# Patient Record
Sex: FEMALE | Race: WHITE | NOT HISPANIC OR LATINO | Employment: FULL TIME | URBAN - METROPOLITAN AREA
[De-identification: names, ages, dates, MRNs, and addresses within clinical notes are randomized per-mention and may not be internally consistent; named-entity substitution may affect disease eponyms.]

---

## 2017-02-22 ENCOUNTER — TRANSCRIBE ORDERS (OUTPATIENT)
Dept: LAB | Facility: CLINIC | Age: 22
End: 2017-02-22

## 2017-02-22 ENCOUNTER — ALLSCRIPTS OFFICE VISIT (OUTPATIENT)
Dept: OTHER | Facility: OTHER | Age: 22
End: 2017-02-22

## 2017-02-22 ENCOUNTER — APPOINTMENT (OUTPATIENT)
Dept: LAB | Facility: CLINIC | Age: 22
End: 2017-02-22
Payer: COMMERCIAL

## 2017-02-22 DIAGNOSIS — K50.90 CROHN'S DISEASE WITHOUT COMPLICATION (HCC): ICD-10-CM

## 2017-02-22 DIAGNOSIS — K50.919 CROHN'S DISEASE WITH COMPLICATION, UNSPECIFIED GASTROINTESTINAL TRACT LOCATION (HCC): ICD-10-CM

## 2017-02-22 DIAGNOSIS — K50.919 CROHN'S DISEASE WITH COMPLICATION, UNSPECIFIED GASTROINTESTINAL TRACT LOCATION (HCC): Primary | ICD-10-CM

## 2017-02-22 LAB
BASOPHILS # BLD AUTO: 0.03 THOUSANDS/ΜL (ref 0–0.1)
BASOPHILS NFR BLD AUTO: 1 % (ref 0–1)
CRP SERPL QL: <3 MG/L
EOSINOPHIL # BLD AUTO: 0.26 THOUSAND/ΜL (ref 0–0.61)
EOSINOPHIL NFR BLD AUTO: 5 % (ref 0–6)
ERYTHROCYTE [DISTWIDTH] IN BLOOD BY AUTOMATED COUNT: 13.8 % (ref 11.6–15.1)
ERYTHROCYTE [SEDIMENTATION RATE] IN BLOOD: 7 MM/HOUR (ref 0–20)
FERRITIN SERPL-MCNC: 6 NG/ML (ref 8–388)
FOLATE SERPL-MCNC: 18.2 NG/ML (ref 3.1–17.5)
HCT VFR BLD AUTO: 37.7 % (ref 34.8–46.1)
HGB BLD-MCNC: 12.6 G/DL (ref 11.5–15.4)
LYMPHOCYTES # BLD AUTO: 1.58 THOUSANDS/ΜL (ref 0.6–4.47)
LYMPHOCYTES NFR BLD AUTO: 33 % (ref 14–44)
MCH RBC QN AUTO: 27.4 PG (ref 26.8–34.3)
MCHC RBC AUTO-ENTMCNC: 33.4 G/DL (ref 31.4–37.4)
MCV RBC AUTO: 82 FL (ref 82–98)
MONOCYTES # BLD AUTO: 0.51 THOUSAND/ΜL (ref 0.17–1.22)
MONOCYTES NFR BLD AUTO: 11 % (ref 4–12)
NEUTROPHILS # BLD AUTO: 2.45 THOUSANDS/ΜL (ref 1.85–7.62)
NEUTS SEG NFR BLD AUTO: 50 % (ref 43–75)
NRBC BLD AUTO-RTO: 0 /100 WBCS
PLATELET # BLD AUTO: 234 THOUSANDS/UL (ref 149–390)
PMV BLD AUTO: 10.5 FL (ref 8.9–12.7)
RBC # BLD AUTO: 4.6 MILLION/UL (ref 3.81–5.12)
VIT B12 SERPL-MCNC: 342 PG/ML (ref 100–900)
WBC # BLD AUTO: 4.83 THOUSAND/UL (ref 4.31–10.16)

## 2017-02-22 PROCEDURE — 85025 COMPLETE CBC W/AUTO DIFF WBC: CPT

## 2017-02-22 PROCEDURE — 82607 VITAMIN B-12: CPT

## 2017-02-22 PROCEDURE — 86140 C-REACTIVE PROTEIN: CPT

## 2017-02-22 PROCEDURE — 82746 ASSAY OF FOLIC ACID SERUM: CPT

## 2017-02-22 PROCEDURE — 82728 ASSAY OF FERRITIN: CPT

## 2017-02-22 PROCEDURE — 36415 COLL VENOUS BLD VENIPUNCTURE: CPT

## 2017-02-22 PROCEDURE — 85652 RBC SED RATE AUTOMATED: CPT

## 2017-05-23 ENCOUNTER — ALLSCRIPTS OFFICE VISIT (OUTPATIENT)
Dept: OTHER | Facility: OTHER | Age: 22
End: 2017-05-23

## 2017-06-30 ENCOUNTER — HOSPITAL ENCOUNTER (EMERGENCY)
Facility: HOSPITAL | Age: 22
Discharge: HOME | End: 2017-06-30
Attending: EMERGENCY MEDICINE | Admitting: EMERGENCY MEDICINE
Payer: COMMERCIAL

## 2017-06-30 VITALS
RESPIRATION RATE: 18 BRPM | OXYGEN SATURATION: 100 % | WEIGHT: 102 LBS | BODY MASS INDEX: 17.42 KG/M2 | SYSTOLIC BLOOD PRESSURE: 109 MMHG | HEIGHT: 64 IN | TEMPERATURE: 98.6 F | HEART RATE: 81 BPM | DIASTOLIC BLOOD PRESSURE: 67 MMHG

## 2017-06-30 DIAGNOSIS — L01.00 IMPETIGO: Primary | ICD-10-CM

## 2017-06-30 PROCEDURE — 99282 EMERGENCY DEPT VISIT SF MDM: CPT

## 2017-06-30 RX ORDER — CEPHALEXIN 500 MG/1
500 CAPSULE ORAL 4 TIMES DAILY
Qty: 40 CAPSULE | Refills: 0 | Status: SHIPPED | OUTPATIENT
Start: 2017-06-30 | End: 2017-07-10

## 2017-12-28 ENCOUNTER — GENERIC CONVERSION - ENCOUNTER (OUTPATIENT)
Dept: OTHER | Facility: OTHER | Age: 22
End: 2017-12-28

## 2017-12-28 ENCOUNTER — TRANSCRIBE ORDERS (OUTPATIENT)
Dept: LAB | Facility: CLINIC | Age: 22
End: 2017-12-28

## 2017-12-28 ENCOUNTER — APPOINTMENT (OUTPATIENT)
Dept: LAB | Facility: CLINIC | Age: 22
End: 2017-12-28
Payer: COMMERCIAL

## 2017-12-28 DIAGNOSIS — L29.8 OTHER PRURITUS: ICD-10-CM

## 2017-12-28 DIAGNOSIS — L29.8 PRURITUS SENILIS: Primary | ICD-10-CM

## 2017-12-28 DIAGNOSIS — L29.8 PRURITUS SENILIS: ICD-10-CM

## 2017-12-28 LAB
ALBUMIN SERPL BCP-MCNC: 4.5 G/DL (ref 3.5–5)
ALP SERPL-CCNC: 62 U/L (ref 46–116)
ALT SERPL W P-5'-P-CCNC: 18 U/L (ref 12–78)
ANION GAP SERPL CALCULATED.3IONS-SCNC: 8 MMOL/L (ref 4–13)
AST SERPL W P-5'-P-CCNC: 22 U/L (ref 5–45)
BASOPHILS # BLD AUTO: 0.06 THOUSANDS/ΜL (ref 0–0.1)
BASOPHILS NFR BLD AUTO: 1 % (ref 0–1)
BILIRUB SERPL-MCNC: 0.76 MG/DL (ref 0.2–1)
BUN SERPL-MCNC: 13 MG/DL (ref 5–25)
CALCIUM SERPL-MCNC: 9.5 MG/DL (ref 8.3–10.1)
CHLORIDE SERPL-SCNC: 102 MMOL/L (ref 100–108)
CO2 SERPL-SCNC: 26 MMOL/L (ref 21–32)
CREAT SERPL-MCNC: 0.58 MG/DL (ref 0.6–1.3)
EOSINOPHIL # BLD AUTO: 0.3 THOUSAND/ΜL (ref 0–0.61)
EOSINOPHIL NFR BLD AUTO: 3 % (ref 0–6)
ERYTHROCYTE [DISTWIDTH] IN BLOOD BY AUTOMATED COUNT: 13.5 % (ref 11.6–15.1)
GFR SERPL CREATININE-BSD FRML MDRD: 133 ML/MIN/1.73SQ M
GLUCOSE SERPL-MCNC: 84 MG/DL (ref 65–140)
HCT VFR BLD AUTO: 40.8 % (ref 34.8–46.1)
HGB BLD-MCNC: 13.8 G/DL (ref 11.5–15.4)
LYMPHOCYTES # BLD AUTO: 3.26 THOUSANDS/ΜL (ref 0.6–4.47)
LYMPHOCYTES NFR BLD AUTO: 29 % (ref 14–44)
MCH RBC QN AUTO: 28.3 PG (ref 26.8–34.3)
MCHC RBC AUTO-ENTMCNC: 33.8 G/DL (ref 31.4–37.4)
MCV RBC AUTO: 84 FL (ref 82–98)
MONOCYTES # BLD AUTO: 0.78 THOUSAND/ΜL (ref 0.17–1.22)
MONOCYTES NFR BLD AUTO: 7 % (ref 4–12)
NEUTROPHILS # BLD AUTO: 6.74 THOUSANDS/ΜL (ref 1.85–7.62)
NEUTS SEG NFR BLD AUTO: 60 % (ref 43–75)
NRBC BLD AUTO-RTO: 0 /100 WBCS
PLATELET # BLD AUTO: 339 THOUSANDS/UL (ref 149–390)
PMV BLD AUTO: 9.9 FL (ref 8.9–12.7)
POTASSIUM SERPL-SCNC: 3.6 MMOL/L (ref 3.5–5.3)
PROT SERPL-MCNC: 9 G/DL (ref 6.4–8.2)
RBC # BLD AUTO: 4.87 MILLION/UL (ref 3.81–5.12)
SODIUM SERPL-SCNC: 136 MMOL/L (ref 136–145)
WBC # BLD AUTO: 11.16 THOUSAND/UL (ref 4.31–10.16)

## 2017-12-28 PROCEDURE — 86704 HEP B CORE ANTIBODY TOTAL: CPT

## 2017-12-28 PROCEDURE — 86706 HEP B SURFACE ANTIBODY: CPT

## 2017-12-28 PROCEDURE — 80074 ACUTE HEPATITIS PANEL: CPT

## 2017-12-28 PROCEDURE — 80053 COMPREHEN METABOLIC PANEL: CPT

## 2017-12-28 PROCEDURE — 86635 COCCIDIOIDES ANTIBODY: CPT

## 2017-12-28 PROCEDURE — 86612 BLASTOMYCES ANTIBODY: CPT

## 2017-12-28 PROCEDURE — 36415 COLL VENOUS BLD VENIPUNCTURE: CPT

## 2017-12-28 PROCEDURE — 86698 HISTOPLASMA ANTIBODY: CPT

## 2017-12-28 PROCEDURE — 85025 COMPLETE CBC W/AUTO DIFF WBC: CPT

## 2017-12-28 PROCEDURE — 86606 ASPERGILLUS ANTIBODY: CPT

## 2017-12-28 PROCEDURE — 87350 HEPATITIS BE AG IA: CPT

## 2017-12-28 PROCEDURE — 86628 CANDIDA ANTIBODY: CPT

## 2017-12-29 LAB
HAV IGM SER QL: NORMAL
HBV CORE AB SER QL: NORMAL
HBV CORE IGM SER QL: NORMAL
HBV E AG SERPL QL IA: NEGATIVE
HBV SURFACE AB SER-ACNC: 125.82 MIU/ML
HBV SURFACE AG SER QL: NORMAL
HCV AB SER QL: NORMAL

## 2017-12-29 PROCEDURE — 36415 COLL VENOUS BLD VENIPUNCTURE: CPT

## 2017-12-30 LAB
B DERMAT AB SER QL ID: NEGATIVE
H CAPSUL AB TITR SER ID: NEGATIVE {TITER}

## 2017-12-31 LAB
A FLAVUS AB SER QL ID: NEGATIVE
A FUMIGATUS AB SER QL ID: NEGATIVE
A NIGER AB SER QL ID: NEGATIVE

## 2018-01-03 LAB
CANDIDA AB IGA: 19 U/ML (ref 0–9)
CANDIDA AB IGG: <30 U/ML (ref 0–29)
CANDIDA AB IGM: <10 U/ML (ref 0–9)

## 2018-01-08 LAB — SCAN RESULT: NORMAL

## 2018-01-09 ENCOUNTER — GENERIC CONVERSION - ENCOUNTER (OUTPATIENT)
Dept: OTHER | Facility: OTHER | Age: 23
End: 2018-01-09

## 2018-01-09 NOTE — RESULT NOTES
Verified Results  (1) SED RATE [id number] 03:26PM [employer] Order Number: [id number]     Test Name Result Flag Reference   SED RATE 29 mm/hour H 0-20     (1) FERRITIN [id number] 03:26PM Candice Shadi Order Number: [id number]  TW Order Number: [id number]     Test Name Result Flag Reference   FERRITIN 8 ng/mL  8-388     (1) CBC/ PLT (NO DIFF) [id number] 03:26PM Candice Shadi Order Number: [id number]  TW Order Number: [id number]     Test Name Result Flag Reference   HEMATOCRIT 36 5 %  34 8-46 1   HEMOGLOBIN 11 5 g/dL  11 5-15 4   MCHC 31 5 g/dL  31 4-37 4   MCH 23 5 pg L 26 8-34 3   MCV 75 fL L 82-98   PLATELET COUNT 313 Thousands/uL H 149-390   RBC COUNT 4 89 Million/uL  3 81-5 12   RDW 15 3 % H 11 6-15 1   WBC COUNT 12 26 Thousand/uL H 4 31-10 16   MPV 9 2 fL  8 9-12 7       Plan  Crohn disease    · PredniSONE 10 MG Oral Tablet;  Take 2 tables BID for two weeks,  Take 1 1/2  tablets BID for two weeks, Take 1 tablet  BID for 2 weeks,  Take 1 tablet daily for two  weeks,

## 2018-01-12 VITALS
BODY MASS INDEX: 17.89 KG/M2 | DIASTOLIC BLOOD PRESSURE: 78 MMHG | SYSTOLIC BLOOD PRESSURE: 116 MMHG | WEIGHT: 101 LBS | HEIGHT: 63 IN

## 2018-01-12 NOTE — RESULT NOTES
Verified Results  (1) FERRITIN 22Jul2016 12:49PM Dennison Marlene Order Number: [id number]  TW Order Number: [id number] Order Number: [id number] Order Number: [id number] Patient Instructions: This is a non fasting blood test  Please drink two glasses of water the morning of test      Test Name Result Flag Reference   FERRITIN 14 ng/mL  8-388     (1) SED RATE 22Jul2016 12:49PM Dylanisco, Frankey Fickle Order Number: [id number]     Test Name Result Flag Reference   SED RATE 25 mm/hour H 0-20     (1) C-REACTIVE PROTEIN 22Jul2016 12:49PM Cordisco, Frankey Fickle Order Number: [id number]  TW Order Number: [id number] Order Number: [id number] Order Number: [id number] Patient Instructions: This is a non fasting blood test  Please drink two glasses of water the morning of test      Test Name Result Flag Reference   C-REACT PROTEIN 39 0 mg/L H <3 0     (1) CBC/PLT/DIFF 22Jul2016 12:49PM [employer] Order Number: [id number]  TW Order Number: [id number] Patient Instructions: This bloodwork is non-fasting  Please drink two glasses of water morning of bloodwork  Test Name Result Flag Reference   WBC COUNT 11 52 Thousand/uL H 4 31-10 16   RBC COUNT 4 73 Million/uL  3 81-5 12   HEMOGLOBIN 10 9 g/dL L 11 5-15 4   HEMATOCRIT 34 9 %  34 8-46  1   MCV 74 fL L 82-98   MCH 23 0 pg L 26 8-34 3   MCHC 31 2 g/dL L 31 4-37 4   RDW 15 3 % H 11 6-15 1   MPV 9 2 fL  8 9-12 7   PLATELET COUNT 039 Thousands/uL H 149-390   nRBC AUTOMATED 0 /100 WBCs     NEUTROPHILS RELATIVE PERCENT 78 % H 43-75   LYMPHOCYTES RELATIVE PERCENT 13 % L 14-44   MONOCYTES RELATIVE PERCENT 7 %  4-12   EOSINOPHILS RELATIVE PERCENT 1 %  0-6   BASOPHILS RELATIVE PERCENT 1 %  0-1   NEUTROPHILS ABSOLUTE COUNT 9 06 Thousands/?L H 1 85-7 62   LYMPHOCYTES ABSOLUTE COUNT 1 50 Thousands/?L  0 60-4 47   MONOCYTES ABSOLUTE COUNT 0 76 Thousand/?L  0 17-1 22 EOSINOPHILS ABSOLUTE COUNT 0 12 Thousand/?L  0 00-0 61   BASOPHILS ABSOLUTE COUNT 0 06 Thousands/?L  0 00-0 10   - Patient Instructions: This bloodwork is non-fasting  Please drink two glasses of water morning of bloodwork  (1) HEPATIC FUNCTION PANEL [id number] 12:49PM BeronicaEllie Order Number: [id number]  TW Order Number: [id number] Order Number: [id number] Order Number: [id number] Patient Instructions: This is a non fasting blood test  Please drink two glasses of water the morning of test      Test Name Result Flag Reference   ALBUMIN 3 4 g/dL L 3 5-5 0   - Patient Instructions:  This is a non fasting blood test  Please drink two glasses of water the morning of test    ALK PHOSPHATAS 88 U/L     ALT (SGPT) 14 U/L  12-78   AST(SGOT) 12 U/L  5-45   BILI, DIRECT 0 14 mg/dL  0 00-0 20   BILI, TOTAL 0 42 mg/dL  0 20-1 00   TOTAL PROTEIN 7 9 g/dL  6 4-8 2

## 2018-01-12 NOTE — RESULT NOTES
Verified Results  (1) FERRITIN 07Apr2016 12:12PM Darlene Navas Order Number: [id number]     Test Name Result Flag Reference   FERRITIN 26 ng/mL  8-388     (1) CBC/ PLT (NO DIFF) 07Apr2016 12:12PM Darlene Navas Order Number: [id number]     Order Number: [id number]     Test Name Result Flag Reference   HEMATOCRIT 36 7 %  34 8-46 1   HEMOGLOBIN 11 9 g/dL  11 5-15 4   MCHC 32 4 g/dL  31 4-37 4   MCH 23 3 pg L 26 8-34 3   MCV 72 fL L 82-98   PLATELET COUNT 749 Thousands/uL H 149-390   RBC COUNT 5 10 Million/uL  3 81-5 12   RDW 16 5 % H 11 6-15 1   WBC COUNT 11 25 Thousand/uL H 4 31-10 16   MPV 9 1 fL  8 9-12 7     (1) SED RATE 07Apr2016 12:12PM Bella Khan    Order Number: [id number]     Test Name Result Flag Reference   SED RATE 30 mm/hour H 0-20       Plan  Crohn disease    · Pentasa 500 MG Oral Capsule Extended Release; TAKE 2 CAPSULES 4 TIMES  DAILY

## 2018-01-13 VITALS
BODY MASS INDEX: 18.07 KG/M2 | SYSTOLIC BLOOD PRESSURE: 118 MMHG | HEIGHT: 63 IN | DIASTOLIC BLOOD PRESSURE: 70 MMHG | WEIGHT: 102 LBS

## 2018-01-17 NOTE — RESULT NOTES
Verified Results  * CT ABDOMEN PELVIS W CONTRAST [id number] 09:27AM Sadia Mendoza Order Number: [id number]     Test Name Result Flag Reference   CT ABDOMEN PELVIS W CONTRAST (Report)     CT ABDOMEN AND PELVIS WITH IV CONTRAST     INDICATION: History of Crohn's disease     COMPARISON: No recent CT of the abdomen and pelvis for comparison  TECHNIQUE: CT examination of the abdomen and pelvis was performed after the administration of intravenous contrast  This examination, like all CT scans performed in the [facility], was performed utilizing techniques to minimize    radiation dose exposure, including the use of iterative reconstruction and automated exposure control  Axial, sagittal, and coronal reformatted images were submitted for interpretation  100 cc of intravenous Omnipaque 350 was administered for this    examination  Enteric contrast was administered  FINDINGS:     ABDOMEN     LOWER CHEST: No significant abnormalities identified in the lower chest  There is a pectus excavatum deformity  LIVER/BILIARY TREE: Unremarkable  GALLBLADDER: No calcified gallstones  No pericholecystic inflammatory change  SPLEEN: Unremarkable  PANCREAS: Unremarkable  ADRENAL GLANDS: Unremarkable  KIDNEYS/URETERS: There is a right renal cyst as reported on prior ultrasound    No hydronephrosis  STOMACH AND BOWEL: There is thickening of the distal ileum including the terminal ileum with surrounding stranding and fluid consistent with active inflammatory bowel disease  There are dilated segments of bowel adjacent to the inflamed segments which    are likely partially obstructed  The obstruction does not extend upstream, however  The possibility of underlying stricture is not excluded though this obstruction may be entirely on the basis of active inflammation   Terminating from the terminal    ileum are at least 2 soft tissue tracks consistent with either sinus tracts or complete fistulas to adjacent small bowel loops  These are best seen on coronal images 38 through 41  APPENDIX: No findings to suggest appendicitis  ABDOMINOPELVIC CAVITY: No ascites or free intraperitoneal air  There is diffuse mesenteric lymphadenopathy  VESSELS: Unremarkable for patient's age  PELVIS     REPRODUCTIVE ORGANS: Unremarkable for patient's age  URINARY BLADDER: Unremarkable  ABDOMINAL WALL/INGUINAL REGIONS: Unremarkable  OSSEOUS STRUCTURES: No acute fracture or destructive osseous lesion  IMPRESSION:     1  Active inflammation of the distal ileum including terminal ileum consistent with regional enteritis  Dilated loops of small bowel between actively inflamed segments are likely partially obstructed though the obstruction does not extend upstream     This may be entirely related to inflammatory disease though the possibility of underlying stricture is not excluded and follow-up imaging following resolution of active inflammation would be required to confirm this diagnosis  2  Soft tissue tracks extending from the terminal ileum either representing sinus tracts or complete fistulae to adjacent small bowel loops  3  Moderate stranding and fluid with diffuse lymphadenopathy in the mesentery compatible with the active inflammatory process       4  Right renal cyst      ##cfslh   I personally discussed this result with Mily Wilburn on 2/18/2016 2:55 PM    ##       Workstation performed: [id number]     Signed by:   Ana Mojica MD   2/18/16   100

## 2018-01-18 NOTE — MISCELLANEOUS
Message  Return to work or school:   Marilee Esparza is under my professional care  She was seen in my office on 04/13/2016             Signatures   Electronically signed by : Ivory Love, [facility];  Apr 13 2016  4:56PM EST                       (Author)

## 2018-01-18 NOTE — RESULT NOTES
Verified Results  (1) CBC/ PLT (NO DIFF) 09Aug2016 11:13AM Nimco Armas Order Number: [id number]     Test Name Result Flag Reference   HEMATOCRIT 36 3 %  34 8-46 1   HEMOGLOBIN 11 4 g/dL L 11 5-15 4   MCHC 31 4 g/dL  31 4-37 4   MCH 23 1 pg L 26 8-34 3   MCV 74 fL L 82-98   PLATELET COUNT 059 Thousands/uL H 149-390   RBC COUNT 4 93 Million/uL  3 81-5 12   RDW 15 3 % H 11 6-15 1   WBC COUNT 8 78 Thousand/uL  4 31-10 16   MPV 9 1 fL  8 9-12 7     (1) FERRITIN 09Aug2016 11:13AM Leona Herrera    Order Number: [id number]     Test Name Result Flag Reference   FERRITIN 11 ng/mL  8-388     (1) SED RATE 09Aug2016 11:13AM Rylie Loco Order Number: [id number]     Test Name Result Flag Reference   SED RATE 28 mm/hour H 0-20     (1) COMPREHENSIVE METABOLIC PANEL [id number] 53:95DE Leona Herrera    Order Number: [id number]     Test Name Result Flag Reference   GLUCOSE,RANDM 85 mg/dL     If the patient is fasting, the ADA then defines impaired fasting glucose as > 100 mg/dL and diabetes as > or equal to 123 mg/dL  SODIUM 137 mmol/L  136-145   POTASSIUM 3 9 mmol/L  3 5-5 3   CHLORIDE 103 mmol/L  100-108   CARBON DIOXIDE 27 mmol/L  21-32   ANION GAP (CALC) 7 mmol/L  4-13   BLOOD UREA NITROGEN 12 mg/dL  5-25   CREATININE 0 52 mg/dL L 0 60-1 30   Standardized to IDMS reference method   CALCIUM 9 1 mg/dL  8 3-10 1   BILI, TOTAL 0 40 mg/dL  0 20-1 00   ALK PHOSPHATAS 86 U/L     ALT (SGPT) 14 U/L  12-78   AST(SGOT) 13 U/L  5-45   ALBUMIN 3 6 g/dL  3 5-5 0   TOTAL PROTEIN 8 4 g/dL H 6 4-8 2   eGFR Non-African American      >60 0 ml/min/1 73sq m   [employer] Disease Education Program recommendations are as follows:  GFR calculation is accurate only with a steady state creatinine  Chronic Kidney disease less than 60 ml/min/1 73 sq  meters  Kidney failure less than 15 ml/min/1 73 sq  meters

## 2018-01-23 NOTE — MISCELLANEOUS
Message  Please excuse Gardenia Galaviz from work from 1/5/2018 - 1/7/2018  she had a serious reaction to medication for a chronic condition  Return to work or school:     Mukeshantonio Kellyskpatel is under my professional care  She was seen in my office on 12/28/2017   She is able to return to work on   1/8/2018            Signatures   Electronically signed by : Fernando Leyden; Jan 9 2018 12:50PM EST                       (Author)

## 2018-01-24 VITALS
HEART RATE: 84 BPM | DIASTOLIC BLOOD PRESSURE: 70 MMHG | BODY MASS INDEX: 17.19 KG/M2 | HEIGHT: 63 IN | SYSTOLIC BLOOD PRESSURE: 120 MMHG | WEIGHT: 97 LBS

## 2018-03-23 ENCOUNTER — TELEPHONE (OUTPATIENT)
Dept: INTERNAL MEDICINE CLINIC | Facility: CLINIC | Age: 23
End: 2018-03-23

## 2019-01-09 ENCOUNTER — TELEPHONE (OUTPATIENT)
Dept: GASTROENTEROLOGY | Facility: CLINIC | Age: 24
End: 2019-01-09

## 2019-01-22 ENCOUNTER — TELEPHONE (OUTPATIENT)
Dept: GASTROENTEROLOGY | Facility: CLINIC | Age: 24
End: 2019-01-22

## 2019-06-20 ENCOUNTER — HOSPITAL ENCOUNTER (EMERGENCY)
Facility: HOSPITAL | Age: 24
Discharge: HOME | End: 2019-06-21
Attending: EMERGENCY MEDICINE | Admitting: EMERGENCY MEDICINE
Payer: COMMERCIAL

## 2019-06-20 VITALS
RESPIRATION RATE: 18 BRPM | TEMPERATURE: 98.5 F | HEART RATE: 91 BPM | SYSTOLIC BLOOD PRESSURE: 125 MMHG | DIASTOLIC BLOOD PRESSURE: 80 MMHG | OXYGEN SATURATION: 99 %

## 2019-06-20 DIAGNOSIS — R53.83 FATIGUE: Primary | ICD-10-CM

## 2019-06-20 PROCEDURE — 99283 EMERGENCY DEPT VISIT LOW MDM: CPT

## 2019-06-20 PROCEDURE — 99282 EMERGENCY DEPT VISIT SF MDM: CPT | Performed by: EMERGENCY MEDICINE

## 2019-06-21 LAB — HETEROPH AB SER QL: NEGATIVE

## 2019-06-21 PROCEDURE — 86308 HETEROPHILE ANTIBODY SCREEN: CPT | Performed by: EMERGENCY MEDICINE

## 2019-06-21 PROCEDURE — 36415 COLL VENOUS BLD VENIPUNCTURE: CPT | Performed by: EMERGENCY MEDICINE

## 2019-06-21 NOTE — DISCHARGE INSTRUCTIONS
You will get a call if your mono screen comes back positive  Follow up with primary care doctor for continued symptoms

## 2019-06-21 NOTE — ED PROVIDER NOTES
History  Chief Complaint   Patient presents with    Fatigue     Per Pt " I am concern I have mon because I was around who was diagnosed "      HPI  56-year-old female presents with mild fatigue for the last week  She states she is concerned that she might have mononucleosis because a friend that she a close contact with was diagnosed recently  Denies sore throat, lymph node swelling, abdominal pain  She is requesting test for mono  Will draw blood test, call if positive  Otherwise she appears well, no concerning signs or symptoms and I recommend that she follow up with primary care  Prior to Admission Medications   Prescriptions Last Dose Informant Patient Reported? Taking? adalimumab (HUMIRA) 40 mg/0 8 mL PSKT   Yes No   Sig: Inject under the skin   mupirocin (BACTROBAN) 2 % nasal ointment   No No   Sig: Apply to skin lesions BID   selenium sulfide (SELSUN) 1 %   No No   Sig: Apply 1 application topically daily for 14 days      Facility-Administered Medications: None       Past Medical History:   Diagnosis Date    [facility]        History reviewed  No pertinent surgical history  History reviewed  No pertinent family history  I have reviewed and agree with the history as documented  Social History     Tobacco Use    Smoking status: Never Smoker    Smokeless tobacco: Never Used   Substance Use Topics    Alcohol use: No    Drug use: Yes     Frequency: 7 0 times per week     Types: Marijuana     Comment: Smokes weed daily         Review of Systems   Constitutional: Negative for chills and fever  HENT: Negative for dental problem and ear pain  Eyes: Negative for pain and redness  Respiratory: Negative for cough and shortness of breath  Cardiovascular: Negative for chest pain and palpitations  Gastrointestinal: Negative for abdominal pain and nausea  Endocrine: Negative for polydipsia and polyphagia  Genitourinary: Negative for dysuria and frequency     Musculoskeletal: Negative for arthralgias and joint swelling  Skin: Negative for color change and rash  Neurological: Negative for dizziness and headaches  Psychiatric/Behavioral: Negative for behavioral problems and confusion  All other systems reviewed and are negative  Physical Exam  Physical Exam   Constitutional: She is oriented to person, place, and time  She appears well-developed and well-nourished  No distress  HENT:   Head: Atraumatic  Right Ear: External ear normal    Left Ear: External ear normal    Nose: Nose normal    Eyes: Pupils are equal, round, and reactive to light  Conjunctivae and EOM are normal    Neck: Normal range of motion  Neck supple  No JVD present  Cardiovascular: Normal rate, regular rhythm and normal heart sounds  No murmur heard  Pulmonary/Chest: Effort normal and breath sounds normal  No respiratory distress  She has no wheezes  Abdominal: Soft  Bowel sounds are normal  She exhibits no distension  There is no tenderness  Musculoskeletal: Normal range of motion  She exhibits no edema  Neurological: She is alert and oriented to person, place, and time  No cranial nerve deficit  Skin: Skin is warm and dry  Capillary refill takes less than 2 seconds  She is not diaphoretic  Psychiatric: She has a normal mood and affect  Her behavior is normal    Nursing note and vitals reviewed        Vital Signs  ED Triage Vitals [06/20/19 2241]   Temperature Pulse Respirations Blood Pressure SpO2   98 5 °F (36 9 °C) 91 18 125/80 99 %      Temp Source Heart Rate Source Patient Position - Orthostatic VS BP Location FiO2 (%)   Oral Monitor Sitting Right arm --      Pain Score       --           Vitals:    06/20/19 2241   BP: 125/80   Pulse: 91   Patient Position - Orthostatic VS: Sitting         Visual Acuity      ED Medications  Medications - No data to display    Diagnostic Studies  Results Reviewed     Procedure Component Value Units Date/Time    Mononucleosis screen [[id number]] Collected: 06/21/19 0017    Lab Status: In process Specimen:  Blood from Arm, Right Updated:  06/21/19 0019                 No orders to display              Procedures  Procedures       ED Course                               MDM    Disposition  Final diagnoses:   Fatigue     Time reflects when diagnosis was documented in both MDM as applicable and the Disposition within this note     Time User Action Codes Description Comment    6/21/2019 12:18 AM Rangel Chidijaky Add [R53 83] Fatigue       ED Disposition     ED Disposition Condition Date/Time Comment    Discharge Stable Fri Jun 21, 2019 12:18 AM Maggie Ybarra discharge to home/self care  Follow-up Information     Follow up With Specialties Details Why Contact Info    Femi Maurer, [address] [facility], Nurse Practitioner Schedule an appointment as soon as possible for a visit  for primary care follow up [address] [place] [address]  [phone]            Discharge Medication List as of 6/21/2019 12:20 AM      CONTINUE these medications which have NOT CHANGED    Details   adalimumab (HUMIRA) 40 mg/0 8 mL PSKT Inject under the skin, Starting Thu 12/1/2016, Historical Med      mupirocin (BACTROBAN) 2 % nasal ointment Apply to skin lesions BID, Print      selenium sulfide (SELSUN) 1 % Apply 1 application topically daily for 14 days, Starting Fri 6/30/2017, Until Fri 7/14/2017, Print           No discharge procedures on file      ED Provider  Electronically Signed by           Mesfin Doyle MD  06/21/19 0111

## 2019-10-17 ENCOUNTER — HOSPITAL ENCOUNTER (EMERGENCY)
Facility: HOSPITAL | Age: 24
End: 2019-10-17
Attending: EMERGENCY MEDICINE
Payer: COMMERCIAL

## 2019-10-17 VITALS
BODY MASS INDEX: 20.07 KG/M2 | HEART RATE: 70 BPM | SYSTOLIC BLOOD PRESSURE: 113 MMHG | TEMPERATURE: 97.8 F | OXYGEN SATURATION: 99 % | DIASTOLIC BLOOD PRESSURE: 72 MMHG | RESPIRATION RATE: 17 BRPM | WEIGHT: 113.32 LBS

## 2019-10-17 DIAGNOSIS — F32.A DEPRESSION WITH SUICIDAL IDEATION: Primary | ICD-10-CM

## 2019-10-17 DIAGNOSIS — T42.4X2A INTENTIONAL BENZODIAZEPINE OVERDOSE, INITIAL ENCOUNTER (HCC): ICD-10-CM

## 2019-10-17 DIAGNOSIS — N39.0 URINARY TRACT INFECTION: ICD-10-CM

## 2019-10-17 DIAGNOSIS — R45.851 DEPRESSION WITH SUICIDAL IDEATION: Primary | ICD-10-CM

## 2019-10-17 LAB
AMPHETAMINES SERPL QL SCN: NEGATIVE
ANION GAP SERPL CALCULATED.3IONS-SCNC: 10 MMOL/L (ref 4–13)
ATRIAL RATE: 95 BPM
BACTERIA UR QL AUTO: ABNORMAL /HPF
BARBITURATES UR QL: NEGATIVE
BENZODIAZ UR QL: POSITIVE
BILIRUB UR QL STRIP: ABNORMAL
BUN SERPL-MCNC: 7 MG/DL (ref 5–25)
CALCIUM SERPL-MCNC: 8.7 MG/DL (ref 8.3–10.1)
CHLORIDE SERPL-SCNC: 102 MMOL/L (ref 100–108)
CLARITY UR: ABNORMAL
CO2 SERPL-SCNC: 26 MMOL/L (ref 21–32)
COCAINE UR QL: NEGATIVE
COLOR UR: ABNORMAL
CREAT SERPL-MCNC: 0.96 MG/DL (ref 0.6–1.3)
ETHANOL EXG-MCNC: 0 MG/DL
EXT PREG TEST URINE: NEGATIVE
EXT. CONTROL ED NAV: NORMAL
GFR SERPL CREATININE-BSD FRML MDRD: 84 ML/MIN/1.73SQ M
GLUCOSE SERPL-MCNC: 113 MG/DL (ref 65–140)
GLUCOSE UR STRIP-MCNC: NEGATIVE MG/DL
HGB UR QL STRIP.AUTO: ABNORMAL
KETONES UR STRIP-MCNC: NEGATIVE MG/DL
LEUKOCYTE ESTERASE UR QL STRIP: ABNORMAL
METHADONE UR QL: NEGATIVE
NITRITE UR QL STRIP: NEGATIVE
NON-SQ EPI CELLS URNS QL MICRO: ABNORMAL /HPF
OPIATES UR QL SCN: NEGATIVE
P AXIS: 48 DEGREES
PCP UR QL: NEGATIVE
PH UR STRIP.AUTO: 7 [PH]
POTASSIUM SERPL-SCNC: 3.6 MMOL/L (ref 3.5–5.3)
PR INTERVAL: 166 MS
PROT UR STRIP-MCNC: ABNORMAL MG/DL
QRS AXIS: 70 DEGREES
QRSD INTERVAL: 74 MS
QT INTERVAL: 356 MS
QTC INTERVAL: 447 MS
RBC #/AREA URNS AUTO: ABNORMAL /HPF
S PYO AG THROAT QL: NEGATIVE
SODIUM SERPL-SCNC: 138 MMOL/L (ref 136–145)
SP GR UR STRIP.AUTO: 1.01 (ref 1–1.03)
T WAVE AXIS: 21 DEGREES
THC UR QL: POSITIVE
UROBILINOGEN UR QL STRIP.AUTO: 1 E.U./DL
VENTRICULAR RATE: 95 BPM
WBC #/AREA URNS AUTO: ABNORMAL /HPF

## 2019-10-17 PROCEDURE — 80307 DRUG TEST PRSMV CHEM ANLYZR: CPT | Performed by: EMERGENCY MEDICINE

## 2019-10-17 PROCEDURE — 99285 EMERGENCY DEPT VISIT HI MDM: CPT | Performed by: EMERGENCY MEDICINE

## 2019-10-17 PROCEDURE — 80048 BASIC METABOLIC PNL TOTAL CA: CPT | Performed by: EMERGENCY MEDICINE

## 2019-10-17 PROCEDURE — 87186 SC STD MICRODIL/AGAR DIL: CPT | Performed by: EMERGENCY MEDICINE

## 2019-10-17 PROCEDURE — 81025 URINE PREGNANCY TEST: CPT | Performed by: EMERGENCY MEDICINE

## 2019-10-17 PROCEDURE — NC001 PR NO CHARGE: Performed by: EMERGENCY MEDICINE

## 2019-10-17 PROCEDURE — 87086 URINE CULTURE/COLONY COUNT: CPT | Performed by: EMERGENCY MEDICINE

## 2019-10-17 PROCEDURE — 99285 EMERGENCY DEPT VISIT HI MDM: CPT

## 2019-10-17 PROCEDURE — 36415 COLL VENOUS BLD VENIPUNCTURE: CPT | Performed by: EMERGENCY MEDICINE

## 2019-10-17 PROCEDURE — 93010 ELECTROCARDIOGRAM REPORT: CPT | Performed by: INTERNAL MEDICINE

## 2019-10-17 PROCEDURE — 87430 STREP A AG IA: CPT | Performed by: EMERGENCY MEDICINE

## 2019-10-17 PROCEDURE — 81001 URINALYSIS AUTO W/SCOPE: CPT | Performed by: EMERGENCY MEDICINE

## 2019-10-17 PROCEDURE — 93005 ELECTROCARDIOGRAM TRACING: CPT

## 2019-10-17 PROCEDURE — 82075 ASSAY OF BREATH ETHANOL: CPT | Performed by: EMERGENCY MEDICINE

## 2019-10-17 RX ORDER — IBUPROFEN 400 MG/1
800 TABLET ORAL EVERY 8 HOURS PRN
Status: CANCELLED | OUTPATIENT
Start: 2019-10-17

## 2019-10-17 RX ORDER — CEPHALEXIN 250 MG/1
500 CAPSULE ORAL EVERY 12 HOURS SCHEDULED
Status: DISCONTINUED | OUTPATIENT
Start: 2019-10-17 | End: 2019-10-17

## 2019-10-17 RX ORDER — ACETAMINOPHEN 325 MG/1
650 TABLET ORAL ONCE
Status: COMPLETED | OUTPATIENT
Start: 2019-10-17 | End: 2019-10-17

## 2019-10-17 RX ORDER — BENZTROPINE MESYLATE 1 MG/ML
2 INJECTION INTRAMUSCULAR; INTRAVENOUS EVERY 6 HOURS PRN
Status: CANCELLED | OUTPATIENT
Start: 2019-10-17

## 2019-10-17 RX ORDER — CEPHALEXIN 250 MG/1
500 CAPSULE ORAL ONCE
Status: COMPLETED | OUTPATIENT
Start: 2019-10-17 | End: 2019-10-17

## 2019-10-17 RX ORDER — ACETAMINOPHEN 325 MG/1
650 TABLET ORAL EVERY 6 HOURS PRN
Status: CANCELLED | OUTPATIENT
Start: 2019-10-17

## 2019-10-17 RX ORDER — MAGNESIUM HYDROXIDE/ALUMINUM HYDROXICE/SIMETHICONE 120; 1200; 1200 MG/30ML; MG/30ML; MG/30ML
15 SUSPENSION ORAL EVERY 4 HOURS PRN
Status: CANCELLED | OUTPATIENT
Start: 2019-10-17

## 2019-10-17 RX ORDER — HALOPERIDOL 5 MG/ML
5 INJECTION INTRAMUSCULAR EVERY 8 HOURS PRN
Status: CANCELLED | OUTPATIENT
Start: 2019-10-17

## 2019-10-17 RX ORDER — HALOPERIDOL 5 MG
5 TABLET ORAL EVERY 8 HOURS PRN
Status: CANCELLED | OUTPATIENT
Start: 2019-10-17

## 2019-10-17 RX ORDER — RISPERIDONE 1 MG/1
1 TABLET, ORALLY DISINTEGRATING ORAL EVERY 12 HOURS PRN
Status: CANCELLED | OUTPATIENT
Start: 2019-10-17

## 2019-10-17 RX ORDER — CEPHALEXIN 250 MG/1
500 CAPSULE ORAL EVERY 12 HOURS SCHEDULED
Status: DISCONTINUED | OUTPATIENT
Start: 2019-10-17 | End: 2019-10-18 | Stop reason: HOSPADM

## 2019-10-17 RX ORDER — LORAZEPAM 1 MG/1
2 TABLET ORAL EVERY 2 HOUR PRN
Status: CANCELLED | OUTPATIENT
Start: 2019-10-17

## 2019-10-17 RX ORDER — ACETAMINOPHEN 325 MG/1
325 TABLET ORAL EVERY 6 HOURS PRN
Status: CANCELLED | OUTPATIENT
Start: 2019-10-17

## 2019-10-17 RX ORDER — BENZTROPINE MESYLATE 1 MG/1
2 TABLET ORAL EVERY 6 HOURS PRN
Status: CANCELLED | OUTPATIENT
Start: 2019-10-17

## 2019-10-17 RX ORDER — HYDROXYZINE HYDROCHLORIDE 25 MG/1
25 TABLET, FILM COATED ORAL EVERY 6 HOURS PRN
Status: CANCELLED | OUTPATIENT
Start: 2019-10-17

## 2019-10-17 RX ORDER — IBUPROFEN 600 MG/1
600 TABLET ORAL ONCE
Status: COMPLETED | OUTPATIENT
Start: 2019-10-17 | End: 2019-10-17

## 2019-10-17 RX ORDER — ACETAMINOPHEN 325 MG/1
650 TABLET ORAL EVERY 4 HOURS PRN
Status: CANCELLED | OUTPATIENT
Start: 2019-10-17

## 2019-10-17 RX ADMIN — CEPHALEXIN 500 MG: 250 CAPSULE ORAL at 22:25

## 2019-10-17 RX ADMIN — CEPHALEXIN 500 MG: 250 CAPSULE ORAL at 04:51

## 2019-10-17 RX ADMIN — IBUPROFEN 600 MG: 600 TABLET ORAL at 11:09

## 2019-10-17 RX ADMIN — ACETAMINOPHEN 650 MG: 325 TABLET, FILM COATED ORAL at 11:10

## 2019-10-17 NOTE — ED NOTES
S/O telling patient that she has a choice to stay or leave; pt making verbal threats to run out when s/o leaves  Security called-talking with s/o about disrupting patients care        Danny Loco RN  10/17/19 3009

## 2019-10-17 NOTE — ED NOTES
Pt's mother requested when the patient would be seen by Crisis worker  Explained to parent that the patient will be seen by the crisis worker as soon as possible as there are other evaluations on going  Patients mother, sister and significant other are in the room with her at this time       Eze Crespo  10/17/19 Levon Rivera  10/17/19 0729

## 2019-10-17 NOTE — LETTER
[address]  [place] [address]  Dept: [phone]            EMTALA TRANSFER CONSENT    NAME Sarah Rowland                                         1995                              MRN [id number]    I have been informed of my rights regarding examination, treatment, and transfer   by Dr Susie Hdz DO    Benefits: Continuity of care    Risks: Potential for delay in receiving treatment      { ED EMTALA TRANSFER CHOICES:[id number]}    I authorize the performance of emergency medical procedures and treatments upon me in both transit and upon arrival at the receiving facility  Additionally, I authorize the release of any and all medical records to the receiving facility and request they be transported with me, if possible  I understand that the safest mode of transportation during a medical emergency is an ambulance and that the Hospital advocates the use of this mode of transport  Risks of traveling to the receiving facility by car, including absence of medical control, life sustaining equipment, such as oxygen, and medical personnel has been explained to me and I fully understand them  (CARLITOS CORRECT BOX BELOW)  [ X ]  I consent to the stated transfer and to be transported by ambulance/helicopter  [  ]  I consent to the stated transfer, but refuse transportation by ambulance and accept full responsibility for my transportation by car    I understand the risks of non-ambulance transfers and I exonerate the Hospital and its staff from any deterioration in my condition that results from this refusal     X___________________________________________    DATE  10/17/19  TIME__21:45______  Signature of patient or legally responsible individual signing on patient behalf           RELATIONSHIP TO PATIENT_________________________                                    Provider Certification    NAME Sarah Rowland  1995                              MRN [id number]    A medical screening exam was performed on the above named patient  Based on the examination:    Condition Necessitating Transfer The primary encounter diagnosis was Depression with suicidal ideation  Diagnoses of Urinary tract infection and Intentional benzodiazepine overdose, initial encounter [facility]) were also pertinent to this visit  Patient Condition: The patient has been stabilized such that within reasonable medical probability, no material deterioration of the patient condition or the condition of the unborn child(ed) is likely to result from the transfer    Reason for Transfer: Level of Care needed not available at this facility    [facility]   · Space available and qualified personnel available for treatment as acknowledged by ANGELO Bowen @ [phone]  · Agreed to accept transfer and to provide appropriate medical treatment as acknowledged by       Dr Mateus Celaya  · Appropriate medical records of the examination and treatment of the patient are provided at the time of transfer   [address] [facility], [address] ___A  A ____  · Transfer will be performed by qualified personnel from [facility]  and appropriate transfer equipment as required, including the use of necessary and appropriate life support measures      Provider Certification: I have examined the patient and explained the following risks and benefits of being transferred/refusing transfer to the patient/family:  General risk, such as traffic hazards, adverse weather conditions, rough terrain or turbulence, possible failure of equipment (including vehicle or aircraft), or consequences of actions of persons outside the control of the transport personnel      Based on these reasonable risks and benefits to the patient and/or the unborn child(ed), and based upon the information available at the time of the patients examination, I certify that the medical benefits reasonably to be expected from the provision of appropriate medical treatments at another medical facility outweigh the increasing risks, if any, to the individuals medical condition, and in the case of labor to the unborn child, from effecting the transfer      X____________________________________________ DATE 10/17/19        TIME_______      ORIGINAL - SEND TO MEDICAL RECORDS   COPY - SEND WITH PATIENT DURING TRANSFER

## 2019-10-17 NOTE — ED NOTES
Physician made aware that mother abruptly refused the patient sign in, stating that "she is taking her home"  Physician, and this writer, at bedside with patient and mother and sister  Mother indicating that "this sounds like group home, and I am going to get my  involved"  Mother continued to manipulate the patient into not signing a 12  Patient's sister is extremely level headed and is advocating for tx at this time  Physician made the patient aware that a 2  would be petitioned if she were unwilling to sign in  Physician also made mother aware that she is unable to return to the patients room  Security made aware  Patient signed 12 after further discussion with crisis and sister at bedside  201 sent to  intake for review in indra Billy LMSW  10/17/19  5086

## 2019-10-17 NOTE — ED NOTES
Mother informed pt not to sign 12  Requesting to take pt home into her care  Provider made aware        Gladys Holm RN  10/17/19 4949

## 2019-10-17 NOTE — ED NOTES
Pt is a 27 y o  female who presented to the ED due to increased depression and a suicide attempt by ingesting numerous tablets of xanax along with wine and marijuana  Patient admits to feeling depressed, but does not indicate what the stressors are  Per sister/mother, the patient has very erratic episodes and can be very labile  Family stated that the patient is linked with a psychologist, but is not on medications  Sister stated that the patient has a very difficult time being alone, and has admitted to "loving xanax"  Family is concerned that the patient is addicted to xanax, which she consumes from the street  Mother indicated that the patient has Crohn's Disease, since being in college  Patient appears to have poor insight into her behaviors, and attempts to undermine this writer and the physician during the assessment  Patient denies homicidal ideation and denies this being a suicide attempt, despite several statements made to the physician last evening  Sister at bedside is extremely helpful in advocating for care, admitting that the patient has made statements that she would drive her car into a tree, and mentioned that there were numerous text messages sent between the patient and her fiance which indicated that she was going to harm herself and was"tired with life"  Patient agreeable to signing a 201  Chief Complaint   Patient presents with    Psychiatric Evaluation     Pt presents to the ED via EMS reporting depression that started 2 weeks ago  Pt has been taking someone elses xanax 2mg each tablet and took 10 tablets throughout the day along with a glass of wine and smoked "two joints"  Pt reports SI stating "thats why I took everything I took becuase I know you cant mix them"  Pt further reported this is her second attempt with her first attempt a week ago  Intake Assessment completed, Safety Risk Assessment completed      Zackery Sherif, TONY  10/17/19  7900

## 2019-10-17 NOTE — ED NOTES
Pt on 1:1  Continual observation by this writer   Room stripped and environment is safe for [facility] patient      Amelia Parada  10/17/19 0149       Galvan Reyna Montelongo  10/17/19 0150

## 2019-10-17 NOTE — ED NOTES
Pt now voicing threats that she hopes my brakes fail on the way home & calling out "stupid bitch"       Elissa Sam RN  10/17/19 8342

## 2019-10-17 NOTE — ED NOTES
Per sister, the patient's grandmother committed suicide, and during the discussion with the patients mother, the mother admits that she is a suicide survivor       Wesley Gonzalez, TONY  10/17/19  6647

## 2019-10-17 NOTE — ED NOTES
Pt transferred from ED 28 to [facility] as per charge       Marylen Rattan  10/17/19 0072

## 2019-10-17 NOTE — LETTER
Section I - General Information    Name of Patient: Jose Juan Neumann                 : 1995    Medicare #:____________________  Transport Date: 10/17/19 (PCS is valid for round trips on this date and for all repetitive trips in the 60-day range as noted below )  Origin: [address]                                                         Destination:___-[facility]_____________________________________________  Is the pt's stay covered under Medicare Part A (PPS/DRG)     (_) YES  (X_) NO  Closest appropriate facility? (X_) YES  (_) NO  If no, why is transport to more distant facility required?________________________  If hosp-hosp transfer, describe services needed at 2nd facility not available at 1st facility? _[facility]______________________  If hospice pt, is this transport related to pt's terminal illness? (_) YES (_) NO Describe____________________________________    Section II - Medical Necessity Questionnaire  Ambulance transportation is medically necessary only if other means of transport are contraindicated or would be potentially harmful to the patient  To meet this requirement, the patient must either be "bed confined" or suffer from a condition such that transport by means other than ambulance is contraindicated by the patient's condition   The following questions must be answered by the medical professional signing below for this form to be valid:    1)  Describe the MEDICAL CONDITION (physical and/or mental) of this patient AT [address] [place] [address] that requires the patient to be transported in an ambulance and why transport by other means is contraindicated by the patient's condition:____SI______________________________________________________________________________________________    2) Is the patient "bed confined" as defined below?     (_) YES  (X_) NO  To be "be confined" the patient must satisfy all three of the following conditions: (1) unable Fexofenadine-Pseudoephedrine (ALLEGRA-D 24 HOUR PO) Take by mouth   Yes Historical Provider, MD   PARoxetine (PAXIL) 20 MG tablet Take 30 mg by mouth every morning    Yes Historical Provider, MD   palbociclib (IBRANCE) 125 MG capsule Take 125 mg by mouth daily . One pill daily at the same time each day for 21 days. Off 7 days, then restart. 12/8/17   Tory Stevenson MD   levothyroxine (SYNTHROID) 112 MCG tablet Take 112 mcg by mouth Daily    Historical Provider, MD   alendronate (FOSAMAX) 70 MG tablet Take 70 mg by mouth every 7 days    Historical Provider, MD       This is a 48 y.o. female who is  pleasant, cooperative, alert and oriented x3, in no acute distress. Heart: Heart sounds are normal.  Regular rate and rhythm without murmur, gallop or rub. Lungs:clear to auscultation without wheezes or rales  No increased work of breathing, good air exchange, clear to auscultation bilaterally, no crackles or wheezing  Abdomen: soft, nontender, nondistended, no masses or organomegaly. Labs:  Recent Labs      12/14/17   0916   PLT  274   INR  0.9   PROTIME  9.6*   APTT  25.2       KRISTI Iraheta-BC  Electronically signed 12/14/2017 at 9:40 Miriam Marcelo MD Physician Signed   Progress Notes Date of Service: 12/1/2017  8:20 AM   Related encounter: Office Visit from 12/1/2017 in [address] [facility]    []Hide copied text  []Hover for attribution information       ONCOLOGY NOTE     PCP: Eder Link MD  Referring Provider: No ref.  provider found     PAST MEDICAL HISTORY:  Past Medical History             Diagnosis Date    Depressive disorder, not elsewhere classified      Malignant neoplasm of breast (female), unspecified site 0 and 1/02      R breast  checmo (Vincent) and RadX     Melanoma of skin, site unspecified 1/02    Unspecified disorder of autonomic nervous system      Unspecified essential hypertension      Unspecified hypothyroidism   to get up from bed without Assistance; AND (2) unable to ambulate; AND (3) unable to sit in a chair or wheelchair  3) Can this patient safely be transported by car or wheelchair [place] (i e , seated during transport without a medical attendant or monitoring)?   (_) YES  (X_) NO    4) In addition to completing questions 1-3 above, please check any of the following conditions that apply*:  *Note: supporting documentation for any boxes checked must be maintained in the patient's medical records  (_)Contractures   (_)Non-Healed Fractures  (_)Patient is confused (_)Patient is comatose (_)Moderate/severe pain on movement (X_)Danger to self/others  (_)IV meds/fluids required (_)Patient is combative(_)Need or possible need for restraints (_)DVT requires elevation of lower extremity  (_)Medical attendant required (_)Requires oxygen-unable to self administer (_)Special handling/isolation/infection control precautions required (_)Unable to tolerate seated position for time needed to transport (_)Hemodynamic monitoring required en route (_)Unable to sit in a chair or wheelchair due to decubitus ulcers or other wounds (_)Cardiac monitoring required en route (_)Morbid obesity requires additional personnel/equipment to safely handle patient (_)Orthopedic device (backboard, halo, pins, traction, brace, wedge, etc,) requiring special handling during transport (_)Other(specify)_______________________________________________    Section III - Signature of Physician or Healthcare Professional  I certify that the above information is true and correct based on my evaluation of this patient, and represent that the patient requires transport by ambulance and that other forms of transport are contraindicated   I understand that this information will be used by the Centers for Medicare and Medicaid Services (CMS) to support the determination of medical necessity for ambulance services, and I represent that I have personal knowledge of the patient's condition at time of transport  (_) If this box is checked, I also certify that the patient is physically or mentally incapable of signing the ambulance service's claim and that the institution with which I am affiliated has furnished care, services, or assistance to the patient  My signature below is made on behalf of the patient pursuant to 42 CFR §424 36(b)(4)  In accordance with 42 CFR §424 37, the specific reason(s) that the patient is physically or mentally incapable of signing the claim form is as follows: _________________________________________________________________________________________________________      Signature of Physician* or Healthcare Professional______________________________________________________________  Signature Date 10/17/19 (For scheduled repetitive transports, this form is not valid for transports performed more than 60 days after this date)    Printed Name & Credentials of Physician or Healthcare Professional (MD, DO, RN, etc )___A  BERNARDO Lee__________  *Form must be signed by patient's attending physician for scheduled, repetitive transports   For non-repetitive, unscheduled ambulance transports, if unable to obtain the signature of the attending physician, any of the following may sign (choose appropriate option below)  (_) Physician Assistant (_)  Clinical Nurse Specialist (_)  Registered Nurse  (_)  Nurse Practitioner  (X_) Discharge Planner Mass at the sternum is SUV of 8.1 is 2.8 x 1.5 cm and is obstructive into the bone. There is mild subcarinal and right hilar lymphadenopathy within the mediastinum. There are mildly enlarged pathologic appearing lymph nodes in internal drug or veins. There is a mass in the right adrenal gland 3 x 2.3 cm with SUV of 4.1. Implications of same were discussed with patient given the number of sites involve I suspect we are dealing with late relapsing phuong receptor positive likely to be HER-2 negative metastatic breast cancer. This remains to be proven.     Review of Systems   Constitutional: Negative. HENT:   Positive for sore throat. Eyes: Negative. Respiratory: Negative. Cardiovascular: Negative. Gastrointestinal: Negative. Endocrine: Negative. Genitourinary: Negative. Musculoskeletal: Negative. Skin: Negative. Neurological: Negative. Hematological: Negative.     Psychiatric/Behavioral: Negative.          PHYSICAL EXAM:       Vitals:     12/01/17 0826   BP: (!) 125/95   Pulse: 67   Resp: 16   Temp: 98.3 °F (36.8 °C)         Physical Exam     LABS:         Results for orders placed or performed during the hospital encounter of 10/27/17   TSH with Reflex   Result Value Ref Range     TSH 0.13 (L) 0.30 - 5.00 mIU/L   Cortisol   Result Value Ref Range     Cortisol 6.8 2.7 - 18.4 ug/dL     Cortisol Collection Info NOT REPORTED     Comprehensive Metabolic Panel   Result Value Ref Range     Glucose 93 70 - 99 mg/dL     BUN 14 6 - 20 mg/dL     CREATININE 0.68 0.50 - 0.90 mg/dL     Bun/Cre Ratio NOT REPORTED 9 - 20     Calcium 8.9 8.6 - 10.4 mg/dL     Sodium 140 135 - 144 mmol/L     Potassium 4.5 3.7 - 5.3 mmol/L     Chloride 102 98 - 107 mmol/L     CO2 25 20 - 31 mmol/L     Anion Gap 13 9 - 17 mmol/L     Alkaline Phosphatase 70 35 - 104 U/L     ALT 37 (H) 5 - 33 U/L     AST 27 <32 U/L     Total Bilirubin 0.48 0.3 - 1.2 mg/dL     Total Protein 7.8 6.4 - 8.3 g/dL     Alb 4.1 3.5 - 5.2 g/dL   Albumin/Globulin Ratio 1.1 1.0 - 2.5     GFR Non-African American >60 >60 mL/min     GFR African American >60 >60 mL/min     GFR Comment            GFR Staging NOT REPORTED     T4, Free   Result Value Ref Range     Thyroxine, Free 1.53 0.93 - 1.70 ng/dL         IMPRESSION:      1. History of cancer of right breast    2. Recurrent breast cancer ER positive [place] negative HER-2/héctor status unknown involving the right lateral sternal border overlaying the sternum and distracted into the sternum. Metastatic disease to multiple sites of lymph nodes likely and bilateral supraclavicular internal jugular right hilar subcarinal nodes. Possible right adrenal gland metastases noted. #3     post menopausal status noted. Agent post right modified radical cystectomy with reconstructed breast.         Patient Active Problem List   Diagnosis    HTN (hypertension)    Autonomic dysfunction    MR (mitral regurgitation)    AI (aortic insufficiency)    Chest pain         PLAN:      1. Have again discussed genetic counseling referral patient is agreeable to same. 2. We'll arrange for right adrenal gland biopsy to obtain whether this is metastatic breast cancer and if so HER-2/héctor status. If negative we'll then proceed with normal biopsies in the chest.  3. Head discussed beginning therapy his workup is ongoing and Will start Faslodex per standard of care. We'll add Ibrance  4.  Return to clinic after biopsy to discuss results.        Electronically signed by Coco Arnold MD on

## 2019-10-17 NOTE — ED NOTES
Pt provided large cup of water and encouraged to drink plenty of water for hydration        [place], [address] [place] [address]  10/17/19 6965

## 2019-10-17 NOTE — ED NOTES
Pts s/o asked to not be in the bed with patient under the covers  S/O cooperative and moved herself to the visitors chair; pt re-informed that everything is being recorded  Pt then held up her middle finger towards the camera        Mara Page RN  10/17/19 4137

## 2019-10-17 NOTE — ED PROVIDER NOTES
History  Chief Complaint   Patient presents with    Psychiatric Evaluation     Pt presents to the ED via EMS reporting depression that started 2 weeks ago  Pt has been taking someone elses xanax 2mg each tablet and took 10 tablets throughout the day along with a glass of wine and smoked "two joints"  Pt reports SI stating "thats why I took everything I took becuase I know you cant mix them"  Pt further reported this is her second attempt with her first attempt a week ago  70-year-old female presents via EMS for psychiatric evaluation, presents with her fiance  She admits to being depressed  She took 10 x 2mg Xanax tablets tonight along with smoking to joints of marijuana  No other drug use with these medications  She states that she did this with attempts to harm or kill herself  She states that she has been depressed  She does see outpatient counseling however it is only once a week  She has never been on any psychiatric medications in the past   She has never been hospitalized for psychiatric conditions  She seems agreeable to stay in the hospital  No thoughts of harming anyone else  Prior to Admission Medications   Prescriptions Last Dose Informant Patient Reported? Taking? adalimumab (HUMIRA) 40 mg/0 8 mL PSKT   Yes No   Sig: Inject under the skin   mupirocin (BACTROBAN) 2 % nasal ointment   No No   Sig: Apply to skin lesions BID   Patient not taking: Reported on 10/18/2019   selenium sulfide (SELSUN) 1 %   No No   Sig: Apply 1 application topically daily for 14 days   Patient not taking: Reported on 10/18/2019      Facility-Administered Medications: None       Past Medical History:   Diagnosis Date    Anxiety     [facility] ([facility] [address] [facility]     Depression     [facility]        History reviewed  No pertinent surgical history  History reviewed  No pertinent family history  I have reviewed and agree with the history as documented      Social History     Tobacco Use    Smoking status: Never Smoker    Smokeless tobacco: Never Used   Substance Use Topics    Alcohol use: Yes     Frequency: 2-3 times a week     Drinks per session: 5 or 6     Binge frequency: Weekly     Comment: pt reports 12 drinks 3 times a week    Drug use: Yes     Frequency: 7 0 times per week     Types: Marijuana     Comment: Smokes weed daily         Review of Systems   Constitutional: Negative for chills and fever  HENT: Positive for sore throat  Negative for congestion and rhinorrhea  Respiratory: Negative for chest tightness and shortness of breath  Cardiovascular: Negative for chest pain and leg swelling  Gastrointestinal: Negative for abdominal pain, nausea and vomiting  Musculoskeletal: Negative for back pain and neck pain  Skin: Negative for wound  Neurological: Negative for dizziness and headaches  Psychiatric/Behavioral: Positive for dysphoric mood, self-injury, sleep disturbance and suicidal ideas  Negative for confusion  Physical Exam  Physical Exam   Constitutional: She is oriented to person, place, and time  She appears well-developed and well-nourished  No distress  HENT:   Head: Normocephalic and atraumatic  Mouth/Throat: Oropharynx is clear and moist  No oropharyngeal exudate (Erythema without exudate)  Eyes: Conjunctivae and EOM are normal    Neck: Normal range of motion  Cardiovascular: Normal rate and regular rhythm  Pulmonary/Chest: Effort normal  No respiratory distress  Musculoskeletal: Normal range of motion  Neurological: She is alert and oriented to person, place, and time  No cranial nerve deficit  Skin: Skin is warm and dry  She is not diaphoretic  No pallor  Psychiatric:   Depressed  Poor eye contact  Flat affect  Vitals reviewed        Vital Signs  ED Triage Vitals   Temperature Pulse Respirations Blood Pressure SpO2   10/17/19 0132 10/17/19 0132 10/17/19 0132 10/17/19 0132 10/17/19 0132   99 1 °F (37 3 °C) 96 15 123/71 98 %      Temp Source Heart Rate Source Patient Position - Orthostatic VS BP Location FiO2 (%)   10/17/19 0132 10/17/19 0132 10/17/19 0458 10/17/19 0132 --   Oral Monitor Lying Right arm       Pain Score       10/17/19 0132       No Pain           Vitals:    10/17/19 0842 10/17/19 1337 10/17/19 1728 10/17/19 2130   BP: 106/63 110/66 105/71 113/72   Pulse: 94 88 65 70   Patient Position - Orthostatic VS: Lying Lying Lying          Visual Acuity      ED Medications  Medications   cephalexin (KEFLEX) capsule 500 mg (500 mg Oral Given 10/17/19 0451)   acetaminophen (TYLENOL) tablet 650 mg (650 mg Oral Given 10/17/19 1110)   ibuprofen (MOTRIN) tablet 600 mg (600 mg Oral Given 10/17/19 1109)       Diagnostic Studies  Results Reviewed     Procedure Component Value Units Date/Time    Urine culture [[id number]]  (Abnormal)  (Susceptibility) Collected:  10/17/19 0231    Lab Status:  Final result Specimen:  Urine Updated:  10/20/19 0905     Urine Culture 10,000-19,000 cfu/ml Staphylococcus coagulase negative      80,000-89,000 cfu/ml     Susceptibility     Staphylococcus coagulase negative (1)     Antibiotic Interpretation Microscan Method Status    ZID Performed  Yes  LUIS A Final    Ampicillin ($$) Resistant <=2 00 ug/ml LUIS A Final    Cefazolin ($) Susceptible <=4 00 ug/ml LUIS A Final    Gentamicin ($$) Susceptible <=1 ug/ml LUIS A Final    Nitrofurantoin Susceptible <=32 ug/ml LUIS A Final    Oxacillin Susceptible <=0 25 ug/ml LUIS A Final    Tetracycline Resistant >8 ug/ml LUIS A Final    Trimethoprim + Sulfamethoxazole ($$$) Susceptible <=0 5/9 5 ug/ml LUIS A Final    Vancomycin ($) Susceptible 1 00 ug/ml LUIS A Final                   Basic metabolic panel [[id number]] Collected:  10/17/19 1337    Lab Status:  Final result Specimen:  Blood from Arm, Right Updated:  10/17/19 1358     Sodium 138 mmol/L      Potassium 3 6 mmol/L      Chloride 102 mmol/L      CO2 26 mmol/L      ANION GAP 10 mmol/L      BUN 7 mg/dL      Creatinine 0 96 mg/dL      Glucose 113 mg/dL Calcium 8 7 mg/dL      eGFR 84 ml/min/1 73sq m     Narrative:       [employer] guidelines for Chronic Kidney Disease (CKD):     Stage 1 with normal or high GFR (GFR > 90 mL/min/1 73 square meters)    Stage 2 Mild CKD (GFR = 60-89 mL/min/1 73 square meters)    Stage 3A Moderate CKD (GFR = 45-59 mL/min/1 73 square meters)    Stage 3B Moderate CKD (GFR = 30-44 mL/min/1 73 square meters)    Stage 4 Severe CKD (GFR = 15-29 mL/min/1 73 square meters)    Stage 5 End Stage CKD (GFR <15 mL/min/1 73 square meters)  Note: GFR calculation is accurate only with a steady state creatinine    Urine Microscopic [[id number]]  (Abnormal) Collected:  10/17/19 0231    Lab Status:  Final result Specimen:  Urine Updated:  10/17/19 0406     RBC, UA 10-20 /hpf      WBC, UA 10-20 /hpf      Epithelial Cells Moderate /hpf      Bacteria, UA Moderate /hpf     UA w Reflex to Microscopic w Reflex to Culture [[id number]]  (Abnormal) Collected:  10/17/19 0231    Lab Status:  Final result Specimen:  Urine Updated:  10/17/19 0405     Color, UA Cherry     Clarity, UA Cloudy     Specific Gravity, UA 1 015     pH, UA 7 0     Leukocytes, UA Small     Nitrite, UA Negative     Protein,  (2+) mg/dl      Glucose, UA Negative mg/dl      Ketones, UA Negative mg/dl      Urobilinogen, UA 1 0 E U /dl      Bilirubin, UA Small     Blood, UA Large    Rapid Strep A Screen Only, Adults [[id number]]  (Normal) Collected:  10/17/19 0342    Lab Status:  Final result Specimen:  Throat Updated:  10/17/19 0359     Rapid Strep A Screen Negative    Rapid drug screen, urine [[id number]]  (Abnormal) Collected:  10/17/19 0231    Lab Status:  Final result Specimen:  Urine, Clean Catch Updated:  10/17/19 0305     Amph/Meth UR Negative     Barbiturate Ur Negative     Benzodiazepine Urine Positive     Cocaine Urine Negative     Methadone Urine Negative     Opiate Urine Negative     PCP Ur Negative     THC Urine Positive    Narrative:       Presumptive report  If requested, specimen will be sent to reference lab for confirmation  FOR MEDICAL PURPOSES ONLY  IF CONFIRMATION NEEDED PLEASE CONTACT THE LAB WITHIN 5 DAYS  Drug Screen Cutoff Levels:  AMPHETAMINE/METHAMPHETAMINES  1000 ng/mL  BARBITURATES     200 ng/mL  BENZODIAZEPINES     200 ng/mL  COCAINE      300 ng/mL  METHADONE      300 ng/mL  OPIATES      300 ng/mL  PHENCYCLIDINE     25 ng/mL  THC       50 ng/mL      POCT pregnancy, urine [[id number]]  (Normal) Resulted:  10/17/19 0239    Lab Status:  Final result Specimen:  Urine Updated:  10/17/19 0239     EXT PREG TEST UR (Ref: Negative) negative     Control valid    POCT alcohol breath test [[id number]]  (Normal) Resulted:  10/17/19 0205    Lab Status:  Final result Updated:  10/17/19 0205     EXTBreath Alcohol 0 000                 No orders to display              Procedures  ECG 12 Lead Documentation Only  Date/Time: 10/17/2019 2:51 AM  Performed by: Shaylee Ramey DO  Authorized by: Shaylee Ramey DO     Indications / Diagnosis:  Overdose  ECG reviewed by me, the ED Provider: yes    Patient location:  ED  Previous ECG:     Previous ECG:  Unavailable    Comparison to cardiac monitor: Yes    Interpretation:     Interpretation: normal    Rate:     ECG rate:  95    ECG rate assessment: normal    Rhythm:     Rhythm: sinus rhythm    Ectopy:     Ectopy: none    QRS:     QRS axis:  Normal    QRS intervals:  Normal  Conduction:     Conduction: normal    ST segments:     ST segments:  Normal  T waves:     T waves: inverted      Inverted:  III           ED Course  ED Course as of Oct 23 0858   Thu Oct 17, 2019   0213 EXTBreath Alcohol: 0 000   0327 BENZODIAZEPINE URINE(!): Positive   0327 THC URINE(!): Positive   0446 Bacteria, UA(!): Moderate                               MDM  Number of Diagnoses or Management Options  Diagnosis management comments: 19-year-old female, depressed, suicidal attempt    Patient will require hospitalization for psychiatric care  If she is agreeable to sign a 201, she will be voluntarily hospitalized  If not she will require a 302 given her suicide attempt  Crisis evaluation and treatment for placement in the morning  Amount and/or Complexity of Data Reviewed  Clinical lab tests: ordered and reviewed        Disposition  Final diagnoses:   Depression with suicidal ideation   Urinary tract infection     Time reflects when diagnosis was documented in both MDM as applicable and the Disposition within this note     Time User Action Codes Description Comment    10/17/2019  4:47 AM Jesus Tolbert Add [F32 9,  Y93 565] Depression with suicidal ideation     10/17/2019  4:47 AM Diallo Tolbert Add [N39 0] Urinary tract infection     10/17/2019  8:28 PM Diamond Flaherty Add [T42 4X2A] Intentional benzodiazepine overdose, initial encounter [facility])       ED Disposition     ED Disposition Condition Date/Time Comment    Transfer to Another Facility  Thu Oct 17, 2019 10:53 PM Deatra Dues should be transferred out to [address] MD Documentation      Most Recent Value   Patient Condition  The patient has been stabilized such that within reasonable medical probability, no material deterioration of the patient condition or the condition of the unborn child(ed) is likely to result from the transfer   Reason for Transfer  Level of Care needed not available at this facility   Benefits of Transfer  Continuity of care   Risks of Transfer  Potential for delay in receiving treatment   Accepting Physician  Dr Marcelo Davidson Name, [address] Laura Hsieh    (Name & Tel number)  ANGELO Ansari Beny @ [phone]   Transported by (Company and Unit #)  [employer]   Provider Certification  General risk, such as traffic hazards, adverse weather conditions, rough terrain or turbulence, possible failure of equipment (including vehicle or aircraft), or consequences of actions of persons outside the control of the transport personnel      RN Documentation      [facility] Name, [address] Laura Hsieh    (Name & Tel number)  Mirela Patricia @ [phone]   Transport Mode  Ambulance   Transported by [employer] and Unit #)  [facility]   Level of Care  Basic life support   Transfer Date  10/17/19   Transfer Time  2230      Follow-up Information    None         Discharge Medication List as of 10/17/2019 10:54 PM      CONTINUE these medications which have NOT CHANGED    Details   adalimumab (HUMIRA) 40 mg/0 8 mL PSKT Inject under the skin, Starting Thu 12/1/2016, Historical Med      mupirocin (BACTROBAN) 2 % nasal ointment Apply to skin lesions BID, Print      selenium sulfide (SELSUN) 1 % Apply 1 application topically daily for 14 days, Starting Fri 6/30/2017, Until Fri 7/14/2017, Print           No discharge procedures on file      ED Provider  Electronically Signed by           Arjun Gerard DO  10/23/19 3232

## 2019-10-17 NOTE — ED PROVIDER NOTES
I was asked to come and speak with the patient and her family over concerns with 201 vs 302 process  Her mother did not want her to sign and wanted to take her home to pursue outpatient care  They were told that this was not an option given that her initial evaluating provider felt that she would meet criteria for 302  The patient lacks insight into the consequences of her actions and statements  Patient is an adult and needs to make her own decision regarding 201 after being given her rights and options  For this reason her mother was asked to stay in the waiting room as she was escalating the situation  Patient's sister was reasonable and supportive and was allowed to accompany patient in the room as she did not cause an additional stress during this situation  201 signed with crisis and signed by myself           Girma Schmid DO  10/17/19 2111

## 2019-10-17 NOTE — ED NOTES
Pt did not like hamburger; requesting quesadilla; order placed        Mara Page, GREGORIA  10/17/19 7569

## 2019-10-17 NOTE — ED PROVIDER NOTES
History  Chief Complaint   Patient presents with    Psychiatric Evaluation     Pt presents to the ED via EMS reporting depression that started 2 weeks ago  Pt has been taking someone elses xanax 2mg each tablet and took 10 tablets throughout the day along with a glass of wine and smoked "two joints"  Pt reports SI stating "thats why I took everything I took becuase I know you cant mix them"  Pt further reported this is her second attempt with her first attempt a week ago  The patient is medically clear for psychiatric admission  Prior to Admission Medications   Prescriptions Last Dose Informant Patient Reported? Taking? adalimumab (HUMIRA) 40 mg/0 8 mL PSKT   Yes No   Sig: Inject under the skin   mupirocin (BACTROBAN) 2 % nasal ointment   No No   Sig: Apply to skin lesions BID   selenium sulfide (SELSUN) 1 %   No No   Sig: Apply 1 application topically daily for 14 days      Facility-Administered Medications: None       Past Medical History:   Diagnosis Date    Crohn disease ([facility] [address] )        History reviewed  No pertinent surgical history  History reviewed  No pertinent family history  I have reviewed and agree with the history as documented      Social History     Tobacco Use    Smoking status: Never Smoker    Smokeless tobacco: Never Used   Substance Use Topics    Alcohol use: Yes     Comment: pt reports 12 drinks a day    Drug use: Yes     Frequency: 7 0 times per week     Types: Marijuana     Comment: Smokes weed daily         Review of Systems    Physical Exam  Physical Exam    Vital Signs  ED Triage Vitals   Temperature Pulse Respirations Blood Pressure SpO2   10/17/19 0132 10/17/19 0132 10/17/19 0132 10/17/19 0132 10/17/19 0132   99 1 °F (37 3 °C) 96 15 123/71 98 %      Temp Source Heart Rate Source Patient Position - Orthostatic VS BP Location FiO2 (%)   10/17/19 0132 10/17/19 0132 10/17/19 0458 10/17/19 0132 --   Oral Monitor Lying Right arm       Pain Score       10/17/19 0132       No Pain           Vitals:    10/17/19 0458 10/17/19 0842 10/17/19 1337 10/17/19 1728   BP: 100/54 106/63 110/66 105/71   Pulse: 87 94 88 65   Patient Position - Orthostatic VS: Lying Lying Lying Lying         Visual Acuity      ED Medications  Medications   cephalexin (KEFLEX) capsule 500 mg (has no administration in time range)   cephalexin (KEFLEX) capsule 500 mg (500 mg Oral Given 10/17/19 0451)   acetaminophen (TYLENOL) tablet 650 mg (650 mg Oral Given 10/17/19 1110)   ibuprofen (MOTRIN) tablet 600 mg (600 mg Oral Given 10/17/19 1109)       Diagnostic Studies  Results Reviewed     Procedure Component Value Units Date/Time    Basic metabolic panel [[id number]] Collected:  10/17/19 1337    Lab Status:  Final result Specimen:  Blood from Arm, Right Updated:  10/17/19 1358     Sodium 138 mmol/L      Potassium 3 6 mmol/L      Chloride 102 mmol/L      CO2 26 mmol/L      ANION GAP 10 mmol/L      BUN 7 mg/dL      Creatinine 0 96 mg/dL      Glucose 113 mg/dL      Calcium 8 7 mg/dL      eGFR 84 ml/min/1 73sq m     Narrative:       [place] guidelines for Chronic Kidney Disease (CKD):     Stage 1 with normal or high GFR (GFR > 90 mL/min/1 73 square meters)    Stage 2 Mild CKD (GFR = 60-89 mL/min/1 73 square meters)    Stage 3A Moderate CKD (GFR = 45-59 mL/min/1 73 square meters)    Stage 3B Moderate CKD (GFR = 30-44 mL/min/1 73 square meters)    Stage 4 Severe CKD (GFR = 15-29 mL/min/1 73 square meters)    Stage 5 End Stage CKD (GFR <15 mL/min/1 73 square meters)  Note: GFR calculation is accurate only with a steady state creatinine    Urine Microscopic [[id number]]  (Abnormal) Collected:  10/17/19 0231    Lab Status:  Final result Specimen:  Urine Updated:  10/17/19 0406     RBC, UA 10-20 /hpf      WBC, UA 10-20 /hpf      Epithelial Cells Moderate /hpf      Bacteria, UA Moderate /hpf     Urine culture [[id number]] Collected:  10/17/19 0231    Lab Status:   In process Specimen:  Urine Updated: 10/17/19 0406    UA w Reflex to Microscopic w Reflex to Culture [[id number]]  (Abnormal) Collected:  10/17/19 0231    Lab Status:  Final result Specimen:  Urine Updated:  10/17/19 0405     Color, UA Cherry     Clarity, UA Cloudy     Specific Gravity, UA 1 015     pH, UA 7 0     Leukocytes, UA Small     Nitrite, UA Negative     Protein,  (2+) mg/dl      Glucose, UA Negative mg/dl      Ketones, UA Negative mg/dl      Urobilinogen, UA 1 0 E U /dl      Bilirubin, UA Small     Blood, UA Large    Rapid Strep A Screen Only, Adults [[id number]]  (Normal) Collected:  10/17/19 0342    Lab Status:  Final result Specimen:  Throat Updated:  10/17/19 0359     Rapid Strep A Screen Negative    Rapid drug screen, urine [[id number]]  (Abnormal) Collected:  10/17/19 0231    Lab Status:  Final result Specimen:  Urine, Clean Catch Updated:  10/17/19 0305     Amph/Meth UR Negative     Barbiturate Ur Negative     Benzodiazepine Urine Positive     Cocaine Urine Negative     Methadone Urine Negative     Opiate Urine Negative     PCP Ur Negative     THC Urine Positive    Narrative:       Presumptive report  If requested, specimen will be sent to reference lab for confirmation  FOR MEDICAL PURPOSES ONLY  IF CONFIRMATION NEEDED PLEASE CONTACT THE LAB WITHIN 5 DAYS      Drug Screen Cutoff Levels:  AMPHETAMINE/METHAMPHETAMINES  1000 ng/mL  BARBITURATES     200 ng/mL  BENZODIAZEPINES     200 ng/mL  COCAINE      300 ng/mL  METHADONE      300 ng/mL  OPIATES      300 ng/mL  PHENCYCLIDINE     25 ng/mL  THC       50 ng/mL      POCT pregnancy, urine [[id number]]  (Normal) Resulted:  10/17/19 0239    Lab Status:  Final result Specimen:  Urine Updated:  10/17/19 0239     EXT PREG TEST UR (Ref: Negative) negative     Control valid    POCT alcohol breath test [[id number]]  (Normal) Resulted:  10/17/19 0205    Lab Status:  Final result Updated:  10/17/19 0205     EXTBreath Alcohol 0 000                 No orders to display Procedures  Procedures       ED Course                               MDM    Disposition  Final diagnoses:   Depression with suicidal ideation   Urinary tract infection     Time reflects when diagnosis was documented in both MDM as applicable and the Disposition within this note     Time User Action Codes Description Comment    10/17/2019  4:47 AM Juany Tolbert  Add [F32 9,  R48 851] Depression with suicidal ideation     10/17/2019  4:47 AM Jeremiah Tolbert Add [N39 0] Urinary tract infection       ED Disposition     None      MD Documentation      Most Recent Value   Sending MD  Dr Melodie Gordon    None         Patient's Medications   Discharge Prescriptions    No medications on file     No discharge procedures on file      ED Provider  Electronically Signed by           Aníbal Fitzgerald DO  10/17/19 4453

## 2019-10-18 ENCOUNTER — HOSPITAL ENCOUNTER (INPATIENT)
Facility: HOSPITAL | Age: 24
LOS: 1 days | Discharge: HOME | DRG: 882 | End: 2019-10-18
Attending: PSYCHIATRY & NEUROLOGY | Admitting: PSYCHIATRY & NEUROLOGY
Payer: COMMERCIAL

## 2019-10-18 VITALS
BODY MASS INDEX: 19.49 KG/M2 | WEIGHT: 110 LBS | OXYGEN SATURATION: 97 % | DIASTOLIC BLOOD PRESSURE: 56 MMHG | HEART RATE: 70 BPM | SYSTOLIC BLOOD PRESSURE: 96 MMHG | HEIGHT: 63 IN | RESPIRATION RATE: 16 BRPM | TEMPERATURE: 98.7 F

## 2019-10-18 DIAGNOSIS — T42.4X2A INTENTIONAL BENZODIAZEPINE OVERDOSE, INITIAL ENCOUNTER (HCC): ICD-10-CM

## 2019-10-18 PROBLEM — N30.00 ACUTE CYSTITIS WITHOUT HEMATURIA: Status: ACTIVE | Noted: 2019-10-18

## 2019-10-18 PROBLEM — Z00.8 MEDICAL CLEARANCE FOR PSYCHIATRIC ADMISSION: Status: ACTIVE | Noted: 2019-10-18

## 2019-10-18 PROBLEM — F43.25 ADJUSTMENT DISORDER WITH MIXED DISTURBANCE OF EMOTIONS AND CONDUCT: Status: ACTIVE | Noted: 2019-10-18

## 2019-10-18 PROBLEM — Z78.9 ADMITS TO ALCOHOL USE: Status: ACTIVE | Noted: 2019-10-18

## 2019-10-18 PROBLEM — K50.90 CROHN'S DISEASE WITHOUT COMPLICATION (HCC): Status: ACTIVE | Noted: 2019-10-18

## 2019-10-18 PROBLEM — F13.10 BENZODIAZEPINE ABUSE (HCC): Status: ACTIVE | Noted: 2019-10-18

## 2019-10-18 PROCEDURE — 99251 PR INITL INPATIENT CONSULT NEW/ESTAB PT 20 MIN: CPT | Performed by: INTERNAL MEDICINE

## 2019-10-18 PROCEDURE — 99222 1ST HOSP IP/OBS MODERATE 55: CPT | Performed by: PSYCHIATRY & NEUROLOGY

## 2019-10-18 RX ORDER — HYDROXYZINE HYDROCHLORIDE 25 MG/1
25 TABLET, FILM COATED ORAL EVERY 6 HOURS PRN
Status: DISCONTINUED | OUTPATIENT
Start: 2019-10-18 | End: 2019-10-18 | Stop reason: HOSPADM

## 2019-10-18 RX ORDER — OLANZAPINE 10 MG/1
10 INJECTION, POWDER, LYOPHILIZED, FOR SOLUTION INTRAMUSCULAR
Status: DISCONTINUED | OUTPATIENT
Start: 2019-10-18 | End: 2019-10-18 | Stop reason: HOSPADM

## 2019-10-18 RX ORDER — HALOPERIDOL 5 MG/ML
5 INJECTION INTRAMUSCULAR EVERY 8 HOURS PRN
Status: DISCONTINUED | OUTPATIENT
Start: 2019-10-18 | End: 2019-10-18

## 2019-10-18 RX ORDER — ACETAMINOPHEN 325 MG/1
650 TABLET ORAL EVERY 4 HOURS PRN
Status: DISCONTINUED | OUTPATIENT
Start: 2019-10-18 | End: 2019-10-18 | Stop reason: HOSPADM

## 2019-10-18 RX ORDER — ACETAMINOPHEN 325 MG/1
650 TABLET ORAL EVERY 6 HOURS PRN
Status: DISCONTINUED | OUTPATIENT
Start: 2019-10-18 | End: 2019-10-18

## 2019-10-18 RX ORDER — BENZTROPINE MESYLATE 1 MG/1
2 TABLET ORAL EVERY 6 HOURS PRN
Status: DISCONTINUED | OUTPATIENT
Start: 2019-10-18 | End: 2019-10-18

## 2019-10-18 RX ORDER — MAGNESIUM HYDROXIDE/ALUMINUM HYDROXICE/SIMETHICONE 120; 1200; 1200 MG/30ML; MG/30ML; MG/30ML
15 SUSPENSION ORAL EVERY 4 HOURS PRN
Status: DISCONTINUED | OUTPATIENT
Start: 2019-10-18 | End: 2019-10-18 | Stop reason: HOSPADM

## 2019-10-18 RX ORDER — HALOPERIDOL 5 MG
5 TABLET ORAL EVERY 8 HOURS PRN
Status: DISCONTINUED | OUTPATIENT
Start: 2019-10-18 | End: 2019-10-18

## 2019-10-18 RX ORDER — RISPERIDONE 1 MG/1
1 TABLET, ORALLY DISINTEGRATING ORAL EVERY 4 HOURS PRN
Status: DISCONTINUED | OUTPATIENT
Start: 2019-10-18 | End: 2019-10-18 | Stop reason: HOSPADM

## 2019-10-18 RX ORDER — BENZTROPINE MESYLATE 1 MG/ML
1 INJECTION INTRAMUSCULAR; INTRAVENOUS EVERY 6 HOURS PRN
Status: DISCONTINUED | OUTPATIENT
Start: 2019-10-18 | End: 2019-10-18 | Stop reason: HOSPADM

## 2019-10-18 RX ORDER — IBUPROFEN 800 MG/1
800 TABLET ORAL EVERY 8 HOURS PRN
Status: DISCONTINUED | OUTPATIENT
Start: 2019-10-18 | End: 2019-10-18

## 2019-10-18 RX ORDER — ACETAMINOPHEN 325 MG/1
325 TABLET ORAL EVERY 6 HOURS PRN
Status: DISCONTINUED | OUTPATIENT
Start: 2019-10-18 | End: 2019-10-18

## 2019-10-18 RX ORDER — LORAZEPAM 1 MG/1
2 TABLET ORAL EVERY 2 HOUR PRN
Status: DISCONTINUED | OUTPATIENT
Start: 2019-10-18 | End: 2019-10-18

## 2019-10-18 RX ORDER — LORAZEPAM 1 MG/1
2 TABLET ORAL EVERY 2 HOUR PRN
Status: DISCONTINUED | OUTPATIENT
Start: 2019-10-18 | End: 2019-10-18 | Stop reason: HOSPADM

## 2019-10-18 RX ORDER — CEPHALEXIN 500 MG/1
500 CAPSULE ORAL EVERY 12 HOURS SCHEDULED
Qty: 4 CAPSULE | Refills: 0 | Status: SHIPPED | OUTPATIENT
Start: 2019-10-18 | End: 2019-10-20

## 2019-10-18 RX ORDER — RISPERIDONE 1 MG/1
1 TABLET, ORALLY DISINTEGRATING ORAL EVERY 12 HOURS PRN
Status: DISCONTINUED | OUTPATIENT
Start: 2019-10-18 | End: 2019-10-18

## 2019-10-18 RX ORDER — HALOPERIDOL 5 MG
5 TABLET ORAL EVERY 8 HOURS PRN
Status: DISCONTINUED | OUTPATIENT
Start: 2019-10-18 | End: 2019-10-18 | Stop reason: HOSPADM

## 2019-10-18 RX ORDER — ACETAMINOPHEN 325 MG/1
975 TABLET ORAL EVERY 6 HOURS PRN
Status: DISCONTINUED | OUTPATIENT
Start: 2019-10-18 | End: 2019-10-18 | Stop reason: HOSPADM

## 2019-10-18 RX ORDER — CEPHALEXIN 500 MG/1
500 CAPSULE ORAL EVERY 12 HOURS SCHEDULED
Status: DISCONTINUED | OUTPATIENT
Start: 2019-10-18 | End: 2019-10-18 | Stop reason: HOSPADM

## 2019-10-18 RX ORDER — ACETAMINOPHEN 325 MG/1
650 TABLET ORAL EVERY 6 HOURS PRN
Status: DISCONTINUED | OUTPATIENT
Start: 2019-10-18 | End: 2019-10-18 | Stop reason: HOSPADM

## 2019-10-18 RX ORDER — ACETAMINOPHEN 325 MG/1
650 TABLET ORAL EVERY 4 HOURS PRN
Status: DISCONTINUED | OUTPATIENT
Start: 2019-10-18 | End: 2019-10-18

## 2019-10-18 RX ORDER — HYDROXYZINE HYDROCHLORIDE 25 MG/1
50 TABLET, FILM COATED ORAL EVERY 4 HOURS PRN
Status: DISCONTINUED | OUTPATIENT
Start: 2019-10-18 | End: 2019-10-18 | Stop reason: HOSPADM

## 2019-10-18 RX ORDER — HYDROXYZINE HYDROCHLORIDE 25 MG/1
25 TABLET, FILM COATED ORAL EVERY 6 HOURS PRN
Status: DISCONTINUED | OUTPATIENT
Start: 2019-10-18 | End: 2019-10-18

## 2019-10-18 RX ORDER — HALOPERIDOL 5 MG/ML
5 INJECTION INTRAMUSCULAR EVERY 8 HOURS PRN
Status: DISCONTINUED | OUTPATIENT
Start: 2019-10-18 | End: 2019-10-18 | Stop reason: HOSPADM

## 2019-10-18 RX ORDER — BENZTROPINE MESYLATE 1 MG/ML
2 INJECTION INTRAMUSCULAR; INTRAVENOUS EVERY 6 HOURS PRN
Status: DISCONTINUED | OUTPATIENT
Start: 2019-10-18 | End: 2019-10-18

## 2019-10-18 RX ORDER — BENZTROPINE MESYLATE 1 MG/1
1 TABLET ORAL EVERY 6 HOURS PRN
Status: DISCONTINUED | OUTPATIENT
Start: 2019-10-18 | End: 2019-10-18 | Stop reason: HOSPADM

## 2019-10-18 RX ADMIN — CEPHALEXIN 500 MG: 500 CAPSULE ORAL at 11:41

## 2019-10-18 NOTE — PROGRESS NOTES
Provided paper prescription for keflex  Pt provided education and importance of compliance with antibiotic therapy  Printed education provided UTI

## 2019-10-18 NOTE — PLAN OF CARE
Problem: Risk for Self Injury/Neglect  Goal: Verbalize thoughts and feelings  Description  Interventions:  - Assess and re-assess patient's lethality and potential for self-injury  - Engage patient in 1:1 interactions, daily, for a minimum of 15 minutes  - Encourage patient to express feelings, fears, frustrations, hopes  - Establish rapport/trust with patient   Outcome: Progressing  Goal: Attend and participate in unit activities, including therapeutic, recreational, and educational groups  Description  Interventions:  - Provide therapeutic and educational activities daily, encourage attendance and participation, and document same in the medical record  - Obtain collateral information, encourage visitation and family involvement in care   Outcome: Progressing  Goal: Complete daily ADLs, including personal hygiene independently, as able  Description  Interventions:  - Observe, teach, and assist patient with ADLS  - Monitor and promote a balance of rest/activity, with adequate nutrition and elimination  Outcome: Progressing     Problem: Depression  Goal: Treatment Goal: Demonstrate behavioral control of depressive symptoms, verbalize feelings of improved mood/affect, and adopt new coping skills prior to discharge  Outcome: Progressing  Goal: Refrain from harming self  Description  Interventions:  - Monitor patient closely, per order   - Supervise medication ingestion, monitor effects and side effects   Outcome: Progressing  Goal: Refrain from self-neglect  Outcome: Progressing     Problem: Anxiety  Goal: Anxiety is at manageable level  Description  Interventions:  - Assess and monitor patient's anxiety level  - Monitor for signs and symptoms (heart palpitations, chest pain, shortness of breath, headaches, nausea, feeling jumpy, restlessness, irritable, apprehensive)  - Collaborate with interdisciplinary team and initiate plan and interventions as ordered    - Manor patient to unit/surroundings  - Explain treatment plan  - Encourage participation in care  - Encourage verbalization of concerns/fears  - Identify coping mechanisms  - Assist in developing anxiety-reducing skills  - Administer/offer alternative therapies  - Limit or eliminate stimulants  Outcome: Progressing

## 2019-10-18 NOTE — ED NOTES
Insurance Authorization for admission:   [facility]  Phone number:  [phone]  Spoke to [employer]      7 days approved  Level of care: Inpatient Psych Tx (201)  Review on 10/24  Authorization # [id number]         UR to leave clinical on care manager's v/m  [phone] Ext [phone]  EVS (Eligibility Verification System) called - [phone]  Automated system indicates: not eligible for MA  Insurance Authorization for Transportation:    Phone call placed to Provider Services  Phone number [phone]  Spoke to Vic  Authorization #: Not required for inpatient acute tx  Reference # [id number]      Michael Sparks LMSW  10/18/19  1648

## 2019-10-18 NOTE — ED NOTES
Insurance Authorization for admission:   [facility]  Phone number:  [phone]  Spoke to [employer]      7 days approved  Level of care: Inpatient Psych Tx (201)  Review on 10/24  Authorization # [id number]         UR to leave clinical on care manager's v/m  [phone] Ext [phone]  EVS (Eligibility Verification System) called - [phone]  Automated system indicates: not eligible for MA  Insurance Authorization for Transportation:    Phone call placed to Provider Services  Phone number [phone]  Spoke to Vic  Authorization #: Not required for inpatient acute tx  Reference # [id number]      Yeison Merchant LMSW  10/18/19  7939

## 2019-10-18 NOTE — PROGRESS NOTES
10/18/19 1348   Team Meeting   Meeting Type Tx Team Meeting   Initial Conference Date 10/18/19   Next Conference Date 11/16/19   Team Members Present   Team Members Present Physician;Nurse;   Physician Team Member Dr Robin Sahu MD   Nursing Team Member Marielos Chadwick, GREGORIA   Social Work Team Member Loretta Petty [place]   Patient/Family Present   Patient Present Yes   Patient's Family Present No     Purpose of the treatment team meeting explained  Pt's plan is to continue with therapy  Pt would like to get back in school but wants to do so when ready so she doesn't waist anytime  RN spoke to pt about finishing the antibiotic and not to skip any doses  SW discharge recommendation of outpatient substance abuse treatment  Psychiatrist states that she will only see a psychiatrist if she wants to start medications  SW informed her that resources for psychiatry, PCP, and substance abuse are on the discharge plan  Pt discharging today AMA  Girlfriend picking her up shortly  Pt recognized for attending all groups on the unit since admission      Goals:  Discharge planning  Ineffective Coping  Substance abuse

## 2019-10-18 NOTE — QUICK NOTE
Notified by staff that patient received 1 dose of keflex in ED yesterday for suspected UTI  Will continue 3 days course of keflex for now pending urine culture

## 2019-10-18 NOTE — PROGRESS NOTES
Reviewed discharge instructions and after care appointments  Pt verbalized understanding  Personal belongings inventoried and sent on discharge  Girlfriend picked up to transport home

## 2019-10-18 NOTE — ED NOTES
CW prepared pt's 201 document, EMTALA and Medical Necessity forms for transport      Shahram Aguileras [facility], [address]  10/17/19 21:48
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
## 2019-10-18 NOTE — PROGRESS NOTES
Doctor Jess Santiago was made aware that patient triggers a Moderate Risk on Suicide Risk Assessment Rating  It was decided that Constant Observation was no longer required  Patient will be maintained on Q 7 minute safety checks

## 2019-10-18 NOTE — ED NOTES
[facility]  Patient is accepted by Dr Lan per Naomi Cuff  Transportation is arranged with Amanda Moon of [address]  Transportation is scheduled for 22:30  Patient may go to the floor at 23:30       CW informed Caroline Nath C of Intake of pt's ETA  Nurse report is to be called to [phone] prior to patient transfer      Johanna Leach, [facility], [address]  10/17/19 21:06

## 2019-10-18 NOTE — PROGRESS NOTES
Patient adjusting well to unit  Sleeping soundly  No suicidal ideations  Remains on q 7 minute checks

## 2019-10-18 NOTE — PROGRESS NOTES
JOEL walker patient belongings with patient present  Patient agrees and signed that all personal items are accounted for  All items are listed below    Shirt x1  Bra x1  Underware x1  Socks x1 pair  Pants x1  Hoodie x 1  Shoes x 1 pair  Cell phone   Earrings x3  Nose ring x1  Belly button ring x 1  [place] 's license  [employer] cross blue shield card  [employer] card  [employer] ID  AAA card  Employee ID  [employer] card  [employer] It card  [employer]

## 2019-10-18 NOTE — DISCHARGE INSTR - APPOINTMENTS
The treatment team recommends individual therapy and substance abuse treatment  You declined a referral for substance abuse treatment  You requested a referral for a psychiatrist and Primary Care Physician but because you were with us less than 24 hours and signed out Against Medical Advice [facility]) we were unable to provide your with referrals  Below you will see a list of resources that accept your insurance; we recommend that you call within 3 business days to schedule an appointment  Please continue with Juliet NEWBERRY  , [facility] ([address] [place] [address] Phone: [phone] Fax: [phone]) for individual therapy  Your next therapy appointment is scheduled for Thursday, October 24, 2019 at 2:00PM  Your summary of care will be faxed to this provider      Primary Care Physicians  [address] [place] [address] Internal Medicine [facility]- [phone]    Psychiatry:  [address]; we recommend this providers as they offer mental health and substance abuse treatment  [address]  WARD Deluca at Dr Faye Coe office- [phone]

## 2019-10-18 NOTE — ED NOTES
Pt sitting on s/o in bed  Both informed by MARY Pedroza of the inappropriateness and visitors are to utilize the bedside chairs        Danyel Barker RN  10/17/19 2043

## 2019-10-18 NOTE — PLAN OF CARE
Problem: Ineffective Coping  Goal: Participates in unit activities  Description  Interventions:  - Provide therapeutic environment   - Provide required programming   - Redirect inappropriate behaviors   Outcome: Completed     Problem: SUBSTANCE USE/ABUSE  Goal: Will have no detox symptoms and will verbalize plan for changing substance-related behavior  Description  INTERVENTIONS:  - Monitor physical status and assess for symptoms of withdrawal  - Administer medication as ordered  - Provide emotional support with 1 on 1 interaction with staff  - Encourage recovery focused program/ addiction education  - Assess for verbalization of changing behaviors related to substance abuse  - Initiate consults and referrals as appropriate (Case Management, Spiritual Care, etc )  Outcome: Completed  Goal: By discharge, will develop insight into their chemical dependency and sustain motivation to continue in recovery  Description  INTERVENTIONS:  - Attends all daily group sessions and scheduled AA groups  - Actively practices coping skills through participation in the therapeutic community and adherence to program rules  - Reviews and completes assignments from individual treatment plan  - Assist patient development of understanding of their personal cycle of addiction and relapse triggers  Outcome: Completed  Goal: By discharge, patient will have ongoing treatment plan addressing chemical dependency  Description  INTERVENTIONS:  - Assist patient with resources and/or appointments for ongoing recovery based living  Outcome: Completed

## 2019-10-18 NOTE — PROGRESS NOTES
Pt belongings were inventoried  The following items were given:  Shirt x 1  Socks x 1  Underwear x 1  Bra x 1    Piercing x 3: 2 ears and 1 nose ring: Pt stated it cannot be taken out    The following items were placed into storage:  Shoes  Pants with string  Hoody with string  Cell phone placed into contraband drawer    The following items were placed in the safe:  Safe Bag #[id number]  Belly button piercing x 1  Black wallet/case with no cash or money  [place] Drivers License  [employer] [address] ID  Employee ID  AAA  [employer]  Virtual [address] [place] [address]
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
## 2019-10-18 NOTE — SOCIAL WORK
Phone call placed to pt's therapist's office; confirmed that she has an appointment on 10/24/19 at 2:00PM   SW informed her of pt's admission and discharge today and explained that she will fax a summary of care  Phone call placed to pt's father Kathy Gill  MARY informed Kathytito Gill that the psychiatrist is allowing pt to discharge today  SW explained that pt signed a 72 hour notice and we do not have grounds to 302 pt  MARY explained that what seems to be the main issue is the substance abuse which she minimizes  Kathy Gill informed SW that pt is extremely manipulative and has been her whole life; he reports that she always gets what she wants  He was hoping she would stay longer as there is [address] on both sides of the family  He is unsure if pt is a danger to herself or if it has something to do with what happened this weekend; he reports that pt will not talk to anyone about it  He also indicated that pt is very attention seeking  He states that she is bright but acts like a 15year old girl  He reports that they pay for her to see a therapist but she is inconsistent with appointments and says she misses because she has to work; however, he explained that they pay for everything  He reports that they have tried to play hard love with her and she goes off the deep end  Upon further discussion Kathy Jairo states that he would say that pt is probably not a danger to herself or others  SW informed him that she will share this information with the doctor and if he can keep her longer she will let him know otherwise pt will discharge today  MARY explained that pt is agreeable to a PCP and psychiatry referral despite not started on any medications  SW explained that she will give pt resources

## 2019-10-18 NOTE — ED NOTES
Insurance Authorization for admission:   Phone call placed to [facility]  Phone number: [phone]  Spoke to Heather        Level of care: 201  Reference # [id number]  As per Raul Patel, he will fax this information to the care managers and someone will call CW back to complete pre-cert tomorrow, 18/83/83 during normal business hrs of 8:30 am - 4:30 pm     EVS (Eligibility Verification System) called  [phone]  Automated system indicates: MA ineligible  Insurance Authorization for Transportation:    Phone call placed to **  Phone number **  Spoke to **  Authorization #: **    Transport auth to be completed tomorrow by AM CW if needed, as per [employer]      Sourav [facility], [facility] [employer] [facility]  10/17/19 21:43

## 2019-10-18 NOTE — DISCHARGE INSTRUCTIONS
Cephalexin (By mouth)   Cephalexin (kye-z-ZPD-in)  Treats infections  This medicine is a cephalosporin antibiotic  Brand Name(s): Daxbia, Keflex   There may be other brand names for this medicine  When This Medicine Should Not Be Used: This medicine is not right for everyone  Do not use this medicine if you had an allergic reaction to cephalexin or another cephalosporin medicine  How to Use This Medicine:   Capsule, Tablet, Tablet for Suspension, Liquid  · Your doctor will tell you how much medicine to use  Do not use more than directed  · Read and follow the patient instructions that come with this medicine  Talk to your doctor or pharmacist if you have any questions  · You may take your medicine with food or milk to avoid stomach upset  · Oral liquid: Shake well just before use  Measure the oral liquid medicine with a marked measuring spoon, oral syringe, or medicine cup  · Take all of the medicine in your prescription to clear up your infection, even if you feel better after the first few doses  · Missed dose: Take a dose as soon as you remember  If it is almost time for your next dose, wait until then and take a regular dose  Do not take extra medicine to make up for a missed dose  · Capsule or tablet: Store at room temperature away from heat, moisture, and direct light  · Oral liquid: Store in the refrigerator for 14 days  After 14 days, throw away any unused medicine  Do not freeze  Drugs and Foods to Avoid:   Ask your doctor or pharmacist before using any other medicine, including over-the-counter medicines, vitamins, and herbal products  · Some medicines and foods can affect how cephalexin works  Tell your doctor if you are also using  ¨ Metformin  ¨ Probenecid  Warnings While Using This Medicine:   · Tell your doctor if you are pregnant or breastfeeding, or if you have kidney disease, liver disease, or a history of digestive problems, such as colitis   Tell your doctor if you are allergic to penicillin  · This medicine can cause diarrhea  Call your doctor if the diarrhea becomes severe, does not stop, or is bloody  Do not take any medicine to stop diarrhea until you have talked to your doctor  Diarrhea can occur 2 months or more after you stop taking this medicine  · Tell any doctor or dentist who treats you that you are using this medicine  This medicine may affect certain medical test results  · Call your doctor if your symptoms do not improve or if they get worse  · Keep all medicine out of the reach of children  Never share your medicine with anyone  Possible Side Effects While Using This Medicine:   Call your doctor right away if you notice any of these side effects:  · Allergic reaction: Itching or hives, swelling in your face or hands, swelling or tingling in your mouth or throat, chest tightness, trouble breathing  · Blistering, peeling, red skin rash  · Severe diarrhea, especially if bloody or ongoing  · Severe stomach pain, vomiting  If you notice these less serious side effects, talk with your doctor:   · Mild diarrhea or nausea  If you notice other side effects that you think are caused by this medicine, tell your doctor  Call your doctor for medical advice about side effects  You may report side effects to FDA at 7-605-FDA-8872  © 2017 [address] [employer] Information is for End User's use only and may not be sold, redistributed or otherwise used for commercial purposes  The above information is an  only  It is not intended as medical advice for individual conditions or treatments  Talk to your doctor, nurse or pharmacist before following any medical regimen to see if it is safe and effective for you

## 2019-10-18 NOTE — CONSULTS
Consult- Morgan Rosado 1995, 27 y o  female MRN: [id number]    Unit/Bed#: [facility] 220-01 Encounter: [id number]    Primary Care Provider: No primary care provider on file  Date and time admitted to hospital: 10/17/2019 12:00 AM      Inpatient consult for Medical Clearance for [facility] patient  Consult performed by: Lisandra Ghosh MD  Consult ordered by: Jose Nieves MD          Crohn's disease without complication [facility])  Assessment & Plan  · Stable  · Patient states she is not on any home medications for her Crohn's  · Follow up with PCP/GI as outpatient    Medical clearance for psychiatric admission  Assessment & Plan  · Patient medically optimized for inpatient behavior health unit admission    Acute cystitis without hematuria  Assessment & Plan  · Continue 3 day course of Keflex  · Follow up with PCP as outpatient        Recommendations for Discharge:  · Per Primary Service      History of Present Illness:  Morgan Rosado is a 27 y o  female who is originally admitted to the [address] [place] [address] service due to depression  We are consulted for medical management  Patient with history of Crohn's disease which is stable and patient is not on any chronic medications  Patient was found have UTI and started on Keflex  Patient denies any fever or chills  No abdominal pain  No dysuria  Review of Systems:  Review of Systems   Constitutional: Negative for activity change, chills and fever  Gastrointestinal: Negative for blood in stool and diarrhea  Genitourinary: Negative for dysuria and frequency  All other systems reviewed and are negative  Past Medical and Surgical History:   Past Medical History:   Diagnosis Date    Anxiety     [facility]     Depression     Suicide attempt ([facility] )        No past surgical history on file  Meds/Allergies:  all medications and allergies reviewed    Allergies:    Allergies   Allergen Reactions    Amoxicillin Anaphylaxis       Social History:     Marital Status: Single    Substance Use History:   Social History     Substance and Sexual Activity   Alcohol Use Yes    Frequency: 2-3 times a week    Drinks per session: 5 or 6    Binge frequency: Weekly    Comment: pt reports 12 drinks 3 times a week     Social History     Tobacco Use   Smoking Status Never Smoker   Smokeless Tobacco Never Used     Social History     Substance and Sexual Activity   Drug Use Yes    Frequency: 7 0 times per week    Types: Marijuana    Comment: Smokes weed daily        Family History:  I have reviewed the patients family history    Physical Exam:   Vitals:   Blood Pressure: 96/56 (10/18/19 0708)  Pulse: 70 (10/18/19 0708)  Temperature: 98 7 °F (37 1 °C) (10/18/19 0708)  Temp Source: Temporal (10/18/19 0708)  Respirations: 16 (10/18/19 0708)  Height: 5' 3" (160 cm) (10/18/19 0006)  Weight - Scale: 49 9 kg (110 lb) (10/18/19 0006)  SpO2: 97 % (10/18/19 0708)    Physical Exam   Constitutional: She appears well-developed  No distress  HENT:   Head: Normocephalic and atraumatic  Eyes: Conjunctivae and EOM are normal    Neck: Normal range of motion  Neck supple  Cardiovascular: Normal rate and regular rhythm  Pulmonary/Chest: Effort normal  No respiratory distress  Abdominal: Soft  She exhibits no distension  There is no tenderness  Musculoskeletal: Normal range of motion  She exhibits no edema  Neurological: She is alert  No cranial nerve deficit  Skin: Skin is warm and dry  Psychiatric: She has a normal mood and affect  Her behavior is normal        Additional Data:   Lab Results: I have personally reviewed pertinent reports  Results from last 7 days   Lab Units 10/17/19  1337   POTASSIUM mmol/L 3 6   CHLORIDE mmol/L 102   CO2 mmol/L 26   BUN mg/dL 7   CREATININE mg/dL 0 96   CALCIUM mg/dL 8 7             No results found for: HGBA1C        Imaging: I have personally reviewed pertinent reports      No orders to display       ** Please Note: This note has been constructed using a voice recognition system   **

## 2019-10-18 NOTE — TREATMENT PLAN
Treatment Plan 2016 [facility] 27 y o  female MRN: [id number]    [address] [facility]  Encounter: [id number]          Admit Date/Time:  10/17/2019 12:00 AM    Treatment Team: Attending Provider: Robin Sahu MD; Consulting Physician: Merry Gomez MD; Registered Nurse: Marielos Chadwick RN; : BARRETT Mcgee; Licensed Practical Nurse: Ainsley Brown LPN; Patient Care Assistant: Meño Watson; Patient Care Technician: David Camejo    Diagnosis: Principal Problem:    Adjustment disorder with mixed disturbance of emotions and conduct  Active Problems:    Acute cystitis without hematuria    Medical clearance for psychiatric admission    Crohn's disease without complication ([facility] )    Benzodiazepine abuse ([facility] )    Admits to alcohol use        Patient Strengths: active sense of humor     Patient Barriers: substance abuse    Progress Toward Goals: ongoing    Recommended Treatment: discharge planning     Treatment Frequency: 1-2 times per week     Expected Discharge Date: 10/18/2019    Discharge Plan: referrals as indicated     Treatment Plan Created/Updated By: Robin Sahu MD

## 2019-10-18 NOTE — NURSING NOTE
Patient admitted to Adult Behavior Health, [address] [place] [address] from [facility] with a valid 201  Physical assessment completed, no wounds or weapons noted  Refused shower, no signs of infestation noted  Bill of Rights and HIPPA regulations reviewed and signed  Admission medication and diet ordered  Oriented to unit  Started on 1 to 1  Fluids and food given  Appetite good  Den Personal property recorded  Affect flat  Appears depressed  Refusing any medications currently
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
## 2019-10-18 NOTE — PROGRESS NOTES
AT met with PT to complete self assessment  PT displayed a labile mood and affect, did have good eye contact and was cooperative during assessment  PT reported to AT that she was scheduled for discharge today to home and that she made a mistake that led to her admission  PT described her biggest stressors as I went to a music festival this weekend and things got out of her control that happened and caused her to cope in unhealthy ways  PT reports this has changed her ability to do the things that are important to her some of the time  She enjoys spending time with family and friends as well as achieving her goals  What she likes least about her life is that she wishes that she would have graduated college on time  PT currently works for [employer] and states she enjoys her position there  She identified reading, music, sports and exercise as her healthy coping skills of choice  PT stated a desire to work on her self esteem, relaxation and healthy lifestyles while she is here and to prepare her for discharge  PT stated she will know when she is ready for discharge when she knows she is ready to make a change and feel overall healthy and happy

## 2019-10-18 NOTE — PROGRESS NOTES
Patient quite/consrticted/good eye contact at times/able to be attentive      10/18/19 1000   Activity/Group Checklist   Group   (WRAP Plan)   Attendance Attended   Attendance Duration (min) Greater than 60   Interactions Did not interact   Affect/Mood Appropriate   Goals Achieved Able to listen to others

## 2019-10-18 NOTE — LETTER
October 18, 2019     [facility] , [facility]    Patient: Pablito Méndez   YOB: 1995   Date of Visit: 10/17/2019       Dear Dr Minnie Elliott:    Thank you for referring Pablito Méndez to me for evaluation  Below are the relevant portions of my H&P  If you have questions, please do not hesitate to call me  I look forward to following Tyrell Butler along with you  Sincerely,        No name on file          CC: No Recipients

## 2019-10-18 NOTE — ASSESSMENT & PLAN NOTE
· Stable  · Patient states she is not on any home medications for her Crohn's  · Follow up with PCP/GI as outpatient

## 2019-10-20 LAB
BACTERIA UR CULT: ABNORMAL
BACTERIA UR CULT: ABNORMAL

## 2019-10-22 NOTE — PROGRESS NOTES
AT met with PT to complete relapse prevention form and discharge planning  PT described her warning signs and symptoms to indicate that she might need to reach out for help as being abusing any substance, staying in bed all day, missing work, and feeling down  PT was able to identify a positive support system and identified spending time with her dog, going to work and healthy hobbies as her healthy coping skills of choice  PT signed form and received copy

## 2020-01-02 ENCOUNTER — HOSPITAL ENCOUNTER (EMERGENCY)
Facility: HOSPITAL | Age: 25
Discharge: HOME | End: 2020-01-02
Attending: EMERGENCY MEDICINE | Admitting: EMERGENCY MEDICINE
Payer: COMMERCIAL

## 2020-01-02 VITALS
TEMPERATURE: 97.9 F | BODY MASS INDEX: 21.26 KG/M2 | OXYGEN SATURATION: 100 % | DIASTOLIC BLOOD PRESSURE: 54 MMHG | HEART RATE: 83 BPM | WEIGHT: 120 LBS | RESPIRATION RATE: 18 BRPM | SYSTOLIC BLOOD PRESSURE: 92 MMHG

## 2020-01-02 DIAGNOSIS — F10.929 ALCOHOL INTOXICATION (HCC): ICD-10-CM

## 2020-01-02 DIAGNOSIS — R45.851 SUICIDAL THOUGHTS: ICD-10-CM

## 2020-01-02 DIAGNOSIS — Z00.8 ENCOUNTER FOR PSYCHOLOGICAL EVALUATION: Primary | ICD-10-CM

## 2020-01-02 LAB
ETHANOL EXG-MCNC: 0.12 MG/DL
ETHANOL EXG-MCNC: 0.22 MG/DL

## 2020-01-02 PROCEDURE — 99284 EMERGENCY DEPT VISIT MOD MDM: CPT

## 2020-01-02 PROCEDURE — 82075 ASSAY OF BREATH ETHANOL: CPT | Performed by: EMERGENCY MEDICINE

## 2020-01-02 PROCEDURE — 99281 EMR DPT VST MAYX REQ PHY/QHP: CPT | Performed by: EMERGENCY MEDICINE

## 2020-01-02 NOTE — ED CARE HANDOFF
Emergency Department Sign Out Note        Sign out and transfer of care from Sukumar Spivey MD  See Separate Emergency Department note  The patient, Michelle Almeida, was evaluated by the previous provider for SI, intolxication  Workup Completed:    Patient was seen for SI however she was intoxicated at the time  Awaiting her to sober up so that she can be further evaluated and seen by crisis  ED Course / Workup Pending (followup): Once sober, the patient denies suicidal thoughts  She is cleared by crisis for discharge home  She has outpatient resources but is given more outpatient resources to ensure that she has the care that she needs  Advised good return precautions in case of suicidal thoughts  Patient voices understanding of return precautions and is discharged in stable condition  ED Course as of Jan 04 1639   [employer]   2820 Patient is being seen by crisis for eval      8821 Patient seen by crisis  No longer intoxicated  No longer with SI  Patient cleared by crisis for dc home           Procedures  MDM    Disposition  Final diagnoses:   Encounter for psychological evaluation   Suicidal thoughts   Alcohol intoxication ([facility] )     Time reflects when diagnosis was documented in both MDM as applicable and the Disposition within this note     Time User Action Codes Description Comment    1/2/2020  8:47 AM CopperYonathan barcenas Add [F32 9] Depression     1/2/2020  8:47 AM CopperYonathan barcenas Add [R45 851] Suicidal ideations     1/2/2020  8:47 AM Yonathan Tolbert Remove [C43 829] Suicidal ideations     1/2/2020  8:47 AM Yonathan Tolbert Add [Z00 8] Encounter for psychological evaluation     1/2/2020  8:47 AM Yonathan Tolbert Add [H71 938] Suicidal thoughts     1/2/2020  8:48 AM CopperYonathan barcenas Add [F10 929] Alcohol intoxication ([facility] )     1/2/2020  8:48 AM Yonathan Tolbert Modify [Z00 8] Encounter for psychological evaluation 1/2/2020  8:48 AM TomaszLizzie Paulina [F32 9] Depression       ED Disposition     ED Disposition Condition Date/Time Comment    Discharge Stable Thu Jan 2, 2020  8:47 AM Suri Mares discharge to home/self care  MD Documentation      Most Recent Value   Sending MD  Dr Negrete Organ up With Specialties Details Why Contact Info Additional Information    Infolink   call if you need a PCP [address] [place] [address] Emergency Department Emergency Medicine  If symptoms worsen: thoughts of harming yourself or anyone else [facility] [id number]  [address] [place] [address] ED, [address] [place] [address], [facility]        Discharge Medication List as of 1/2/2020  8:49 AM      CONTINUE these medications which have NOT CHANGED    Details   adalimumab (HUMIRA) 40 mg/0 8 mL PSKT Inject under the skin, Starting Thu 12/1/2016, Historical Med      mupirocin (BACTROBAN) 2 % nasal ointment Apply to skin lesions BID, Print      selenium sulfide (SELSUN) 1 % Apply 1 application topically daily for 14 days, Starting Fri 6/30/2017, Until Fri 7/14/2017, Print           No discharge procedures on file         ED Provider  Electronically Signed by     Jaspal Mehta DO  01/04/20 6127

## 2020-01-02 NOTE — ED NOTES
Patient's friend at bedside     Nelson Nam, [facility]  01/02/20 6289

## 2020-01-02 NOTE — ED NOTES
Pt belongings collected and inventoried by this writer  Pt changed into paper scrubs at this time  Pt belongings include:  1 x shirt  1 x sweater  1 x bra  1 x underwear  2 x socks  2 x shoes  4 x earrings  1 x nose ring  1 x belly button ring  1 x smart phone  1 x pop socket  1 x cell phone  Misc   Cards in card ronquillo       IMayGou  01/02/20 9871

## 2020-01-02 NOTE — ED NOTES
Pt is a 27 y o  female who presented to the ED due to a 940 by police after her friend contacted them due to suicidal statements she sent via text messages  Patient is currently sober and does not recall sending text messaged and denies wanting to harm herself at this time  Patient has 1 previous inpatient admission that we are aware of, and she is currently seeing a therapist weekly in [facility]  Patient denies homicidal ideation as well as auditory hallucinations or visual hallucinations, and there are no signs of psychosis present at this time  Patient is calm/coopertive and stated "I just shouldn't drink"  Patient wishes to return home at this time  302 will be denied by Dr Avery Campos  Chief Complaint   Patient presents with    Psychiatric Evaluation     Patient escorted by PSP, as per PSP patient's friend called 911 because patient was making suicidal statements, currently 201, hx of SI 2 months ago      Intake Assessment completed, Safety Risk Assessment completed      Michael Sparks LMSW  01/02/20  7156

## 2020-01-02 NOTE — ED NOTES
Per pt's request she wanted to do another BAT  Resulted as 0 100  Explained to pt that she needs to be seen by crisis and cannot go home at this moment  Pt is annoyed that she can't go home due to the overnight Doctor telling her that she can go home when sober  I explained being that she is a 201 and expressed suicidal thoughts that she cannot until she talks to crisis        Joselo Jauregui  01/02/20 9439

## 2020-01-02 NOTE — ED NOTES
Pt's belongings given back to pt and also pt's visitor        Bello [facility]  01/02/20 8158

## 2020-01-25 NOTE — ED PROVIDER NOTES
History  Chief Complaint   Patient presents with    Psychiatric Evaluation     Patient escorted by [facility], as per PSP patient's friend called 911 because patient was making suicidal statements, currently 61 51 81, hx of SI 2 months ago      27year old female presenting to the emergency department for eval of SI  Pt is brought in by police for 976  Pt is intoxicated, sent SI threatening texts to friends/familu  Called [facility] crisis/police who escorted her here  Here she is intoxicated, denying HI/SI  Prior to Admission Medications   Prescriptions Last Dose Informant Patient Reported? Taking? adalimumab (HUMIRA) 40 mg/0 8 mL PSKT   Yes No   Sig: Inject under the skin   mupirocin (BACTROBAN) 2 % nasal ointment   No No   Sig: Apply to skin lesions BID   Patient not taking: Reported on 10/18/2019   selenium sulfide (SELSUN) 1 %   No No   Sig: Apply 1 application topically daily for 14 days   Patient not taking: Reported on 10/18/2019      Facility-Administered Medications: None       Past Medical History:   Diagnosis Date    Anxiety     Crohn disease ([facility] )     Depression     Suicide attempt ([facility] )        History reviewed  No pertinent surgical history  History reviewed  No pertinent family history  I have reviewed and agree with the history as documented  Social History     Tobacco Use    Smoking status: Never Smoker    Smokeless tobacco: Never Used   Substance Use Topics    Alcohol use: Yes     Frequency: 2-3 times a week     Drinks per session: 5 or 6     Binge frequency: Weekly     Comment: pt reports 12 drinks 3 times a week    Drug use: Yes     Frequency: 7 0 times per week     Types: Marijuana     Comment: Smokes weed daily         Review of Systems   Constitutional: Negative for appetite change, chills, fatigue and fever  HENT: Negative for sneezing and sore throat  Eyes: Negative for visual disturbance     Respiratory: Negative for cough, choking, chest tightness, shortness of breath and wheezing  Cardiovascular: Negative for chest pain and palpitations  Gastrointestinal: Negative for abdominal pain, constipation, diarrhea, nausea and vomiting  Genitourinary: Negative for difficulty urinating and dysuria  Neurological: Negative for dizziness, weakness, light-headedness, numbness and headaches  All other systems reviewed and are negative  Physical Exam  Physical Exam   Constitutional: She is oriented to person, place, and time  She appears well-developed and well-nourished  No distress  HENT:   Head: Normocephalic and atraumatic  Mouth/Throat: Oropharynx is clear and moist    Eyes: Pupils are equal, round, and reactive to light  EOM are normal    Neck: No JVD present  No tracheal deviation present  Cardiovascular: Normal rate, regular rhythm, normal heart sounds and intact distal pulses  Exam reveals no gallop and no friction rub  No murmur heard  Pulmonary/Chest: Effort normal and breath sounds normal  No respiratory distress  She has no wheezes  She has no rales  Abdominal: Soft  Bowel sounds are normal  She exhibits no distension  There is no tenderness  There is no rebound and no guarding  Neurological: She is alert and oriented to person, place, and time  No cranial nerve deficit  She exhibits normal muscle tone  Skin: Skin is warm and dry  She is not diaphoretic  No pallor  Psychiatric: She has a normal mood and affect  Her behavior is normal    Nursing note and vitals reviewed        Vital Signs  ED Triage Vitals   Temperature Pulse Respirations Blood Pressure SpO2   01/02/20 0150 01/02/20 0150 01/02/20 0150 01/02/20 0150 01/02/20 0150   97 9 °F (36 6 °C) 92 22 127/75 96 %      Temp Source Heart Rate Source Patient Position - Orthostatic VS BP Location FiO2 (%)   01/02/20 0150 01/02/20 0150 01/02/20 0605 01/02/20 0150 --   Oral Monitor Lying Right arm       Pain Score       --                  Vitals:    01/02/20 0150 01/02/20 0605   BP: 127/75 92/54   Pulse: 92 83   Patient Position - Orthostatic VS:  Lying         Visual Acuity      ED Medications  Medications - No data to display    Diagnostic Studies  Results Reviewed     Procedure Component Value Units Date/Time    POCT alcohol breath test [[id number]]  (Normal) Resulted:  01/02/20 0643    Lab Status:  Final result Updated:  01/02/20 0643     EXTBreath Alcohol 0 119    POCT alcohol breath test [[id number]]  (Normal) Resulted:  01/02/20 0323    Lab Status:  Final result Updated:  01/02/20 0324     EXTBreath Alcohol 0 223                 No orders to display              Procedures  Procedures         ED Course                               MDM  Number of Diagnoses or Management Options  Alcohol intoxication ([facility] ):   Encounter for psychological evaluation:   Suicidal thoughts:   Diagnosis management comments: 27year old female with alcohol intoxication/ suicidal thoughts, will check bat/uds/crisis eval when sober           Disposition  Final diagnoses:   Encounter for psychological evaluation   Suicidal thoughts   Alcohol intoxication ([facility] )     Time reflects when diagnosis was documented in both MDM as applicable and the Disposition within this note     Time User Action Codes Description Comment    1/2/2020  8:47 AM Coppersmith, Riky Otter Add [F32 9] Depression     1/2/2020  8:47 AM Coppersmith, Odinocencio Otter Add [R45 851] Suicidal ideations     1/2/2020  8:47 AM Coppersmith, Odinocencio Otter Remove [O08 376] Suicidal ideations     1/2/2020  8:47 AM Coppersmith, Odetta Otter Add [Z00 8] Encounter for psychological evaluation     1/2/2020  8:47 AM Coppersmith, Riky Otter Add [C20 785] Suicidal thoughts     1/2/2020  8:48 AM Coppersmith, Odinocencio Otter Add [F10 929] Alcohol intoxication ([facility] )     1/2/2020  8:48 AM Coppersmith, Odetta Otter Modify [Z00 8] Encounter for psychological evaluation     1/2/2020  8:48 AM CoppersmithChiara [F32 9] Depression       ED Disposition     ED Disposition Condition Date/Time Comment Discharge Stable Thu Jan 2, 2020  8:47 AM Joe Fairbanks discharge to home/self care  MD Documentation      Most Recent Value   Sending MD Dr Nellie Shetty up With Specialties Details Why Contact Info Additional Information    Infolink   call if you need a PCP [address] [place] [address] Emergency Department Emergency Medicine  If symptoms worsen: thoughts of harming yourself or anyone else [facility] [id number]  [address] [place] [address] ED, [address] [place] [address], [phone]          Discharge Medication List as of 1/2/2020  8:49 AM      CONTINUE these medications which have NOT CHANGED    Details   adalimumab (HUMIRA) 40 mg/0 8 mL PSKT Inject under the skin, Starting Thu 12/1/2016, Historical Med      mupirocin (BACTROBAN) 2 % nasal ointment Apply to skin lesions BID, Print      selenium sulfide (SELSUN) 1 % Apply 1 application topically daily for 14 days, Starting Fri 6/30/2017, Until Fri 7/14/2017, Print           No discharge procedures on file      ED Provider  Electronically Signed by           Douglas Han MD  01/24/20 8734

## 2020-06-22 ENCOUNTER — APPOINTMENT (EMERGENCY)
Dept: RADIOLOGY | Facility: HOSPITAL | Age: 25
End: 2020-06-22
Payer: COMMERCIAL

## 2020-06-22 ENCOUNTER — HOSPITAL ENCOUNTER (EMERGENCY)
Facility: HOSPITAL | Age: 25
Discharge: HOME | End: 2020-06-22
Attending: SURGERY
Payer: COMMERCIAL

## 2020-06-22 VITALS
SYSTOLIC BLOOD PRESSURE: 105 MMHG | TEMPERATURE: 97.8 F | HEART RATE: 72 BPM | DIASTOLIC BLOOD PRESSURE: 60 MMHG | OXYGEN SATURATION: 98 % | RESPIRATION RATE: 16 BRPM

## 2020-06-22 PROBLEM — M25.521 RIGHT ELBOW PAIN: Status: ACTIVE | Noted: 2020-06-22
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
## 2020-06-22 PROBLEM — M25.552 ACUTE HIP PAIN, LEFT: Status: ACTIVE | Noted: 2020-06-22

## 2020-06-22 LAB
BASE EXCESS BLDA CALC-SCNC: 1 MMOL/L (ref -2–3)
CA-I BLD-SCNC: 1.19 MMOL/L (ref 1.12–1.32)
GLUCOSE SERPL-MCNC: 111 MG/DL (ref 65–140)
HCO3 BLDA-SCNC: 23.6 MMOL/L (ref 24–30)
HCT VFR BLD CALC: 38 % (ref 34.8–46.1)
HGB BLDA-MCNC: 12.9 G/DL (ref 11.5–15.4)
PCO2 BLD: 25 MMOL/L (ref 21–32)
PCO2 BLD: 30.8 MM HG (ref 42–50)
PH BLD: 7.49 [PH] (ref 7.3–7.4)
PO2 BLD: 56 MM HG (ref 35–45)
POTASSIUM BLD-SCNC: 3.8 MMOL/L (ref 3.5–5.3)
SAO2 % BLD FROM PO2: 91 % (ref 60–85)
SODIUM BLD-SCNC: 140 MMOL/L (ref 136–145)
SPECIMEN SOURCE: ABNORMAL

## 2020-06-22 PROCEDURE — 82947 ASSAY GLUCOSE BLOOD QUANT: CPT

## 2020-06-22 PROCEDURE — 71260 CT THORAX DX C+: CPT

## 2020-06-22 PROCEDURE — 99285 EMERGENCY DEPT VISIT HI MDM: CPT

## 2020-06-22 PROCEDURE — 99285 EMERGENCY DEPT VISIT HI MDM: CPT | Performed by: SURGERY

## 2020-06-22 PROCEDURE — 85014 HEMATOCRIT: CPT

## 2020-06-22 PROCEDURE — 72125 CT NECK SPINE W/O DYE: CPT

## 2020-06-22 PROCEDURE — 74177 CT ABD & PELVIS W/CONTRAST: CPT

## 2020-06-22 PROCEDURE — 82803 BLOOD GASES ANY COMBINATION: CPT

## 2020-06-22 PROCEDURE — 84132 ASSAY OF SERUM POTASSIUM: CPT

## 2020-06-22 PROCEDURE — 70450 CT HEAD/BRAIN W/O DYE: CPT

## 2020-06-22 PROCEDURE — 84295 ASSAY OF SERUM SODIUM: CPT

## 2020-06-22 PROCEDURE — 82330 ASSAY OF CALCIUM: CPT

## 2020-06-22 PROCEDURE — 73080 X-RAY EXAM OF ELBOW: CPT

## 2020-06-22 RX ORDER — IBUPROFEN 400 MG/1
400 TABLET ORAL ONCE
Status: COMPLETED | OUTPATIENT
Start: 2020-06-22 | End: 2020-06-22

## 2020-06-22 RX ADMIN — IBUPROFEN 400 MG: 400 TABLET ORAL at 18:34

## 2020-06-22 RX ADMIN — IOHEXOL 100 ML: 350 INJECTION, SOLUTION INTRAVENOUS at 16:29

## 2020-07-14 ENCOUNTER — OFFICE VISIT (OUTPATIENT)
Dept: OBGYN CLINIC | Facility: CLINIC | Age: 25
End: 2020-07-14
Payer: COMMERCIAL

## 2020-07-14 VITALS — SYSTOLIC BLOOD PRESSURE: 104 MMHG | DIASTOLIC BLOOD PRESSURE: 50 MMHG

## 2020-07-14 DIAGNOSIS — D18.1 LYMPHANGIOMA: Primary | ICD-10-CM

## 2020-07-14 PROCEDURE — 99204 OFFICE O/P NEW MOD 45 MIN: CPT | Performed by: OBSTETRICS & GYNECOLOGY

## 2020-07-15 NOTE — PROGRESS NOTES
Gynecology   Italia Camarillo 23 y o  female MRN: [id number]    Assessment/Plan     Assessment:  Right labial lymphoangioma    Plan:  Continue current regimen  If it ever does not completely resolved we discussed surgical incision/ligation  Will recheck and do annual in 2 weeks  History of Present Illness     HPI:  Italia Camarillo is a 23 y o  female who presents with recurrence of right labial "cyst "  She states that this is the third time it has developed  Always in the same location  Never examined before  Spontaneously resolves  Currently doing sitz baths and warm compresses - draining clear fluid  Never painful  Not associated with menses, sexual activity, or Crohns flares  No odor  Martir Stokes Historical Information   Past Medical History:   Diagnosis Date    Anxiety     Crohn disease ([facility] )     Depression     Suicide attempt ([facility] )      No past surgical history on file      OB/GYN History:   Same-sex partner  Never had Pap  Denies STD's    Family History   Problem Relation Age of Onset    Vitiligo Paternal Grandfather     Lupus Paternal Aunt      Social History   Social History     Substance and Sexual Activity   Alcohol Use Not Currently    Frequency: 2-3 times a week    Drinks per session: 5 or 6    Binge frequency: Weekly    Comment: pt reports 12 drinks 3 times a week     Social History     Substance and Sexual Activity   Drug Use Not Currently    Frequency: 7 0 times per week    Types: Marijuana    Comment: Smokes weed daily      Social History     Tobacco Use   Smoking Status Never Smoker   Smokeless Tobacco Never Used     E-Cigarette/Vaping    E-Cigarette Use Never User      E-Cigarette/Vaping Substances    Nicotine No     THC No     CBD No     Flavoring No     Other No     Unknown No        Meds/Allergies     Current Outpatient Medications:     adalimumab (HUMIRA) 40 mg/0 8 mL PSKT, Inject under the skin, Disp: , Rfl:     mupirocin (BACTROBAN) 2 % nasal ointment, Apply to skin Thoracic Surgery Post Op Clinic Visit    Patient: Solomon Francisco Date: 2020   : 1957 Meds: Reviewed and updated in Epic   62 year old male      Date and type of surgery: Robotic Left lower lobectomy, Left upper wedge resection with LN dissection on 20 at [facility].    Reason for visit: Post-op phone visit    PMD: Rajat Segovia MD    Oncologist: Referred to Marisol Bryant MD     HPI: Solomon Francisco was admitted to [facility] on 2020 and on the same date underwent robotic left thoracoscopy, lower lobectomy, left upper lobe wedge resection of anteromedial apical nodule with lymph node dissection. Pathology report is pending at discharge. Post op, he weaned from his oxygen and was ambulating independently. he was tolerating a regular diet. There were no post op arrhythmias. CT was removed on POD # 1. D/C instructions were reviewed with the patient and family and they agreed with d/c home    Phone call placed to patient for postoperative phone visit.  spigitpretor [id number] utilized for phone call.  He reports feeling well since return home.  He reports slight shortness of breath with activity that is improving.  He has a good productive cough with a small amount of phlegm.  He is active, uses IS, and ambulates frequently in his home and outside.  He is no longer taking percocet and has mild pain with no need for additional medications.  He is having regular bowel movements with intermittent stool softeners and a fair appetite.  He endorses daily incision care and denies question/concerns.       Activity/Exercise: Accomplishing all ADL's Walking    Diet: Regular    Diagnostics:   CXR 20  IMPRESSION:  1. Small to moderate amounts of atelectasis and or consolidation are  present in the left lower lobe, worsened.  2. Small left-sided pleural effusion, new.  3. Status post thoracostomy tube removal with resolution of the  pneumothorax.    Pathology:   Pathologic Diagnosis   A.   Lymph node,  lesions BID (Patient not taking: Reported on 10/18/2019), Disp: 10 g, Rfl: 0    selenium sulfide (SELSUN) 1 %, Apply 1 application topically daily for 14 days (Patient not taking: Reported on 10/18/2019), Disp: 1 Bottle, Rfl: 0    Allergies   Allergen Reactions    Amoxicillin Anaphylaxis            Objective   Vitals: Blood pressure 104/50, last menstrual period 07/12/2020  Physical Exam   Constitutional: She is oriented to person, place, and time  She appears well-developed and well-nourished  Genitourinary:   There is lesion on the right labia  Genitourinary Comments: Right labial with 3cm cystic structure adjacent to palpable vascular structure; not arising from Bartholins gland  Nontender  Very soft and fluid level apparent  HENT:   Head: Normocephalic  Eyes: EOM are normal    Cardiovascular: Normal rate and regular rhythm  Pulmonary/Chest: Effort normal and breath sounds normal    Musculoskeletal: She exhibits no edema  Neurological: She is alert and oriented to person, place, and time  Skin: Skin is warm and dry  Psychiatric: She has a normal mood and affect  Vitals reviewed  station 9L, biopsies:  -Benign lymph node fragments.     B.   Lymph node, station 7, biopsies:  -Benign lymph node fragments.     C.   Lymph node, station 10 L, biopsies:  -Benign lymph node fragments.     D.   Lymph node, station 5, biopsies:  -Benign lymph node fragments.     E.   Lymph node, station 11 L, biopsies:  -Benign lymph node fragments.     F.   Lung, left lower lobe, lobectomy:  -Invasive moderately differentiated adenocarcinoma of lung origin (2.2 cm).  Visceral pleura is not involved.  Bronchial margin free.  -4 benign lymph nodes (0/4).     G.   Lung, left upper lobe, wedge resection:  -Invasive moderately differentiated adenocarcinoma of lung origin (0.9 cm).  Visceral pleura is not involved.  Parenchymal margin free     Stage: pT4 pN0    Impression: Making a satisfactory surgical recovery    Plan:   F/u CXR in 1 week  Diligent IS, cough/deep breathe, ambulation q2 hours, mucinex  May resume Naltrexone-No longer on opioid pain medication   F/u with Dr. Bryant as directed by their office  Daily incision care-No soaking until completely healed     Smoking cessatation counseling offered or initiated: Smoked in the past, but quit January 2020    Thoracic Surgery  Gwendolyn Regalado, FAVIO

## 2020-07-28 ENCOUNTER — ANNUAL EXAM (OUTPATIENT)
Dept: OBGYN CLINIC | Facility: CLINIC | Age: 25
End: 2020-07-28
Payer: COMMERCIAL

## 2020-07-28 VITALS
TEMPERATURE: 96.7 F | DIASTOLIC BLOOD PRESSURE: 62 MMHG | SYSTOLIC BLOOD PRESSURE: 100 MMHG | HEIGHT: 62 IN | BODY MASS INDEX: 20.94 KG/M2 | WEIGHT: 113.8 LBS

## 2020-07-28 DIAGNOSIS — D17.9 ANGIOLIPOMA: ICD-10-CM

## 2020-07-28 DIAGNOSIS — Z11.3 SCREENING FOR STD (SEXUALLY TRANSMITTED DISEASE): ICD-10-CM

## 2020-07-28 DIAGNOSIS — Z01.419 ENCNTR FOR GYN EXAM (GENERAL) (ROUTINE) W/O ABN FINDINGS: Primary | ICD-10-CM

## 2020-07-28 PROCEDURE — S0612 ANNUAL GYNECOLOGICAL EXAMINA: HCPCS | Performed by: OBSTETRICS & GYNECOLOGY

## 2020-07-28 PROCEDURE — G0145 SCR C/V CYTO,THINLAYER,RESCR: HCPCS | Performed by: OBSTETRICS & GYNECOLOGY

## 2020-07-28 PROCEDURE — 87491 CHLMYD TRACH DNA AMP PROBE: CPT | Performed by: OBSTETRICS & GYNECOLOGY

## 2020-07-28 PROCEDURE — 87591 N.GONORRHOEAE DNA AMP PROB: CPT | Performed by: OBSTETRICS & GYNECOLOGY

## 2020-07-29 NOTE — PROGRESS NOTES
Assessment/Plan:    1  Encntr for gyn exam (general) (routine) w/o abn findings    - Liquid-based pap, screening    2  Screening for STD (sexually transmitted disease)    - Chlamydia/GC amplified DNA by PCR    3  Angiolipoma, right vulva    - patient desires surgical excision  Discussed risks of recurrence, breakdown, hematoma  Reviewed strict postop instructions  Consents signed  Dinora Guillen is a 23 y o  female who presents for annual exam  Periods are regular every 28-30 days, lasting 5 days  Dysmenorrhea:none  Cyclic symptoms include none  No intermenstrual bleeding, spotting, or discharge  The patient reports slight increase in size of vaginal cyst       Current contraception: none  History of abnormal Pap smear: no  Family history of uterine or ovarian cancer: no  Regular self breast exam: no  History of abnormal mammogram: no  Family history of breast cancer: no  History of abnormal lipids: no  Menstrual History:  OB History    None          Patient's last menstrual period was 07/12/2020 (exact date)  Period Cycle (Days): 28  Period Duration (Days): 4-5  Period Pattern: Regular  Menstrual Flow: Moderate  Dysmenorrhea: None(Back pain only)    The following portions of the patient's history were reviewed and updated as appropriate: allergies, current medications, past family history, past medical history, past social history, past surgical history and problem list     Review of Systems  Pertinent items are noted in HPI        Objective      /62 (BP Location: Left arm, Patient Position: Sitting, Cuff Size: Standard)   Temp (!) 96 7 °F (35 9 °C) (Tympanic)   Ht 5' 2 25" (1 581 m)   Wt 51 6 kg (113 lb 12 8 oz)   LMP 07/12/2020 (Exact Date)   BMI 20 65 kg/m²     General:   alert and oriented, in no acute distress, appears stated age and cooperative   Heart:    Breasts: regular rate and rhythm, S1, S2 normal, no murmur, click, rub or gallop   appear normal, no suspicious masses, no skin or nipple changes or axillary nodes     Lungs: clear to auscultation bilaterally   Abdomen: soft, non-tender, without masses or organomegaly   Vulva: Right inferior labia with 3cm cystic nontender lesion   Vagina: normal mucosa   Cervix: no lesions and nulliparous appearance   Uterus: normal size, mobile, non-tender   Adnexa: normal adnexa and no mass, fullness, tenderness

## 2020-08-01 LAB
C TRACH DNA SPEC QL NAA+PROBE: NEGATIVE
N GONORRHOEA DNA SPEC QL NAA+PROBE: NEGATIVE

## 2020-08-03 ENCOUNTER — TELEPHONE (OUTPATIENT)
Dept: OBGYN CLINIC | Facility: CLINIC | Age: 25
End: 2020-08-03

## 2020-08-04 LAB
LAB AP GYN PRIMARY INTERPRETATION: NORMAL
LAB AP LMP: NORMAL
Lab: NORMAL

## 2020-08-06 ENCOUNTER — TELEPHONE (OUTPATIENT)
Dept: OBGYN CLINIC | Facility: CLINIC | Age: 25
End: 2020-08-06

## 2020-08-18 NOTE — PRE-PROCEDURE INSTRUCTIONS
No outpatient medications have been marked as taking for the 8/26/20 encounter [facility] Encounter)  Pt instructed to stop nsaids and supplements one week prior to surgery   Pt verbalized understanding of shower instructions  Pt denies fever, sob, cough and sore throat

## 2020-08-25 ENCOUNTER — ANESTHESIA EVENT (OUTPATIENT)
Dept: PERIOP | Facility: HOSPITAL | Age: 25
End: 2020-08-25
Payer: COMMERCIAL

## 2020-08-25 NOTE — ANESTHESIA PREPROCEDURE EVALUATION
Procedure:  Excision of Right Labial Lesion (Right Vagina )    Relevant Problems   Other   (+) Adjustment disorder with mixed disturbance of emotions and conduct   (+) Benzodiazepine abuse (HCC) (previous, none recently)   (+) Crohn's disease without complication (HCC)   (+) Labial lesion   (+) Marijuana use        Physical Exam    Airway    Mallampati score: II  TM Distance: >3 FB  Neck ROM: full     Dental   No notable dental hx     Cardiovascular  Rhythm: regular, Rate: normal, Cardiovascular exam normal    Pulmonary  Pulmonary exam normal Breath sounds clear to auscultation,     Other Findings        Anesthesia Plan  ASA Score- 2     Anesthesia Type- IV sedation with anesthesia with ASA Monitors  Additional Monitors:   Airway Plan:     Comment: Discussed risks/benefits, including medication reactions, awareness, aspiration, and serious/life threatening complications  Plan to maintain native airway with IVGA, monitored with EtCO2  Plan Factors-Exercise tolerance (METS): >4 METS  Patient summary reviewed  Patient instructed to abstain from smoking on day of procedure  Patient did not smoke on day of surgery  Induction- intravenous  Postoperative Plan- Plan for postoperative opioid use  Planned trial extubation    Informed Consent- Anesthetic plan and risks discussed with patient  I personally reviewed this patient with the CRNA  Discussed and agreed on the Anesthesia Plan with the CRNA  Shakira Tristan

## 2020-08-26 ENCOUNTER — ANESTHESIA (OUTPATIENT)
Dept: PERIOP | Facility: HOSPITAL | Age: 25
End: 2020-08-26
Payer: COMMERCIAL

## 2020-08-26 ENCOUNTER — HOSPITAL ENCOUNTER (OUTPATIENT)
Facility: HOSPITAL | Age: 25
Setting detail: OUTPATIENT SURGERY
Discharge: HOME | End: 2020-08-26
Attending: OBSTETRICS & GYNECOLOGY | Admitting: OBSTETRICS & GYNECOLOGY
Payer: COMMERCIAL

## 2020-08-26 VITALS
DIASTOLIC BLOOD PRESSURE: 71 MMHG | TEMPERATURE: 97.8 F | RESPIRATION RATE: 16 BRPM | WEIGHT: 113 LBS | OXYGEN SATURATION: 98 % | HEIGHT: 62 IN | SYSTOLIC BLOOD PRESSURE: 113 MMHG | HEART RATE: 92 BPM | BODY MASS INDEX: 20.8 KG/M2

## 2020-08-26 DIAGNOSIS — Z98.890 S/P GENITAL SURGERY: Primary | ICD-10-CM

## 2020-08-26 DIAGNOSIS — N90.89 LABIAL LESION: ICD-10-CM

## 2020-08-26 DIAGNOSIS — N90.89 VULVAR MASS: ICD-10-CM

## 2020-08-26 LAB
EXT PREGNANCY TEST URINE: NEGATIVE
EXT. CONTROL: NORMAL
SARS-COV-2 RNA RESP QL NAA+PROBE: NEGATIVE

## 2020-08-26 PROCEDURE — 11424 EXC H-F-NK-SP B9+MARG 3.1-4: CPT | Performed by: OBSTETRICS & GYNECOLOGY

## 2020-08-26 PROCEDURE — U0003 INFECTIOUS AGENT DETECTION BY NUCLEIC ACID (DNA OR RNA); SEVERE ACUTE RESPIRATORY SYNDROME CORONAVIRUS 2 (SARS-COV-2) (CORONAVIRUS DISEASE [COVID-19]), AMPLIFIED PROBE TECHNIQUE, MAKING USE OF HIGH THROUGHPUT TECHNOLOGIES AS DESCRIBED BY CMS-2020-01-R: HCPCS | Performed by: OBSTETRICS & GYNECOLOGY

## 2020-08-26 PROCEDURE — 88304 TISSUE EXAM BY PATHOLOGIST: CPT | Performed by: PATHOLOGY

## 2020-08-26 PROCEDURE — 94664 DEMO&/EVAL PT USE INHALER: CPT

## 2020-08-26 PROCEDURE — 12042 INTMD RPR N-HF/GENIT2.6-7.5: CPT | Performed by: OBSTETRICS & GYNECOLOGY

## 2020-08-26 PROCEDURE — 81025 URINE PREGNANCY TEST: CPT | Performed by: OBSTETRICS & GYNECOLOGY

## 2020-08-26 PROCEDURE — 94640 AIRWAY INHALATION TREATMENT: CPT

## 2020-08-26 RX ORDER — FENTANYL CITRATE/PF 50 MCG/ML
25 SYRINGE (ML) INJECTION
Status: DISCONTINUED | OUTPATIENT
Start: 2020-08-26 | End: 2020-08-26 | Stop reason: HOSPADM

## 2020-08-26 RX ORDER — DEXAMETHASONE SODIUM PHOSPHATE 10 MG/ML
INJECTION, SOLUTION INTRAMUSCULAR; INTRAVENOUS AS NEEDED
Status: DISCONTINUED | OUTPATIENT
Start: 2020-08-26 | End: 2020-08-26

## 2020-08-26 RX ORDER — ONDANSETRON 2 MG/ML
INJECTION INTRAMUSCULAR; INTRAVENOUS AS NEEDED
Status: DISCONTINUED | OUTPATIENT
Start: 2020-08-26 | End: 2020-08-26

## 2020-08-26 RX ORDER — ONDANSETRON 2 MG/ML
4 INJECTION INTRAMUSCULAR; INTRAVENOUS ONCE AS NEEDED
Status: DISCONTINUED | OUTPATIENT
Start: 2020-08-26 | End: 2020-08-26 | Stop reason: HOSPADM

## 2020-08-26 RX ORDER — OXYCODONE HYDROCHLORIDE 5 MG/1
2.5 TABLET ORAL EVERY 4 HOURS PRN
Status: DISCONTINUED | OUTPATIENT
Start: 2020-08-26 | End: 2020-08-26 | Stop reason: HOSPADM

## 2020-08-26 RX ORDER — OXYCODONE HYDROCHLORIDE AND ACETAMINOPHEN 5; 325 MG/1; MG/1
1 TABLET ORAL EVERY 4 HOURS PRN
Qty: 10 TABLET | Refills: 0 | Status: SHIPPED | OUTPATIENT
Start: 2020-08-26 | End: 2020-08-31 | Stop reason: SDUPTHER

## 2020-08-26 RX ORDER — HYDROMORPHONE HCL/PF 1 MG/ML
0.2 SYRINGE (ML) INJECTION
Status: DISCONTINUED | OUTPATIENT
Start: 2020-08-26 | End: 2020-08-26 | Stop reason: HOSPADM

## 2020-08-26 RX ORDER — ONDANSETRON 2 MG/ML
4 INJECTION INTRAMUSCULAR; INTRAVENOUS EVERY 6 HOURS PRN
Status: DISCONTINUED | OUTPATIENT
Start: 2020-08-26 | End: 2020-08-26 | Stop reason: HOSPADM

## 2020-08-26 RX ORDER — IBUPROFEN 200 MG
600 TABLET ORAL EVERY 6 HOURS PRN
Qty: 30 TABLET | Refills: 0 | Status: SHIPPED | OUTPATIENT
Start: 2020-08-26 | End: 2020-09-22 | Stop reason: ALTCHOICE

## 2020-08-26 RX ORDER — IBUPROFEN 600 MG/1
600 TABLET ORAL EVERY 6 HOURS PRN
Status: DISCONTINUED | OUTPATIENT
Start: 2020-08-26 | End: 2020-08-26 | Stop reason: HOSPADM

## 2020-08-26 RX ORDER — ACETAMINOPHEN 325 MG/1
650 TABLET ORAL EVERY 6 HOURS PRN
Status: DISCONTINUED | OUTPATIENT
Start: 2020-08-26 | End: 2020-08-26 | Stop reason: HOSPADM

## 2020-08-26 RX ORDER — LIDOCAINE HYDROCHLORIDE 10 MG/ML
INJECTION, SOLUTION EPIDURAL; INFILTRATION; INTRACAUDAL; PERINEURAL AS NEEDED
Status: DISCONTINUED | OUTPATIENT
Start: 2020-08-26 | End: 2020-08-26

## 2020-08-26 RX ORDER — LIDOCAINE HYDROCHLORIDE 10 MG/ML
INJECTION, SOLUTION EPIDURAL; INFILTRATION; INTRACAUDAL; PERINEURAL AS NEEDED
Status: DISCONTINUED | OUTPATIENT
Start: 2020-08-26 | End: 2020-08-26 | Stop reason: HOSPADM

## 2020-08-26 RX ORDER — MIDAZOLAM HYDROCHLORIDE 2 MG/2ML
INJECTION, SOLUTION INTRAMUSCULAR; INTRAVENOUS AS NEEDED
Status: DISCONTINUED | OUTPATIENT
Start: 2020-08-26 | End: 2020-08-26

## 2020-08-26 RX ORDER — KETOROLAC TROMETHAMINE 30 MG/ML
INJECTION, SOLUTION INTRAMUSCULAR; INTRAVENOUS AS NEEDED
Status: DISCONTINUED | OUTPATIENT
Start: 2020-08-26 | End: 2020-08-26

## 2020-08-26 RX ORDER — PROPOFOL 10 MG/ML
INJECTION, EMULSION INTRAVENOUS AS NEEDED
Status: DISCONTINUED | OUTPATIENT
Start: 2020-08-26 | End: 2020-08-26

## 2020-08-26 RX ORDER — LIDOCAINE HYDROCHLORIDE 10 MG/ML
0.5 INJECTION, SOLUTION EPIDURAL; INFILTRATION; INTRACAUDAL; PERINEURAL ONCE AS NEEDED
Status: COMPLETED | OUTPATIENT
Start: 2020-08-26 | End: 2020-08-26

## 2020-08-26 RX ORDER — SODIUM CHLORIDE, SODIUM LACTATE, POTASSIUM CHLORIDE, CALCIUM CHLORIDE 600; 310; 30; 20 MG/100ML; MG/100ML; MG/100ML; MG/100ML
125 INJECTION, SOLUTION INTRAVENOUS CONTINUOUS
Status: DISCONTINUED | OUTPATIENT
Start: 2020-08-26 | End: 2020-08-26 | Stop reason: HOSPADM

## 2020-08-26 RX ORDER — GLYCOPYRROLATE 0.2 MG/ML
INJECTION INTRAMUSCULAR; INTRAVENOUS AS NEEDED
Status: DISCONTINUED | OUTPATIENT
Start: 2020-08-26 | End: 2020-08-26

## 2020-08-26 RX ORDER — SENNOSIDES 8.6 MG
650 CAPSULE ORAL EVERY 8 HOURS PRN
Qty: 30 TABLET | Refills: 0 | Status: SHIPPED | OUTPATIENT
Start: 2020-08-26 | End: 2020-09-22 | Stop reason: ALTCHOICE

## 2020-08-26 RX ORDER — ALBUTEROL SULFATE 2.5 MG/3ML
2.5 SOLUTION RESPIRATORY (INHALATION) ONCE
Status: COMPLETED | OUTPATIENT
Start: 2020-08-26 | End: 2020-08-26

## 2020-08-26 RX ORDER — GINSENG 100 MG
CAPSULE ORAL AS NEEDED
Status: DISCONTINUED | OUTPATIENT
Start: 2020-08-26 | End: 2020-08-26 | Stop reason: HOSPADM

## 2020-08-26 RX ADMIN — ONDANSETRON 4 MG: 2 INJECTION INTRAMUSCULAR; INTRAVENOUS at 17:29

## 2020-08-26 RX ADMIN — PROPOFOL 200 MG: 10 INJECTION, EMULSION INTRAVENOUS at 14:49

## 2020-08-26 RX ADMIN — MIDAZOLAM 4 MG: 1 INJECTION INTRAMUSCULAR; INTRAVENOUS at 14:37

## 2020-08-26 RX ADMIN — LIDOCAINE HYDROCHLORIDE 0.2 ML: 10 INJECTION, SOLUTION EPIDURAL; INFILTRATION; INTRACAUDAL; PERINEURAL at 14:18

## 2020-08-26 RX ADMIN — ONDANSETRON 4 MG: 2 INJECTION INTRAMUSCULAR; INTRAVENOUS at 14:57

## 2020-08-26 RX ADMIN — Medication 25 MCG: at 16:29

## 2020-08-26 RX ADMIN — GLYCOPYRROLATE 0.1 MG: 0.2 INJECTION, SOLUTION INTRAMUSCULAR; INTRAVENOUS at 14:49

## 2020-08-26 RX ADMIN — SODIUM CHLORIDE, SODIUM LACTATE, POTASSIUM CHLORIDE, AND CALCIUM CHLORIDE 125 ML/HR: .6; .31; .03; .02 INJECTION, SOLUTION INTRAVENOUS at 14:17

## 2020-08-26 RX ADMIN — OXYCODONE HYDROCHLORIDE 2.5 MG: 5 TABLET ORAL at 17:05

## 2020-08-26 RX ADMIN — ACETAMINOPHEN 650 MG: 325 TABLET, FILM COATED ORAL at 17:34

## 2020-08-26 RX ADMIN — KETOROLAC TROMETHAMINE 30 MG: 30 INJECTION, SOLUTION INTRAMUSCULAR at 16:03

## 2020-08-26 RX ADMIN — ALBUTEROL SULFATE 2.5 MG: 2.5 SOLUTION RESPIRATORY (INHALATION) at 14:29

## 2020-08-26 RX ADMIN — LIDOCAINE HYDROCHLORIDE 50 MG: 10 INJECTION, SOLUTION EPIDURAL; INFILTRATION; INTRACAUDAL; PERINEURAL at 14:49

## 2020-08-26 RX ADMIN — DEXAMETHASONE SODIUM PHOSPHATE 10 MG: 10 INJECTION, SOLUTION INTRAMUSCULAR; INTRAVENOUS at 14:57

## 2020-08-26 NOTE — H&P
H&P reviewed  After examining the patient I find no changes in the patients condition since the H&P had been written      Vitals:    08/26/20 1320   BP: 118/80   Pulse: 64   Resp: 16   Temp: 97 9 °F (36 6 °C)   SpO2: 98%

## 2020-08-26 NOTE — OP NOTE
OPERATIVE REPORT  PATIENT NAME: Jama Mayorga    :  1995  MRN: [id number]  Pt Location: BE OR ROOM 03    SURGERY DATE: 2020    Surgeon(s) and Role:     * Derian Vela MD - Primary     * Shawnee Villarreal MD - Assisting    Preop Diagnosis:  Labial lesion [N90 89]    Post-Op Diagnosis Codes:     * Labial lesion [N90 89]    Procedure(s) (LRB):  Excision of Right Labial Lesion (Right)    Specimen(s):  ID Type Source Tests Collected by Time Destination   1 : Right labial lesion wall of vagina  Tissue Labia TISSUE EXAM Derian Vela MD 2020 1521        Estimated Blood Loss:   Minimal    Drains:  * No LDAs found *    Anesthesia Type:   LMA    Operative Indications:  Labial lesion [N90 89]      Operative Findings:  1 5cm raised, fluctuant, mass on R labia  Palpated and noted to track medio-inferiorly toward perineal body, 3cm depth  Once incised, the lesion drained bloody, yellow, slightly mucinous fluid  Complications:   None    Procedure and Technique:  The patient was taken to the operating room where timeout was performed to confirm correct patient and correct procedure  IV sedation was established and found to be adequate  The patient was then positioned on the operating table in dorsolithotomy position with SCDs bilaterally  The patient was then prepped and draped in usual sterile fashion  Could see a 1 5cm, raised, non-erythematous, fluctuant lesion medial to R labia majora  Bilateral labia and introitus were palpated, and the lesion was noted to track 3cm medio-inferiorly towards the perineal body  A 1 centimeter vertical incision was made at the center of the raised lesion  Bloody, yellow, slightly mucinous, nonpurulent, non-malodorous fluid drained from the incision site  The cyst wall was visualized and grasped, and a plane of dissection was established between the cyst walls and the skin   The plane was followed to the base of the cyst walls, and the body of the cyst was removed with Metzenbaum scissors  The tissue was closed in three layers; the deepest was  3-0 monocryl running, locked; the middle was  3-0 monocryl running, and the skin was closed with 4-0 monocryl subcuticularly  Good hemostasis was confirmed at the completion of the procedure  All needle, sponge, instrument counts were noted to be correct ×2  Dr Manjinder Ambriz was present for all key portions of the procedure      I was present for the entire procedure    Patient Disposition:  PACU     SIGNATURE: Gurdeep Engel MD  DATE: August 26, 2020  TIME: 4:29 PM

## 2020-08-26 NOTE — ANESTHESIA POSTPROCEDURE EVALUATION
Post-Op Assessment Note    CV Status:  Stable    Pain management: adequate     Mental Status:  Awake   Hydration Status:  Stable   PONV Controlled:  Controlled   Airway Patency:  Patent      Post Op Vitals Reviewed: Yes      Staff: Anesthesiologist, CRNA         No complications documented      BP   94/60   Temp 98 °F (36 7 °C) (08/26/20 1616)    Pulse  108   Resp 16 (08/26/20 1616)    SpO2   100

## 2020-08-26 NOTE — DISCHARGE INSTRUCTIONS
Labial lesion- Angiolipoma  WHAT YOU NEED TO KNOW:   An angiolipoma is a lump near the opening to your vagina  You may have pain in this area when you walk or have sex  This lesion is made of fat cells and small blood vessels, encased in a cyst wall  The cyst walls produces fluid, which can lead to growth of the lump  DISCHARGE INSTRUCTIONS:   Contact your gynecologist or healthcare provider if:   · You have a fever  · Your cyst returns after treatment, or gets larger and becomes more painful  · You have pus, redness, or swelling where the cyst was drained  · You have questions or concerns about your condition or care  Medicines: You may need any of the following:  · Antibiotics  help prevent or treat a bacterial infection  · NSAIDs , such as ibuprofen, help decrease swelling, pain, and fever  NSAIDs can cause stomach bleeding or kidney problems in certain people  If you take blood thinner medicine, always ask your healthcare provider if NSAIDs are safe for you  Always read the medicine label and follow directions  · Take your medicine as directed  Contact your healthcare provider if you think your medicine is not helping or if you have side effects  Tell him of her if you are allergic to any medicine  Keep a list of the medicines, vitamins, and herbs you take  Include the amounts, and when and why you take them  Bring the list or the pill bottles to follow-up visits  Carry your medicine list with you in case of an emergency  Self-care if your cyst was not drained:   · Apply a warm compress  to your cyst  This may relieve swelling and pain  Wet a washcloth in warm, but not hot, water  Apply the compress for 10 minutes  Repeat 4 times each day  · Keep the area around your vagina clean  Always wipe front to back  Shower once a day  Gently pat the area dry after a shower    ·   Self-care after an incision and drainage of your cyst:   · Wear a sanitary pad  to absorb drainage from your wound  You may have drainage for a few weeks after your cyst is drained  · Ask your healthcare provider if it is okay to have sex  Sex may increase your risk for an infection  · Keep the area around your vagina clean  Always wipe front to back  Shower once a day  Gently pat the area dry after a shower  ·   Follow up with your healthcare provider as directed: If packing was placed in your wound, return in 24 to 48 hours to have it removed  If a drain was placed, you will need to return in 4 to 6 weeks to have it removed  Write down your questions so you remember to ask them during your visits  © 2017 [facility] Information is for End User's use only and may not be sold, redistributed or otherwise used for commercial purposes  All illustrations and images included in CareNotes® are the copyrighted property of [employer]  or [employer]  The above information is an  only  It is not intended as medical advice for individual conditions or treatments  Talk to your doctor, nurse or pharmacist before following any medical regimen to see if it is safe and effective for you

## 2020-08-27 PROBLEM — F12.90 MARIJUANA USE: Status: ACTIVE | Noted: 2020-08-27

## 2020-08-31 ENCOUNTER — OFFICE VISIT (OUTPATIENT)
Dept: OBGYN CLINIC | Facility: CLINIC | Age: 25
End: 2020-08-31

## 2020-08-31 VITALS — SYSTOLIC BLOOD PRESSURE: 102 MMHG | DIASTOLIC BLOOD PRESSURE: 66 MMHG | WEIGHT: 113 LBS | BODY MASS INDEX: 20.5 KG/M2

## 2020-08-31 DIAGNOSIS — N90.89 VULVAR MASS: ICD-10-CM

## 2020-08-31 DIAGNOSIS — T81.49XA POSTOPERATIVE CELLULITIS OF SURGICAL WOUND: Primary | ICD-10-CM

## 2020-08-31 PROCEDURE — 99024 POSTOP FOLLOW-UP VISIT: CPT | Performed by: OBSTETRICS & GYNECOLOGY

## 2020-08-31 RX ORDER — OXYCODONE HYDROCHLORIDE AND ACETAMINOPHEN 5; 325 MG/1; MG/1
1 TABLET ORAL EVERY 4 HOURS PRN
Qty: 10 TABLET | Refills: 0 | Status: SHIPPED | OUTPATIENT
Start: 2020-08-31 | End: 2020-09-10

## 2020-08-31 RX ORDER — CIPROFLOXACIN 500 MG/1
500 TABLET, FILM COATED ORAL EVERY 12 HOURS SCHEDULED
Qty: 14 TABLET | Refills: 0 | Status: SHIPPED | OUTPATIENT
Start: 2020-08-31 | End: 2020-09-07

## 2020-08-31 NOTE — PROGRESS NOTES
Assessment:    Wound cellulitis   Poor hygiene  Operative findings again reviewed  Plan:    1  Start cipro  2  Wound care discussed  3  Activity restrictions: nothing per vagina  4  Anticipated return to work: not applicable  5  Follow up: 4 days for recheck  Subjective     Caren Salgado is a 23 y o  female who presents to the clinic 5 days status resection of right labial mass  Pain increased today and partner has noted a white area at incision site  No fever or chills  No problems with urination  Review of Systems  Pertinent items are noted in HPI        Objective     /66   Wt 51 3 kg (113 lb)   Breastfeeding No   BMI 20 50 kg/m²  T98 4  General:  alert and oriented, in no acute distress   Abdomen: soft, non-tender   Incision:   no drainage, no seroma, mild erythema with some necrotic debris at top edge between labial folds, incision well approximated

## 2020-09-22 ENCOUNTER — OFFICE VISIT (OUTPATIENT)
Dept: OBGYN CLINIC | Facility: CLINIC | Age: 25
End: 2020-09-22

## 2020-09-22 VITALS
WEIGHT: 113.6 LBS | SYSTOLIC BLOOD PRESSURE: 110 MMHG | BODY MASS INDEX: 20.61 KG/M2 | DIASTOLIC BLOOD PRESSURE: 60 MMHG | TEMPERATURE: 113.6 F

## 2020-09-22 DIAGNOSIS — Z09 POSTOPERATIVE EXAMINATION: Primary | ICD-10-CM

## 2020-09-22 DIAGNOSIS — Z98.890 S/P GENITAL SURGERY: ICD-10-CM

## 2020-09-22 PROCEDURE — 99024 POSTOP FOLLOW-UP VISIT: CPT | Performed by: OBSTETRICS & GYNECOLOGY

## 2020-09-24 NOTE — PROGRESS NOTES
Assessment:    Doing well postoperatively  Operative findings again reviewed  Pathology report discussed  Plan:    1  Continue any current medications  2  Wound care discussed  3  Activity restrictions: none  4  Anticipated return to work: not applicable  5  Follow up: 1 year for annual        Lyndsay Thompson is a 23 y o  female who presents to the clinic 3 5 weeks status post right labial cyst resection for persistent right labial cyst  Eating a regular diet without difficulty  Bowel movements are normal  The patient is not having any pain  The following portions of the patient's history were reviewed and updated as appropriate: allergies, current medications, past family history, past medical history, past social history, past surgical history and problem list     Review of Systems  Pertinent items are noted in HPI        Objective     /60 (BP Location: Right arm, Patient Position: Sitting, Cuff Size: Standard)   Temp (!) 113 6 °F (45 3 °C) (Tympanic)   Wt 51 5 kg (113 lb 9 6 oz)   LMP 09/13/2020 (Exact Date)   BMI 20 61 kg/m²   General:  alert and oriented, in no acute distress   Abdomen: soft, non-tender   Incision:   healing well, no drainage, no erythema, no seroma, no swelling, no dehiscence, incision well approximated

## 2020-11-14 ENCOUNTER — HOSPITAL ENCOUNTER (EMERGENCY)
Facility: HOSPITAL | Age: 25
Discharge: HOME | End: 2020-11-14
Attending: EMERGENCY MEDICINE | Admitting: EMERGENCY MEDICINE
Payer: COMMERCIAL

## 2020-11-14 ENCOUNTER — APPOINTMENT (EMERGENCY)
Dept: CT IMAGING | Facility: HOSPITAL | Age: 25
End: 2020-11-14
Payer: COMMERCIAL

## 2020-11-14 ENCOUNTER — APPOINTMENT (EMERGENCY)
Dept: RADIOLOGY | Facility: HOSPITAL | Age: 25
End: 2020-11-14
Payer: COMMERCIAL

## 2020-11-14 VITALS
SYSTOLIC BLOOD PRESSURE: 99 MMHG | BODY MASS INDEX: 21.16 KG/M2 | RESPIRATION RATE: 18 BRPM | OXYGEN SATURATION: 98 % | TEMPERATURE: 100.3 F | HEIGHT: 62 IN | DIASTOLIC BLOOD PRESSURE: 54 MMHG | HEART RATE: 78 BPM | WEIGHT: 115 LBS

## 2020-11-14 DIAGNOSIS — R11.2 NAUSEA & VOMITING: ICD-10-CM

## 2020-11-14 DIAGNOSIS — R10.84 COLICKY ABDOMINAL PAIN: ICD-10-CM

## 2020-11-14 DIAGNOSIS — J02.0 ACUTE STREPTOCOCCAL PHARYNGITIS: Primary | ICD-10-CM

## 2020-11-14 DIAGNOSIS — R50.9 FEVER: ICD-10-CM

## 2020-11-14 DIAGNOSIS — J02.9 SORE THROAT: ICD-10-CM

## 2020-11-14 LAB
ALBUMIN SERPL BCP-MCNC: 4.1 G/DL (ref 3.5–5)
ALP SERPL-CCNC: 59 U/L (ref 46–116)
ALT SERPL W P-5'-P-CCNC: 21 U/L (ref 12–78)
ANION GAP SERPL CALCULATED.3IONS-SCNC: 12 MMOL/L (ref 4–13)
AST SERPL W P-5'-P-CCNC: 17 U/L (ref 5–45)
BACTERIA UR QL AUTO: ABNORMAL /HPF
BASOPHILS # BLD AUTO: 0.05 THOUSANDS/ΜL (ref 0–0.1)
BASOPHILS NFR BLD AUTO: 0 % (ref 0–1)
BILIRUB DIRECT SERPL-MCNC: 0.23 MG/DL (ref 0–0.2)
BILIRUB SERPL-MCNC: 1 MG/DL (ref 0.2–1)
BILIRUB UR QL STRIP: NEGATIVE
BUN SERPL-MCNC: 10 MG/DL (ref 5–25)
CALCIUM SERPL-MCNC: 9.4 MG/DL (ref 8.3–10.1)
CHLORIDE SERPL-SCNC: 98 MMOL/L (ref 100–108)
CLARITY UR: CLEAR
CO2 SERPL-SCNC: 24 MMOL/L (ref 21–32)
COLOR UR: YELLOW
CREAT SERPL-MCNC: 0.76 MG/DL (ref 0.6–1.3)
EOSINOPHIL # BLD AUTO: 0 THOUSAND/ΜL (ref 0–0.61)
EOSINOPHIL NFR BLD AUTO: 0 % (ref 0–6)
ERYTHROCYTE [DISTWIDTH] IN BLOOD BY AUTOMATED COUNT: 12.1 % (ref 11.6–15.1)
FLUAV RNA RESP QL NAA+PROBE: NEGATIVE
FLUBV RNA RESP QL NAA+PROBE: NEGATIVE
GFR SERPL CREATININE-BSD FRML MDRD: 111 ML/MIN/1.73SQ M
GLUCOSE SERPL-MCNC: 112 MG/DL (ref 65–140)
GLUCOSE UR STRIP-MCNC: NEGATIVE MG/DL
HCG SERPL QL: NEGATIVE
HCT VFR BLD AUTO: 40 % (ref 34.8–46.1)
HGB BLD-MCNC: 13.5 G/DL (ref 11.5–15.4)
HGB UR QL STRIP.AUTO: ABNORMAL
IMM GRANULOCYTES # BLD AUTO: 0.13 THOUSAND/UL (ref 0–0.2)
IMM GRANULOCYTES NFR BLD AUTO: 1 % (ref 0–2)
KETONES UR STRIP-MCNC: ABNORMAL MG/DL
LACTATE SERPL-SCNC: 0.8 MMOL/L (ref 0.5–2)
LEUKOCYTE ESTERASE UR QL STRIP: NEGATIVE
LYMPHOCYTES # BLD AUTO: 0.85 THOUSANDS/ΜL (ref 0.6–4.47)
LYMPHOCYTES NFR BLD AUTO: 4 % (ref 14–44)
MAGNESIUM SERPL-MCNC: 1.5 MG/DL (ref 1.6–2.6)
MCH RBC QN AUTO: 28.7 PG (ref 26.8–34.3)
MCHC RBC AUTO-ENTMCNC: 33.8 G/DL (ref 31.4–37.4)
MCV RBC AUTO: 85 FL (ref 82–98)
MONOCYTES # BLD AUTO: 0.92 THOUSAND/ΜL (ref 0.17–1.22)
MONOCYTES NFR BLD AUTO: 5 % (ref 4–12)
NEUTROPHILS # BLD AUTO: 17.42 THOUSANDS/ΜL (ref 1.85–7.62)
NEUTS SEG NFR BLD AUTO: 90 % (ref 43–75)
NITRITE UR QL STRIP: NEGATIVE
NON-SQ EPI CELLS URNS QL MICRO: ABNORMAL /HPF
NRBC BLD AUTO-RTO: 0 /100 WBCS
PH UR STRIP.AUTO: >=9 [PH]
PLATELET # BLD AUTO: 239 THOUSANDS/UL (ref 149–390)
PMV BLD AUTO: 9.6 FL (ref 8.9–12.7)
POTASSIUM SERPL-SCNC: 3.6 MMOL/L (ref 3.5–5.3)
PROT SERPL-MCNC: 8.7 G/DL (ref 6.4–8.2)
PROT UR STRIP-MCNC: ABNORMAL MG/DL
RBC # BLD AUTO: 4.71 MILLION/UL (ref 3.81–5.12)
RBC #/AREA URNS AUTO: ABNORMAL /HPF
RSV RNA RESP QL NAA+PROBE: NEGATIVE
S PYO DNA THROAT QL NAA+PROBE: DETECTED
SARS-COV-2 RNA RESP QL NAA+PROBE: NEGATIVE
SODIUM SERPL-SCNC: 134 MMOL/L (ref 136–145)
SP GR UR STRIP.AUTO: 1.01 (ref 1–1.03)
UROBILINOGEN UR QL STRIP.AUTO: 0.2 E.U./DL
WBC # BLD AUTO: 19.37 THOUSAND/UL (ref 4.31–10.16)
WBC #/AREA URNS AUTO: ABNORMAL /HPF

## 2020-11-14 PROCEDURE — 87651 STREP A DNA AMP PROBE: CPT | Performed by: EMERGENCY MEDICINE

## 2020-11-14 PROCEDURE — 81001 URINALYSIS AUTO W/SCOPE: CPT | Performed by: EMERGENCY MEDICINE

## 2020-11-14 PROCEDURE — 80048 BASIC METABOLIC PNL TOTAL CA: CPT | Performed by: EMERGENCY MEDICINE

## 2020-11-14 PROCEDURE — 84703 CHORIONIC GONADOTROPIN ASSAY: CPT | Performed by: EMERGENCY MEDICINE

## 2020-11-14 PROCEDURE — 36415 COLL VENOUS BLD VENIPUNCTURE: CPT | Performed by: EMERGENCY MEDICINE

## 2020-11-14 PROCEDURE — 86308 HETEROPHILE ANTIBODY SCREEN: CPT | Performed by: EMERGENCY MEDICINE

## 2020-11-14 PROCEDURE — 96365 THER/PROPH/DIAG IV INF INIT: CPT

## 2020-11-14 PROCEDURE — 96375 TX/PRO/DX INJ NEW DRUG ADDON: CPT

## 2020-11-14 PROCEDURE — 96361 HYDRATE IV INFUSION ADD-ON: CPT

## 2020-11-14 PROCEDURE — G1004 CDSM NDSC: HCPCS

## 2020-11-14 PROCEDURE — 96367 TX/PROPH/DG ADDL SEQ IV INF: CPT

## 2020-11-14 PROCEDURE — 0241U HB NFCT DS VIR RESP RNA 4 TRGT: CPT | Performed by: EMERGENCY MEDICINE

## 2020-11-14 PROCEDURE — 99284 EMERGENCY DEPT VISIT MOD MDM: CPT

## 2020-11-14 PROCEDURE — 85025 COMPLETE CBC W/AUTO DIFF WBC: CPT | Performed by: EMERGENCY MEDICINE

## 2020-11-14 PROCEDURE — 74177 CT ABD & PELVIS W/CONTRAST: CPT

## 2020-11-14 PROCEDURE — 83605 ASSAY OF LACTIC ACID: CPT | Performed by: EMERGENCY MEDICINE

## 2020-11-14 PROCEDURE — 71045 X-RAY EXAM CHEST 1 VIEW: CPT

## 2020-11-14 PROCEDURE — 83735 ASSAY OF MAGNESIUM: CPT | Performed by: EMERGENCY MEDICINE

## 2020-11-14 PROCEDURE — 99284 EMERGENCY DEPT VISIT MOD MDM: CPT | Performed by: EMERGENCY MEDICINE

## 2020-11-14 PROCEDURE — 80076 HEPATIC FUNCTION PANEL: CPT | Performed by: EMERGENCY MEDICINE

## 2020-11-14 RX ORDER — CLINDAMYCIN HYDROCHLORIDE 150 MG/1
300 CAPSULE ORAL EVERY 8 HOURS SCHEDULED
Qty: 60 CAPSULE | Refills: 0 | Status: SHIPPED | OUTPATIENT
Start: 2020-11-14 | End: 2020-11-24

## 2020-11-14 RX ORDER — ONDANSETRON 2 MG/ML
4 INJECTION INTRAMUSCULAR; INTRAVENOUS ONCE
Status: COMPLETED | OUTPATIENT
Start: 2020-11-14 | End: 2020-11-14

## 2020-11-14 RX ORDER — ONDANSETRON 4 MG/1
4 TABLET, ORALLY DISINTEGRATING ORAL EVERY 8 HOURS PRN
Qty: 12 TABLET | Refills: 0 | Status: ON HOLD | OUTPATIENT
Start: 2020-11-14 | End: 2021-07-30 | Stop reason: SDUPTHER

## 2020-11-14 RX ORDER — MAGNESIUM SULFATE HEPTAHYDRATE 40 MG/ML
2 INJECTION, SOLUTION INTRAVENOUS ONCE
Status: COMPLETED | OUTPATIENT
Start: 2020-11-14 | End: 2020-11-14

## 2020-11-14 RX ORDER — CLINDAMYCIN PHOSPHATE 600 MG/50ML
600 INJECTION INTRAVENOUS ONCE
Status: COMPLETED | OUTPATIENT
Start: 2020-11-14 | End: 2020-11-14

## 2020-11-14 RX ORDER — KETOROLAC TROMETHAMINE 30 MG/ML
15 INJECTION, SOLUTION INTRAMUSCULAR; INTRAVENOUS ONCE
Status: COMPLETED | OUTPATIENT
Start: 2020-11-14 | End: 2020-11-14

## 2020-11-14 RX ADMIN — DEXAMETHASONE SODIUM PHOSPHATE 10 MG: 10 INJECTION, SOLUTION INTRAMUSCULAR; INTRAVENOUS at 11:39

## 2020-11-14 RX ADMIN — MAGNESIUM SULFATE HEPTAHYDRATE 2 G: 40 INJECTION, SOLUTION INTRAVENOUS at 13:00

## 2020-11-14 RX ADMIN — SODIUM CHLORIDE 1000 ML: 0.9 INJECTION, SOLUTION INTRAVENOUS at 11:39

## 2020-11-14 RX ADMIN — CLINDAMYCIN PHOSPHATE 600 MG: 600 INJECTION, SOLUTION INTRAVENOUS at 12:39

## 2020-11-14 RX ADMIN — KETOROLAC TROMETHAMINE 15 MG: 30 INJECTION, SOLUTION INTRAMUSCULAR at 11:38

## 2020-11-14 RX ADMIN — IOHEXOL 100 ML: 350 INJECTION, SOLUTION INTRAVENOUS at 12:25

## 2020-11-14 RX ADMIN — ONDANSETRON 4 MG: 2 INJECTION INTRAMUSCULAR; INTRAVENOUS at 11:38

## 2020-11-16 LAB — HETEROPH AB SER QL: NEGATIVE

## 2020-12-07 PROCEDURE — 87070 CULTURE OTHR SPECIMN AEROBIC: CPT | Performed by: OTOLARYNGOLOGY

## 2020-12-07 PROCEDURE — 87147 CULTURE TYPE IMMUNOLOGIC: CPT | Performed by: OTOLARYNGOLOGY

## 2020-12-08 ENCOUNTER — LAB (OUTPATIENT)
Dept: LAB | Facility: HOSPITAL | Age: 25
End: 2020-12-08
Attending: OTOLARYNGOLOGY
Payer: COMMERCIAL

## 2020-12-08 DIAGNOSIS — J03.90 ACUTE TONSILLITIS, UNSPECIFIED ETIOLOGY: Primary | ICD-10-CM

## 2020-12-08 LAB
BASOPHILS # BLD MANUAL: 0 THOUSAND/UL (ref 0–0.1)
BASOPHILS NFR MAR MANUAL: 0 % (ref 0–1)
EOSINOPHIL # BLD MANUAL: 0.07 THOUSAND/UL (ref 0–0.4)
EOSINOPHIL NFR BLD MANUAL: 1 % (ref 0–6)
ERYTHROCYTE [DISTWIDTH] IN BLOOD BY AUTOMATED COUNT: 14.5 % (ref 11.6–15.1)
HCT VFR BLD AUTO: 33.1 % (ref 34.8–46.1)
HGB BLD-MCNC: 10.9 G/DL (ref 11.5–15.4)
LG PLATELETS BLD QL SMEAR: PRESENT
LYMPHOCYTES # BLD AUTO: 3.91 THOUSAND/UL (ref 0.6–4.47)
LYMPHOCYTES # BLD AUTO: 53 % (ref 14–44)
MCH RBC QN AUTO: 28.4 PG (ref 26.8–34.3)
MCHC RBC AUTO-ENTMCNC: 32.9 G/DL (ref 31.4–37.4)
MCV RBC AUTO: 86 FL (ref 82–98)
MONOCYTES # BLD AUTO: 0.66 THOUSAND/UL (ref 0–1.22)
MONOCYTES NFR BLD: 9 % (ref 4–12)
NEUTROPHILS # BLD MANUAL: 2.73 THOUSAND/UL (ref 1.85–7.62)
NEUTS SEG NFR BLD AUTO: 37 % (ref 43–75)
NRBC BLD AUTO-RTO: 0 /100 WBCS
PLATELET # BLD AUTO: 175 THOUSANDS/UL (ref 149–390)
PLATELET BLD QL SMEAR: ADEQUATE
PMV BLD AUTO: 9.5 FL (ref 8.9–12.7)
RBC # BLD AUTO: 3.84 MILLION/UL (ref 3.81–5.12)
RBC MORPH BLD: NORMAL
TOTAL CELLS COUNTED SPEC: 100
WBC # BLD AUTO: 7.37 THOUSAND/UL (ref 4.31–10.16)

## 2020-12-08 PROCEDURE — 85027 COMPLETE CBC AUTOMATED: CPT

## 2020-12-08 PROCEDURE — 85007 BL SMEAR W/DIFF WBC COUNT: CPT

## 2020-12-08 PROCEDURE — 36415 COLL VENOUS BLD VENIPUNCTURE: CPT

## 2020-12-08 PROCEDURE — 86308 HETEROPHILE ANTIBODY SCREEN: CPT

## 2020-12-09 ENCOUNTER — HOSPITAL ENCOUNTER (EMERGENCY)
Facility: HOSPITAL | Age: 25
Discharge: HOME | End: 2020-12-09
Attending: EMERGENCY MEDICINE | Admitting: EMERGENCY MEDICINE
Payer: COMMERCIAL

## 2020-12-09 VITALS
WEIGHT: 110 LBS | DIASTOLIC BLOOD PRESSURE: 68 MMHG | HEART RATE: 75 BPM | TEMPERATURE: 98.2 F | RESPIRATION RATE: 15 BRPM | SYSTOLIC BLOOD PRESSURE: 112 MMHG | HEIGHT: 62 IN | BODY MASS INDEX: 20.24 KG/M2 | OXYGEN SATURATION: 98 %

## 2020-12-09 DIAGNOSIS — J03.90 TONSILLITIS: Primary | ICD-10-CM

## 2020-12-09 LAB
ANION GAP SERPL CALCULATED.3IONS-SCNC: 7 MMOL/L (ref 4–13)
BASOPHILS # BLD MANUAL: 0.07 THOUSAND/UL (ref 0–0.1)
BASOPHILS NFR MAR MANUAL: 1 % (ref 0–1)
BUN SERPL-MCNC: 9 MG/DL (ref 5–25)
CALCIUM SERPL-MCNC: 8.6 MG/DL (ref 8.3–10.1)
CHLORIDE SERPL-SCNC: 103 MMOL/L (ref 100–108)
CO2 SERPL-SCNC: 28 MMOL/L (ref 21–32)
CREAT SERPL-MCNC: 0.7 MG/DL (ref 0.6–1.3)
EOSINOPHIL # BLD MANUAL: 0 THOUSAND/UL (ref 0–0.4)
EOSINOPHIL NFR BLD MANUAL: 0 % (ref 0–6)
ERYTHROCYTE [DISTWIDTH] IN BLOOD BY AUTOMATED COUNT: 14.8 % (ref 11.6–15.1)
GFR SERPL CREATININE-BSD FRML MDRD: 123 ML/MIN/1.73SQ M
GLUCOSE SERPL-MCNC: 110 MG/DL (ref 65–140)
HCT VFR BLD AUTO: 34.5 % (ref 34.8–46.1)
HETEROPH AB SER QL: POSITIVE
HGB BLD-MCNC: 11.2 G/DL (ref 11.5–15.4)
LYMPHOCYTES # BLD AUTO: 2.49 THOUSAND/UL (ref 0.6–4.47)
LYMPHOCYTES # BLD AUTO: 37 % (ref 14–44)
MCH RBC QN AUTO: 28.5 PG (ref 26.8–34.3)
MCHC RBC AUTO-ENTMCNC: 32.5 G/DL (ref 31.4–37.4)
MCV RBC AUTO: 88 FL (ref 82–98)
MONOCYTES # BLD AUTO: 0.4 THOUSAND/UL (ref 0–1.22)
MONOCYTES NFR BLD: 6 % (ref 4–12)
NEUTROPHILS # BLD MANUAL: 3.43 THOUSAND/UL (ref 1.85–7.62)
NEUTS BAND NFR BLD MANUAL: 4 % (ref 0–8)
NEUTS SEG NFR BLD AUTO: 47 % (ref 43–75)
NRBC BLD AUTO-RTO: 0 /100 WBCS
PLATELET # BLD AUTO: 188 THOUSANDS/UL (ref 149–390)
PLATELET BLD QL SMEAR: ADEQUATE
PMV BLD AUTO: 9.3 FL (ref 8.9–12.7)
POTASSIUM SERPL-SCNC: 3.7 MMOL/L (ref 3.5–5.3)
RBC # BLD AUTO: 3.93 MILLION/UL (ref 3.81–5.12)
RBC MORPH BLD: NORMAL
SODIUM SERPL-SCNC: 138 MMOL/L (ref 136–145)
TOTAL CELLS COUNTED SPEC: 100
VARIANT LYMPHS # BLD AUTO: 5 %
WBC # BLD AUTO: 6.73 THOUSAND/UL (ref 4.31–10.16)

## 2020-12-09 PROCEDURE — 99283 EMERGENCY DEPT VISIT LOW MDM: CPT

## 2020-12-09 PROCEDURE — 36415 COLL VENOUS BLD VENIPUNCTURE: CPT | Performed by: NURSE PRACTITIONER

## 2020-12-09 PROCEDURE — 96368 THER/DIAG CONCURRENT INF: CPT

## 2020-12-09 PROCEDURE — 99284 EMERGENCY DEPT VISIT MOD MDM: CPT | Performed by: NURSE PRACTITIONER

## 2020-12-09 PROCEDURE — 96365 THER/PROPH/DIAG IV INF INIT: CPT

## 2020-12-09 PROCEDURE — 85007 BL SMEAR W/DIFF WBC COUNT: CPT | Performed by: NURSE PRACTITIONER

## 2020-12-09 PROCEDURE — 96375 TX/PRO/DX INJ NEW DRUG ADDON: CPT

## 2020-12-09 PROCEDURE — 85027 COMPLETE CBC AUTOMATED: CPT | Performed by: NURSE PRACTITIONER

## 2020-12-09 PROCEDURE — 96366 THER/PROPH/DIAG IV INF ADDON: CPT

## 2020-12-09 PROCEDURE — 80048 BASIC METABOLIC PNL TOTAL CA: CPT | Performed by: NURSE PRACTITIONER

## 2020-12-09 RX ORDER — CLINDAMYCIN PALMITATE HYDROCHLORIDE 75 MG/5ML
300 SOLUTION ORAL 4 TIMES DAILY
Qty: 800 ML | Refills: 0 | Status: SHIPPED | OUTPATIENT
Start: 2020-12-09 | End: 2020-12-19

## 2020-12-09 RX ORDER — SODIUM CHLORIDE, SODIUM GLUCONATE, SODIUM ACETATE, POTASSIUM CHLORIDE, MAGNESIUM CHLORIDE, SODIUM PHOSPHATE, DIBASIC, AND POTASSIUM PHOSPHATE .53; .5; .37; .037; .03; .012; .00082 G/100ML; G/100ML; G/100ML; G/100ML; G/100ML; G/100ML; G/100ML
2000 INJECTION, SOLUTION INTRAVENOUS ONCE
Status: COMPLETED | OUTPATIENT
Start: 2020-12-09 | End: 2020-12-09

## 2020-12-09 RX ORDER — DEXAMETHASONE SODIUM PHOSPHATE 10 MG/ML
10 INJECTION, SOLUTION INTRAMUSCULAR; INTRAVENOUS ONCE
Status: COMPLETED | OUTPATIENT
Start: 2020-12-09 | End: 2020-12-09

## 2020-12-09 RX ORDER — HYDROCODONE BITARTRATE AND ACETAMINOPHEN 5; 325 MG/1; MG/1
1 TABLET ORAL EVERY 6 HOURS PRN
Qty: 12 TABLET | Refills: 0 | Status: ON HOLD | OUTPATIENT
Start: 2020-12-09 | End: 2021-07-30 | Stop reason: SDUPTHER

## 2020-12-09 RX ADMIN — DEXAMETHASONE SODIUM PHOSPHATE 10 MG: 10 INJECTION, SOLUTION INTRAMUSCULAR; INTRAVENOUS at 12:30

## 2020-12-09 RX ADMIN — ALUMINA, MAGNESIA, AND SIMETHICONE ORAL SUSPENSION REGULAR STRENGTH 10 ML: 1200; 1200; 120 SUSPENSION ORAL at 14:16

## 2020-12-09 RX ADMIN — CEFTRIAXONE SODIUM 1000 MG: 10 INJECTION, POWDER, FOR SOLUTION INTRAVENOUS at 12:32

## 2020-12-09 RX ADMIN — SODIUM CHLORIDE, SODIUM GLUCONATE, SODIUM ACETATE, POTASSIUM CHLORIDE, MAGNESIUM CHLORIDE, SODIUM PHOSPHATE, DIBASIC, AND POTASSIUM PHOSPHATE 2000 ML: .53; .5; .37; .037; .03; .012; .00082 INJECTION, SOLUTION INTRAVENOUS at 12:31

## 2021-01-06 ENCOUNTER — OFFICE VISIT (OUTPATIENT)
Dept: OBGYN CLINIC | Facility: CLINIC | Age: 26
End: 2021-01-06
Payer: COMMERCIAL

## 2021-01-06 VITALS — BODY MASS INDEX: 20.23 KG/M2 | WEIGHT: 110.6 LBS | SYSTOLIC BLOOD PRESSURE: 122 MMHG | DIASTOLIC BLOOD PRESSURE: 70 MMHG

## 2021-01-06 DIAGNOSIS — Z98.890 S/P GENITAL SURGERY: ICD-10-CM

## 2021-01-06 DIAGNOSIS — R52 PAIN IN SURGICAL SCAR: Primary | ICD-10-CM

## 2021-01-06 DIAGNOSIS — L90.5 PAIN IN SURGICAL SCAR: Primary | ICD-10-CM

## 2021-01-06 PROCEDURE — 99213 OFFICE O/P EST LOW 20 MIN: CPT | Performed by: OBSTETRICS & GYNECOLOGY

## 2021-01-07 NOTE — PROGRESS NOTES
GYN Problem Visit    HPI:  Patient is s/p right vulvar mass excision 8/2020  She has been scared to do anything to area - not touching it, not using tampons, not sexually active  Tried to touch area recently and notes pain with a "ball "  No swelling or drainage  PMH, PSH, Meds, Allergies, SocHx, FamHx reviewed and no changes noted  ROS:  Pertinent findings in HPI]    Vitals:    01/06/21 0825   BP: 122/70       Physical Exam  Constitutional:       Appearance: Normal appearance  Genitourinary:      Vulva normal       Vulva exam comments: Surgical site well-healed; no erythema, fluctuance, or distortion  Scar palpable - + pain with mobilization  HENT:      Head: Normocephalic  Cardiovascular:      Rate and Rhythm: Normal rate and regular rhythm  Pulmonary:      Effort: Pulmonary effort is normal    Abdominal:      Palpations: Abdomen is soft  Tenderness: There is no abdominal tenderness  Musculoskeletal:         General: No swelling  Neurological:      General: No focal deficit present  Mental Status: She is alert and oriented to person, place, and time  Skin:     General: Skin is warm and dry  Psychiatric:         Mood and Affect: Mood normal          Behavior: Behavior normal    Vitals signs reviewed  Assessment/Plan:    1  S/P genital surgery    2  Pain in surgical scar    Discussed scar massage/manipulation  Will recheck in 4-6 weeks    If uable to do on her own will refer to PT

## 2021-05-15 ENCOUNTER — APPOINTMENT (EMERGENCY)
Dept: CT IMAGING | Facility: HOSPITAL | Age: 26
End: 2021-05-15
Payer: COMMERCIAL

## 2021-05-15 ENCOUNTER — HOSPITAL ENCOUNTER (EMERGENCY)
Facility: HOSPITAL | Age: 26
Discharge: HOME | End: 2021-05-15
Attending: EMERGENCY MEDICINE | Admitting: EMERGENCY MEDICINE
Payer: COMMERCIAL

## 2021-05-15 VITALS
HEART RATE: 81 BPM | SYSTOLIC BLOOD PRESSURE: 127 MMHG | OXYGEN SATURATION: 98 % | TEMPERATURE: 98.5 F | DIASTOLIC BLOOD PRESSURE: 74 MMHG | RESPIRATION RATE: 20 BRPM

## 2021-05-15 DIAGNOSIS — S16.1XXA CERVICAL STRAIN: ICD-10-CM
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
## 2021-05-15 DIAGNOSIS — S09.90XA CHI (CLOSED HEAD INJURY): ICD-10-CM
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
## 2021-05-15 PROCEDURE — 99284 EMERGENCY DEPT VISIT MOD MDM: CPT | Performed by: EMERGENCY MEDICINE

## 2021-05-15 PROCEDURE — 70450 CT HEAD/BRAIN W/O DYE: CPT

## 2021-05-15 PROCEDURE — 72125 CT NECK SPINE W/O DYE: CPT

## 2021-05-15 PROCEDURE — 99284 EMERGENCY DEPT VISIT MOD MDM: CPT

## 2021-05-15 PROCEDURE — 90471 IMMUNIZATION ADMIN: CPT

## 2021-05-15 PROCEDURE — 90715 TDAP VACCINE 7 YRS/> IM: CPT | Performed by: EMERGENCY MEDICINE

## 2021-05-15 PROCEDURE — G1004 CDSM NDSC: HCPCS

## 2021-05-15 RX ORDER — CLINDAMYCIN HYDROCHLORIDE 150 MG/1
450 CAPSULE ORAL 3 TIMES DAILY
Qty: 63 CAPSULE | Refills: 0 | Status: SHIPPED | OUTPATIENT
Start: 2021-05-15 | End: 2021-05-22

## 2021-05-15 RX ORDER — CLINDAMYCIN HYDROCHLORIDE 150 MG/1
450 CAPSULE ORAL ONCE
Status: COMPLETED | OUTPATIENT
Start: 2021-05-15 | End: 2021-05-15

## 2021-05-15 RX ORDER — DOXYCYCLINE HYCLATE 100 MG/1
100 CAPSULE ORAL ONCE
Status: COMPLETED | OUTPATIENT
Start: 2021-05-15 | End: 2021-05-15

## 2021-05-15 RX ORDER — DOXYCYCLINE 100 MG/1
100 CAPSULE ORAL 2 TIMES DAILY
Qty: 14 CAPSULE | Refills: 0 | Status: SHIPPED | OUTPATIENT
Start: 2021-05-15 | End: 2021-05-22

## 2021-05-15 RX ADMIN — TETANUS TOXOID, REDUCED DIPHTHERIA TOXOID AND ACELLULAR PERTUSSIS VACCINE, ADSORBED 0.5 ML: 5; 2.5; 8; 8; 2.5 SUSPENSION INTRAMUSCULAR at 15:23

## 2021-05-15 RX ADMIN — DOXYCYCLINE 100 MG: 100 CAPSULE ORAL at 13:34

## 2021-05-15 RX ADMIN — CLINDAMYCIN HYDROCHLORIDE 450 MG: 150 CAPSULE ORAL at 13:35
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
## 2021-05-26 ENCOUNTER — TELEPHONE (OUTPATIENT)
Dept: OBGYN CLINIC | Facility: CLINIC | Age: 26
End: 2021-05-26

## 2021-05-26 NOTE — TELEPHONE ENCOUNTER
Pt called from a different number to scheduled follow up appointment that is over due  Pt claimed the office kept cancelling her appts due to provider not available and she hasn't been able to follow up  Pt scheduled for 06/07 2:40 in [address] with dr hagen  Pt stated she wanted to know if there are any recommendations from the provider, prior to visit, that she needs to do? Pt states she can be called back at number [phone]  Please advise

## 2021-06-10 ENCOUNTER — OFFICE VISIT (OUTPATIENT)
Dept: OBGYN CLINIC | Facility: CLINIC | Age: 26
End: 2021-06-10
Payer: COMMERCIAL

## 2021-06-10 VITALS — WEIGHT: 107.8 LBS | BODY MASS INDEX: 19.72 KG/M2

## 2021-06-10 DIAGNOSIS — Z98.890 S/P GENITAL SURGERY: Primary | ICD-10-CM

## 2021-06-10 DIAGNOSIS — L90.5 SCAR PAIN: ICD-10-CM

## 2021-06-10 PROCEDURE — 99213 OFFICE O/P EST LOW 20 MIN: CPT | Performed by: OBSTETRICS & GYNECOLOGY

## 2021-06-14 NOTE — PROGRESS NOTES
GYN Follow Up Visit    HPI:  Patient presents for recheck of vaginal scar  Has only been doing stretching for 2 months  Denies trauma to area  Does report improvement in discomfort but is frustrated that it is not completely resolved  PMH, PSH, Meds, Allergies, SocHx, FamHx reviewed and no changes noted  Review of Systems   Constitutional: Negative for chills, decreased appetite, fever and malaise/fatigue  Respiratory: Negative for cough, shortness of breath, sputum production and wheezing  Gastrointestinal: Negative for bloating, abdominal pain, nausea and vomiting  Genitourinary: Positive for pelvic pain  Negative for dysuria, frequency and non-menstrual bleeding  Physical Exam  Constitutional:       Appearance: Normal appearance  Genitourinary:      Vulva and urethra normal       Vaginal exam comments: Surgical scar well-healed  Small area (1cm) of edema with eccymosis  HENT:      Head: Normocephalic  Cardiovascular:      Rate and Rhythm: Normal rate and regular rhythm  Pulmonary:      Effort: Pulmonary effort is normal    Abdominal:      Palpations: Abdomen is soft  Tenderness: There is no abdominal tenderness  Musculoskeletal:         General: No swelling  Neurological:      General: No focal deficit present  Mental Status: She is alert and oriented to person, place, and time  Skin:     General: Skin is warm and dry  Psychiatric:         Mood and Affect: Mood normal          Behavior: Behavior normal    Vitals reviewed  Assessment/Plan:      1  S/P genital surgery    2  Scar pain    - Ambulatory referral to Physical Therapy;  Future

## 2021-07-25 ENCOUNTER — HOSPITAL ENCOUNTER (INPATIENT)
Facility: HOSPITAL | Age: 26
LOS: 2 days | Discharge: HOME | DRG: 389 | End: 2021-07-30
Attending: EMERGENCY MEDICINE | Admitting: INTERNAL MEDICINE
Payer: COMMERCIAL

## 2021-07-25 ENCOUNTER — APPOINTMENT (EMERGENCY)
Dept: CT IMAGING | Facility: HOSPITAL | Age: 26
DRG: 389 | End: 2021-07-25
Payer: COMMERCIAL

## 2021-07-25 DIAGNOSIS — R11.2 NAUSEA & VOMITING: ICD-10-CM

## 2021-07-25 DIAGNOSIS — K50.90 CROHN'S DISEASE (HCC): Primary | ICD-10-CM

## 2021-07-25 DIAGNOSIS — R10.9 ABDOMINAL PAIN: ICD-10-CM

## 2021-07-25 DIAGNOSIS — K56.1 INTUSSUSCEPTION (HCC): ICD-10-CM

## 2021-07-25 DIAGNOSIS — J03.90 TONSILLITIS: ICD-10-CM

## 2021-07-25 DIAGNOSIS — J02.0 ACUTE STREPTOCOCCAL PHARYNGITIS: ICD-10-CM

## 2021-07-25 DIAGNOSIS — K50.90 CROHN DISEASE (HCC): ICD-10-CM

## 2021-07-25 LAB
ALBUMIN SERPL BCP-MCNC: 4.2 G/DL (ref 3.5–5)
ALP SERPL-CCNC: 59 U/L (ref 46–116)
ALT SERPL W P-5'-P-CCNC: 14 U/L (ref 12–78)
AMORPH PHOS CRY URNS QL MICRO: ABNORMAL /HPF
ANION GAP SERPL CALCULATED.3IONS-SCNC: 9 MMOL/L (ref 4–13)
AST SERPL W P-5'-P-CCNC: 31 U/L (ref 5–45)
BACTERIA UR QL AUTO: ABNORMAL /HPF
BASOPHILS # BLD AUTO: 0.07 THOUSANDS/ΜL (ref 0–0.1)
BASOPHILS NFR BLD AUTO: 1 % (ref 0–1)
BILIRUB SERPL-MCNC: 0.95 MG/DL (ref 0.2–1)
BILIRUB UR QL STRIP: NEGATIVE
BILIRUB UR QL STRIP: NEGATIVE
BUN SERPL-MCNC: 15 MG/DL (ref 5–25)
CALCIUM SERPL-MCNC: 9 MG/DL (ref 8.3–10.1)
CHLORIDE SERPL-SCNC: 101 MMOL/L (ref 100–108)
CLARITY UR: ABNORMAL
CLARITY UR: CLEAR
CO2 SERPL-SCNC: 25 MMOL/L (ref 21–32)
COLOR UR: YELLOW
COLOR UR: YELLOW
CREAT SERPL-MCNC: 0.59 MG/DL (ref 0.6–1.3)
EOSINOPHIL # BLD AUTO: 0.11 THOUSAND/ΜL (ref 0–0.61)
EOSINOPHIL NFR BLD AUTO: 1 % (ref 0–6)
ERYTHROCYTE [DISTWIDTH] IN BLOOD BY AUTOMATED COUNT: 12.1 % (ref 11.6–15.1)
EXT PREG TEST URINE: NEGATIVE
EXT. CONTROL ED NAV: NORMAL
GFR SERPL CREATININE-BSD FRML MDRD: 129 ML/MIN/1.73SQ M
GLUCOSE SERPL-MCNC: 97 MG/DL (ref 65–140)
GLUCOSE UR STRIP-MCNC: NEGATIVE MG/DL
GLUCOSE UR STRIP-MCNC: NEGATIVE MG/DL
HCT VFR BLD AUTO: 41.5 % (ref 34.8–46.1)
HGB BLD-MCNC: 14.1 G/DL (ref 11.5–15.4)
HGB UR QL STRIP.AUTO: NEGATIVE
HGB UR QL STRIP.AUTO: NEGATIVE
IMM GRANULOCYTES # BLD AUTO: 0.03 THOUSAND/UL (ref 0–0.2)
IMM GRANULOCYTES NFR BLD AUTO: 0 % (ref 0–2)
KETONES UR STRIP-MCNC: ABNORMAL MG/DL
KETONES UR STRIP-MCNC: ABNORMAL MG/DL
LEUKOCYTE ESTERASE UR QL STRIP: NEGATIVE
LEUKOCYTE ESTERASE UR QL STRIP: NEGATIVE
LIPASE SERPL-CCNC: 79 U/L (ref 73–393)
LYMPHOCYTES # BLD AUTO: 1.78 THOUSANDS/ΜL (ref 0.6–4.47)
LYMPHOCYTES NFR BLD AUTO: 21 % (ref 14–44)
MCH RBC QN AUTO: 29.4 PG (ref 26.8–34.3)
MCHC RBC AUTO-ENTMCNC: 34 G/DL (ref 31.4–37.4)
MCV RBC AUTO: 87 FL (ref 82–98)
MONOCYTES # BLD AUTO: 0.57 THOUSAND/ΜL (ref 0.17–1.22)
MONOCYTES NFR BLD AUTO: 7 % (ref 4–12)
MUCOUS THREADS UR QL AUTO: ABNORMAL
NEUTROPHILS # BLD AUTO: 5.78 THOUSANDS/ΜL (ref 1.85–7.62)
NEUTS SEG NFR BLD AUTO: 70 % (ref 43–75)
NITRITE UR QL STRIP: NEGATIVE
NITRITE UR QL STRIP: NEGATIVE
NON-SQ EPI CELLS URNS QL MICRO: ABNORMAL /HPF
NRBC BLD AUTO-RTO: 0 /100 WBCS
PH UR STRIP.AUTO: 8.5 [PH]
PH UR STRIP.AUTO: >=9 [PH]
PLATELET # BLD AUTO: 309 THOUSANDS/UL (ref 149–390)
PMV BLD AUTO: 9.4 FL (ref 8.9–12.7)
POTASSIUM SERPL-SCNC: 4.2 MMOL/L (ref 3.5–5.3)
PROT SERPL-MCNC: 8.4 G/DL (ref 6.4–8.2)
PROT UR STRIP-MCNC: ABNORMAL MG/DL
PROT UR STRIP-MCNC: NEGATIVE MG/DL
RBC # BLD AUTO: 4.8 MILLION/UL (ref 3.81–5.12)
RBC #/AREA URNS AUTO: ABNORMAL /HPF
SODIUM SERPL-SCNC: 135 MMOL/L (ref 136–145)
SP GR UR STRIP.AUTO: 1.01 (ref 1–1.03)
SP GR UR STRIP.AUTO: 1.01 (ref 1–1.03)
UROBILINOGEN UR QL STRIP.AUTO: 0.2 E.U./DL
UROBILINOGEN UR QL STRIP.AUTO: 0.2 E.U./DL
WBC # BLD AUTO: 8.34 THOUSAND/UL (ref 4.31–10.16)
WBC #/AREA URNS AUTO: ABNORMAL /HPF

## 2021-07-25 PROCEDURE — 81003 URINALYSIS AUTO W/O SCOPE: CPT | Performed by: EMERGENCY MEDICINE

## 2021-07-25 PROCEDURE — 86255 FLUORESCENT ANTIBODY SCREEN: CPT | Performed by: INTERNAL MEDICINE

## 2021-07-25 PROCEDURE — 99219 PR INITIAL OBSERVATION CARE/DAY 50 MINUTES: CPT | Performed by: INTERNAL MEDICINE

## 2021-07-25 PROCEDURE — 80053 COMPREHEN METABOLIC PANEL: CPT | Performed by: EMERGENCY MEDICINE

## 2021-07-25 PROCEDURE — 83516 IMMUNOASSAY NONANTIBODY: CPT | Performed by: INTERNAL MEDICINE

## 2021-07-25 PROCEDURE — 36415 COLL VENOUS BLD VENIPUNCTURE: CPT | Performed by: EMERGENCY MEDICINE

## 2021-07-25 PROCEDURE — 81025 URINE PREGNANCY TEST: CPT | Performed by: EMERGENCY MEDICINE

## 2021-07-25 PROCEDURE — 85025 COMPLETE CBC W/AUTO DIFF WBC: CPT | Performed by: EMERGENCY MEDICINE

## 2021-07-25 PROCEDURE — 96361 HYDRATE IV INFUSION ADD-ON: CPT

## 2021-07-25 PROCEDURE — 81001 URINALYSIS AUTO W/SCOPE: CPT | Performed by: EMERGENCY MEDICINE

## 2021-07-25 PROCEDURE — 74177 CT ABD & PELVIS W/CONTRAST: CPT

## 2021-07-25 PROCEDURE — 99285 EMERGENCY DEPT VISIT HI MDM: CPT | Performed by: EMERGENCY MEDICINE

## 2021-07-25 PROCEDURE — 82784 ASSAY IGA/IGD/IGG/IGM EACH: CPT | Performed by: INTERNAL MEDICINE

## 2021-07-25 PROCEDURE — 83690 ASSAY OF LIPASE: CPT | Performed by: EMERGENCY MEDICINE

## 2021-07-25 PROCEDURE — 96375 TX/PRO/DX INJ NEW DRUG ADDON: CPT

## 2021-07-25 PROCEDURE — 96374 THER/PROPH/DIAG INJ IV PUSH: CPT

## 2021-07-25 PROCEDURE — 99285 EMERGENCY DEPT VISIT HI MDM: CPT

## 2021-07-25 RX ORDER — FAMOTIDINE 20 MG/1
20 TABLET, FILM COATED ORAL 2 TIMES DAILY
Status: DISCONTINUED | OUTPATIENT
Start: 2021-07-25 | End: 2021-07-30 | Stop reason: HOSPADM

## 2021-07-25 RX ORDER — ONDANSETRON 2 MG/ML
4 INJECTION INTRAMUSCULAR; INTRAVENOUS EVERY 6 HOURS PRN
Status: DISCONTINUED | OUTPATIENT
Start: 2021-07-25 | End: 2021-07-30 | Stop reason: HOSPADM

## 2021-07-25 RX ORDER — HYDROMORPHONE HCL/PF 1 MG/ML
1 SYRINGE (ML) INJECTION EVERY 6 HOURS PRN
Status: DISCONTINUED | OUTPATIENT
Start: 2021-07-25 | End: 2021-07-25

## 2021-07-25 RX ORDER — PROMETHAZINE HYDROCHLORIDE 25 MG/ML
12.5 INJECTION, SOLUTION INTRAMUSCULAR; INTRAVENOUS EVERY 6 HOURS PRN
Status: DISCONTINUED | OUTPATIENT
Start: 2021-07-25 | End: 2021-07-30 | Stop reason: HOSPADM

## 2021-07-25 RX ORDER — HYDROMORPHONE HCL/PF 1 MG/ML
1 SYRINGE (ML) INJECTION ONCE
Status: COMPLETED | OUTPATIENT
Start: 2021-07-25 | End: 2021-07-25

## 2021-07-25 RX ORDER — DICYCLOMINE HYDROCHLORIDE 10 MG/1
10 CAPSULE ORAL
Status: DISCONTINUED | OUTPATIENT
Start: 2021-07-25 | End: 2021-07-25

## 2021-07-25 RX ORDER — ACETAMINOPHEN 325 MG/1
650 TABLET ORAL EVERY 6 HOURS PRN
Status: DISCONTINUED | OUTPATIENT
Start: 2021-07-25 | End: 2021-07-30 | Stop reason: HOSPADM

## 2021-07-25 RX ORDER — ONDANSETRON 2 MG/ML
4 INJECTION INTRAMUSCULAR; INTRAVENOUS ONCE
Status: COMPLETED | OUTPATIENT
Start: 2021-07-25 | End: 2021-07-25

## 2021-07-25 RX ORDER — HYDROMORPHONE HCL/PF 1 MG/ML
1 SYRINGE (ML) INJECTION EVERY 4 HOURS PRN
Status: DISCONTINUED | OUTPATIENT
Start: 2021-07-25 | End: 2021-07-29

## 2021-07-25 RX ORDER — SODIUM CHLORIDE, SODIUM LACTATE, POTASSIUM CHLORIDE, CALCIUM CHLORIDE 600; 310; 30; 20 MG/100ML; MG/100ML; MG/100ML; MG/100ML
125 INJECTION, SOLUTION INTRAVENOUS CONTINUOUS
Status: DISCONTINUED | OUTPATIENT
Start: 2021-07-25 | End: 2021-07-30 | Stop reason: HOSPADM

## 2021-07-25 RX ADMIN — IOHEXOL 100 ML: 350 INJECTION, SOLUTION INTRAVENOUS at 12:44

## 2021-07-25 RX ADMIN — HYDROMORPHONE HYDROCHLORIDE 1 MG: 1 INJECTION, SOLUTION INTRAMUSCULAR; INTRAVENOUS; SUBCUTANEOUS at 21:24

## 2021-07-25 RX ADMIN — SODIUM CHLORIDE, SODIUM LACTATE, POTASSIUM CHLORIDE, AND CALCIUM CHLORIDE 125 ML/HR: .6; .31; .03; .02 INJECTION, SOLUTION INTRAVENOUS at 16:18

## 2021-07-25 RX ADMIN — SODIUM CHLORIDE 1000 ML: 0.9 INJECTION, SOLUTION INTRAVENOUS at 12:01

## 2021-07-25 RX ADMIN — HYDROMORPHONE HYDROCHLORIDE 1 MG: 1 INJECTION, SOLUTION INTRAMUSCULAR; INTRAVENOUS; SUBCUTANEOUS at 13:08

## 2021-07-25 RX ADMIN — ONDANSETRON 4 MG: 2 INJECTION INTRAMUSCULAR; INTRAVENOUS at 17:12

## 2021-07-25 RX ADMIN — HYDROMORPHONE HYDROCHLORIDE 1 MG: 1 INJECTION, SOLUTION INTRAMUSCULAR; INTRAVENOUS; SUBCUTANEOUS at 16:21

## 2021-07-25 RX ADMIN — ONDANSETRON 4 MG: 2 INJECTION INTRAMUSCULAR; INTRAVENOUS at 13:08

## 2021-07-25 NOTE — ASSESSMENT & PLAN NOTE
Somewhat acute in nature  CTA of the abdomen does not reveal a lot of inflammation actively but does reveal findings of chronic Crohn's disease and does reveal intussusception which may be contributing to this  Analgesia with hydromorphone  IV fluids as ordered  Monitor clinically  GI input input would be appreciated

## 2021-07-25 NOTE — PLAN OF CARE
Problem: Potential for Falls  Goal: Patient will remain free of falls  Description: INTERVENTIONS:  - Educate patient/family on patient safety including physical limitations  - Instruct patient to call for assistance with activity   - Consult OT/PT to assist with strengthening/mobility   - Keep Call bell within reach  - Keep bed low and locked with side rails adjusted as appropriate  - Keep care items and personal belongings within reach  - Initiate and maintain comfort rounds  - Make Fall Risk Sign visible to staff  - Offer Toileting every 2 Hours, in advance of need  - Initiate/Maintain bed alarm  - Obtain necessary fall risk management equipment: chair alarm  - Apply yellow socks and bracelet for high fall risk patients  - Consider moving patient to room near nurses station  Outcome: Progressing     Problem: PAIN - ADULT  Goal: Verbalizes/displays adequate comfort level or baseline comfort level  Description: Interventions:  - Encourage patient to monitor pain and request assistance  - Assess pain using appropriate pain scale  - Administer analgesics based on type and severity of pain and evaluate response  - Implement non-pharmacological measures as appropriate and evaluate response  - Consider cultural and social influences on pain and pain management  - Notify physician/advanced practitioner if interventions unsuccessful or patient reports new pain  Outcome: Progressing     Problem: INFECTION - ADULT  Goal: Absence or prevention of progression during hospitalization  Description: INTERVENTIONS:  - Assess and monitor for signs and symptoms of infection  - Monitor lab/diagnostic results  - Monitor all insertion sites, i e  indwelling lines, tubes, and drains  - Monitor endotracheal if appropriate and nasal secretions for changes in amount and color  - [place] appropriate cooling/warming therapies per order  - Administer medications as ordered  - Instruct and encourage patient and family to use good hand hygiene technique  - Identify and instruct in appropriate isolation precautions for identified infection/condition  Outcome: Progressing  Goal: Absence of fever/infection during neutropenic period  Description: INTERVENTIONS:  - Monitor WBC    Outcome: Progressing     Problem: SAFETY ADULT  Goal: Patient will remain free of falls  Description: INTERVENTIONS:  - Educate patient/family on patient safety including physical limitations  - Instruct patient to call for assistance with activity   - Consult OT/PT to assist with strengthening/mobility   - Keep Call bell within reach  - Keep bed low and locked with side rails adjusted as appropriate  - Keep care items and personal belongings within reach  - Initiate and maintain comfort rounds  - Make Fall Risk Sign visible to staff  - Offer Toileting every 1 Hours, in advance of need  - Initiate/Maintain bed alarm  - Obtain necessary fall risk management equipment: chair alarm  - Apply yellow socks and bracelet for high fall risk patients  - Consider moving patient to room near nurses station  Outcome: Progressing  Goal: Maintain or return to baseline ADL function  Description: INTERVENTIONS:  -  Assess patient's ability to carry out ADLs; assess patient's baseline for ADL function and identify physical deficits which impact ability to perform ADLs (bathing, care of mouth/teeth, toileting, grooming, dressing, etc )  - Assess/evaluate cause of self-care deficits   - Assess range of motion  - Assess patient's mobility; develop plan if impaired  - Assess patient's need for assistive devices and provide as appropriate  - Encourage maximum independence but intervene and supervise when necessary  - Involve family in performance of ADLs  - Assess for home care needs following discharge   - Consider OT consult to assist with ADL evaluation and planning for discharge  - Provide patient education as appropriate  Outcome: Progressing  Goal: Maintains/Returns to pre admission functional level  Description: INTERVENTIONS:  - Perform BMAT or MOVE assessment daily    - Set and communicate daily mobility goal to care team and patient/family/caregiver  - Collaborate with rehabilitation services on mobility goals if consulted  - Perform Range of Motion 3 times a day  - Reposition patient every 3 hours  - Dangle patient 3 times a day  - Stand patient 3 times a day  - Ambulate patient 3 times a day  - Out of bed to chair 3 times a day   - Out of bed for meals 3 times a day  - Out of bed for toileting  - Record patient progress and toleration of activity level   Outcome: Progressing     Problem: DISCHARGE PLANNING  Goal: Discharge to home or other facility with appropriate resources  Description: INTERVENTIONS:  - Identify barriers to discharge w/patient and caregiver  - Arrange for needed discharge resources and transportation as appropriate  - Identify discharge learning needs (meds, wound care, etc )  - Arrange for interpretive services to assist at discharge as needed  - Refer to Case Management Department for coordinating discharge planning if the patient needs post-hospital services based on physician/advanced practitioner order or complex needs related to functional status, cognitive ability, or social support system  Outcome: Progressing     Problem: Knowledge Deficit  Goal: Patient/family/caregiver demonstrates understanding of disease process, treatment plan, medications, and discharge instructions  Description: Complete learning assessment and assess knowledge base    Interventions:  - Provide teaching at level of understanding  - Provide teaching via preferred learning methods  Outcome: Progressing     Problem: GASTROINTESTINAL - ADULT  Goal: Minimal or absence of nausea and/or vomiting  Description: INTERVENTIONS:  - Administer IV fluids if ordered to ensure adequate hydration  - Maintain NPO status until nausea and vomiting are resolved  - Nasogastric tube if ordered  - Administer ordered antiemetic medications as needed  - Provide nonpharmacologic comfort measures as appropriate  - Advance diet as tolerated, if ordered  - Consider nutrition services referral to assist patient with adequate nutrition and appropriate food choices  Outcome: Progressing  Goal: Maintains or returns to baseline bowel function  Description: INTERVENTIONS:  - Assess bowel function  - Encourage oral fluids to ensure adequate hydration  - Administer IV fluids if ordered to ensure adequate hydration  - Administer ordered medications as needed  - Encourage mobilization and activity  - Consider nutritional services referral to assist patient with adequate nutrition and appropriate food choices  Outcome: Progressing  Goal: Maintains adequate nutritional intake  Description: INTERVENTIONS:  - Monitor percentage of each meal consumed  - Identify factors contributing to decreased intake, treat as appropriate  - Assist with meals as needed  - Monitor I&O, weight, and lab values if indicated  - Obtain nutrition services referral as needed  Outcome: Progressing     Problem: METABOLIC, FLUID AND ELECTROLYTES - ADULT  Goal: Electrolytes maintained within normal limits  Description: INTERVENTIONS:  - Monitor labs and assess patient for signs and symptoms of electrolyte imbalances  - Administer electrolyte replacement as ordered  - Monitor response to electrolyte replacements, including repeat lab results as appropriate  - Instruct patient on fluid and nutrition as appropriate  Outcome: Progressing  Goal: Fluid balance maintained  Description: INTERVENTIONS:  - Monitor labs   - Monitor I/O and WT  - Instruct patient on fluid and nutrition as appropriate  - Assess for signs & symptoms of volume excess or deficit  Outcome: Progressing

## 2021-07-25 NOTE — ASSESSMENT & PLAN NOTE
Finding on CT scan  No significant bowel obstruction however on CT  Patient does mention abdominal pain and nausea however  Results were discussed by ED with by GI who recommended observation and celiac panel    Place under observation  GI consult  Monitor abdominal exam

## 2021-07-25 NOTE — H&P
[facility]  H&P- Nayely Cantrell 1995, 27 y o  female MRN: [id number]  Unit/Bed#: ED 20 Encounter: [id number]  Primary Care Provider: No primary care provider on file  Date and time admitted to hospital: 7/25/2021 10:27 AM    Intussusception [facility])  Assessment & Plan  Finding on CT scan  No significant bowel obstruction however on CT  Patient does mention abdominal pain and nausea however  Results were discussed by ED with by GI who recommended observation and celiac panel  Place under observation  GI consult  Monitor abdominal exam    Abdominal pain  Assessment & Plan  Somewhat acute in nature  CTA of the abdomen does not reveal a lot of inflammation actively but does reveal findings of chronic Crohn's disease and does reveal intussusception which may be contributing to this  Analgesia with hydromorphone  IV fluids as ordered  Monitor clinically  GI input input would be appreciated  Intractable nausea and vomiting  Assessment & Plan  In the setting of abdominal pain and finding of intussusception on CT scan  ED has discussed with GI who recommended observation  Will provide antiemetic therapy Zofran  IV fluids  Clear liquid diet and advanced as tolerated  Crohn's disease [facility])  Assessment & Plan  Not on any suppressive therapy for this  Denies any bloody bowel movements  Marijuana use  Assessment & Plan  Reportedly chart  Was recommended to stop  PDMP reviewed, no narcotics filled recently  VTE Prophylaxis: Pharmacologic VTE Prophylaxis contraindicated due to Low risk  / sequential compression device   Code Status:  Full code  POLST: POLST form is not discussed and not completed at this time  Discussion with family:  Discussed with friend at the bedside    Anticipated Length of Stay:  Patient will be admitted on an Observation basis with an anticipated length of stay of  less than 2 midnights     Justification for Hospital Stay:  Abdominal pain with intractable nausea vomiting  Intussusception  Total Time for Visit, including Counseling / Coordination of Care: 45 minutes  Greater than 50% of this total time spent on direct patient counseling and coordination of care  Chief Complaint:   Abdominal pain nausea vomiting    History of Present Illness:    Flaco Vazquez is a 27 y o  female who presents with abdominal pain nausea vomiting  Patient does mention history of Crohn's disease for at least the past 6 years  She is not on any chronic therapy for that  She does mention the last flare was about 4 years ago  She is not on any medicines  She denies any bloody bowel movements  She denies any fever  At least since yesterday she has started having epigastric and mid epigastric abdominal discomfort, pressure in nature with episodes of colic like pain intensity 8/10  She also mentions nausea and at least a couple episodes of nonbilious vomiting  She denies any diarrhea however  She denies eating anything different  Because of her symptoms she decided to come to the ED  At emergency department patient was noted to be hemodynamically stable  Blood work essentially unremarkable  Urinalysis mild ketones  CT abd showed There is small segment of the terminal ileum which demonstrates enhancement and demonstrates some distortion without significant perienteric stranding, unchanged from the previous study this is probably related to low-grade fibrosing stenosing disease   Small mesenteric lymph nodes, stable   Small bowel intussusception seen left upper quadrant without associated bowel obstruction probably a transient and incidental finding      No intra-abdominal fluid collection   Chronic enlargement of the tip of the appendix without surrounding inflammatory changes, stable   This is likely sequela of  inflammatory changes seen in relation to the tip of the appendix in 2016     Patient had persistent pain despite narcotics  She has been hospitalized      Review of Systems:    Review of Systems   Constitutional: Positive for fatigue  Gastrointestinal: Positive for abdominal pain, nausea and vomiting  All other systems reviewed and are negative  More than 10 systems reviewed and negative except for above    Past Medical and Surgical History:     Past Medical History:   Diagnosis Date    Anxiety     Crohn disease ([facility] [address] )     Depression     Suicide attempt [facility])        Past Surgical History:   Procedure Laterality Date    COLONOSCOPY      VULVA BIOPSY Right 8/26/2020    Procedure: Excision of Right Labial Lesion;  Surgeon: Susan Moon MD;  Location: [facility];  Service: Gynecology       Meds/Allergies:    Prior to Admission medications    Medication Sig Start Date End Date Taking? Authorizing Provider   HYDROcodone-acetaminophen (NORCO) 5-325 mg per tablet Take 1 tablet by mouth every 6 (six) hours as needed for pain for up to 12 dosesMax Daily Amount: 4 tablets  Patient not taking: Reported on 1/6/2021 12/9/20   WARD Slaughter   methylPREDNISolone 4 MG tablet therapy pack Use as directed on package  Patient not taking: Reported on 1/6/2021 12/7/20   Adamaris Barrera MD   ondansetron (ZOFRAN-ODT) 4 mg disintegrating tablet Take 1 tablet (4 mg total) by mouth every 8 (eight) hours as needed for nausea or vomiting  Patient not taking: Reported on 12/7/2020 11/14/20   Clark Flynn,      I have reviewed home medications with patient personally  Allergies:    Allergies   Allergen Reactions    Amoxicillin Anaphylaxis    Amoxicillin Abdominal Pain       Social History:     Marital Status: Unknown     Substance Use History:   Social History     Substance and Sexual Activity   Alcohol Use Not Currently     Social History     Tobacco Use   Smoking Status Never Smoker   Smokeless Tobacco Never Used     Social History     Substance and Sexual Activity   Drug Use Not Currently    Frequency: 7 0 times per week    Types: Marijuana    Comment: Smokes weed daily Family History:    Family History   Problem Relation Age of Onset    Vitiligo Paternal Grandfather     Lupus Paternal Aunt        Physical Exam:     Vitals:   Blood Pressure: 116/70 (07/25/21 1420)  Pulse: 60 (07/25/21 1420)  Temperature: 98 1 °F (36 7 °C) (07/25/21 1042)  Respirations: 15 (07/25/21 1420)  Height: 5' 3" (160 cm) (07/25/21 1041)  Weight - Scale: 49 kg (108 lb) (07/25/21 1041)  SpO2: 100 % (07/25/21 1420)    Physical Exam  Vitals and nursing note reviewed  Constitutional:       Appearance: Normal appearance  She is normal weight  Comments: Young female in bed, awake   HENT:      Head: Normocephalic and atraumatic  Right Ear: External ear normal       Left Ear: External ear normal       Nose: Nose normal  No congestion  Mouth/Throat:      Mouth: Mucous membranes are moist       Pharynx: Oropharynx is clear  No oropharyngeal exudate or posterior oropharyngeal erythema  Eyes:      General: No scleral icterus  Right eye: No discharge  Left eye: No discharge  Extraocular Movements: Extraocular movements intact  Conjunctiva/sclera: Conjunctivae normal       Pupils: Pupils are equal, round, and reactive to light  Cardiovascular:      Rate and Rhythm: Normal rate and regular rhythm  Pulses: Normal pulses  Heart sounds: Normal heart sounds  No murmur heard  No friction rub  No gallop  Pulmonary:      Effort: Pulmonary effort is normal  No respiratory distress  Breath sounds: Normal breath sounds  No stridor  No wheezing, rhonchi or rales  Chest:      Chest wall: No tenderness  Abdominal:      General: Abdomen is flat  Bowel sounds are normal  There is no distension  Palpations: Abdomen is soft  There is no mass  Tenderness: There is abdominal tenderness  There is no guarding or rebound        Comments: Tenderness to palpation of mid epigastric area but no rebound or guarding   Musculoskeletal:         General: No swelling, tenderness, deformity or signs of injury  Normal range of motion  Cervical back: Normal range of motion and neck supple  No rigidity  No muscular tenderness  Skin:     General: Skin is warm and dry  Capillary Refill: Capillary refill takes less than 2 seconds  Coloration: Skin is not jaundiced or pale  Findings: No bruising, erythema, lesion or rash  Neurological:      General: No focal deficit present  Mental Status: She is alert and oriented to person, place, and time  Mental status is at baseline  Cranial Nerves: No cranial nerve deficit  Sensory: No sensory deficit  Motor: No weakness  Coordination: Coordination normal    Psychiatric:         Mood and Affect: Mood normal          Behavior: Behavior normal          Thought Content: Thought content normal          Judgment: Judgment normal            Additional Data:     Lab Results: I have personally reviewed pertinent reports  Results from last 7 days   Lab Units 07/25/21  1159   WBC Thousand/uL 8 34   HEMOGLOBIN g/dL 14 1   HEMATOCRIT % 41 5   PLATELETS Thousands/uL 309   NEUTROS PCT % 70   LYMPHS PCT % 21   MONOS PCT % 7   EOS PCT % 1     Results from last 7 days   Lab Units 07/25/21  1159   SODIUM mmol/L 135*   POTASSIUM mmol/L 4 2   CHLORIDE mmol/L 101   CO2 mmol/L 25   BUN mg/dL 15   CREATININE mg/dL 0 59*   ANION GAP mmol/L 9   CALCIUM mg/dL 9 0   ALBUMIN g/dL 4 2   TOTAL BILIRUBIN mg/dL 0 95   ALK PHOS U/L 59   ALT U/L 14   AST U/L 31   GLUCOSE RANDOM mg/dL 97                       Imaging: I have personally reviewed pertinent reports        CT abdomen pelvis with contrast   ED Interpretation by Kameron Grimm DO (07/25 8738)   There is small segment of the terminal ileum which demonstrates enhancement and demonstrates some distortion without significant perienteric stranding, unchanged from the previous study this is probably related to low-grade fibrosing stenosing disease        Small mesenteric lymph nodes, stable     Small bowel intussusception seen left upper quadrant without associated bowel obstruction probably a transient and incidental finding   However a nonemergent CT enterography on MR enterography suggest in this patient with Crohn's disease     No intra-abdominal fluid collection     Chronic enlargement of the tip of the appendix without surrounding inflammatory changes, stable  This is likely sequela of  inflammatory changes seen in relation to the tip of the appendix in 2016      Final Result by Ismael Mccabe MD (07/25 1606)      There is small segment of the terminal ileum which demonstrates enhancement and demonstrates some distortion without significant perienteric stranding, unchanged from the previous study this is probably related to low-grade fibrosing stenosing disease         Small mesenteric lymph nodes, stable      Small bowel intussusception seen left upper quadrant without associated bowel obstruction probably a transient and incidental finding   However a nonemergent CT enterography on MR enterography suggest in this patient with Crohn's disease      No intra-abdominal fluid collection      Chronic enlargement of the tip of the appendix without surrounding inflammatory changes, stable  This is likely sequela of  inflammatory changes seen in relation to the tip of the appendix in 2016            The study was marked in [facility] for immediate notification  Workstation performed: [id number]               [employer] [facility] [employer] Records Reviewed: Yes     ** Please Note: This note has been constructed using a voice recognition system   **

## 2021-07-25 NOTE — ED PROVIDER NOTES
History  Chief Complaint   Patient presents with    Abdominal Pain     Pt with crohns, c/o abd pain since last night  81-year-old female with history of Crohn's disease presents with epigastric abdominal pain and generalized abdominal pain over the past 3 days  She states that it has been worsening  She states this does feel similar to when she was diagnosed with Crohn's but it is worse  She has vomiting  She has normal bowel movements without blood or melena  She did not take any medicines at home  She has chills but presents afebrile  She has no prior abdominal surgeries  She has been admitted for Crohn's in the past when she had ulcerations to her colon  She denies urinary symptoms but states she has not urinated since yesterday  Prior to Admission Medications   Prescriptions Last Dose Informant Patient Reported? Taking?    HYDROcodone-acetaminophen (NORCO) 5-325 mg per tablet Not Taking at Unknown time Self No No   Sig: Take 1 tablet by mouth every 6 (six) hours as needed for pain for up to 12 dosesMax Daily Amount: 4 tablets   Patient not taking: Reported on 1/6/2021   methylPREDNISolone 4 MG tablet therapy pack Not Taking at Unknown time Self No No   Sig: Use as directed on package   Patient not taking: Reported on 1/6/2021   ondansetron (ZOFRAN-ODT) 4 mg disintegrating tablet Not Taking at Unknown time Self No No   Sig: Take 1 tablet (4 mg total) by mouth every 8 (eight) hours as needed for nausea or vomiting   Patient not taking: Reported on 12/7/2020      Facility-Administered Medications: None       Past Medical History:   Diagnosis Date    Anxiety     Crohn disease ([facility] [address] )     Depression     Suicide attempt [facility])        Past Surgical History:   Procedure Laterality Date    COLONOSCOPY      VULVA BIOPSY Right 8/26/2020    Procedure: Excision of Right Labial Lesion;  Surgeon: Dudley Miranda MD;  [facility];  Service: Gynecology       Family History   Problem Relation Age of Onset    Vitiligo Paternal Grandfather     Lupus Paternal Aunt      I have reviewed and agree with the history as documented  E-Cigarette/Vaping    E-Cigarette Use Never User      E-Cigarette/Vaping Substances    Nicotine No     THC No     CBD No     Flavoring No     Other No     Unknown No      Social History     Tobacco Use    Smoking status: Never Smoker    Smokeless tobacco: Never Used   Vaping Use    Vaping Use: Never used   Substance Use Topics    Alcohol use: Not Currently    Drug use: Not Currently     Frequency: 7 0 times per week     Types: Marijuana     Comment: Smokes weed daily        Review of Systems   Constitutional: Positive for appetite change, chills and fatigue  Negative for fever  HENT: Negative for congestion and sore throat  Respiratory: Negative for cough, chest tightness and shortness of breath  Cardiovascular: Negative for chest pain and palpitations  Gastrointestinal: Positive for abdominal pain, nausea and vomiting  Negative for blood in stool, constipation and diarrhea  Genitourinary: Negative for dysuria and flank pain  Decreased urine output   Musculoskeletal: Negative for back pain and neck pain  Skin: Negative for color change and rash  Allergic/Immunologic: Negative for immunocompromised state  Neurological: Negative for dizziness, syncope, weakness, light-headedness, numbness and headaches  Physical Exam  Physical Exam  Vitals reviewed  Constitutional:       General: She is not in acute distress  Appearance: She is well-developed  She is not ill-appearing, toxic-appearing or diaphoretic  Comments: Appears uncomfortable no acute distress   HENT:      Head: Normocephalic and atraumatic  Eyes:      General: No scleral icterus  Right eye: No discharge  Left eye: No discharge  Conjunctiva/sclera: Conjunctivae normal       Pupils: Pupils are equal, round, and reactive to light     Neck: Vascular: No JVD  Cardiovascular:      Rate and Rhythm: Normal rate and regular rhythm  Heart sounds: Normal heart sounds  No murmur heard  No friction rub  No gallop  Pulmonary:      Effort: Pulmonary effort is normal  No respiratory distress  Breath sounds: Normal breath sounds  No wheezing or rales  Chest:      Chest wall: No tenderness  Abdominal:      General: Bowel sounds are normal  There is no distension  Palpations: Abdomen is soft  Tenderness: There is generalized abdominal tenderness and tenderness in the epigastric area  There is no right CVA tenderness, left CVA tenderness, guarding or rebound  Negative signs include Leavitt's sign and McBurney's sign  Hernia: No hernia is present  Musculoskeletal:         General: No tenderness or deformity  Normal range of motion  Cervical back: Normal range of motion and neck supple  Skin:     General: Skin is warm and dry  Coloration: Skin is not pale  Findings: No erythema or rash  Neurological:      Mental Status: She is alert and oriented to person, place, and time  Cranial Nerves: No cranial nerve deficit     Psychiatric:         Behavior: Behavior normal          Vital Signs  ED Triage Vitals   Temperature Pulse Respirations Blood Pressure SpO2   07/25/21 1042 07/25/21 1041 07/25/21 1041 07/25/21 1041 07/25/21 1041   98 1 °F (36 7 °C) 65 15 127/76 100 %      Temp Source Heart Rate Source Patient Position - Orthostatic VS BP Location FiO2 (%)   07/25/21 1637 -- 07/25/21 1637 07/25/21 1637 --   Oral  Lying Left arm       Pain Score       07/25/21 1308       Worst Possible Pain           Vitals:    07/25/21 1041 07/25/21 1420 07/25/21 1637   BP: 127/76 116/70 109/64   Pulse: 65 60 55   Patient Position - Orthostatic VS:   Lying         Visual Acuity      ED Medications  Medications   HYDROmorphone (DILAUDID) injection 1 mg (1 mg Intravenous Given 7/25/21 1621)   ondansetron (ZOFRAN) injection 4 mg (4 mg Intravenous Given 7/25/21 1712)   lactated ringers infusion (125 mL/hr Intravenous New Bag 7/25/21 1618)   acetaminophen (TYLENOL) tablet 650 mg (has no administration in time range)   famotidine (PEPCID) tablet 20 mg (20 mg Oral Not Given 7/25/21 1712)   sodium chloride 0 9 % bolus 1,000 mL (0 mL Intravenous Stopped 7/25/21 1610)   iohexol (OMNIPAQUE) 350 MG/ML injection (SINGLE-DOSE) 100 mL (100 mL Intravenous Given 7/25/21 1244)   HYDROmorphone (DILAUDID) injection 1 mg (1 mg Intravenous Given 7/25/21 1308)   ondansetron (ZOFRAN) injection 4 mg (4 mg Intravenous Given 7/25/21 1308)       Diagnostic Studies  Results Reviewed     Procedure Component Value Units Date/Time    Celiac Disease Antibody Profile [[id number]] Collected: 07/25/21 1618    Lab Status:  In process Specimen: Blood from Arm, Right Updated: 07/25/21 1626    UA w Reflex to Microscopic w Reflex to Culture [[id number]]  (Abnormal) Collected: 07/25/21 1354    Lab Status: Final result Specimen: Urine, Clean Catch Updated: 07/25/21 1401     Color, UA Yellow     Clarity, UA Clear     Specific Gravity, UA 1 010     pH, UA 8 5     Leukocytes, UA Negative     Nitrite, UA Negative     Protein, UA Negative mg/dl      Glucose, UA Negative mg/dl      Ketones, UA 15 (1+) mg/dl      Urobilinogen, UA 0 2 E U /dl      Bilirubin, UA Negative     Blood, UA Negative    Urine Microscopic [[id number]]  (Abnormal) Collected: 07/25/21 1201    Lab Status: Final result Specimen: Urine, Clean Catch Updated: 07/25/21 1230     RBC, UA None Seen /hpf      WBC, UA 0-1 /hpf      Epithelial Cells Moderate /hpf      Bacteria, UA Moderate /hpf      AMORPH PHOSPATES Moderate /hpf      MUCUS THREADS Occasional    Comprehensive metabolic panel [[id number]]  (Abnormal) Collected: 07/25/21 1159    Lab Status: Final result Specimen: Blood from Arm, Right Updated: 07/25/21 1225     Sodium 135 mmol/L      Potassium 4 2 mmol/L      Chloride 101 mmol/L      CO2 25 mmol/L      ANION GAP 9 mmol/L BUN 15 mg/dL      Creatinine 0 59 mg/dL      Glucose 97 mg/dL      Calcium 9 0 mg/dL      AST 31 U/L      ALT 14 U/L      Alkaline Phosphatase 59 U/L      Total Protein 8 4 g/dL      Albumin 4 2 g/dL      Total Bilirubin 0 95 mg/dL      eGFR 129 ml/min/1 73sq m     Narrative:      National Kidney Disease Foundation guidelines for Chronic Kidney Disease (CKD):     Stage 1 with normal or high GFR (GFR > 90 mL/min/1 73 square meters)    Stage 2 Mild CKD (GFR = 60-89 mL/min/1 73 square meters)    Stage 3A Moderate CKD (GFR = 45-59 mL/min/1 73 square meters)    Stage 3B Moderate CKD (GFR = 30-44 mL/min/1 73 square meters)    Stage 4 Severe CKD (GFR = 15-29 mL/min/1 73 square meters)    Stage 5 End Stage CKD (GFR <15 mL/min/1 73 square meters)  Note: GFR calculation is accurate only with a steady state creatinine    Lipase [[id number]]  (Normal) Collected: 07/25/21 1159    Lab Status: Final result Specimen: Blood from Arm, Right Updated: 07/25/21 1225     Lipase 79 u/L     POCT pregnancy, urine [[id number]]  (Normal) Resulted: 07/25/21 1222    Lab Status: Final result Updated: 07/25/21 1222     EXT PREG TEST UR (Ref: Negative) negative     Control valid    UA w Reflex to Microscopic w Reflex to Culture [[id number]]  (Abnormal) Collected: 07/25/21 1201    Lab Status: Final result Specimen: Urine, Clean Catch Updated: 07/25/21 1216     Color, UA Yellow     Clarity, UA Cloudy     Specific Gravity, UA 1 015     pH, UA >=9 0     Leukocytes, UA Negative     Nitrite, UA Negative     Protein, UA Trace mg/dl      Glucose, UA Negative mg/dl      Ketones, UA 15 (1+) mg/dl      Urobilinogen, UA 0 2 E U /dl      Bilirubin, UA Negative     Blood, UA Negative    CBC and differential [[id number]] Collected: 07/25/21 1159    Lab Status: Final result Specimen: Blood from Arm, Right Updated: 07/25/21 1207     WBC 8 34 Thousand/uL      RBC 4 80 Million/uL      Hemoglobin 14 1 g/dL      Hematocrit 41 5 %      MCV 87 fL      MCH 29 4 pg MCHC 34 0 g/dL      RDW 12 1 %      MPV 9 4 fL      Platelets 757 Thousands/uL      nRBC 0 /100 WBCs      Neutrophils Relative 70 %      Immat GRANS % 0 %      Lymphocytes Relative 21 %      Monocytes Relative 7 %      Eosinophils Relative 1 %      Basophils Relative 1 %      Neutrophils Absolute 5 78 Thousands/µL      Immature Grans Absolute 0 03 Thousand/uL      Lymphocytes Absolute 1 78 Thousands/µL      Monocytes Absolute 0 57 Thousand/µL      Eosinophils Absolute 0 11 Thousand/µL      Basophils Absolute 0 07 Thousands/µL                  CT abdomen pelvis with contrast   ED Interpretation by Baljinder Padron DO (07/25 3159)   There is small segment of the terminal ileum which demonstrates enhancement and demonstrates some distortion without significant perienteric stranding, unchanged from the previous study this is probably related to low-grade fibrosing stenosing disease        Small mesenteric lymph nodes, stable     Small bowel intussusception seen left upper quadrant without associated bowel obstruction probably a transient and incidental finding   However a nonemergent CT enterography on MR enterography suggest in this patient with Crohn's disease     No intra-abdominal fluid collection     Chronic enlargement of the tip of the appendix without surrounding inflammatory changes, stable  This is likely sequela of  inflammatory changes seen in relation to the tip of the appendix in 2016      Final Result by Ysabel Flannery MD (07/25 2692)      There is small segment of the terminal ileum which demonstrates enhancement and demonstrates some distortion without significant perienteric stranding, unchanged from the previous study this is probably related to low-grade fibrosing stenosing disease         Small mesenteric lymph nodes, stable      Small bowel intussusception seen left upper quadrant without associated bowel obstruction probably a transient and incidental finding     However a nonemergent CT enterography on MR enterography suggest in this patient with Crohn's disease      No intra-abdominal fluid collection      Chronic enlargement of the tip of the appendix without surrounding inflammatory changes, stable  This is likely sequela of  inflammatory changes seen in relation to the tip of the appendix in 2016            The study was marked in [facility] for immediate notification  Workstation performed: [id number]                    Procedures  Procedures         ED Course  ED Course as of Jul 25 1743   Esaw Pinks Jul 25, 2021   1409 TT sent to GI      1457 Discussed with patient intussusception seen on CT scan  Advised can admit for pain control versus outpatient follow-up with GI  Patient not sure which route she wants to do - risks benefits of both explained  1 TT sent to [facility] for admission                                SBIRT 20yo+      Most Recent Value   SBIRT (22 yo +)   In order to provide better care to our patients, we are screening all of our patients for alcohol and drug use  Would it be okay to ask you these screening questions? No Filed at: 07/25/2021 1127                    MDM  Number of Diagnoses or Management Options  Diagnosis management comments: Crohn's disease with abdominal pain  Concern for abscess, perforation, infection cholecystitis appendicitis or other intra-abdominal pathology  Patient will have urinalysis, lab work imaging  Symptomatic treatment  Imaging indicates no acute finding to account for her sx - there is intussusception which can be further evaluted by GI out of concern for possible celiac's disease  Patient is admitted for pain control and for further evaluation by GI  Admitted on an observation status to Internal Medicine         Amount and/or Complexity of Data Reviewed  Clinical lab tests: ordered and reviewed  Tests in the radiology section of CPT®: ordered and reviewed        Disposition  Final diagnoses:   Abdominal pain   Crohn disease [facility])     Time reflects when diagnosis was documented in both MDM as applicable and the Disposition within this note     Time User Action Codes Description Comment    7/25/2021  4:07 PM Kasia Avina Add [K50 90] Crohn's disease ([address] )     7/25/2021  5:42 PM Daisha Tolbert Add [R10 9] Abdominal pain     7/25/2021  5:42 PM Daisha Tolbert Add [K50 90] Crohn disease [facility])       ED Disposition     ED Disposition Condition Date/Time Comment    Admit Stable Sun Jul 25, 2021  5:42 PM Case was discussed with Z[facility]ida [facility]) and the patient's admission status was agreed to be Admission Status: observation status to the service of Dr Zoe Partida   Follow-up Information    None         Current Discharge Medication List      CONTINUE these medications which have NOT CHANGED    Details   HYDROcodone-acetaminophen (NORCO) 5-325 mg per tablet Take 1 tablet by mouth every 6 (six) hours as needed for pain for up to 12 dosesMax Daily Amount: 4 tablets  Qty: 12 tablet, Refills: 0    Associated Diagnoses: Tonsillitis      methylPREDNISolone 4 MG tablet therapy pack Use as directed on package  Qty: 1 each, Refills: 0    Associated Diagnoses: Acute tonsillitis, unspecified etiology      ondansetron (ZOFRAN-ODT) 4 mg disintegrating tablet Take 1 tablet (4 mg total) by mouth every 8 (eight) hours as needed for nausea or vomiting  Qty: 12 tablet, Refills: 0    Associated Diagnoses: Acute streptococcal pharyngitis; Nausea & vomiting           No discharge procedures on file      PDMP Review       Value Time User    PDMP Reviewed  Yes 10/18/2019  1:59 AM Elena Kinney MD          ED Provider  Electronically Signed by           Lizz Rod DO  07/25/21 5061

## 2021-07-25 NOTE — LETTER
[address] 3RD FLOOR MED SURG UNIT  [address]  [address] [place] [id number]  Dept: [phone]    July 30, 2021     Patient: Guera Boogie   YOB: 1995   Date of Visit: 7/25/2021       To Whom it May Concern:    Guera Boogie is under my professional care  She was seen in the hospital from 7/25/2021   to 07/30/21  She may return to work on 8/3/21 or sooner if able without limitations  If you have any questions or concerns, please don't hesitate to call           Sincerely,          Marika Clemens MD

## 2021-07-25 NOTE — ASSESSMENT & PLAN NOTE
In the setting of abdominal pain and finding of intussusception on CT scan  ED has discussed with GI who recommended observation  Will provide antiemetic therapy Zofran  IV fluids  Clear liquid diet and advanced as tolerated

## 2021-07-26 ENCOUNTER — APPOINTMENT (OUTPATIENT)
Dept: ULTRASOUND IMAGING | Facility: HOSPITAL | Age: 26
DRG: 389 | End: 2021-07-26
Payer: COMMERCIAL

## 2021-07-26 LAB
ALBUMIN SERPL BCP-MCNC: 3.4 G/DL (ref 3.5–5)
ALP SERPL-CCNC: 51 U/L (ref 46–116)
ALT SERPL W P-5'-P-CCNC: 8 U/L (ref 12–78)
ANION GAP SERPL CALCULATED.3IONS-SCNC: 9 MMOL/L (ref 4–13)
AST SERPL W P-5'-P-CCNC: 18 U/L (ref 5–45)
BASOPHILS # BLD AUTO: 0.06 THOUSANDS/ΜL (ref 0–0.1)
BASOPHILS NFR BLD AUTO: 1 % (ref 0–1)
BILIRUB SERPL-MCNC: 0.99 MG/DL (ref 0.2–1)
BUN SERPL-MCNC: 6 MG/DL (ref 5–25)
CALCIUM ALBUM COR SERPL-MCNC: 8.7 MG/DL (ref 8.3–10.1)
CALCIUM SERPL-MCNC: 8.2 MG/DL (ref 8.3–10.1)
CHLORIDE SERPL-SCNC: 100 MMOL/L (ref 100–108)
CO2 SERPL-SCNC: 26 MMOL/L (ref 21–32)
CREAT SERPL-MCNC: 0.54 MG/DL (ref 0.6–1.3)
EOSINOPHIL # BLD AUTO: 0.16 THOUSAND/ΜL (ref 0–0.61)
EOSINOPHIL NFR BLD AUTO: 2 % (ref 0–6)
ERYTHROCYTE [DISTWIDTH] IN BLOOD BY AUTOMATED COUNT: 11.9 % (ref 11.6–15.1)
GFR SERPL CREATININE-BSD FRML MDRD: 133 ML/MIN/1.73SQ M
GLUCOSE P FAST SERPL-MCNC: 86 MG/DL (ref 65–99)
GLUCOSE SERPL-MCNC: 86 MG/DL (ref 65–140)
HCT VFR BLD AUTO: 37.4 % (ref 34.8–46.1)
HGB BLD-MCNC: 12.6 G/DL (ref 11.5–15.4)
IMM GRANULOCYTES # BLD AUTO: 0.02 THOUSAND/UL (ref 0–0.2)
IMM GRANULOCYTES NFR BLD AUTO: 0 % (ref 0–2)
LYMPHOCYTES # BLD AUTO: 1.66 THOUSANDS/ΜL (ref 0.6–4.47)
LYMPHOCYTES NFR BLD AUTO: 24 % (ref 14–44)
MCH RBC QN AUTO: 29.6 PG (ref 26.8–34.3)
MCHC RBC AUTO-ENTMCNC: 33.7 G/DL (ref 31.4–37.4)
MCV RBC AUTO: 88 FL (ref 82–98)
MONOCYTES # BLD AUTO: 0.43 THOUSAND/ΜL (ref 0.17–1.22)
MONOCYTES NFR BLD AUTO: 6 % (ref 4–12)
NEUTROPHILS # BLD AUTO: 4.48 THOUSANDS/ΜL (ref 1.85–7.62)
NEUTS SEG NFR BLD AUTO: 67 % (ref 43–75)
NRBC BLD AUTO-RTO: 0 /100 WBCS
PLATELET # BLD AUTO: 244 THOUSANDS/UL (ref 149–390)
PMV BLD AUTO: 9.4 FL (ref 8.9–12.7)
POTASSIUM SERPL-SCNC: 3.6 MMOL/L (ref 3.5–5.3)
PROT SERPL-MCNC: 6.9 G/DL (ref 6.4–8.2)
RBC # BLD AUTO: 4.26 MILLION/UL (ref 3.81–5.12)
SODIUM SERPL-SCNC: 135 MMOL/L (ref 136–145)
WBC # BLD AUTO: 6.81 THOUSAND/UL (ref 4.31–10.16)

## 2021-07-26 PROCEDURE — 85025 COMPLETE CBC W/AUTO DIFF WBC: CPT | Performed by: INTERNAL MEDICINE

## 2021-07-26 PROCEDURE — 80053 COMPREHEN METABOLIC PANEL: CPT | Performed by: INTERNAL MEDICINE

## 2021-07-26 PROCEDURE — 99245 OFF/OP CONSLTJ NEW/EST HI 55: CPT | Performed by: INTERNAL MEDICINE

## 2021-07-26 PROCEDURE — 76705 ECHO EXAM OF ABDOMEN: CPT

## 2021-07-26 PROCEDURE — 99254 IP/OBS CNSLTJ NEW/EST MOD 60: CPT | Performed by: SURGERY

## 2021-07-26 PROCEDURE — 99225 PR SBSQ OBSERVATION CARE/DAY 25 MINUTES: CPT | Performed by: INTERNAL MEDICINE

## 2021-07-26 RX ORDER — METHYLPREDNISOLONE SODIUM SUCCINATE 40 MG/ML
20 INJECTION, POWDER, LYOPHILIZED, FOR SOLUTION INTRAMUSCULAR; INTRAVENOUS EVERY 8 HOURS SCHEDULED
Status: DISCONTINUED | OUTPATIENT
Start: 2021-07-26 | End: 2021-07-30 | Stop reason: HOSPADM

## 2021-07-26 RX ADMIN — ONDANSETRON 4 MG: 2 INJECTION INTRAMUSCULAR; INTRAVENOUS at 04:14

## 2021-07-26 RX ADMIN — HYDROMORPHONE HYDROCHLORIDE 1 MG: 1 INJECTION, SOLUTION INTRAMUSCULAR; INTRAVENOUS; SUBCUTANEOUS at 22:17

## 2021-07-26 RX ADMIN — FAMOTIDINE 20 MG: 20 TABLET, FILM COATED ORAL at 16:42

## 2021-07-26 RX ADMIN — HYDROMORPHONE HYDROCHLORIDE 1 MG: 1 INJECTION, SOLUTION INTRAMUSCULAR; INTRAVENOUS; SUBCUTANEOUS at 15:25

## 2021-07-26 RX ADMIN — HYDROMORPHONE HYDROCHLORIDE 1 MG: 1 INJECTION, SOLUTION INTRAMUSCULAR; INTRAVENOUS; SUBCUTANEOUS at 08:58

## 2021-07-26 RX ADMIN — HYDROMORPHONE HYDROCHLORIDE 1 MG: 1 INJECTION, SOLUTION INTRAMUSCULAR; INTRAVENOUS; SUBCUTANEOUS at 04:15

## 2021-07-26 RX ADMIN — METHYLPREDNISOLONE SODIUM SUCCINATE 20 MG: 40 INJECTION, POWDER, FOR SOLUTION INTRAMUSCULAR; INTRAVENOUS at 22:16

## 2021-07-26 RX ADMIN — SODIUM CHLORIDE, SODIUM LACTATE, POTASSIUM CHLORIDE, AND CALCIUM CHLORIDE 125 ML/HR: .6; .31; .03; .02 INJECTION, SOLUTION INTRAVENOUS at 00:22

## 2021-07-26 RX ADMIN — METHYLPREDNISOLONE SODIUM SUCCINATE 20 MG: 40 INJECTION, POWDER, FOR SOLUTION INTRAMUSCULAR; INTRAVENOUS at 17:44

## 2021-07-26 RX ADMIN — FAMOTIDINE 20 MG: 20 TABLET, FILM COATED ORAL at 08:49

## 2021-07-26 RX ADMIN — SODIUM CHLORIDE, SODIUM LACTATE, POTASSIUM CHLORIDE, AND CALCIUM CHLORIDE 125 ML/HR: .6; .31; .03; .02 INJECTION, SOLUTION INTRAVENOUS at 16:32

## 2021-07-26 RX ADMIN — ONDANSETRON 4 MG: 2 INJECTION INTRAMUSCULAR; INTRAVENOUS at 17:31

## 2021-07-26 RX ADMIN — SODIUM CHLORIDE, SODIUM LACTATE, POTASSIUM CHLORIDE, AND CALCIUM CHLORIDE 125 ML/HR: .6; .31; .03; .02 INJECTION, SOLUTION INTRAVENOUS at 08:50

## 2021-07-26 NOTE — CONSULTS
Consultation - [facility]) [facility]  Cindy Peralta 27 y o  female MRN: [id number]  Unit/Bed#: -01 Encounter: [id number]         Reason for Consult / Principal Problem:  Epigastric pain, nausea and vomiting; history of Crohn's ileitis    HPI:  Benjamin Negro is a 43-year-old female with history of terminal ileum Crohn's disease diagnosed in 2015 not on any maintenance treatment  Patient reports that she was on mesalamine and Humira at 1 point, but stopped Humira secondary to unspecified infection and hair loss  Patient presenting to the emergency room for epigastric pain 3 days prior to presentation  She reports that she decided to come to the emergency room when she was not able to keep down any clear liquids as she was nauseous and vomiting  She denies signs of overt GI bleeding  On admission, patient had CT scan revealing left upper quadrant inception with small segment of the terminal ileum with enhancement  Per CT report, probably related to low-grade fibrosing stenosing disease  There is also chronic enlargement at the tip of the appendix, that appears to be similar to an imaging study done in 2016  CBC and CMP unremarkable  Lipase negative  Patient's last EGD and colonoscopy she reports was in 2015 when she was initially diagnosed Crohn's disease by Dr Nola Caballero  EGD was normal, colonoscopy revealing erythema and the ileocecal valve  Review of Systems:    CONSTITUTIONAL: Denies any fever, chills, or rigors  Good appetite, and no recent weight loss  HEENT: No earache or tinnitus  Denies hearing loss or visual disturbances  CARDIOVASCULAR: No chest pain or palpitations  RESPIRATORY: Denies any cough, hemoptysis, shortness of breath or dyspnea on exertion  GASTROINTESTINAL: As noted in the History of Present Illness  GENITOURINARY: No problems with urination  Denies any hematuria or dysuria  NEUROLOGIC: No dizziness or vertigo, denies headaches     MUSCULOSKELETAL: Denies any muscle or joint pain  SKIN: Denies skin rashes or itching  ENDOCRINE: Denies excessive thirst  Denies intolerance to heat or cold  PSYCHOSOCIAL: Denies depression or anxiety  Denies any recent memory loss  Historical Information   Past Medical History:   Diagnosis Date    Anxiety     Crohn disease ([facility] )     Depression     Suicide attempt ([facility] )      Past Surgical History:   Procedure Laterality Date    COLONOSCOPY      VULVA BIOPSY Right 8/26/2020    Procedure: Excision of Right Labial Lesion;  Surgeon: Rita Santiago MD;  [facility];  Service: Gynecology     Social History   Social History     Substance and Sexual Activity   Alcohol Use Not Currently     Social History     Substance and Sexual Activity   Drug Use Not Currently    Frequency: 7 0 times per week    Types: Marijuana    Comment: Smokes weed daily      Social History     Tobacco Use   Smoking Status Never Smoker   Smokeless Tobacco Never Used     Family History   Problem Relation Age of Onset    Vitiligo Paternal Grandfather     Lupus Paternal Aunt         Meds/Allergies     Current Facility-Administered Medications   Medication Dose Route Frequency    acetaminophen (TYLENOL) tablet 650 mg  650 mg Oral Q6H PRN    famotidine (PEPCID) tablet 20 mg  20 mg Oral BID    HYDROmorphone (DILAUDID) injection 1 mg  1 mg Intravenous Q4H PRN    lactated ringers infusion  125 mL/hr Intravenous Continuous    ondansetron (ZOFRAN) injection 4 mg  4 mg Intravenous Q6H PRN    promethazine (PHENERGAN) injection 12 5 mg  12 5 mg Intravenous Q6H PRN       Allergies   Allergen Reactions    Amoxicillin Anaphylaxis    Amoxicillin Abdominal Pain         Objective     Blood pressure 104/69, pulse 59, temperature 97 6 °F (36 4 °C), resp  rate 17, height 5' 3" (1 6 m), weight 49 kg (108 lb), SpO2 98 %, not currently breastfeeding        Intake/Output Summary (Last 24 hours) at 7/26/2021 1336  Last data filed at 7/26/2021 0915  Gross per 24 hour   Intake 3066 67 ml   Output --   Net 3066 67 ml         PHYSICAL EXAM:      General Appearance:   Alert and oriented x 3  Cooperative, and in no respiratory distress   HEENT:   Normocephalic, atraumatic, anicteric      Neck:  Supple, symmetrical, trachea midline   Lungs:   Clear to auscultation bilaterally; no rales, rhonchi or wheezing; respirations unlabored    Heart[de-identified]   S1 and S2 normal; regular rate and rhythm; no murmur, rub, or gallop  Abdomen:   Soft, epigastric tenderness without guarding or rigidity, non-distended; normal bowel sounds; no masses, no organomegaly    Genitalia:   Deferred    Rectal:   Deferred    Extremities:  No cyanosis, clubbing or edema    Pulses:  2+ and symmetric all extremities    Skin:  Skin color, texture, turgor normal, no rashes or lesions    Lymph nodes:  No palpable cervical or supraclavicular lymphadenopathy        Lab Results:   Results from last 7 days   Lab Units 07/26/21  0540   WBC Thousand/uL 6 81   HEMOGLOBIN g/dL 12 6   HEMATOCRIT % 37 4   PLATELETS Thousands/uL 244   NEUTROS PCT % 67   LYMPHS PCT % 24   MONOS PCT % 6   EOS PCT % 2     Results from last 7 days   Lab Units 07/26/21  0540   POTASSIUM mmol/L 3 6   CHLORIDE mmol/L 100   CO2 mmol/L 26   BUN mg/dL 6   CREATININE mg/dL 0 54*   CALCIUM mg/dL 8 2*   ALK PHOS U/L 51   ALT U/L 8*   AST U/L 18         Results from last 7 days   Lab Units 07/25/21  1159   LIPASE u/L 79       Imaging Studies: I have personally reviewed pertinent imaging studies      CT abdomen pelvis with contrast    Result Date: 7/25/2021  Impression: There is small segment of the terminal ileum which demonstrates enhancement and demonstrates some distortion without significant perienteric stranding, unchanged from the previous study this is probably related to low-grade fibrosing stenosing disease Small mesenteric lymph nodes, stable Small bowel intussusception seen left upper quadrant without associated bowel obstruction probably a transient and incidental finding   However a nonemergent CT enterography on MR enterography suggest in this patient with Crohn's disease No intra-abdominal fluid collection Chronic enlargement of the tip of the appendix without surrounding inflammatory changes, stable  This is likely sequela of  inflammatory changes seen in relation to the tip of the appendix in 2016 The study was marked in [facility] for immediate notification  Workstation performed: [id number]       ASSESSMENT and PLAN:      1) Epigastric pain, findings of intussusception and terminal ileum enhancement on CT scan - Patient has a long history of Crohn's disease and has not been on any maintenance treatment  CT scan revealing left upper quadrant intussusception, and enhancement at the terminal ileum that could represent low-grade fiber stenosing disease    - CRP and fecal calprotectin  - If patient were to develop diarrhea, obtain C diff PCR  - Consulting surgery for left upper quadrant intussusception seen on imaging  - If patient has elevated CRP, will start her on IV Solu-Medrol  - Clear liquid diet as tolerated  - Will consider endoscopic evaluation if not improved if OK from the surgical standpoint      The patient was seen and examined by Dr Kun Arellano, all barros medical decisions were made with Dr Kun Arellano  Thank you for allowing us to participate in the care of this pleasant patient  We will follow up with you closely

## 2021-07-26 NOTE — UTILIZATION REVIEW
Initial Clinical Review      Admission: Date/Time/Statement:   Admission Orders (From admission, onward)     Ordered        07/25/21 1605  Place in Observation  Once                   Orders Placed This Encounter   Procedures    Place in Observation     Standing Status:   Standing     Number of Occurrences:   1     Order Specific Question:   Level of Care     Answer:   Med Surg [16]     ED Arrival Information     Expected Arrival Acuity    - 7/25/2021 10:10 Urgent         Means of arrival Escorted by Service Admission type    [facility]In [facility] Hospitalist Urgent         Arrival complaint    Abdominal Pain        Chief Complaint   Patient presents with    Abdominal Pain     Pt with crohns, c/o abd pain since last night  Initial Presentation: 28 yo female to ED from home w/ abd pain , N/V   Hx Crohns for the past 6 years   Last flare 4 yrs ago   nausea and at least a couple episodes of nonbilious vomiting  Ct abd Small bowel intussusception seen left upper quadrant without associated bowel obstruction   Admitted OBS for GI consult and celiac panel andmonitor abd   IVF , pain control, and antiemetics , clear liq diet adv as keri   PE : abd tenderness     7/26 IM Note   Cont IV zofran , GI consult pending   Cont IVF   Clear liq diet adv as keri       7/26 GI Consult   Epigastric pain, findings of intussusception and terminal ileum on CT   CRP and fecal calprotectin   Check Cdiff if diarrhea develops   If CRP elevated start IV solumedrol , clear liq diet   Consider Endoscopic eval if ok from surgical stanpoint        ED Triage Vitals   Temperature Pulse Respirations Blood Pressure SpO2   07/25/21 1042 07/25/21 1041 07/25/21 1041 07/25/21 1041 07/25/21 1041   98 1 °F (36 7 °C) 65 15 127/76 100 %      Temp Source Heart Rate Source Patient Position - Orthostatic VS BP Location FiO2 (%)   07/25/21 1637 -- 07/25/21 1637 07/25/21 1637 --   Oral  Lying Left arm       Pain Score       07/25/21 1308       Worst Possible Pain          Wt Readings from Last 1 Encounters:   07/25/21 49 kg (108 lb)     Additional Vital Signs:   07/25/21 23:02:53  97 6 °F (36 4 °C)  59  18  105/64  78  98 %  --  --   07/25/21 16:37:01  98 °F (36 7 °C)  55  18  109/64  79  92 %  None (Room air)  Lying   07/25/21 1420  --  60  15  116/70  --  100 %  --  --   07/25/21 1042  98 1 °F (36 7 °C)  --  --  --  --             Pertinent Labs/Diagnostic Test Results:      7/26 US RUQ - pending   CT abdomen pelvis with contrast   ED Interpretation by Tulio Munoz DO (07/25 1349)   There is small segment of the terminal ileum which demonstrates enhancement and demonstrates some distortion without significant perienteric stranding, unchanged from the previous study this is probably related to low-grade fibrosing stenosing disease        Small mesenteric lymph nodes, stable     Small bowel intussusception seen left upper quadrant without associated bowel obstruction probably a transient and incidental finding   However a nonemergent CT enterography on MR enterography suggest in this patient with Crohn's disease     No intra-abdominal fluid collection     Final Result by Zach Baker MD (07/25 1344)       There is small segment of the terminal ileum which demonstrates enhancement and demonstrates some distortion without significant perienteric stranding, unchanged from the previous study this is probably related to low-grade fibrosing stenosing disease           Small mesenteric lymph nodes, stable       Small bowel intussusception seen left upper quadrant without associated bowel obstruction probably a transient and incidental finding     However a nonemergent CT enterography on MR enterography suggest in this patient with Crohn's disease       No intra-abdominal fluid collection     7/26  Calprotectin fecal    7/26  C reactive protein   Results from last 7 days   Lab Units 07/26/21  0540 07/25/21  1159   WBC Thousand/uL 6 81 8 34   HEMOGLOBIN g/dL 12 6 14 1   HEMATOCRIT % 37 4 41 5   PLATELETS Thousands/uL 244 309   NEUTROS ABS Thousands/µL 4 48 5 78     Results from last 7 days   Lab Units 07/26/21  0540 07/25/21  1159   SODIUM mmol/L 135* 135*   POTASSIUM mmol/L 3 6 4 2   CHLORIDE mmol/L 100 101   CO2 mmol/L 26 25   ANION GAP mmol/L 9 9   BUN mg/dL 6 15   CREATININE mg/dL 0 54* 0 59*   EGFR ml/min/1 73sq m 133 129   CALCIUM mg/dL 8 2* 9 0     Results from last 7 days   Lab Units 07/26/21  0540 07/25/21  1159   AST U/L 18 31   ALT U/L 8* 14   ALK PHOS U/L 51 59   TOTAL PROTEIN g/dL 6 9 8 4*   ALBUMIN g/dL 3 4* 4 2   TOTAL BILIRUBIN mg/dL 0 99 0 95         Results from last 7 days   Lab Units 07/26/21  0540 07/25/21  1159   GLUCOSE RANDOM mg/dL 86 97     Results from last 7 days   Lab Units 07/25/21  1159   LIPASE u/L 79     Results from last 7 days   Lab Units 07/25/21  1354 07/25/21  1201   CLARITY UA  Clear Cloudy   COLOR UA  Yellow Yellow   SPEC GRAV UA  1 010 1 015   PH UA  8 5* >=9 0*   GLUCOSE UA mg/dl Negative Negative   KETONES UA mg/dl 15 (1+)* 15 (1+)*   BLOOD UA  Negative Negative   PROTEIN UA mg/dl Negative Trace*   NITRITE UA  Negative Negative   BILIRUBIN UA  Negative Negative   UROBILINOGEN UA E U /dl 0 2 0 2   LEUKOCYTES UA  Negative Negative   WBC UA /hpf  --  0-1   RBC UA /hpf  --  None Seen   BACTERIA UA /hpf  --  Moderate*   EPITHELIAL CELLS WET PREP /hpf  --  Moderate*   MUCUS THREADS   --  Occasional*     ED Treatment:   Medication Administration from 07/25/2021 1010 to 07/25/2021 1632       Order Dose Route Action     sodium chloride 0 9 % bolus 1,000 mL 1,000 mL Intravenous New Bag     HYDROmorphone (DILAUDID) injection 1 mg 1 mg Intravenous Given     ondansetron (ZOFRAN) injection 4 mg 4 mg Intravenous Given     HYDROmorphone (DILAUDID) injection 1 mg 1 mg Intravenous Given     lactated ringers infusion 125 mL/hr Intravenous New Bag        Past Medical History:   Diagnosis Date    Anxiety     Crohn disease ([facility] [address] )     Depression     Suicide attempt [facility])      Present on Admission:   Cro[facility]'s disease ([facility] [address] )   Marijuana use   Abdominal pain   Intussusception ([facility] [address] )   Intractable nausea and vomiting      Admitting Diagnosis: Crohn's disease ([facility] [address] ) [K50 90]  Abdominal pain [R10 9]  Age/Sex: 27 y o  female  Admission Orders:  Scheduled Medications:  famotidine, 20 mg, Oral, BID      Continuous IV Infusions:  lactated ringers, 125 mL/hr, Intravenous, Continuous      PRN Meds:  acetaminophen, 650 mg, Oral, Q6H PRN  HYDROmorphone, 1 mg, Intravenous, Q4H PRN 7/25 x1 7/26  x3  ondansetron, 4 mg, Intravenous, Q6H PRN 7/25  x1  7/26  x1  promethazine, 12 5 mg, Intravenous, Q6H PRN      Consult acute care sx  IP CONSULT TO GASTROENTEROLOGY    Network Utilization Review Department  ATTENTION: Please call with any questions or concerns to [phone] and carefully listen to the prompts so that you are directed to the right person  All voicemails are confidential   Avelina Cowden all requests for admission clinical reviews, approved or denied determinations and any other requests to dedicated fax number below belonging to the campus where the patient is receiving treatment   List of dedicated fax numbers for the Facilities:  [address] DENIALS (Administrative/Medical Necessity) [phone]   [address] (Maternity/NICU/Pediatrics) [phone]   [address] [place] [phone] [address] [phone]

## 2021-07-26 NOTE — PROGRESS NOTES
[facility]  Progress Note Roldan Lewis 1995, 27 y o  female MRN: [id number]  Unit/Bed#: -01 Encounter: [id number]  Primary Care Provider: No primary care provider on file  Date and time admitted to hospital: 7/25/2021 10:27 AM    Intractable nausea and vomiting  Assessment & Plan  In the setting of abdominal pain and finding of intussusception on CT scan  ED has discussed with GI who recommended observation  Will provide antiemetic therapy Zofran  IV fluids  Clear liquid diet and advanced as tolerated  Abdominal pain  Assessment & Plan  Somewhat acute in nature  CTA of the abdomen does not reveal a lot of inflammation actively but does reveal findings of chronic Crohn's disease and does reveal intussusception which may be contributing to this  Analgesia with hydromorphone  IV fluids as ordered  Monitor clinically  GI input input would be appreciated  Marijuana use  Assessment & Plan  Reportedly chart  Was recommended to stop  PDMP reviewed, no narcotics filled recently  Crohn's disease [facility])  Assessment & Plan  Not on any suppressive therapy for this  Denies any bloody bowel movements  * Intussusception [facility])  Assessment & Plan  Finding on CT scan  No significant bowel obstruction however on CT  Patient does mention abdominal pain and nausea however  Results were discussed by ED with by GI who recommended observation and celiac panel  Place under observation  GI consult  Monitor abdominal exam        VTE Pharmacologic Prophylaxis:   Pharmacologic: Pharmacologic VTE Prophylaxis contraindicated due to low risk  Mechanical VTE Prophylaxis in Place: Yes    Patient Centered Rounds: I have performed bedside rounds with nursing staff today  Discussions with Specialists or Other Care Team Provider: cm, nursing    Education and Discussions with Family / Patient: pt    Time Spent for Care: 20 minutes    More than 50% of total time spent on counseling and coordination of care as described above  Current Length of Stay: 0 day(s)    Current Patient Status: Observation   Certification Statement: The patient will continue to require additional inpatient hospital stay due to Pending GI eval    Discharge Plan:  See above    Code Status: Level 1 - Full Code      Subjective:   No acute complaints    Objective:     Vitals:   Temp (24hrs), Av 8 °F (36 6 °C), Min:97 6 °F (36 4 °C), Max:98 1 °F (36 7 °C)    Temp:  [97 6 °F (36 4 °C)-98 1 °F (36 7 °C)] 97 6 °F (36 4 °C)  HR:  [55-65] 59  Resp:  [15-18] 17  BP: (104-127)/(64-76) 104/69  SpO2:  [92 %-100 %] 98 %  Body mass index is 19 13 kg/m²  Input and Output Summary (last 24 hours): Intake/Output Summary (Last 24 hours) at 2021 0854  Last data filed at 2021 0850  Gross per 24 hour   Intake 3066 67 ml   Output --   Net 3066 67 ml       Physical Exam:     Physical Exam  Constitutional:       General: She is not in acute distress  Appearance: She is well-developed  She is not diaphoretic  HENT:      Head: Normocephalic and atraumatic  Nose: Nose normal       Mouth/Throat:      Pharynx: No oropharyngeal exudate  Eyes:      General: No scleral icterus  Right eye: No discharge  Left eye: No discharge  Conjunctiva/sclera: Conjunctivae normal    Neck:      Thyroid: No thyromegaly  Vascular: No JVD  Cardiovascular:      Rate and Rhythm: Normal rate and regular rhythm  Heart sounds: Normal heart sounds  No murmur heard  No friction rub  No gallop  Pulmonary:      Effort: Pulmonary effort is normal  No respiratory distress  Breath sounds: Normal breath sounds  No wheezing or rales  Chest:      Chest wall: No tenderness  Abdominal:      General: Bowel sounds are normal  There is no distension  Palpations: Abdomen is soft  Tenderness: There is no abdominal tenderness  There is no guarding or rebound     Musculoskeletal:         General: No tenderness or deformity  Normal range of motion  Cervical back: Normal range of motion and neck supple  Skin:     General: Skin is warm and dry  Findings: No erythema or rash  Neurological:      Mental Status: She is alert  Mental status is at baseline  Cranial Nerves: No cranial nerve deficit  Sensory: No sensory deficit  Motor: No abnormal muscle tone  Coordination: Coordination normal        (  Additional Data:     Labs:    Results from last 7 days   Lab Units 07/26/21  0540   WBC Thousand/uL 6 81   HEMOGLOBIN g/dL 12 6   HEMATOCRIT % 37 4   PLATELETS Thousands/uL 244   NEUTROS PCT % 67   LYMPHS PCT % 24   MONOS PCT % 6   EOS PCT % 2     Results from last 7 days   Lab Units 07/26/21  0540   SODIUM mmol/L 135*   POTASSIUM mmol/L 3 6   CHLORIDE mmol/L 100   CO2 mmol/L 26   BUN mg/dL 6   CREATININE mg/dL 0 54*   ANION GAP mmol/L 9   CALCIUM mg/dL 8 2*   ALBUMIN g/dL 3 4*   TOTAL BILIRUBIN mg/dL 0 99   ALK PHOS U/L 51   ALT U/L 8*   AST U/L 18   GLUCOSE RANDOM mg/dL 86                           * I Have Reviewed All Lab Data Listed Above  * Additional Pertinent Lab Tests Reviewed:  All Labs Within Last 24 Hours Reviewed    Imaging:    Imaging Reports Reviewed Today Include: na  Imaging Personally Reviewed by Myself Includes:  na    Recent Cultures (last 7 days):           Last 24 Hours Medication List:   Current Facility-Administered Medications   Medication Dose Route Frequency Provider Last Rate    acetaminophen  650 mg Oral Q6H PRN Diallo Mcmanus MD      famotidine  20 mg Oral BID Diallo Mcmanus MD      HYDROmorphone  1 mg Intravenous Q4H PRN Van Buren Breath, CRNP      lactated ringers  125 mL/hr Intravenous Continuous Diallo Mcmanus  mL/hr (07/26/21 0850)    ondansetron  4 mg Intravenous Q6H PRN Diallo Mcmanus MD      promethazine  12 5 mg Intravenous Q6H PRN [place] Breath, CRNP          Today, Patient Was Seen By: Lima Marsh MD    ** Please Note: Dictation voice to text software may have been used in the creation of this document   **

## 2021-07-26 NOTE — CONSULTS
Consultation - General Surgery   Jayden Turner 27 y o  female MRN: [id number]  Unit/Bed#: -01 Encounter: [id number]    ASSESSMENT:  72-year-old female with known history of Crohn's    PLAN:  - no indication for acute surgical intervention at this time  - medical management as per primary team and Gastroenterology for Crohn's disease  - GI planning to start patient on steroids in perform small-bowel follow-through  If patient does not improve with conservative/medical management she may require diagnostic laparoscopy   _______________________________________________________________  Physician Requesting Consult: Andres Bennett MD    Additional consultants:  Gastroenterology    Reason for Consult / Principal Problem:  Intussusception    HPI: Jayden Turner is a 27y o  year old female with past medical history of Crohn's disease, who presents emergency department with complaints of abdominal pain, nausea, and vomiting  Patient reports she was diagnosed about 6 years ago, and received diffuse therapies of the last 1 being Humira which was stopped about 4 years ago due to a skin infection as per patient, and she has not had any medication for the last 4 years  Patient reports that she started having abdominal pain about 4-5 days ago and has progressively gotten worse  Patient describes pain as a sharp squeezing that waxes and wanes intermittently and reports that to be a 8/10 pain  Patient reports she had several episodes of nonbilious nonbloody vomiting  Patient reports she had some Jell-O earlier this morning which cause an increasing abdominal pain  Patient reports that she typically has a bowel movement daily, and the last bowel movement was yesterday  Patient reports persistent flatus    CT of the abdomen pelvis performed in the emergency department noted a small segment of terminal ileum just into the daily sickle bowel demonstrating mucosal enhancement, as well as a small bowel intussusception noted in the left upper quadrant without associated obstruction which is likely transient  Patient denies any prior abdominal surgeries  Patient reports that she has not had a colonoscopy since she she was diagnosed with Crohn's disease in 2015  Historical Information   Past Medical History:   Diagnosis Date    Anxiety     Crohn disease ([facility] [address] )     Depression     [facility])      Past Surgical History:   Procedure Laterality Date    COLONOSCOPY      VULVA BIOPSY Right 8/26/2020    Procedure: Excision of Right Labial Lesion;  Surgeon: Janet Dietz MD;  [facility];  Service: Gynecology     Social History   Social History     Substance and Sexual Activity   Alcohol Use Not Currently     Social History     Substance and Sexual Activity   Drug Use Not Currently    Frequency: 7 0 times per week    Types: Marijuana    Comment: Smokes weed daily      Social History     Tobacco Use   Smoking Status Never Smoker   Smokeless Tobacco Never Used     Family History: non-contributory}    Meds/Allergies   Home meds:   Prior to Admission medications    Medication Sig Start Date End Date Taking?  Authorizing Provider   HYDROcodone-acetaminophen (NORCO) 5-325 mg per tablet Take 1 tablet by mouth every 6 (six) hours as needed for pain for up to 12 dosesMax Daily Amount: 4 tablets  Patient not taking: Reported on 1/6/2021 12/9/20   WARD Theodore   methylPREDNISolone 4 MG tablet therapy pack Use as directed on package  Patient not taking: Reported on 1/6/2021 12/7/20   Miguel Angel Gilmore MD   ondansetron (ZOFRAN-ODT) 4 mg disintegrating tablet Take 1 tablet (4 mg total) by mouth every 8 (eight) hours as needed for nausea or vomiting  Patient not taking: Reported on 12/7/2020 11/14/20   Ella Marshall DO     Scheduled Meds:  Current Facility-Administered Medications   Medication Dose Route Frequency Provider Last Rate    acetaminophen  650 mg Oral Q6H PRN Rosario Simmons MD      famotidine  20 mg Oral BID Chong Blue MD      HYDROmorphone  1 mg Intravenous Q4H PRN WARD Arzate      lactated ringers  125 mL/hr Intravenous Continuous Chong Blue  mL/hr (07/26/21 1632)    methylPREDNISolone sodium succinate  20 mg Intravenous Q8H [address] Sylvia Anne PA-C      ondansetron  4 mg Intravenous Q6H PRN Chong Blue MD      promethazine  12 5 mg Intravenous Q6H PRN WARD Arzate       Continuous Infusions:lactated ringers, 125 mL/hr, Last Rate: 125 mL/hr (07/26/21 1632)      PRN Meds:    acetaminophen    HYDROmorphone    ondansetron    promethazine    ALLERGIES:   Allergies   Allergen Reactions    Amoxicillin Anaphylaxis    Amoxicillin Abdominal Pain       Review of Systems:  General: negative  Cardiovascular: no chest pain or dyspnea on exertion  Respiratory: no cough, shortness of breath, or wheezing  Gastrointestinal:  Abdominal pain, nausea, vomiting  Genitourinary: no dysuria, trouble voiding, or hematuria  Musculoskeletal: negative  Neurological: no TIA or stroke symptoms  Hematological and Lymphatic: negative  Dermatological : negative  Psychological: negative  Ophthalmic: negative  ENT: negative      Objective   Vitals:  Blood pressure 96/51, pulse (!) 52, temperature 98 1 °F (36 7 °C), resp  rate 16, height 5' 3" (1 6 m), weight 49 kg (108 lb), SpO2 97 %, not currently breastfeeding  Body mass index is 19 13 kg/m²  SpO2: SpO2: 97 %    I/Os:  I/O       07/24 0701 - 07/25 0700 07/25 0701 - 07/26 0700 07/26 0701 - 07/27 0700    P  O    0    I V  (mL/kg)   3029 2 (61 8)    IV Piggyback  1000     Total Intake(mL/kg)  1000 (20 4) 3029 2 (61 8)    Net  +1000 +3029 2                 Invasive Lines/Tubes:  Invasive Devices     Peripheral Intravenous Line            Peripheral IV 07/25/21 Right Antecubital 1 day                Physical Exam  General appearance: alert, appears stated age and cooperative  Head: Normocephalic, without obvious abnormality, atraumatic  Eyes: conjunctivae/corneas clear  PERRL, EOM's intact  Throat: lips, mucosa, and tongue normal; teeth and gums normal  Neck: supple, symmetrical, trachea midline and thyroid not enlarged, symmetric, no tenderness/mass/nodules  Back: symmetric, no curvature  ROM normal  No CVA tenderness    Lungs: clear to auscultation bilaterally  Chest wall: no tenderness  Heart[de-identified] regular rate and rhythm and S1, S2 normal  Abdomen: Soft, nondistended, slight epigastric tenderness to palpation, active bowel sounds, no scars from prior surgery noted, no guarding,  Genitalia: deferred  Rectal: deferred  Extremities: extremities normal, atraumatic, no cyanosis or edema  Skin: Skin color, texture, turgor normal  No rashes or lesions  Neurologic: Grossly normal    Lab Results and Cultures:   CBC:   Results from last 7 days   Lab Units 07/26/21  0540 07/25/21  1159   WBC Thousand/uL 6 81 8 34   HEMOGLOBIN g/dL 12 6 14 1   HEMATOCRIT % 37 4 41 5   PLATELETS Thousands/uL 244 309     BMP/CMP:  Results from last 7 days   Lab Units 07/26/21  0540 07/25/21  1159   POTASSIUM mmol/L 3 6 4 2   CHLORIDE mmol/L 100 101   CO2 mmol/L 26 25   BUN mg/dL 6 15   CREATININE mg/dL 0 54* 0 59*   CALCIUM mg/dL 8 2* 9 0     Coags:     Lipid panel:     HgbA1c: No results found for: HGBA1C    Urinalysis:   Lab Results   Component Value Date    COLORU Yellow 07/25/2021    CLARITYU Clear 07/25/2021    SPECGRAV 1 010 07/25/2021    PHUR 8 5 (A) 07/25/2021    PHUR 7 0 02/18/2016    LEUKOCYTESUR Negative 07/25/2021    NITRITE Negative 07/25/2021    GLUCOSEU Negative 07/25/2021    KETONESU 15 (1+) (A) 07/25/2021    BILIRUBINUR Negative 07/25/2021    BLOODU Negative 07/25/2021   ,   Urine Culture:   Lab Results   Component Value Date    URINECX (A) 10/17/2019     10,000-19,000 cfu/ml Staphylococcus coagulase negative    URINECX 80,000-89,000 cfu/ml  10/17/2019     Wound Culure: No results found for: WOUNDCULT  Blood Culture: No results found for: [facility]    Imaging Studies: I have personally reviewed pertinent reports  and I have personally reviewed pertinent films in PACS  EKG, Pathology, and Other Studies: I have personally reviewed pertinent reports     and I have personally reviewed pertinent films in PACS  VTE Prophylaxis: Sequential compression device Rip Like)      Code Status: Level 1 - Full Code  Advance Directive and Living Will:      Power of :    POLST:      Counseling / Coordination of Care  None       Ronan Tran [place]  7/26/2021

## 2021-07-26 NOTE — PLAN OF CARE
Problem: Potential for Falls  Goal: Patient will remain free of falls  Description: INTERVENTIONS:  - Educate patient/family on patient safety including physical limitations  - Instruct patient to call for assistance with activity   - Consult OT/PT to assist with strengthening/mobility   - Keep Call bell within reach  - Keep bed low and locked with side rails adjusted as appropriate  - Keep care items and personal belongings within reach  - Initiate and maintain comfort rounds  -  Problem: PAIN - ADULT  Goal: Verbalizes/displays adequate comfort level or baseline comfort level  Description: Interventions:  - Encourage patient to monitor pain and request assistance  - Assess pain using appropriate pain scale  - Administer analgesics based on type and severity of pain and evaluate response  - Implement non-pharmacological measures as appropriate and evaluate response  - Consider cultural and social influences on pain and pain management  - Notify physician/advanced practitioner if interventions unsuccessful or patient reports new pain  Outcome: Progressing     Problem: INFECTION - ADULT  Goal: Absence or prevention of progression during hospitalization  Description: INTERVENTIONS:  - Assess and monitor for signs and symptoms of infection  - Monitor lab/diagnostic results  - Monitor all insertion sites, i e  indwelling lines, tubes, and drains  - Monitor endotracheal if appropriate and nasal secretions for changes in amount and color  - [place] appropriate cooling/warming therapies per order  - Administer medications as ordered  - Instruct and encourage patient and family to use good hand hygiene technique  - Identify and instruct in appropriate isolation precautions for identified infection/condition  Outcome: Progressing  Goal: Absence of fever/infection during neutropenic period  Description: INTERVENTIONS:  - Monitor WBC    Outcome: Progressing     Problem: DISCHARGE PLANNING  Goal: Discharge to home or other facility with appropriate resources  Description: INTERVENTIONS:  - Identify barriers to discharge w/patient and caregiver  - Arrange for needed discharge resources and transportation as appropriate  - Identify discharge learning needs (meds, wound care, etc )  - Arrange for interpretive services to assist at discharge as needed  - Refer to Case Management Department for coordinating discharge planning if the patient needs post-hospital services based on physician/advanced practitioner order or complex needs related to functional status, cognitive ability, or social support system  Outcome: Progressing     Problem: Knowledge Deficit  Goal: Patient/family/caregiver demonstrates understanding of disease process, treatment plan, medications, and discharge instructions  Description: Complete learning assessment and assess knowledge base    Interventions:  - Provide teaching at level of understanding  - Provide teaching via preferred learning methods  Outcome: Progressing     Problem: GASTROINTESTINAL - ADULT  Goal: Minimal or absence of nausea and/or vomiting  Description: INTERVENTIONS:  - Administer IV fluids if ordered to ensure adequate hydration  - Maintain NPO status until nausea and vomiting are resolved  - Nasogastric tube if ordered  - Administer ordered antiemetic medications as needed  - Provide nonpharmacologic comfort measures as appropriate  - Advance diet as tolerated, if ordered  - Consider nutrition services referral to assist patient with adequate nutrition and appropriate food choices  Outcome: Progressing  Goal: Maintains or returns to baseline bowel function  Description: INTERVENTIONS:  - Assess bowel function  - Encourage oral fluids to ensure adequate hydration  - Administer IV fluids if ordered to ensure adequate hydration  - Administer ordered medications as needed  - Encourage mobilization and activity  - Consider nutritional services referral to assist patient with adequate nutrition and appropriate food choices  Outcome: Progressing  Goal: Maintains adequate nutritional intake  Description: INTERVENTIONS:  - Monitor percentage of each meal consumed  - Identify factors contributing to decreased intake, treat as appropriate  - Assist with meals as needed  - Monitor I&O, weight, and lab values if indicated  - Obtain nutrition services referral as needed  Outcome: Progressing     Problem: METABOLIC, FLUID AND ELECTROLYTES - ADULT  Goal: Electrolytes maintained within normal limits  Description: INTERVENTIONS:  - Monitor labs and assess patient for signs and symptoms of electrolyte imbalances  - Administer electrolyte replacement as ordered  - Monitor response to electrolyte replacements, including repeat lab results as appropriate  - Instruct patient on fluid and nutrition as appropriate  Outcome: Progressing  Goal: Fluid balance maintained  Description: INTERVENTIONS:  - Monitor labs   - Monitor I/O and WT  - Instruct patient on fluid and nutrition as appropriate  - Assess for signs & symptoms of volume excess or deficit  Outcome: Progressing     - Apply yellow socks and bracelet for high fall risk patients  - Consider moving patient to room near nurses station  Outcome: Progressing

## 2021-07-26 NOTE — CASE MANAGEMENT
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 but that problem has been resolved  She has no POA but would want her Mother to answer for her if she were unable  She does work and does drive  Her friend will transport her home at [place]  CM reviewed discharge planning process including the following: identifying help at home, patient preference for discharge planning needs, pharmacy preference, and availability of treatment team to discuss questions or concerns patient and/or family may have regarding understanding medications and recognizing signs and symptoms once discharged  CM also encouraged patient to follow up with all recommended appointments after discharge  Patient advised of importance for patient and family to participate in managing patients medical well being      Pt is IPTA and will return home at [place], friend to transport

## 2021-07-27 ENCOUNTER — APPOINTMENT (OUTPATIENT)
Dept: RADIOLOGY | Facility: HOSPITAL | Age: 26
DRG: 389 | End: 2021-07-27
Payer: COMMERCIAL

## 2021-07-27 LAB
ANION GAP SERPL CALCULATED.3IONS-SCNC: 9 MMOL/L (ref 4–13)
BASOPHILS # BLD AUTO: 0.04 THOUSANDS/ΜL (ref 0–0.1)
BASOPHILS NFR BLD AUTO: 1 % (ref 0–1)
BUN SERPL-MCNC: 7 MG/DL (ref 5–25)
CALCIUM SERPL-MCNC: 8.8 MG/DL (ref 8.3–10.1)
CHLORIDE SERPL-SCNC: 101 MMOL/L (ref 100–108)
CO2 SERPL-SCNC: 26 MMOL/L (ref 21–32)
CREAT SERPL-MCNC: 0.56 MG/DL (ref 0.6–1.3)
CRP SERPL QL: <3 MG/L
ENDOMYSIUM IGA SER QL: NEGATIVE
EOSINOPHIL # BLD AUTO: 0 THOUSAND/ΜL (ref 0–0.61)
EOSINOPHIL NFR BLD AUTO: 0 % (ref 0–6)
ERYTHROCYTE [DISTWIDTH] IN BLOOD BY AUTOMATED COUNT: 11.9 % (ref 11.6–15.1)
GFR SERPL CREATININE-BSD FRML MDRD: 131 ML/MIN/1.73SQ M
GLIADIN PEPTIDE IGA SER-ACNC: 5 UNITS (ref 0–19)
GLIADIN PEPTIDE IGG SER-ACNC: 2 UNITS (ref 0–19)
GLUCOSE SERPL-MCNC: 109 MG/DL (ref 65–140)
HCT VFR BLD AUTO: 38.3 % (ref 34.8–46.1)
HGB BLD-MCNC: 13.3 G/DL (ref 11.5–15.4)
IGA SERPL-MCNC: 299 MG/DL (ref 87–352)
IMM GRANULOCYTES # BLD AUTO: 0.02 THOUSAND/UL (ref 0–0.2)
IMM GRANULOCYTES NFR BLD AUTO: 0 % (ref 0–2)
LYMPHOCYTES # BLD AUTO: 1.13 THOUSANDS/ΜL (ref 0.6–4.47)
LYMPHOCYTES NFR BLD AUTO: 17 % (ref 14–44)
MCH RBC QN AUTO: 29.6 PG (ref 26.8–34.3)
MCHC RBC AUTO-ENTMCNC: 34.7 G/DL (ref 31.4–37.4)
MCV RBC AUTO: 85 FL (ref 82–98)
MONOCYTES # BLD AUTO: 0.16 THOUSAND/ΜL (ref 0.17–1.22)
MONOCYTES NFR BLD AUTO: 3 % (ref 4–12)
NEUTROPHILS # BLD AUTO: 5.14 THOUSANDS/ΜL (ref 1.85–7.62)
NEUTS SEG NFR BLD AUTO: 79 % (ref 43–75)
NRBC BLD AUTO-RTO: 0 /100 WBCS
PLATELET # BLD AUTO: 265 THOUSANDS/UL (ref 149–390)
PMV BLD AUTO: 9.5 FL (ref 8.9–12.7)
POTASSIUM SERPL-SCNC: 4.3 MMOL/L (ref 3.5–5.3)
RBC # BLD AUTO: 4.49 MILLION/UL (ref 3.81–5.12)
SODIUM SERPL-SCNC: 136 MMOL/L (ref 136–145)
TTG IGA SER-ACNC: <2 U/ML (ref 0–3)
TTG IGG SER-ACNC: 5 U/ML (ref 0–5)
WBC # BLD AUTO: 6.49 THOUSAND/UL (ref 4.31–10.16)

## 2021-07-27 PROCEDURE — 74248 X-RAY SM INT F-THRU STD: CPT

## 2021-07-27 PROCEDURE — 74246 X-RAY XM UPR GI TRC 2CNTRST: CPT

## 2021-07-27 PROCEDURE — 99214 OFFICE O/P EST MOD 30 MIN: CPT | Performed by: INTERNAL MEDICINE

## 2021-07-27 PROCEDURE — 99225 PR SBSQ OBSERVATION CARE/DAY 25 MINUTES: CPT | Performed by: INTERNAL MEDICINE

## 2021-07-27 PROCEDURE — 80048 BASIC METABOLIC PNL TOTAL CA: CPT | Performed by: INTERNAL MEDICINE

## 2021-07-27 PROCEDURE — 85025 COMPLETE CBC W/AUTO DIFF WBC: CPT | Performed by: INTERNAL MEDICINE

## 2021-07-27 PROCEDURE — 99232 SBSQ HOSP IP/OBS MODERATE 35: CPT | Performed by: SURGERY

## 2021-07-27 PROCEDURE — 86140 C-REACTIVE PROTEIN: CPT | Performed by: PHYSICIAN ASSISTANT

## 2021-07-27 RX ADMIN — FAMOTIDINE 20 MG: 20 TABLET, FILM COATED ORAL at 19:53

## 2021-07-27 RX ADMIN — SODIUM CHLORIDE, SODIUM LACTATE, POTASSIUM CHLORIDE, AND CALCIUM CHLORIDE 125 ML/HR: .6; .31; .03; .02 INJECTION, SOLUTION INTRAVENOUS at 13:35

## 2021-07-27 RX ADMIN — HYDROMORPHONE HYDROCHLORIDE 1 MG: 1 INJECTION, SOLUTION INTRAMUSCULAR; INTRAVENOUS; SUBCUTANEOUS at 19:53

## 2021-07-27 RX ADMIN — METHYLPREDNISOLONE SODIUM SUCCINATE 20 MG: 40 INJECTION, POWDER, FOR SOLUTION INTRAMUSCULAR; INTRAVENOUS at 19:53

## 2021-07-27 RX ADMIN — HYDROMORPHONE HYDROCHLORIDE 1 MG: 1 INJECTION, SOLUTION INTRAMUSCULAR; INTRAVENOUS; SUBCUTANEOUS at 11:02

## 2021-07-27 RX ADMIN — METHYLPREDNISOLONE SODIUM SUCCINATE 20 MG: 40 INJECTION, POWDER, FOR SOLUTION INTRAMUSCULAR; INTRAVENOUS at 06:34

## 2021-07-27 RX ADMIN — ONDANSETRON 4 MG: 2 INJECTION INTRAMUSCULAR; INTRAVENOUS at 11:01

## 2021-07-27 RX ADMIN — SODIUM CHLORIDE, SODIUM LACTATE, POTASSIUM CHLORIDE, AND CALCIUM CHLORIDE 125 ML/HR: .6; .31; .03; .02 INJECTION, SOLUTION INTRAVENOUS at 01:30

## 2021-07-27 RX ADMIN — ONDANSETRON 4 MG: 2 INJECTION INTRAMUSCULAR; INTRAVENOUS at 19:52

## 2021-07-27 NOTE — PROGRESS NOTES
Progress Note  Nayely Cantrell 27 y o  female MRN: [id number]  Unit/Bed#: -01 Encounter: [id number]    Subjective:  Patient completed SBFT  No BM but passing gas  Patient was nauseated this am     Objective:     Vitals:   Vitals:    07/27/21 1453   BP: 110/69   Pulse: 56   Resp: 17   Temp: 97 7 °F (36 5 °C)   SpO2: 97%   ,Body mass index is 19 13 kg/m²        Intake/Output Summary (Last 24 hours) at 7/27/2021 1530  Last data filed at 7/27/2021 1335  Gross per 24 hour   Intake 3593 75 ml   Output --   Net 3593 75 ml       Physical Exam:     General Appearance: Alert, appears stated age and cooperative  Lungs: Clear to auscultation bilaterally, no rales or rhonchi, no labored breathing/accessory muscle use  Heart: Regular rate and rhythm, S1, S2 normal, no murmur, click, rub or gallop  Abdomen: Soft, non-tender, non-distended; bowel sounds normal; no masses or no organomegaly  Extremities: No cyanosis, edema    Invasive Devices     Peripheral Intravenous Line            Peripheral IV 07/27/21 Left Forearm <1 day                Lab Results:  Admission on 07/25/2021   Component Date Value    WBC 07/25/2021 8 34     RBC 07/25/2021 4 80     Hemoglobin 07/25/2021 14 1     Hematocrit 07/25/2021 41 5     MCV 07/25/2021 87     MCH 07/25/2021 29 4     MCHC 07/25/2021 34 0     RDW 07/25/2021 12 1     MPV 07/25/2021 9 4     Platelets 10/52/5085 309     nRBC 07/25/2021 0     Neutrophils Relative 07/25/2021 70     Immat GRANS % 07/25/2021 0     Lymphocytes Relative 07/25/2021 21     Monocytes Relative 07/25/2021 7     Eosinophils Relative 07/25/2021 1     Basophils Relative 07/25/2021 1     Neutrophils Absolute 07/25/2021 5 78     Immature Grans Absolute 07/25/2021 0 03     Lymphocytes Absolute 07/25/2021 1 78     Monocytes Absolute 07/25/2021 0 57     Eosinophils Absolute 07/25/2021 0 11     Basophils Absolute 07/25/2021 0 07     Sodium 07/25/2021 135*    Potassium 07/25/2021 4 2     Chloride 07/25/2021 101     CO2 07/25/2021 25     ANION GAP 07/25/2021 9     BUN 07/25/2021 15     Creatinine 07/25/2021 0 59*    Glucose 07/25/2021 97     Calcium 07/25/2021 9 0     AST 07/25/2021 31     ALT 07/25/2021 14     Alkaline Phosphatase 07/25/2021 59     Total Protein 07/25/2021 8 4*    Albumin 07/25/2021 4 2     Total Bilirubin 07/25/2021 0 95     eGFR 07/25/2021 129     Lipase 07/25/2021 79     Color, UA 07/25/2021 Yellow     Clarity, UA 07/25/2021 Cloudy     Specific Gravity, UA 07/25/2021 1 015     pH, UA 07/25/2021 >=9 0*    Leukocytes, UA 07/25/2021 Negative     Nitrite, UA 07/25/2021 Negative     Protein, UA 07/25/2021 Trace*    Glucose, UA 07/25/2021 Negative     Ketones, UA 07/25/2021 15 (1+)*    Urobilinogen, UA 07/25/2021 0 2     Bilirubin, UA 07/25/2021 Negative     Blood, UA 07/25/2021 Negative     EXT PREG TEST UR (Ref: N* 07/25/2021 negative     Control 07/25/2021 valid     RBC, UA 07/25/2021 None Seen     WBC, UA 07/25/2021 0-1     Epithelial Cells 07/25/2021 Moderate*    Bacteria, UA 07/25/2021 Moderate*    AMORPH PHOSPATES 07/25/2021 Moderate     MUCUS THREADS 07/25/2021 Occasional*    Color, UA 07/25/2021 Yellow     Clarity, UA 07/25/2021 Clear     Specific Gravity, UA 07/25/2021 1 010     pH, UA 07/25/2021 8 5*    Leukocytes, UA 07/25/2021 Negative     Nitrite, UA 07/25/2021 Negative     Protein, UA 07/25/2021 Negative     Glucose, UA 07/25/2021 Negative     Ketones, UA 07/25/2021 15 (1+)*    Urobilinogen, UA 07/25/2021 0 2     Bilirubin, UA 07/25/2021 Negative     Blood, UA 07/25/2021 Negative     WBC 07/26/2021 6 81     RBC 07/26/2021 4 26     Hemoglobin 07/26/2021 12 6     Hematocrit 07/26/2021 37 4     MCV 07/26/2021 88     MCH 07/26/2021 29 6     MCHC 07/26/2021 33 7     RDW 07/26/2021 11 9     MPV 07/26/2021 9 4     Platelets 91/67/1501 244     nRBC 07/26/2021 0     Neutrophils Relative 07/26/2021 67     Immat GRANS % 07/26/2021 0  Lymphocytes Relative 07/26/2021 24     Monocytes Relative 07/26/2021 6     Eosinophils Relative 07/26/2021 2     Basophils Relative 07/26/2021 1     Neutrophils Absolute 07/26/2021 4 48     Immature Grans Absolute 07/26/2021 0 02     Lymphocytes Absolute 07/26/2021 1 66     Monocytes Absolute 07/26/2021 0 43     Eosinophils Absolute 07/26/2021 0 16     Basophils Absolute 07/26/2021 0 06     Sodium 07/26/2021 135*    Potassium 07/26/2021 3 6     Chloride 07/26/2021 100     CO2 07/26/2021 26     ANION GAP 07/26/2021 9     BUN 07/26/2021 6     Creatinine 07/26/2021 0 54*    Glucose 07/26/2021 86     Glucose, Fasting 07/26/2021 86     Calcium 07/26/2021 8 2*    Corrected Calcium 07/26/2021 8 7     AST 07/26/2021 18     ALT 07/26/2021 8*    Alkaline Phosphatase 07/26/2021 51     Total Protein 07/26/2021 6 9     Albumin 07/26/2021 3 4*    Total Bilirubin 07/26/2021 0 99     eGFR 07/26/2021 133     CRP 07/27/2021 <3 0     WBC 07/27/2021 6 49     RBC 07/27/2021 4 49     Hemoglobin 07/27/2021 13 3     Hematocrit 07/27/2021 38 3     MCV 07/27/2021 85     MCH 07/27/2021 29 6     MCHC 07/27/2021 34 7     RDW 07/27/2021 11 9     MPV 07/27/2021 9 5     Platelets 07/45/3454 265     nRBC 07/27/2021 0     Neutrophils Relative 07/27/2021 79*    Immat GRANS % 07/27/2021 0     Lymphocytes Relative 07/27/2021 17     Monocytes Relative 07/27/2021 3*    Eosinophils Relative 07/27/2021 0     Basophils Relative 07/27/2021 1     Neutrophils Absolute 07/27/2021 5 14     Immature Grans Absolute 07/27/2021 0 02     Lymphocytes Absolute 07/27/2021 1 13     Monocytes Absolute 07/27/2021 0 16*    Eosinophils Absolute 07/27/2021 0 00     Basophils Absolute 07/27/2021 0 04     Sodium 07/27/2021 136     Potassium 07/27/2021 4 3     Chloride 07/27/2021 101     CO2 07/27/2021 26     ANION GAP 07/27/2021 9     BUN 07/27/2021 7     Creatinine 07/27/2021 0 56*    Glucose 07/27/2021 109     Calcium 07/27/2021 8 8     eGFR 07/27/2021 131        Imaging Studies:   I have personally reviewed pertinent imaging studies  FL upper GI / small bowel with air    Result Date: 7/27/2021  Narrative: UPPER GI AND SMALL BOWEL FOLLOW THROUGH DOUBLE CONTRAST INDICATION:   Intussuception  COMPARISON:   CT abdomen and pelvis performed on 7/25/2021 IMAGES:  368  (this includes cine capture images) FLUOROSCOPY TIME:   2 7 minutes TECHNIQUE:    view of the abdomen was obtained and is unremarkable  Upper GI was performed utilizing effervescent granules and barium  orally to the patient  Subsequently, a small bowel follow through was performed including spot images of the terminal ileum  FINDINGS: The esophagus is normal in caliber  Esophageal motility is normal and emptying of contrast from the esophagus is prompt  There is no mucosal mass, ulceration or fold thickening identified  The stomach is unremarkable in size  The gastric mucosa is normal   No penetrating ulcers or masses  Contrast empties promptly into the duodenum  The duodenum is normal in caliber  The ligament of Treitz/duodenojejunal junction lies in a normal position  Minor degree of intermittent spontaneous gastroesophageal reflux occurred    There is no hiatal hernia  Previous CT study had demonstrated an intussusception, which has since resolved  There is no small bowel obstruction  The distal most terminal ileum approximately 4 to 5 cm in length) demonstrates irregular luminal narrowing luminal narrowing compatible with history of Crohn's disease  No proximal distention  No other abnormalities are seen  Impression: 1  Terminal ileal luminal narrowing compatible with patient history of Crohn's disease  No evidence of fistula  2  No evidence of small bowel obstruction  3  Previously seen intussusception in the left upper quadrant no longer present 4  A minor degree of spontaneous gastroesophageal reflux occurred    Upper GI portion of the study otherwise unremarkable Workstation performed: Jenae Greenberg right upper quadrant    Result Date: 7/27/2021  Narrative: RIGHT UPPER QUADRANT ULTRASOUND INDICATION:     epigastric pain, nausea  COMPARISON:  CT examination performed on 7/25/2021 TECHNIQUE:   Real-time ultrasound of the right upper quadrant was performed with a curvilinear transducer with both volumetric sweeps and still imaging techniques  FINDINGS: PANCREAS:  Visualized portions of the pancreas are within normal limits  AORTA AND IVC:  Visualized portions are normal for patient age  LIVER: Size:  Within normal range  The liver measures 16 6 cm in the midclavicular line  Contour:  Surface contour is smooth  Parenchyma:  Echogenicity and echotexture are within normal limits  No evidence of suspicious mass  Limited imaging of the main portal vein shows it to be patent and hepatopetal   BILIARY: No gallbladder findings  No intrahepatic biliary dilatation  CBD measures 2 mm  No choledocholithiasis  KIDNEY: Right kidney measures 10 5 x 4 2 x 4 7 cm  Within normal limits  ASCITES:   None  Impression: Unremarkable right upper quadrant ultrasound Workstation performed: [id number]     CT abdomen pelvis with contrast    Result Date: 7/25/2021  Narrative: CT ABDOMEN AND PELVIS WITH IV CONTRAST INDICATION:   abdominal pain - crohns - r/o abscess / perf  COMPARISON:  November 14, 2020, CT from February 18, 2016 TECHNIQUE:  CT examination of the abdomen and pelvis was performed  Axial, sagittal, and coronal 2D reformatted images were created from the source data and submitted for interpretation  Radiation dose length product (DLP) for this visit:  576 mGy-cm   This examination, like all CT scans performed in the [facility], was performed utilizing techniques to minimize radiation dose exposure, including the use of iterative reconstruction and automated exposure control   IV Contrast:  100 mL of iohexol (OMNIPAQUE) Enteric Contrast:  Enteric contrast was not administered  FINDINGS: ABDOMEN LOWER CHEST:  No clinically significant abnormality identified in the visualized lower chest  LIVER/BILIARY TREE:  Unremarkable  GALLBLADDER:  No calcified gallstones  No pericholecystic inflammatory change  SPLEEN:  The spleen measures 11 3 cm PANCREAS:  Unremarkable  ADRENAL GLANDS:  Unremarkable  KIDNEYS/URETERS:  No hydronephrosis or urinary tract calculus  One or more sharply circumscribed subcentimeter renal hypodensities are present, too small to accurately characterize, and statistically most likely benign findings  According to recent literature (Radiology 2019) no further workup of these findings is recommended  STOMACH AND BOWEL:  The small segment of the terminal ileum just into the ileocecal valve demonstrates mucosal enhancement  There is no significant perienteric stranding in this area  A small bowel loop upstream from this segment is gas-filled with luminal diameter of about 2 2 cm There is no interloop abscess  Small bowel intussusception is noted in the left upper quadrant without associated obstruction  ,  Seen in image 46 series 601 this is probably a transient finding APPENDIX:  Appendix is identified in the right lower quadrant the tip of the appendix is bulbous but this is stable and unchanged from the previous study of November 14, 2020 There is no surrounding inflammatory changes ABDOMINOPELVIC CAVITY:  No ascites  No pneumoperitoneum  Again noted are mesenteric lymph nodes in the right side with largest lymph node measuring about 1 cm ,  Stable Additional smaller ventricular lymph nodes are seen about 6 to 7 mm A mesenteric lymph node in the upper abdomen, measuring about 6 mm in short axis VESSELS:  Unremarkable for patient's age  PELVIS REPRODUCTIVE ORGANS:  The right ovary measures 2 8 x 2 5 x 2 8 cm per The left ovary measures 1 8 x 1 3 x 1 cm  URINARY BLADDER:  Unremarkable   ABDOMINAL WALL/INGUINAL REGIONS: Unremarkable  OSSEOUS STRUCTURES:  No acute fracture or destructive osseous lesion  Impression: There is small segment of the terminal ileum which demonstrates enhancement and demonstrates some distortion without significant perienteric stranding, unchanged from the previous study this is probably related to low-grade fibrosing stenosing disease Small mesenteric lymph nodes, stable Small bowel intussusception seen left upper quadrant without associated bowel obstruction probably a transient and incidental finding   However a nonemergent CT enterography on MR enterography suggest in this patient with Crohn's disease No intra-abdominal fluid collection Chronic enlargement of the tip of the appendix without surrounding inflammatory changes, stable  This is likely sequela of  inflammatory changes seen in relation to the tip of the appendix in 2016 The study was marked in [facility] for immediate notification  Workstation performed: [id number]         Assessment & Plan  Crohns Ileitis  -SBFT shows terminal ileal narrowing  No intussusception  -CRP <3   -CBC normal    -Will start clears and advance as tolerated  -Continue IV solumedrol   -Will plan colonoscopy Thursday   -Patient will need close outpatient follow up to discuss further treatment regimen   -Surgical consultation reviewed  Patient will be seen and examined by Dr Jase Fernando PA-C  7/27/2021,3:30 PM

## 2021-07-27 NOTE — ASSESSMENT & PLAN NOTE
Finding on CT scan  No significant bowel obstruction however on CT  Patient does mention abdominal pain and nausea however  Results were discussed by ED with by GI who recommended observation and celiac panel    Place under observation  GI consult  Suspect secondary Crohn's ileitis  Currently on IV steroids  Small-bowel follow-through ordered by GI  Appreciate GI recommendations  No need for surgical intervention at this time per surgery

## 2021-07-27 NOTE — UTILIZATION REVIEW
Continued Stay Review    Date: 7/27/21                         Current Patient Class: OBS   Current Level of Care: IP    HPI:26 y o  female initially admitted on 7/25 with abd pain , N/V   Hx Crohns for the past 6 years   Last flare 4 yrs ago   nausea and at least a couple episodes of nonbilious vomiting  Ct abd Small bowel intussusception seen left upper quadrant without associated bowel obstruction  Assessment/Plan:   Pt with intussuception  No bowel obstruction  Secondary crohn's ileitis  Continues on IV fluids and IV steroids  Continues IV Zofran for Nausea  Can start clear liquids and advance as keri  Recommendation to stop using THC  No acute surgical intervention needed at this time  Per surgery no intussusception  Needs Crohn's treatment - on steroids          Vital Signs:   07/27/21 14:53:03  97 7 °F (36 5 °C)  56  17  110/69  83  97 %  --  --   07/27/21 08:39:06  97 3 °F (36 3 °C)Abnormal   60  --  83/47Abnormal   59  98 %  --  --   07/26/21 2000  --  --  --  --  --  --  None (Room air)  --   07/26/21 14:14:11  98 1 °F (36 7 °C)  52Abnormal   16  96/51  66  97 %  --  --   07/26/21 0823  --  --  --  --  --  --  None (Room air)  --   07/26/21 07:37:08  97 6 °F (36 4 °C)  59  17  104/69  81  98 %  --  --   07/25/21 23:02:53  97 6 °F (36 4 °C)  59  18  105/64  78  98 %  --  --   07/25/21 16:37:01  98 °F (36 7 °C)  55  18  109/64  79  92 %  None (Room air)  Lying   07/25/21 1420  --  60  15  116/70  --  100 %  --  --   07/25/21 1042  98 1 °F (36 7 °C)  --  --  --  --  --  --  --   07/25/21 1041  --  65  15  127/76  --  100 %  --  --     Pertinent Labs/Diagnostic Results:       Results from last 7 days   Lab Units 07/27/21  0516 07/26/21  0540 07/25/21  1159   WBC Thousand/uL 6 49 6 81 8 34   HEMOGLOBIN g/dL 13 3 12 6 14 1   HEMATOCRIT % 38 3 37 4 41 5   PLATELETS Thousands/uL 265 244 309   NEUTROS ABS Thousands/µL 5 14 4 48 5 78         Results from last 7 days   Lab Units 07/27/21  0516 07/26/21  0540 07/25/21  1159   SODIUM mmol/L 136 135* 135*   POTASSIUM mmol/L 4 3 3 6 4 2   CHLORIDE mmol/L 101 100 101   CO2 mmol/L 26 26 25   ANION GAP mmol/L 9 9 9   BUN mg/dL 7 6 15   CREATININE mg/dL 0 56* 0 54* 0 59*   EGFR ml/min/1 73sq m 131 133 129   CALCIUM mg/dL 8 8 8 2* 9 0     Results from last 7 days   Lab Units 07/26/21  0540 07/25/21  1159   AST U/L 18 31   ALT U/L 8* 14   ALK PHOS U/L 51 59   TOTAL PROTEIN g/dL 6 9 8 4*   ALBUMIN g/dL 3 4* 4 2   TOTAL BILIRUBIN mg/dL 0 99 0 95         Results from last 7 days   Lab Units 07/27/21  0516 07/26/21  0540 07/25/21  1159   GLUCOSE RANDOM mg/dL 109 86 97     Results from last 7 days   Lab Units 07/25/21  1159   LIPASE u/L 79     Results from last 7 days   Lab Units 07/27/21  0516   CRP mg/L <3 0         Results from last 7 days   Lab Units 07/25/21  1354 07/25/21  1201   CLARITY UA  Clear Cloudy   COLOR UA  Yellow Yellow   SPEC GRAV UA  1 010 1 015   PH UA  8 5* >=9 0*   GLUCOSE UA mg/dl Negative Negative   KETONES UA mg/dl 15 (1+)* 15 (1+)*   BLOOD UA  Negative Negative   PROTEIN UA mg/dl Negative Trace*   NITRITE UA  Negative Negative   BILIRUBIN UA  Negative Negative   UROBILINOGEN UA E U /dl 0 2 0 2   LEUKOCYTES UA  Negative Negative   WBC UA /hpf  --  0-1   RBC UA /hpf  --  None Seen   BACTERIA UA /hpf  --  Moderate*   EPITHELIAL CELLS WET PREP /hpf  --  Moderate*   MUCUS THREADS   --  Occasional*     Medications:   Scheduled Medications:  famotidine, 20 mg, Oral, BID  methylPREDNISolone sodium succinate, 20 mg, Intravenous, Q8H [facility]    Continuous IV Infusions:  lactated ringers, 125 mL/hr, Intravenous, Continuous    PRN Meds:  acetaminophen, 650 mg, Oral, Q6H PRN  HYDROmorphone, 1 mg, Intravenous, Q4H PRN - x 1 7/25, x 4 7/26, x 1 7/27  ondansetron, 4 mg, Intravenous, Q6H PRN - x 1 7/25, x 2 7/26, x 1 7/27  promethazine, 12 5 mg, Intravenous, Q6H PRN    Discharge Plan: TBD    Network Utilization Review Department  ATTENTION: Please call with any questions or concerns to [phone] and carefully listen to the prompts so that you are directed to the right person  All voicemails are confidential   [facility] all requests for admission clinical reviews, approved or denied determinations and any other requests to dedicated fax number below belonging to the campus where the patient is receiving treatment   List of dedicated fax numbers for the Facilities:  [address] DENIALS (Administrative/Medical Necessity) [phone]   [address] (Maternity/NICU/Pediatrics) [phone]   [address] [place] [address] [address] [place] [address] [address] [facility] [place] [address] [phone]

## 2021-07-27 NOTE — PROGRESS NOTES
Progress Note - General Surgery   Leah Fish 27 y o  female MRN: [id number]  Unit/Bed#: -01 Encounter: [id number]    Assessment/Plan  Leah Fish is a 27 y o  female     Crohn's ileitis   Possible intussusception  SBFT negative for intussusception, noted narrowing in terminal ileum consistent with known Crohn's disease     No indications for acute surgical intervention, no evidence of persistent intussusception, likely transient finding on CT  OK to advance diet as tolerated, no evidence of obstruction noted on imaging  Serial abdominal exams  Crohn's management per GI, currently on solumedrol  General surgery will sign off, please contact if any further questions or concerns arise     Subjective/Objective     Subjective: Still with some nausea controlled with medication, +flatus    Objective:     Blood pressure (!) 83/47, pulse 60, temperature (!) 97 3 °F (36 3 °C), resp  rate 16, height 5' 3" (1 6 m), weight 49 kg (108 lb), SpO2 98 %, not currently breastfeeding  ,Body mass index is 19 13 kg/m²        Intake/Output Summary (Last 24 hours) at 7/27/2021 1255  Last data filed at 7/26/2021 1632  Gross per 24 hour   Intake 962 5 ml   Output --   Net 962 5 ml       Invasive Devices     Peripheral Intravenous Line            Peripheral IV 07/27/21 Left Forearm <1 day                Physical Exam: BP (!) 83/47   Pulse 60   Temp (!) 97 3 °F (36 3 °C)   Resp 16   Ht 5' 3" (1 6 m)   Wt 49 kg (108 lb)   SpO2 98%   BMI 19 13 kg/m²   General appearance: alert and oriented, in no acute distress  Lungs: clear to auscultation bilaterally  Heart: regular rate and rhythm, S1, S2 normal, no murmur, click, rub or gallop  Abdomen: soft, non-distended, active bowel sounds, focal tenderness to palpation in RLQ no guarding or rebound, no peritoneal signs  Extremities: extremities normal, warm and well-perfused; no cyanosis, clubbing, or edema    Lab, Imaging and other studies:I have personally reviewed pertinent lab results       VTE Pharmacologic Prophylaxis: Sequential compression device (Venodyne)   VTE Mechanical Prophylaxis: sequential compression device    Recent Results (from the past 36 hour(s))   CBC and differential    Collection Time: 07/26/21  5:40 AM   Result Value Ref Range    WBC 6 81 4 31 - 10 16 Thousand/uL    RBC 4 26 3 81 - 5 12 Million/uL    Hemoglobin 12 6 11 5 - 15 4 g/dL    Hematocrit 37 4 34 8 - 46 1 %    MCV 88 82 - 98 fL    MCH 29 6 26 8 - 34 3 pg    MCHC 33 7 31 4 - 37 4 g/dL    RDW 11 9 11 6 - 15 1 %    MPV 9 4 8 9 - 12 7 fL    Platelets 359 771 - 911 Thousands/uL    nRBC 0 /100 WBCs    Neutrophils Relative 67 43 - 75 %    Immat GRANS % 0 0 - 2 %    Lymphocytes Relative 24 14 - 44 %    Monocytes Relative 6 4 - 12 %    Eosinophils Relative 2 0 - 6 %    Basophils Relative 1 0 - 1 %    Neutrophils Absolute 4 48 1 85 - 7 62 Thousands/µL    Immature Grans Absolute 0 02 0 00 - 0 20 Thousand/uL    Lymphocytes Absolute 1 66 0 60 - 4 47 Thousands/µL    Monocytes Absolute 0 43 0 17 - 1 22 Thousand/µL    Eosinophils Absolute 0 16 0 00 - 0 61 Thousand/µL    Basophils Absolute 0 06 0 00 - 0 10 Thousands/µL   Comprehensive metabolic panel    Collection Time: 07/26/21  5:40 AM   Result Value Ref Range    Sodium 135 (L) 136 - 145 mmol/L    Potassium 3 6 3 5 - 5 3 mmol/L    Chloride 100 100 - 108 mmol/L    CO2 26 21 - 32 mmol/L    ANION GAP 9 4 - 13 mmol/L    BUN 6 5 - 25 mg/dL    Creatinine 0 54 (L) 0 60 - 1 30 mg/dL    Glucose 86 65 - 140 mg/dL    Glucose, Fasting 86 65 - 99 mg/dL    Calcium 8 2 (L) 8 3 - 10 1 mg/dL    Corrected Calcium 8 7 8 3 - 10 1 mg/dL    AST 18 5 - 45 U/L    ALT 8 (L) 12 - 78 U/L    Alkaline Phosphatase 51 46 - 116 U/L    Total Protein 6 9 6 4 - 8 2 g/dL    Albumin 3 4 (L) 3 5 - 5 0 g/dL    Total Bilirubin 0 99 0 20 - 1 00 mg/dL    eGFR 133 ml/min/1 73sq m   C-reactive protein    Collection Time: 07/27/21  5:16 AM   Result Value Ref Range    CRP <3 0 <3 0 mg/L   CBC and differential Collection Time: 07/27/21  5:16 AM   Result Value Ref Range    WBC 6 49 4 31 - 10 16 Thousand/uL    RBC 4 49 3 81 - 5 12 Million/uL    Hemoglobin 13 3 11 5 - 15 4 g/dL    Hematocrit 38 3 34 8 - 46 1 %    MCV 85 82 - 98 fL    MCH 29 6 26 8 - 34 3 pg    MCHC 34 7 31 4 - 37 4 g/dL    RDW 11 9 11 6 - 15 1 %    MPV 9 5 8 9 - 12 7 fL    Platelets 959 208 - 256 Thousands/uL    nRBC 0 /100 WBCs    Neutrophils Relative 79 (H) 43 - 75 %    Immat GRANS % 0 0 - 2 %    Lymphocytes Relative 17 14 - 44 %    Monocytes Relative 3 (L) 4 - 12 %    Eosinophils Relative 0 0 - 6 %    Basophils Relative 1 0 - 1 %    Neutrophils Absolute 5 14 1 85 - 7 62 Thousands/µL    Immature Grans Absolute 0 02 0 00 - 0 20 Thousand/uL    Lymphocytes Absolute 1 13 0 60 - 4 47 Thousands/µL    Monocytes Absolute 0 16 (L) 0 17 - 1 22 Thousand/µL    Eosinophils Absolute 0 00 0 00 - 0 61 Thousand/µL    Basophils Absolute 0 04 0 00 - 0 10 Thousands/µL   Basic metabolic panel    Collection Time: 07/27/21  5:16 AM   Result Value Ref Range    Sodium 136 136 - 145 mmol/L    Potassium 4 3 3 5 - 5 3 mmol/L    Chloride 101 100 - 108 mmol/L    CO2 26 21 - 32 mmol/L    ANION GAP 9 4 - 13 mmol/L    BUN 7 5 - 25 mg/dL    Creatinine 0 56 (L) 0 60 - 1 30 mg/dL    Glucose 109 65 - 140 mg/dL    Calcium 8 8 8 3 - 10 1 mg/dL    eGFR 131 ml/min/1 73sq m

## 2021-07-27 NOTE — PLAN OF CARE
Problem: INFECTION - ADULT  Goal: Absence or prevention of progression during hospitalization  Description: INTERVENTIONS:  - Assess and monitor for signs and symptoms of infection  - Monitor lab/diagnostic results  - Monitor all insertion sites, i e  indwelling lines, tubes, and drains  - Monitor endotracheal if appropriate and nasal secretions for changes in amount and color  - [place] appropriate cooling/warming therapies per order  - Administer medications as ordered  - Instruct and encourage patient and family to use good hand hygiene technique  - Identify and instruct in appropriate isolation precautions for identified infection/condition  Outcome: Progressing     Problem: SAFETY ADULT  Goal: Maintain or return to baseline ADL function  Description: INTERVENTIONS:  -  Assess patient's ability to carry out ADLs; assess patient's baseline for ADL function and identify physical deficits which impact ability to perform ADLs (bathing, care of mouth/teeth, toileting, grooming, dressing, etc )  - Assess/evaluate cause of self-care deficits   - Assess range of motion  - Assess patient's mobility; develop plan if impaired  - Assess patient's need for assistive devices and provide as appropriate  - Encourage maximum independence but intervene and supervise when necessary  - Involve family in performance of ADLs  - Assess for home care needs following discharge   - Consider OT consult to assist with ADL evaluation and planning for discharge  - Provide patient education as appropriate  Outcome: Progressing     Problem: GASTROINTESTINAL - ADULT  Goal: Minimal or absence of nausea and/or vomiting  Description: INTERVENTIONS:  - Administer IV fluids if ordered to ensure adequate hydration  - Maintain NPO status until nausea and vomiting are resolved  - Nasogastric tube if ordered  - Administer ordered antiemetic medications as needed  - Provide nonpharmacologic comfort measures as appropriate  - Advance diet as tolerated, if ordered  - Consider nutrition services referral to assist patient with adequate nutrition and appropriate food choices  Outcome: Progressing     Problem: METABOLIC, FLUID AND ELECTROLYTES - ADULT  Goal: Electrolytes maintained within normal limits  Description: INTERVENTIONS:  - Monitor labs and assess patient for signs and symptoms of electrolyte imbalances  - Administer electrolyte replacement as ordered  - Monitor response to electrolyte replacements, including repeat lab results as appropriate  - Instruct patient on fluid and nutrition as appropriate  Outcome: Progressing

## 2021-07-27 NOTE — PROGRESS NOTES
[facility]  Progress Note Juvencio Dodd 1995, 27 y o  female MRN: [id number]  Unit/Bed#: -01 Encounter: [id number]  Primary Care Provider: No primary care provider on file  Date and time admitted to hospital: 7/25/2021 10:27 AM    Intractable nausea and vomiting  Assessment & Plan  In the setting of abdominal pain and finding of intussusception on CT scan  ED has discussed with GI who recommended observation  Will provide antiemetic therapy Zofran  IV fluids  Clear liquid diet and advanced as tolerated  Abdominal pain  Assessment & Plan  Somewhat acute in nature  CTA of the abdomen does not reveal a lot of inflammation actively but does reveal findings of chronic Crohn's disease and does reveal intussusception which may be contributing to this  Analgesia with hydromorphone  IV fluids as ordered  Monitor clinically  GI input input would be appreciated  Marijuana use  Assessment & Plan  Reportedly chart  Was recommended to stop  PDMP reviewed, no narcotics filled recently  Crohn's disease [facility])  Assessment & Plan  Not on any suppressive therapy for this  Denies any bloody bowel movements  * Intussusception [facility])  Assessment & Plan  Finding on CT scan  No significant bowel obstruction however on CT  Patient does mention abdominal pain and nausea however  Results were discussed by ED with by GI who recommended observation and celiac panel  Place under observation  GI consult  Suspect secondary Crohn's ileitis  Currently on IV steroids  Small-bowel follow-through ordered by GI  Appreciate GI recommendations  No need for surgical intervention at this time per surgery      VTE Pharmacologic Prophylaxis:   Pharmacologic: Pharmacologic VTE Prophylaxis contraindicated due to Low risk  Mechanical VTE Prophylaxis in Place: Yes    Patient Centered Rounds: I have performed bedside rounds with nursing staff today      Discussions with Specialists or Other Care Team Provider:  Cm, nurse    Education and Discussions with Family / Patient: pt    Time Spent for Care: 30 minutes  More than 50% of total time spent on counseling and coordination of care as described above  Current Length of Stay: 0 day(s)    Current Patient Status: Observation   Certification Statement: The patient will continue to require additional inpatient hospital stay due to See above    Discharge Plan:  Still requiring IV steroids and further evaluation by GI    Code Status: Level 1 - Full Code      Subjective:   No acute complaints  Objective:     Vitals:   Temp (24hrs), Av 7 °F (36 5 °C), Min:97 3 °F (36 3 °C), Max:98 1 °F (36 7 °C)    Temp:  [97 3 °F (36 3 °C)-98 1 °F (36 7 °C)] 97 3 °F (36 3 °C)  HR:  [52-60] 60  Resp:  [16] 16  BP: (83-96)/(47-51) 83/47  SpO2:  [97 %-98 %] 98 %  Body mass index is 19 13 kg/m²  Input and Output Summary (last 24 hours): Intake/Output Summary (Last 24 hours) at 2021 0840  Last data filed at 2021 1632  Gross per 24 hour   Intake 3029 17 ml   Output --   Net 3029 17 ml       Physical Exam:     Physical Exam  Constitutional:       General: She is not in acute distress  Appearance: She is well-developed  She is not diaphoretic  HENT:      Head: Normocephalic and atraumatic  Nose: Nose normal       Mouth/Throat:      Pharynx: No oropharyngeal exudate  Eyes:      General: No scleral icterus  Right eye: No discharge  Left eye: No discharge  Conjunctiva/sclera: Conjunctivae normal    Neck:      Thyroid: No thyromegaly  Vascular: No JVD  Cardiovascular:      Rate and Rhythm: Normal rate and regular rhythm  Heart sounds: Normal heart sounds  No murmur heard  No friction rub  No gallop  Pulmonary:      Effort: Pulmonary effort is normal  No respiratory distress  Breath sounds: Normal breath sounds  No wheezing or rales  Chest:      Chest wall: No tenderness     Abdominal:      General: Bowel sounds are normal  There is no distension  Palpations: Abdomen is soft  Tenderness: There is no abdominal tenderness  There is no guarding or rebound  Musculoskeletal:         General: No tenderness or deformity  Normal range of motion  Cervical back: Normal range of motion and neck supple  Skin:     General: Skin is warm and dry  Findings: No erythema or rash  Neurological:      Mental Status: She is alert  Mental status is at baseline  Cranial Nerves: No cranial nerve deficit  Sensory: No sensory deficit  Motor: No abnormal muscle tone  Coordination: Coordination normal            Additional Data:     Labs:    Results from last 7 days   Lab Units 07/27/21  0516   WBC Thousand/uL 6 49   HEMOGLOBIN g/dL 13 3   HEMATOCRIT % 38 3   PLATELETS Thousands/uL 265   NEUTROS PCT % 79*   LYMPHS PCT % 17   MONOS PCT % 3*   EOS PCT % 0     Results from last 7 days   Lab Units 07/27/21  0516 07/26/21  0540   SODIUM mmol/L 136 135*   POTASSIUM mmol/L 4 3 3 6   CHLORIDE mmol/L 101 100   CO2 mmol/L 26 26   BUN mg/dL 7 6   CREATININE mg/dL 0 56* 0 54*   ANION GAP mmol/L 9 9   CALCIUM mg/dL 8 8 8 2*   ALBUMIN g/dL  --  3 4*   TOTAL BILIRUBIN mg/dL  --  0 99   ALK PHOS U/L  --  51   ALT U/L  --  8*   AST U/L  --  18   GLUCOSE RANDOM mg/dL 109 86                           * I Have Reviewed All Lab Data Listed Above  * Additional Pertinent Lab Tests Reviewed:  All Labs Within Last 24 Hours Reviewed    Imaging:    Imaging Reports Reviewed Today Include: na  Imaging Personally Reviewed by Myself Includes:  na    Recent Cultures (last 7 days):           Last 24 Hours Medication List:   Current Facility-Administered Medications   Medication Dose Route Frequency Provider Last Rate    acetaminophen  650 mg Oral Q6H PRN Spana Smith MD      famotidine  20 mg Oral BID Sapna Smith MD      HYDROmorphone  1 mg Intravenous Q4H PRN WARD Vega      lactated ringers  125 mL/hr Intravenous Continuous Rafael Alexis  mL/hr (07/27/21 0130)    methylPREDNISolone sodium succinate  20 mg Intravenous [address] Frances Williamson PA-C      ondansetron  4 mg Intravenous Q6H PRN Rafael Alexis MD      promethazine  12 5 mg Intravenous Q6H PRN WARD Alvarenga          Today, Patient Was Seen By: Robbi Rodas MD    ** Please Note: Dictation voice to text software may have been used in the creation of this document   **

## 2021-07-28 LAB
ANION GAP SERPL CALCULATED.3IONS-SCNC: 8 MMOL/L (ref 4–13)
BASOPHILS # BLD AUTO: 0.05 THOUSANDS/ΜL (ref 0–0.1)
BASOPHILS NFR BLD AUTO: 1 % (ref 0–1)
BUN SERPL-MCNC: 10 MG/DL (ref 5–25)
CALCIUM SERPL-MCNC: 8.5 MG/DL (ref 8.3–10.1)
CHLORIDE SERPL-SCNC: 103 MMOL/L (ref 100–108)
CO2 SERPL-SCNC: 27 MMOL/L (ref 21–32)
CREAT SERPL-MCNC: 0.64 MG/DL (ref 0.6–1.3)
EOSINOPHIL # BLD AUTO: 0.01 THOUSAND/ΜL (ref 0–0.61)
EOSINOPHIL NFR BLD AUTO: 0 % (ref 0–6)
ERYTHROCYTE [DISTWIDTH] IN BLOOD BY AUTOMATED COUNT: 12 % (ref 11.6–15.1)
GFR SERPL CREATININE-BSD FRML MDRD: 125 ML/MIN/1.73SQ M
GLUCOSE P FAST SERPL-MCNC: 117 MG/DL (ref 65–99)
GLUCOSE SERPL-MCNC: 117 MG/DL (ref 65–140)
HCT VFR BLD AUTO: 36.5 % (ref 34.8–46.1)
HGB BLD-MCNC: 12.4 G/DL (ref 11.5–15.4)
IMM GRANULOCYTES # BLD AUTO: 0.03 THOUSAND/UL (ref 0–0.2)
IMM GRANULOCYTES NFR BLD AUTO: 0 % (ref 0–2)
LYMPHOCYTES # BLD AUTO: 2.03 THOUSANDS/ΜL (ref 0.6–4.47)
LYMPHOCYTES NFR BLD AUTO: 22 % (ref 14–44)
MCH RBC QN AUTO: 29.7 PG (ref 26.8–34.3)
MCHC RBC AUTO-ENTMCNC: 34 G/DL (ref 31.4–37.4)
MCV RBC AUTO: 88 FL (ref 82–98)
MONOCYTES # BLD AUTO: 0.65 THOUSAND/ΜL (ref 0.17–1.22)
MONOCYTES NFR BLD AUTO: 7 % (ref 4–12)
NEUTROPHILS # BLD AUTO: 6.44 THOUSANDS/ΜL (ref 1.85–7.62)
NEUTS SEG NFR BLD AUTO: 70 % (ref 43–75)
NRBC BLD AUTO-RTO: 0 /100 WBCS
PLATELET # BLD AUTO: 267 THOUSANDS/UL (ref 149–390)
PMV BLD AUTO: 9.6 FL (ref 8.9–12.7)
POTASSIUM SERPL-SCNC: 3.9 MMOL/L (ref 3.5–5.3)
RBC # BLD AUTO: 4.17 MILLION/UL (ref 3.81–5.12)
SODIUM SERPL-SCNC: 138 MMOL/L (ref 136–145)
WBC # BLD AUTO: 9.21 THOUSAND/UL (ref 4.31–10.16)

## 2021-07-28 PROCEDURE — 99232 SBSQ HOSP IP/OBS MODERATE 35: CPT | Performed by: INTERNAL MEDICINE

## 2021-07-28 PROCEDURE — 85025 COMPLETE CBC W/AUTO DIFF WBC: CPT | Performed by: INTERNAL MEDICINE

## 2021-07-28 PROCEDURE — 80048 BASIC METABOLIC PNL TOTAL CA: CPT | Performed by: INTERNAL MEDICINE

## 2021-07-28 PROCEDURE — 83993 ASSAY FOR CALPROTECTIN FECAL: CPT | Performed by: PHYSICIAN ASSISTANT

## 2021-07-28 RX ORDER — POLYETHYLENE GLYCOL 3350, SODIUM CHLORIDE, SODIUM BICARBONATE, POTASSIUM CHLORIDE 420; 11.2; 5.72; 1.48 G/4L; G/4L; G/4L; G/4L
4000 POWDER, FOR SOLUTION ORAL ONCE
Status: COMPLETED | OUTPATIENT
Start: 2021-07-28 | End: 2021-07-28

## 2021-07-28 RX ADMIN — SODIUM CHLORIDE, SODIUM LACTATE, POTASSIUM CHLORIDE, AND CALCIUM CHLORIDE 125 ML/HR: .6; .31; .03; .02 INJECTION, SOLUTION INTRAVENOUS at 09:18

## 2021-07-28 RX ADMIN — HYDROMORPHONE HYDROCHLORIDE 1 MG: 1 INJECTION, SOLUTION INTRAMUSCULAR; INTRAVENOUS; SUBCUTANEOUS at 02:02

## 2021-07-28 RX ADMIN — HYDROMORPHONE HYDROCHLORIDE 1 MG: 1 INJECTION, SOLUTION INTRAMUSCULAR; INTRAVENOUS; SUBCUTANEOUS at 23:14

## 2021-07-28 RX ADMIN — SODIUM CHLORIDE, SODIUM LACTATE, POTASSIUM CHLORIDE, AND CALCIUM CHLORIDE 125 ML/HR: .6; .31; .03; .02 INJECTION, SOLUTION INTRAVENOUS at 17:21

## 2021-07-28 RX ADMIN — ONDANSETRON 4 MG: 2 INJECTION INTRAMUSCULAR; INTRAVENOUS at 02:05

## 2021-07-28 RX ADMIN — METHYLPREDNISOLONE SODIUM SUCCINATE 20 MG: 40 INJECTION, POWDER, FOR SOLUTION INTRAMUSCULAR; INTRAVENOUS at 14:49

## 2021-07-28 RX ADMIN — METHYLPREDNISOLONE SODIUM SUCCINATE 20 MG: 40 INJECTION, POWDER, FOR SOLUTION INTRAMUSCULAR; INTRAVENOUS at 22:46

## 2021-07-28 RX ADMIN — FAMOTIDINE 20 MG: 20 TABLET, FILM COATED ORAL at 17:16

## 2021-07-28 RX ADMIN — FAMOTIDINE 20 MG: 20 TABLET, FILM COATED ORAL at 08:41

## 2021-07-28 RX ADMIN — ONDANSETRON 4 MG: 2 INJECTION INTRAMUSCULAR; INTRAVENOUS at 18:30

## 2021-07-28 RX ADMIN — HYDROMORPHONE HYDROCHLORIDE 1 MG: 1 INJECTION, SOLUTION INTRAMUSCULAR; INTRAVENOUS; SUBCUTANEOUS at 11:44

## 2021-07-28 RX ADMIN — POLYETHYLENE GLYCOL 3350, SODIUM CHLORIDE, SODIUM BICARBONATE AND POTASSIUM CHLORIDE WITH LEMON FLAVOR 4000 ML: 420; 11.2; 5.72; 1.48 POWDER, FOR SOLUTION ORAL at 17:17

## 2021-07-28 RX ADMIN — METHYLPREDNISOLONE SODIUM SUCCINATE 20 MG: 40 INJECTION, POWDER, FOR SOLUTION INTRAMUSCULAR; INTRAVENOUS at 05:56

## 2021-07-28 NOTE — ASSESSMENT & PLAN NOTE
Not on any suppressive therapy for this  Denies any bloody bowel movements    Will need close outpatient f/u

## 2021-07-28 NOTE — ASSESSMENT & PLAN NOTE
Finding on CT scan  No significant bowel obstruction however on CT  Patient does mention abdominal pain and nausea however  Results were discussed by ED with by GI who recommended observation and celiac panel    GI consult  Suspect secondary Crohn's ileitis  Currently on IV steroids  Small-bowel follow-through ordered by GI and reveals intussusception has resolved  C/w iv steroids  Advance diet as tolerated  Appreciate GI recommendations  No need for surgical intervention at this time per surgery

## 2021-07-28 NOTE — PROGRESS NOTES
[address] [facility]  Progress Note Nannette Doran 1995, 27 y o  female MRN: [id number]  Unit/Bed#: -01 Encounter: [id number]  Primary Care Provider: No primary care provider on file  Date and time admitted to hospital: 7/25/2021 10:27 AM    Intractable nausea and vomiting  Assessment & Plan  In the setting of abdominal pain and finding of intussusception on CT scan  ED has discussed with GI who recommended observation  Will provide antiemetic therapy Zofran  IV fluids  Clear liquid diet and advanced as tolerated  Abdominal pain  Assessment & Plan  Somewhat acute in nature  CTA of the abdomen does not reveal a lot of inflammation actively but does reveal findings of chronic Crohn's disease and does reveal intussusception which may be contributing to this  Analgesia with hydromorphone  IV fluids as ordered  Monitor clinically  GI input input would be appreciated  Marijuana use  Assessment & Plan  Reportedly chart  Was recommended to stop  PDMP reviewed, no narcotics filled recently  Crohn's disease [facility])  Assessment & Plan  Not on any suppressive therapy for this  Denies any bloody bowel movements  Will need close outpatient f/u    * Intussusception [facility])  Assessment & Plan  Finding on CT scan  No significant bowel obstruction however on CT  Patient does mention abdominal pain and nausea however  Results were discussed by ED with by GI who recommended observation and celiac panel    GI consult  Suspect secondary Crohn's ileitis  Currently on IV steroids  Small-bowel follow-through ordered by GI and reveals intussusception has resolved  C/w iv steroids  Advance diet as tolerated  Appreciate GI recommendations  No need for surgical intervention at this time per surgery      VTE Pharmacologic Prophylaxis:   Pharmacologic: Pharmacologic VTE Prophylaxis contraindicated due to low risk  Mechanical VTE Prophylaxis in Place: Yes    Patient Centered Rounds: I have performed bedside rounds with nursing staff today  Discussions with Specialists or Other Care Team Provider: cm, nursing    Education and Discussions with Family / Patient: pt    Time Spent for Care: 30 minutes  More than 50% of total time spent on counseling and coordination of care as described above  Current Length of Stay: 0 day(s)    Current Patient Status: Observation   Certification Statement: The patient will continue to require additional inpatient hospital stay due to still requiring iv steroids    Discharge Plan: see above    Code Status: Level 1 - Full Code      Subjective:   No acute complaints    Objective:     Vitals:   Temp (24hrs), Av 7 °F (36 5 °C), Min:97 7 °F (36 5 °C), Max:97 7 °F (36 5 °C)    Temp:  [97 7 °F (36 5 °C)] 97 7 °F (36 5 °C)  HR:  [56] 56  Resp:  [17-18] 18  BP: (100-110)/(65-69) 100/65  SpO2:  [97 %-98 %] 98 %  Body mass index is 19 13 kg/m²  Input and Output Summary (last 24 hours): Intake/Output Summary (Last 24 hours) at 2021 9945  Last data filed at 2021 1741  Gross per 24 hour   Intake 2991 25 ml   Output --   Net 2991 25 ml       Physical Exam:     Physical Exam  Constitutional:       General: She is not in acute distress  Appearance: She is well-developed  She is not diaphoretic  HENT:      Head: Normocephalic and atraumatic  Nose: Nose normal       Mouth/Throat:      Pharynx: No oropharyngeal exudate  Eyes:      General: No scleral icterus  Right eye: No discharge  Left eye: No discharge  Conjunctiva/sclera: Conjunctivae normal    Neck:      Thyroid: No thyromegaly  Vascular: No JVD  Cardiovascular:      Rate and Rhythm: Normal rate and regular rhythm  Heart sounds: Normal heart sounds  No murmur heard  No friction rub  No gallop  Pulmonary:      Effort: Pulmonary effort is normal  No respiratory distress  Breath sounds: Normal breath sounds  No wheezing or rales     Chest:      Chest wall: No tenderness  Abdominal:      General: Bowel sounds are normal  There is no distension  Palpations: Abdomen is soft  Tenderness: There is no abdominal tenderness  There is no guarding or rebound  Musculoskeletal:         General: No tenderness or deformity  Normal range of motion  Cervical back: Normal range of motion and neck supple  Skin:     General: Skin is warm and dry  Findings: No erythema or rash  Neurological:      Mental Status: She is alert  Mental status is at baseline  Cranial Nerves: No cranial nerve deficit  Sensory: No sensory deficit  Motor: No abnormal muscle tone  Coordination: Coordination normal          Additional Data:     Labs:    Results from last 7 days   Lab Units 07/28/21  0531   WBC Thousand/uL 9 21   HEMOGLOBIN g/dL 12 4   HEMATOCRIT % 36 5   PLATELETS Thousands/uL 267   NEUTROS PCT % 70   LYMPHS PCT % 22   MONOS PCT % 7   EOS PCT % 0     Results from last 7 days   Lab Units 07/28/21  0531 07/26/21  0540   SODIUM mmol/L 138 135*   POTASSIUM mmol/L 3 9 3 6   CHLORIDE mmol/L 103 100   CO2 mmol/L 27 26   BUN mg/dL 10 6   CREATININE mg/dL 0 64 0 54*   ANION GAP mmol/L 8 9   CALCIUM mg/dL 8 5 8 2*   ALBUMIN g/dL  --  3 4*   TOTAL BILIRUBIN mg/dL  --  0 99   ALK PHOS U/L  --  51   ALT U/L  --  8*   AST U/L  --  18   GLUCOSE RANDOM mg/dL 117 86                           * I Have Reviewed All Lab Data Listed Above  * Additional Pertinent Lab Tests Reviewed:  All Labs Within Last 24 Hours Reviewed    Imaging:    Imaging Reports Reviewed Today Include: na  Imaging Personally Reviewed by Myself Includes:  na    Recent Cultures (last 7 days):           Last 24 Hours Medication List:   Current Facility-Administered Medications   Medication Dose Route Frequency Provider Last Rate    acetaminophen  650 mg Oral Q6H PRN Sandi Aguilar MD      famotidine  20 mg Oral BID Sandi Aguilar MD      HYDROmorphone  1 mg Intravenous Q4H PRN Gus Hush WARD Terry      lactated ringers  125 mL/hr Intravenous Continuous Rhiannon Rod  mL/hr (07/27/21 1335)    methylPREDNISolone sodium succinate  20 mg Intravenous [facility] Blake Zafar PA-C      ondansetron  4 mg Intravenous Q6H PRN Rhiannon Rod MD      promethazine  12 5 mg Intravenous Q6H PRN WARD [place]          Today, Patient Was Seen By: Elisabeth Huertas MD    ** Please Note: Dictation voice to text software may have been used in the creation of this document   **

## 2021-07-28 NOTE — UTILIZATION REVIEW
Continued Stay Review    Date: 7/28                         OBS order 7/25 @ 1605 converted to IP on 7/28 @ 0907 For continued treatment of Chron's requiring IV solumedrol     Level of Care Med Surg    Estimated length of stay More than 2 Midnights    Certification I certify that inpatient services are medically necessary for this patient for a duration of greater than two midnights  See H&P and MD Progress Notes for additional information about the patient's course of treatment  07/28/21 0908  Inpatient Admission Once      07/28/21 0907   07/25/21 1605  Place in Observation (Place in Observation) Once      07/25/21 1605       Current Patient Class: IP   Current Level of Care: MS     HPI:26 y o  female initially admitted on 7/25 w/ abd pain  W/ chrohn's ileitis , possible intussusception   Assessment/Plan:     7/29 IM Note   GI following , cont IV steroids , colonoscopy scheduled for today , results pending    Pain control   Adv diet as keri     7/28 IM Note   pt has been started on IV solumedrol   On clear liq diet     7/28 GI Note   Small-bowel follow-through from yesterday did show terminal ileal narrowing  No evidence of intussusception  advance diet to low residue low-fiber for lunch  Patient will be placed back on clear liquids for dinner in preparation for colonoscopy tomorrow 7/29/21  Bowel prep this afternoon , NPO , cont IV solumedrol   Pt has not had a BM , passing gas   + nausea but no vomiting   Tolerating clear liq diet   7/27 GI Note   concerned for fibrosing small intestinal disease  SBFT with terminal ileal narrowing and resolved intussusception  Continue solumedrol   Continue clear liquids, will slowly advance diet, decrease opiate medication need, and consider colonoscopy       Vital Signs:   07/29/21 1306  97 6 °F (36 4 °C)  49Abnormal   22  114/66  --  100 %  None (Room air)   07/29/21 07:30:35  97 5 °F (36 4 °C)  44Abnormal   --  101/54  70  98 %  --   07/29/21 07:30:13  97 5 °F (36 4 °C)  44Abnormal   --  101/54  70  98 %  --   07/28/21 22:41:56  97 3 °F (36 3 °C)Abnormal   59  16  123/73  90  100 %  --   07/28/21 15:13:11  97 8 °F (36 6 °C)  48Abnormal   18  95/44Abnormal   61  97 %         07/27/21 22:09:57  97 7 °F (36 5 °C)  56  18  100/65  77  98 %  --   07/27/21 14:53:03  97 7 °F (36 5 °C)  56  17  110/69  83  97 %  --   07/27/21 0900  --  --  --  --  --  --  None (Room air)   07/27/21 08:39:06  97 3 °F (36 3 °C)Abnormal   60  --  83/47Abnormal   59  98 %           Pertinent Labs/Diagnostic Results:   7/27 FL upper GI 1  Terminal ileal luminal narrowing compatible with patient history of Crohn's disease  No evidence of fistula  2  No evidence of small bowel obstruction  3  Previously seen intussusception in the left upper quadrant no longer present  4  A minor degree of spontaneous gastroesophageal reflux occurred    Upper GI portion of the study otherwise unremarkable     Results from last 7 days   Lab Units 07/28/21  0531 07/27/21  0516 07/26/21  0540 07/25/21  1159   WBC Thousand/uL 9 21 6 49 6 81 8 34   HEMOGLOBIN g/dL 12 4 13 3 12 6 14 1   HEMATOCRIT % 36 5 38 3 37 4 41 5   PLATELETS Thousands/uL 267 265 244 309   NEUTROS ABS Thousands/µL 6 44 5 14 4 48 5 78     Results from last 7 days   Lab Units 07/28/21  0531 07/27/21  0516 07/26/21  0540 07/25/21  1159   SODIUM mmol/L 138 136 135* 135*   POTASSIUM mmol/L 3 9 4 3 3 6 4 2   CHLORIDE mmol/L 103 101 100 101   CO2 mmol/L 27 26 26 25   ANION GAP mmol/L 8 9 9 9   BUN mg/dL 10 7 6 15   CREATININE mg/dL 0 64 0 56* 0 54* 0 59*   EGFR ml/min/1 73sq m 125 131 133 129   CALCIUM mg/dL 8 5 8 8 8 2* 9 0     Results from last 7 days   Lab Units 07/26/21  0540 07/25/21  1159   AST U/L 18 31   ALT U/L 8* 14   ALK PHOS U/L 51 59   TOTAL PROTEIN g/dL 6 9 8 4*   ALBUMIN g/dL 3 4* 4 2   TOTAL BILIRUBIN mg/dL 0 99 0 95     Results from last 7 days   Lab Units 07/28/21  0531 07/27/21  0516 07/26/21  0540 07/25/21  1159   GLUCOSE RANDOM mg/dL 117 109 86 97     Results from last 7 days   Lab Units 07/25/21  1159   LIPASE u/L 79     Results from last 7 days   Lab Units 07/27/21  0516   CRP mg/L <3 0     Results from last 7 days   Lab Units 07/25/21  1354 07/25/21  1201   CLARITY UA  Clear Cloudy   COLOR UA  Yellow Yellow   SPEC GRAV UA  1 010 1 015   PH UA  8 5* >=9 0*   GLUCOSE UA mg/dl Negative Negative   KETONES UA mg/dl 15 (1+)* 15 (1+)*   BLOOD UA  Negative Negative   PROTEIN UA mg/dl Negative Trace*   NITRITE UA  Negative Negative   BILIRUBIN UA  Negative Negative   UROBILINOGEN UA E U /dl 0 2 0 2   LEUKOCYTES UA  Negative Negative   WBC UA /hpf  --  0-1   RBC UA /hpf  --  None Seen   BACTERIA UA /hpf  --  Moderate*   EPITHELIAL CELLS WET PREP /hpf  --  Moderate*   MUCUS THREADS   --  Occasional*       Medications:   Scheduled Medications:  famotidine, 20 mg, Oral, BID  methylPREDNISolone sodium succinate, 20 mg, Intravenous, Q8H [address]      Continuous IV Infusions:  lactated ringers, 125 mL/hr, Intravenous, Continuous      PRN Meds:  acetaminophen, 650 mg, Oral, Q6H PRN  HYDROmorphone, 1 mg, Intravenous, Q4H PRN 7/28 x3 7/27  x2    7/26  x4   7/25  x1  ondansetron, 4 mg, Intravenous, Q6H PRN  7/28 x2  7/27  x2  7/26  x3  7/25  x1  promethazine, 12 5 mg, Intravenous, Q6H PRN      Discharge Plan:D     Network Utilization Review Department  ATTENTION: Please call with any questions or concerns to [phone] and carefully listen to the prompts so that you are directed to the right person  All voicemails are confidential   [place]ia Quirk all requests for admission clinical reviews, approved or denied determinations and any other requests to dedicated fax number below belonging to the campus where the patient is receiving treatment   List of dedicated fax numbers for the Facilities:  [address] DENIALS (Administrative/Medical Necessity) [phone]   [address] (Maternity/NICU/Pediatrics) [phone] 5000 [facility] Lynda Esparza [phone]   [address] [place] [address] [facility] [place] [address] [address] [place] [address] Florina Dumont [address] [phone] [address] [facility] [phone]

## 2021-07-28 NOTE — PROGRESS NOTES
Progress Note  Melvin Teague 27 y o  female MRN: [id number]  Unit/Bed#: -01 Encounter: [id number]    Subjective:  Patient reporting improvement today and abdominal pain  Patient is still not had a bowel movement  Patient passing gas  Patient was nauseous this morning but no vomiting  Patient tolerating a clear liquid diet  Objective:     Vitals:   Vitals:    07/27/21 2209   BP: 100/65   Pulse: 56   Resp: 18   Temp: 97 7 °F (36 5 °C)   SpO2: 98%   ,Body mass index is 19 13 kg/m²        Intake/Output Summary (Last 24 hours) at 7/28/2021 1059  Last data filed at 7/28/2021 0918  Gross per 24 hour   Intake 3986 25 ml   Output --   Net 3986 25 ml       Physical Exam:     General Appearance: Alert, appears stated age and cooperative  Lungs: Clear to auscultation bilaterally, no rales or rhonchi, no labored breathing/accessory muscle use  Heart: Regular rate and rhythm, S1, S2 normal, no murmur, click, rub or gallop  Abdomen: Soft, non-tender, non-distended; bowel sounds normal; no masses or no organomegaly  Extremities: No cyanosis, edema    Invasive Devices     Peripheral Intravenous Line            Peripheral IV 07/27/21 Left Forearm 1 day                Lab Results:  Admission on 07/25/2021   Component Date Value    WBC 07/25/2021 8 34     RBC 07/25/2021 4 80     Hemoglobin 07/25/2021 14 1     Hematocrit 07/25/2021 41 5     MCV 07/25/2021 87     MCH 07/25/2021 29 4     MCHC 07/25/2021 34 0     RDW 07/25/2021 12 1     MPV 07/25/2021 9 4     Platelets 27/18/1146 309     nRBC 07/25/2021 0     Neutrophils Relative 07/25/2021 70     Immat GRANS % 07/25/2021 0     Lymphocytes Relative 07/25/2021 21     Monocytes Relative 07/25/2021 7     Eosinophils Relative 07/25/2021 1     Basophils Relative 07/25/2021 1     Neutrophils Absolute 07/25/2021 5 78     Immature Grans Absolute 07/25/2021 0 03     Lymphocytes Absolute 07/25/2021 1 78     Monocytes Absolute 07/25/2021 0 57     Eosinophils Absolute 07/25/2021 0 11     Basophils Absolute 07/25/2021 0 07     Sodium 07/25/2021 135*    Potassium 07/25/2021 4 2     Chloride 07/25/2021 101     CO2 07/25/2021 25     ANION GAP 07/25/2021 9     BUN 07/25/2021 15     Creatinine 07/25/2021 0 59*    Glucose 07/25/2021 97     Calcium 07/25/2021 9 0     AST 07/25/2021 31     ALT 07/25/2021 14     Alkaline Phosphatase 07/25/2021 59     Total Protein 07/25/2021 8 4*    Albumin 07/25/2021 4 2     Total Bilirubin 07/25/2021 0 95     eGFR 07/25/2021 129     Lipase 07/25/2021 79     Color, UA 07/25/2021 Yellow     Clarity, UA 07/25/2021 Cloudy     Specific Gravity, UA 07/25/2021 1 015     pH, UA 07/25/2021 >=9 0*    Leukocytes, UA 07/25/2021 Negative     Nitrite, UA 07/25/2021 Negative     Protein, UA 07/25/2021 Trace*    Glucose, UA 07/25/2021 Negative     Ketones, UA 07/25/2021 15 (1+)*    Urobilinogen, UA 07/25/2021 0 2     Bilirubin, UA 07/25/2021 Negative     Blood, UA 07/25/2021 Negative     EXT PREG TEST UR (Ref: N* 07/25/2021 negative     Control 07/25/2021 valid     RBC, UA 07/25/2021 None Seen     WBC, UA 07/25/2021 0-1     Epithelial Cells 07/25/2021 Moderate*    Bacteria, UA 07/25/2021 Moderate*    AMORPH PHOSPATES 07/25/2021 Moderate     MUCUS THREADS 07/25/2021 Occasional*    Color, UA 07/25/2021 Yellow     Clarity, UA 07/25/2021 Clear     Specific Gravity, UA 07/25/2021 1 010     pH, UA 07/25/2021 8 5*    Leukocytes, UA 07/25/2021 Negative     Nitrite, UA 07/25/2021 Negative     Protein, UA 07/25/2021 Negative     Glucose, UA 07/25/2021 Negative     Ketones, UA 07/25/2021 15 (1+)*    Urobilinogen, UA 07/25/2021 0 2     Bilirubin, UA 07/25/2021 Negative     Blood, UA 07/25/2021 Negative     IgA 07/25/2021 299     Gliadin IgA 07/25/2021 5     Gliadin IgG 07/25/2021 2     Tissue Transglut Ab IGG 07/25/2021 5     TISSUE TRANSGLUTAMINASE * 07/25/2021 <2     Endomysial IgA 07/25/2021 Negative     WBC 07/26/2021 6 81     RBC 07/26/2021 4 26     Hemoglobin 07/26/2021 12 6     Hematocrit 07/26/2021 37 4     MCV 07/26/2021 88     MCH 07/26/2021 29 6     MCHC 07/26/2021 33 7     RDW 07/26/2021 11 9     MPV 07/26/2021 9 4     Platelets 63/80/1523 244     nRBC 07/26/2021 0     Neutrophils Relative 07/26/2021 67     Immat GRANS % 07/26/2021 0     Lymphocytes Relative 07/26/2021 24     Monocytes Relative 07/26/2021 6     Eosinophils Relative 07/26/2021 2     Basophils Relative 07/26/2021 1     Neutrophils Absolute 07/26/2021 4 48     Immature Grans Absolute 07/26/2021 0 02     Lymphocytes Absolute 07/26/2021 1 66     Monocytes Absolute 07/26/2021 0 43     Eosinophils Absolute 07/26/2021 0 16     Basophils Absolute 07/26/2021 0 06     Sodium 07/26/2021 135*    Potassium 07/26/2021 3 6     Chloride 07/26/2021 100     CO2 07/26/2021 26     ANION GAP 07/26/2021 9     BUN 07/26/2021 6     Creatinine 07/26/2021 0 54*    Glucose 07/26/2021 86     Glucose, Fasting 07/26/2021 86     Calcium 07/26/2021 8 2*    Corrected Calcium 07/26/2021 8 7     AST 07/26/2021 18     ALT 07/26/2021 8*    Alkaline Phosphatase 07/26/2021 51     Total Protein 07/26/2021 6 9     Albumin 07/26/2021 3 4*    Total Bilirubin 07/26/2021 0 99     eGFR 07/26/2021 133     CRP 07/27/2021 <3 0     WBC 07/27/2021 6 49     RBC 07/27/2021 4 49     Hemoglobin 07/27/2021 13 3     Hematocrit 07/27/2021 38 3     MCV 07/27/2021 85     MCH 07/27/2021 29 6     MCHC 07/27/2021 34 7     RDW 07/27/2021 11 9     MPV 07/27/2021 9 5     Platelets 40/90/0213 265     nRBC 07/27/2021 0     Neutrophils Relative 07/27/2021 79*    Immat GRANS % 07/27/2021 0     Lymphocytes Relative 07/27/2021 17     Monocytes Relative 07/27/2021 3*    Eosinophils Relative 07/27/2021 0     Basophils Relative 07/27/2021 1     Neutrophils Absolute 07/27/2021 5 14     Immature Grans Absolute 07/27/2021 0 02     Lymphocytes Absolute 07/27/2021 1 13     Monocytes Absolute 07/27/2021 0 16*    Eosinophils Absolute 07/27/2021 0 00     Basophils Absolute 07/27/2021 0 04     Sodium 07/27/2021 136     Potassium 07/27/2021 4 3     Chloride 07/27/2021 101     CO2 07/27/2021 26     ANION GAP 07/27/2021 9     BUN 07/27/2021 7     Creatinine 07/27/2021 0 56*    Glucose 07/27/2021 109     Calcium 07/27/2021 8 8     eGFR 07/27/2021 131     WBC 07/28/2021 9 21     RBC 07/28/2021 4 17     Hemoglobin 07/28/2021 12 4     Hematocrit 07/28/2021 36 5     MCV 07/28/2021 88     MCH 07/28/2021 29 7     MCHC 07/28/2021 34 0     RDW 07/28/2021 12 0     MPV 07/28/2021 9 6     Platelets 27/46/0459 267     nRBC 07/28/2021 0     Neutrophils Relative 07/28/2021 70     Immat GRANS % 07/28/2021 0     Lymphocytes Relative 07/28/2021 22     Monocytes Relative 07/28/2021 7     Eosinophils Relative 07/28/2021 0     Basophils Relative 07/28/2021 1     Neutrophils Absolute 07/28/2021 6 44     Immature Grans Absolute 07/28/2021 0 03     Lymphocytes Absolute 07/28/2021 2 03     Monocytes Absolute 07/28/2021 0 65     Eosinophils Absolute 07/28/2021 0 01     Basophils Absolute 07/28/2021 0 05     Sodium 07/28/2021 138     Potassium 07/28/2021 3 9     Chloride 07/28/2021 103     CO2 07/28/2021 27     ANION GAP 07/28/2021 8     BUN 07/28/2021 10     Creatinine 07/28/2021 0 64     Glucose 07/28/2021 117     Glucose, Fasting 07/28/2021 117*    Calcium 07/28/2021 8 5     eGFR 07/28/2021 125        Imaging Studies:   I have personally reviewed pertinent imaging studies  FL upper GI / small bowel with air    Result Date: 7/27/2021  Narrative: UPPER GI AND SMALL BOWEL FOLLOW THROUGH DOUBLE CONTRAST INDICATION:   Intussuception  COMPARISON:   CT abdomen and pelvis performed on 7/25/2021 IMAGES:  368  (this includes cine capture images) FLUOROSCOPY TIME:   2 7 minutes TECHNIQUE:    view of the abdomen was obtained and is unremarkable   Upper GI was performed utilizing effervescent granules and barium  orally to the patient  Subsequently, a small bowel follow through was performed including spot images of the terminal ileum  FINDINGS: The esophagus is normal in caliber  Esophageal motility is normal and emptying of contrast from the esophagus is prompt  There is no mucosal mass, ulceration or fold thickening identified  The stomach is unremarkable in size  The gastric mucosa is normal   No penetrating ulcers or masses  Contrast empties promptly into the duodenum  The duodenum is normal in caliber  The ligament of Treitz/duodenojejunal junction lies in a normal position  Minor degree of intermittent spontaneous gastroesophageal reflux occurred    There is no hiatal hernia  Previous CT study had demonstrated an intussusception, which has since resolved  There is no small bowel obstruction  The distal most terminal ileum approximately 4 to 5 cm in length) demonstrates irregular luminal narrowing luminal narrowing compatible with history of Crohn's disease  No proximal distention  No other abnormalities are seen  Impression: 1  Terminal ileal luminal narrowing compatible with patient history of Crohn's disease  No evidence of fistula  2  No evidence of small bowel obstruction  3  Previously seen intussusception in the left upper quadrant no longer present 4  A minor degree of spontaneous gastroesophageal reflux occurred  Upper GI portion of the study otherwise unremarkable Workstation performed: Davian Saucedo right upper quadrant    Result Date: 7/27/2021  Narrative: RIGHT UPPER QUADRANT ULTRASOUND INDICATION:     epigastric pain, nausea  COMPARISON:  CT examination performed on 7/25/2021 TECHNIQUE:   Real-time ultrasound of the right upper quadrant was performed with a curvilinear transducer with both volumetric sweeps and still imaging techniques  FINDINGS: PANCREAS:  Visualized portions of the pancreas are within normal limits   AORTA AND IVC: Visualized portions are normal for patient age  LIVER: Size:  Within normal range  The liver measures 16 6 cm in the midclavicular line  Contour:  Surface contour is smooth  Parenchyma:  Echogenicity and echotexture are within normal limits  No evidence of suspicious mass  Limited imaging of the main portal vein shows it to be patent and hepatopetal   BILIARY: No gallbladder findings  No intrahepatic biliary dilatation  CBD measures 2 mm  No choledocholithiasis  KIDNEY: Right kidney measures 10 5 x 4 2 x 4 7 cm  Within normal limits  ASCITES:   None  Impression: Unremarkable right upper quadrant ultrasound Workstation performed: [id number]     CT abdomen pelvis with contrast    Result Date: 7/25/2021  Narrative: CT ABDOMEN AND PELVIS WITH IV CONTRAST INDICATION:   abdominal pain - crohns - r/o abscess / perf  COMPARISON:  November 14, 2020, CT from February 18, 2016 TECHNIQUE:  CT examination of the abdomen and pelvis was performed  Axial, sagittal, and coronal 2D reformatted images were created from the source data and submitted for interpretation  Radiation dose length product (DLP) for this visit:  576 mGy-cm   This examination, like all CT scans performed in the [facility], was performed utilizing techniques to minimize radiation dose exposure, including the use of iterative reconstruction and automated exposure control  IV Contrast:  100 mL of iohexol (OMNIPAQUE) Enteric Contrast:  Enteric contrast was not administered  FINDINGS: ABDOMEN LOWER CHEST:  No clinically significant abnormality identified in the visualized lower chest  LIVER/BILIARY TREE:  Unremarkable  GALLBLADDER:  No calcified gallstones  No pericholecystic inflammatory change  SPLEEN:  The spleen measures 11 3 cm PANCREAS:  Unremarkable  ADRENAL GLANDS:  Unremarkable  KIDNEYS/URETERS:  No hydronephrosis or urinary tract calculus    One or more sharply circumscribed subcentimeter renal hypodensities are present, too small to accurately characterize, and statistically most likely benign findings  According to recent literature (Radiology 2019) no further workup of these findings is recommended  STOMACH AND BOWEL:  The small segment of the terminal ileum just into the ileocecal valve demonstrates mucosal enhancement  There is no significant perienteric stranding in this area  A small bowel loop upstream from this segment is gas-filled with luminal diameter of about 2 2 cm There is no interloop abscess  Small bowel intussusception is noted in the left upper quadrant without associated obstruction  ,  Seen in image 46 series 601 this is probably a transient finding APPENDIX:  Appendix is identified in the right lower quadrant the tip of the appendix is bulbous but this is stable and unchanged from the previous study of November 14, 2020 There is no surrounding inflammatory changes ABDOMINOPELVIC CAVITY:  No ascites  No pneumoperitoneum  Again noted are mesenteric lymph nodes in the right side with largest lymph node measuring about 1 cm ,  Stable Additional smaller ventricular lymph nodes are seen about 6 to 7 mm A mesenteric lymph node in the upper abdomen, measuring about 6 mm in short axis VESSELS:  Unremarkable for patient's age  PELVIS REPRODUCTIVE ORGANS:  The right ovary measures 2 8 x 2 5 x 2 8 cm per The left ovary measures 1 8 x 1 3 x 1 cm  URINARY BLADDER:  Unremarkable  ABDOMINAL WALL/INGUINAL REGIONS:  Unremarkable  OSSEOUS STRUCTURES:  No acute fracture or destructive osseous lesion  Impression: There is small segment of the terminal ileum which demonstrates enhancement and demonstrates some distortion without significant perienteric stranding, unchanged from the previous study this is probably related to low-grade fibrosing stenosing disease Small mesenteric lymph nodes, stable Small bowel intussusception seen left upper quadrant without associated bowel obstruction probably a transient and incidental finding   However a nonemergent CT enterography on MR enterography suggest in this patient with Crohn's disease No intra-abdominal fluid collection Chronic enlargement of the tip of the appendix without surrounding inflammatory changes, stable  This is likely sequela of  inflammatory changes seen in relation to the tip of the appendix in 2016 The study was marked in [facility] for immediate notification  Workstation performed: [id number]         Assessment & Plan  Crohn's ileitis  -Small-bowel follow-through from yesterday did show terminal ileal narrowing  No evidence of intussusception   -Will advance diet to low residue low-fiber for lunch  Patient will be placed back on clear liquids for dinner in preparation for colonoscopy tomorrow 7/29/21  -Bowel prep this afternoon  -NPO midnight   -Continue IV Solu-Medrol   -Patient will need close outpatient follow-up to discuss further treatment regimen of her small bowel Crohn's disease  Patient will be seen examined by Dr Airam Cabral PA-C  7/28/2021,10:59 AM

## 2021-07-29 ENCOUNTER — ANESTHESIA (INPATIENT)
Dept: PERIOP | Facility: HOSPITAL | Age: 26
DRG: 389 | End: 2021-07-29
Payer: COMMERCIAL

## 2021-07-29 ENCOUNTER — APPOINTMENT (INPATIENT)
Dept: PERIOP | Facility: HOSPITAL | Age: 26
DRG: 389 | End: 2021-07-29
Payer: COMMERCIAL

## 2021-07-29 ENCOUNTER — ANESTHESIA EVENT (INPATIENT)
Dept: PERIOP | Facility: HOSPITAL | Age: 26
DRG: 389 | End: 2021-07-29
Payer: COMMERCIAL

## 2021-07-29 PROCEDURE — 88305 TISSUE EXAM BY PATHOLOGIST: CPT | Performed by: PATHOLOGY

## 2021-07-29 PROCEDURE — 0DBL8ZX EXCISION OF TRANSVERSE COLON, VIA NATURAL OR ARTIFICIAL OPENING ENDOSCOPIC, DIAGNOSTIC: ICD-10-PCS | Performed by: INTERNAL MEDICINE

## 2021-07-29 PROCEDURE — 45380 COLONOSCOPY AND BIOPSY: CPT | Performed by: INTERNAL MEDICINE

## 2021-07-29 PROCEDURE — 0DBM8ZX EXCISION OF DESCENDING COLON, VIA NATURAL OR ARTIFICIAL OPENING ENDOSCOPIC, DIAGNOSTIC: ICD-10-PCS | Performed by: INTERNAL MEDICINE

## 2021-07-29 PROCEDURE — 0DBH8ZX EXCISION OF CECUM, VIA NATURAL OR ARTIFICIAL OPENING ENDOSCOPIC, DIAGNOSTIC: ICD-10-PCS | Performed by: INTERNAL MEDICINE

## 2021-07-29 PROCEDURE — 0DBN8ZX EXCISION OF SIGMOID COLON, VIA NATURAL OR ARTIFICIAL OPENING ENDOSCOPIC, DIAGNOSTIC: ICD-10-PCS | Performed by: INTERNAL MEDICINE

## 2021-07-29 PROCEDURE — 0DBP8ZX EXCISION OF RECTUM, VIA NATURAL OR ARTIFICIAL OPENING ENDOSCOPIC, DIAGNOSTIC: ICD-10-PCS | Performed by: INTERNAL MEDICINE

## 2021-07-29 PROCEDURE — 0DBK8ZX EXCISION OF ASCENDING COLON, VIA NATURAL OR ARTIFICIAL OPENING ENDOSCOPIC, DIAGNOSTIC: ICD-10-PCS | Performed by: INTERNAL MEDICINE

## 2021-07-29 PROCEDURE — 99232 SBSQ HOSP IP/OBS MODERATE 35: CPT | Performed by: INTERNAL MEDICINE

## 2021-07-29 RX ORDER — LIDOCAINE HYDROCHLORIDE 10 MG/ML
INJECTION, SOLUTION EPIDURAL; INFILTRATION; INTRACAUDAL; PERINEURAL AS NEEDED
Status: DISCONTINUED | OUTPATIENT
Start: 2021-07-29 | End: 2021-07-29

## 2021-07-29 RX ORDER — HYDROMORPHONE HCL/PF 1 MG/ML
1 SYRINGE (ML) INJECTION EVERY 6 HOURS PRN
Status: DISCONTINUED | OUTPATIENT
Start: 2021-07-29 | End: 2021-07-30 | Stop reason: HOSPADM

## 2021-07-29 RX ORDER — PROPOFOL 10 MG/ML
INJECTION, EMULSION INTRAVENOUS AS NEEDED
Status: DISCONTINUED | OUTPATIENT
Start: 2021-07-29 | End: 2021-07-29

## 2021-07-29 RX ADMIN — PROPOFOL 50 MG: 10 INJECTION, EMULSION INTRAVENOUS at 13:46

## 2021-07-29 RX ADMIN — HYDROMORPHONE HYDROCHLORIDE 1 MG: 1 INJECTION, SOLUTION INTRAMUSCULAR; INTRAVENOUS; SUBCUTANEOUS at 23:16

## 2021-07-29 RX ADMIN — PROPOFOL 50 MG: 10 INJECTION, EMULSION INTRAVENOUS at 13:40

## 2021-07-29 RX ADMIN — METHYLPREDNISOLONE SODIUM SUCCINATE 20 MG: 40 INJECTION, POWDER, FOR SOLUTION INTRAMUSCULAR; INTRAVENOUS at 05:46

## 2021-07-29 RX ADMIN — FAMOTIDINE 20 MG: 20 TABLET, FILM COATED ORAL at 17:29

## 2021-07-29 RX ADMIN — PROPOFOL 25 MG: 10 INJECTION, EMULSION INTRAVENOUS at 13:52

## 2021-07-29 RX ADMIN — PROPOFOL 50 MG: 10 INJECTION, EMULSION INTRAVENOUS at 13:49

## 2021-07-29 RX ADMIN — SODIUM CHLORIDE, SODIUM LACTATE, POTASSIUM CHLORIDE, AND CALCIUM CHLORIDE 125 ML/HR: .6; .31; .03; .02 INJECTION, SOLUTION INTRAVENOUS at 23:16

## 2021-07-29 RX ADMIN — FAMOTIDINE 20 MG: 20 TABLET, FILM COATED ORAL at 08:15

## 2021-07-29 RX ADMIN — PROPOFOL 65 MG: 10 INJECTION, EMULSION INTRAVENOUS at 13:37

## 2021-07-29 RX ADMIN — SODIUM CHLORIDE, SODIUM LACTATE, POTASSIUM CHLORIDE, AND CALCIUM CHLORIDE 125 ML/HR: .6; .31; .03; .02 INJECTION, SOLUTION INTRAVENOUS at 12:23

## 2021-07-29 RX ADMIN — METHYLPREDNISOLONE SODIUM SUCCINATE 20 MG: 40 INJECTION, POWDER, FOR SOLUTION INTRAMUSCULAR; INTRAVENOUS at 23:16

## 2021-07-29 RX ADMIN — LIDOCAINE HYDROCHLORIDE 50 MG: 10 INJECTION, SOLUTION EPIDURAL; INFILTRATION; INTRACAUDAL; PERINEURAL at 13:36

## 2021-07-29 RX ADMIN — PROPOFOL 65 MG: 10 INJECTION, EMULSION INTRAVENOUS at 13:38

## 2021-07-29 RX ADMIN — PROPOFOL 50 MG: 10 INJECTION, EMULSION INTRAVENOUS at 13:43

## 2021-07-29 RX ADMIN — METHYLPREDNISOLONE SODIUM SUCCINATE 20 MG: 40 INJECTION, POWDER, FOR SOLUTION INTRAMUSCULAR; INTRAVENOUS at 14:52

## 2021-07-29 NOTE — ASSESSMENT & PLAN NOTE
In the setting of abdominal pain and finding of intussusception on CT scan  ED has discussed with GI who recommended observation  Will provide antiemetic therapy Zofran  IV fluids    Plan for colonoscopy later today

## 2021-07-29 NOTE — ANESTHESIA PREPROCEDURE EVALUATION
Procedure:  COLONOSCOPY    Relevant Problems   Other   (+) Abdominal pain   (+) Crohn's disease (HCC)   (+) Intractable nausea and vomiting   (+) Intussusception (HCC)   (+) Marijuana use      Intractable nausea and vomiting  Assessment & Plan  In the setting of abdominal pain and finding of intussusception on CT scan  ED has discussed with GI who recommended observation  Will provide antiemetic therapy Zofran  IV fluids  Plan for colonoscopy later today     Abdominal pain  Assessment & Plan  Somewhat acute in nature  CTA of the abdomen does not reveal a lot of inflammation actively but does reveal findings of chronic Crohn's disease and does reveal intussusception which may be contributing to this  Physical Exam    Airway    Mallampati score: I  TM Distance: >3 FB  Neck ROM: full     Dental   Comment: Denies loose teeth  Permanent retainers, upper and lower,     Cardiovascular  Cardiovascular exam normal    Pulmonary  Pulmonary exam normal     Other Findings  Portions of exam deferred due to low yield and/or unknown COVID status      Anesthesia Plan  ASA Score- 3     Anesthesia Type- IV sedation with anesthesia with ASA Monitors  Additional Monitors:   Airway Plan:           Plan Factors-Exercise tolerance (METS): >4 METS  Chart reviewed  Existing labs reviewed  Patient summary reviewed  Patient is a current smoker  Induction- intravenous  Postoperative Plan-     Informed Consent- Anesthetic plan and risks discussed with patient  I personally reviewed this patient with the CRNA  Discussed and agreed on the Anesthesia Plan with the CRNA  Machelle Bauman

## 2021-07-29 NOTE — PROGRESS NOTES
[facility]  Progress Note Zenaida Santiago 1995, 27 y o  female MRN: [id number]  Unit/Bed#: -01 Encounter: [id number]  Primary Care Provider: No primary care provider on file  Date and time admitted to hospital: 7/25/2021 10:27 AM    Intractable nausea and vomiting  Assessment & Plan  In the setting of abdominal pain and finding of intussusception on CT scan  ED has discussed with GI who recommended observation  Will provide antiemetic therapy Zofran  IV fluids  Plan for colonoscopy later today    Abdominal pain  Assessment & Plan  Somewhat acute in nature  CTA of the abdomen does not reveal a lot of inflammation actively but does reveal findings of chronic Crohn's disease and does reveal intussusception which may be contributing to this  Analgesia with hydromorphone  IV fluids as ordered  Monitor clinically  GI input input would be appreciated  Marijuana use  Assessment & Plan  Reportedly chart  Was recommended to stop  PDMP reviewed, no narcotics filled recently  Crohn's disease [facility])  Assessment & Plan  Not on any suppressive therapy for this  Denies any bloody bowel movements  Will need close outpatient f/u    * Intussusception [facility])  Assessment & Plan  Finding on CT scan  No significant bowel obstruction however on CT  Patient does mention abdominal pain and nausea however  Results were discussed by ED with by GI who recommended observation and celiac panel    GI consult  Suspect secondary Crohn's ileitis  Currently on IV steroids  Small-bowel follow-through ordered by GI and reveals intussusception has resolved  C/w iv steroids  Advance diet as tolerated  Appreciate GI recommendations  No need for surgical intervention at this time per surgery        VTE Pharmacologic Prophylaxis:   Pharmacologic: Pharmacologic VTE Prophylaxis contraindicated due to Low risk  Mechanical VTE Prophylaxis in Place: Yes    Patient Centered Rounds: I have performed bedside rounds with nursing staff today  Discussions with Specialists or Other Care Team Provider:  Cm, nursing    Education and Discussions with Family / Patient:  Patient    Time Spent for Care: 30 minutes  More than 50% of total time spent on counseling and coordination of care as described above  Current Length of Stay: 1 day(s)    Current Patient Status: Inpatient   Certification Statement: The patient will continue to require additional inpatient hospital stay due to Pending colonoscopy    Discharge Plan:  Pending colonoscopy    Code Status: Level 1 - Full Code      Subjective:   No acute complaints    Objective:     Vitals:   Temp (24hrs), Av 5 °F (36 4 °C), Min:97 3 °F (36 3 °C), Max:97 8 °F (36 6 °C)    Temp:  [97 3 °F (36 3 °C)-97 8 °F (36 6 °C)] 97 5 °F (36 4 °C)  HR:  [44-59] 44  Resp:  [16-18] 16  BP: ()/(44-73) 101/54  SpO2:  [97 %-100 %] 98 %  Body mass index is 19 13 kg/m²  Input and Output Summary (last 24 hours): Intake/Output Summary (Last 24 hours) at 2021 1113  Last data filed at 2021 1012  Gross per 24 hour   Intake 1366 25 ml   Output --   Net 1366 25 ml       Physical Exam:     Physical Exam  Constitutional:       General: She is not in acute distress  Appearance: She is well-developed  She is not diaphoretic  HENT:      Head: Normocephalic and atraumatic  Nose: Nose normal       Mouth/Throat:      Pharynx: No oropharyngeal exudate  Eyes:      General: No scleral icterus  Right eye: No discharge  Left eye: No discharge  Conjunctiva/sclera: Conjunctivae normal    Neck:      Thyroid: No thyromegaly  Vascular: No JVD  Cardiovascular:      Rate and Rhythm: Normal rate and regular rhythm  Heart sounds: Normal heart sounds  No murmur heard  No friction rub  No gallop  Pulmonary:      Effort: Pulmonary effort is normal  No respiratory distress  Breath sounds: Normal breath sounds  No wheezing or rales     Chest:      Chest wall: No tenderness  Abdominal:      General: Bowel sounds are normal  There is no distension  Palpations: Abdomen is soft  Tenderness: There is no abdominal tenderness  There is no guarding or rebound  Musculoskeletal:         General: No tenderness or deformity  Normal range of motion  Cervical back: Normal range of motion and neck supple  Skin:     General: Skin is warm and dry  Findings: No erythema or rash  Neurological:      Mental Status: She is alert  Mental status is at baseline  Cranial Nerves: No cranial nerve deficit  Sensory: No sensory deficit  Motor: No abnormal muscle tone  Coordination: Coordination normal          Additional Data:     Labs:    Results from last 7 days   Lab Units 07/28/21  0531   WBC Thousand/uL 9 21   HEMOGLOBIN g/dL 12 4   HEMATOCRIT % 36 5   PLATELETS Thousands/uL 267   NEUTROS PCT % 70   LYMPHS PCT % 22   MONOS PCT % 7   EOS PCT % 0     Results from last 7 days   Lab Units 07/28/21  0531 07/26/21  0540   SODIUM mmol/L 138 135*   POTASSIUM mmol/L 3 9 3 6   CHLORIDE mmol/L 103 100   CO2 mmol/L 27 26   BUN mg/dL 10 6   CREATININE mg/dL 0 64 0 54*   ANION GAP mmol/L 8 9   CALCIUM mg/dL 8 5 8 2*   ALBUMIN g/dL  --  3 4*   TOTAL BILIRUBIN mg/dL  --  0 99   ALK PHOS U/L  --  51   ALT U/L  --  8*   AST U/L  --  18   GLUCOSE RANDOM mg/dL 117 86                           * I Have Reviewed All Lab Data Listed Above  * Additional Pertinent Lab Tests Reviewed:  All Labs Within Last 24 Hours Reviewed    Imaging:    Imaging Reports Reviewed Today Include: na  Imaging Personally Reviewed by Myself Includes:  na    Recent Cultures (last 7 days):           Last 24 Hours Medication List:   Current Facility-Administered Medications   Medication Dose Route Frequency Provider Last Rate    acetaminophen  650 mg Oral Q6H PRN Patsy Gross MD      famotidine  20 mg Oral BID Patsy Gross MD      HYDROmorphone  1 mg Intravenous Q4H PRN WARD Valdez      lactated ringers  125 mL/hr Intravenous Continuous Linda Villegas  mL/hr (07/28/21 1721)    methylPREDNISolone sodium succinate  20 mg Intravenous [facility] Karla Chavez PA-C      ondansetron  4 mg Intravenous Q6H PRN Linda Villegas MD      promethazine  12 5 mg Intravenous Q6H PRN WARD Valdez          Today, Patient Was Seen By: Lit Evans MD    ** Please Note: Dictation voice to text software may have been used in the creation of this document   **

## 2021-07-29 NOTE — ANESTHESIA POSTPROCEDURE EVALUATION
Post-Op Assessment Note    CV Status:  Stable  Pain Score: 0    Pain management: adequate     Mental Status:  Alert and awake   Hydration Status:  Euvolemic   PONV Controlled:  Controlled   Airway Patency:  Patent      Post Op Vitals Reviewed: Yes      Staff: CRNA         No complications documented      BP   94/51   Temp 98   Pulse 46   Resp 18   SpO2 97

## 2021-07-29 NOTE — UTILIZATION REVIEW
Inpatient Admission Authorization Request   NOTIFICATION OF INPATIENT ADMISSION/INPATIENT AUTHORIZATION REQUEST   SERVICING FACILITY:   [address] [place] [address]  Tax ID: [id number]  NPI: [id number]  Place of Service: Inpatient [facility]  [address]  Place of Service Code: 24     ATTENDING PROVIDER:  Attending Name and NPI#: Dee Ventura [[id number]]  Address: [address] [place] [address]  Phone: [phone]     UTILIZATION REVIEW CONTACT:  Maribel Bañuelos Utilization   Network Utilization Review Department  Phone: [phone]  Fax [phone]  Email: [email or web address]     PHYSICIAN ADVISORY SERVICES:  FOR QYWF-[place] REVIEW - MEDICAL NECESSITY DENIAL  Phone: [phone]  Fax: [phone]  Email: [email or web address]     TYPE OF REQUEST:  Inpatient Status     ADMISSION INFORMATION:  ADMISSION DATE/TIME: 7/28/21  9:07 AM  PATIENT DIAGNOSIS CODE/DESCRIPTION:  Crohn's disease ([facility] [address] ) [K50 90]  Abdominal pain [R10 9]  DISCHARGE DATE/TIME: No discharge date for patient encounter  DISCHARGE DISPOSITION (IF DISCHARGED): Home/Self Care     IMPORTANT INFORMATION:  Please contact the Maribel Bañuelos directly with any questions or concerns regarding this request  Department voicemails are confidential     Send requests for admission clinical reviews, concurrent reviews, approvals, and administrative denials due to lack of clinical to fax [phone]

## 2021-07-30 ENCOUNTER — TELEPHONE (OUTPATIENT)
Dept: GASTROENTEROLOGY | Facility: CLINIC | Age: 26
End: 2021-07-30

## 2021-07-30 VITALS
TEMPERATURE: 97.7 F | HEART RATE: 48 BPM | WEIGHT: 108 LBS | SYSTOLIC BLOOD PRESSURE: 87 MMHG | OXYGEN SATURATION: 98 % | RESPIRATION RATE: 18 BRPM | DIASTOLIC BLOOD PRESSURE: 37 MMHG | HEIGHT: 63 IN | BODY MASS INDEX: 19.14 KG/M2

## 2021-07-30 LAB — CALPROTECTIN STL-MCNT: 1093 UG/G (ref 0–120)

## 2021-07-30 PROCEDURE — 99239 HOSP IP/OBS DSCHRG MGMT >30: CPT | Performed by: INTERNAL MEDICINE

## 2021-07-30 RX ORDER — HYDROCODONE BITARTRATE AND ACETAMINOPHEN 5; 325 MG/1; MG/1
1 TABLET ORAL EVERY 6 HOURS PRN
Qty: 30 TABLET | Refills: 0 | Status: SHIPPED | OUTPATIENT
Start: 2021-07-30 | End: 2021-09-14

## 2021-07-30 RX ORDER — PREDNISONE 1 MG/1
TABLET ORAL
Qty: 245 TABLET | Refills: 0 | Status: SHIPPED | OUTPATIENT
Start: 2021-07-30 | End: 2021-09-17

## 2021-07-30 RX ORDER — FAMOTIDINE 20 MG/1
20 TABLET, FILM COATED ORAL 2 TIMES DAILY
Qty: 60 TABLET | Refills: 0 | Status: SHIPPED | OUTPATIENT
Start: 2021-07-30 | End: 2021-09-14

## 2021-07-30 RX ORDER — ONDANSETRON 4 MG/1
4 TABLET, ORALLY DISINTEGRATING ORAL EVERY 8 HOURS PRN
Qty: 12 TABLET | Refills: 0 | Status: SHIPPED | OUTPATIENT
Start: 2021-07-30 | End: 2021-10-07

## 2021-07-30 RX ADMIN — METHYLPREDNISOLONE SODIUM SUCCINATE 20 MG: 40 INJECTION, POWDER, FOR SOLUTION INTRAMUSCULAR; INTRAVENOUS at 06:06

## 2021-07-30 RX ADMIN — SODIUM CHLORIDE, SODIUM LACTATE, POTASSIUM CHLORIDE, AND CALCIUM CHLORIDE 125 ML/HR: .6; .31; .03; .02 INJECTION, SOLUTION INTRAVENOUS at 06:43

## 2021-07-30 RX ADMIN — FAMOTIDINE 20 MG: 20 TABLET, FILM COATED ORAL at 09:39

## 2021-07-30 RX ADMIN — HYDROMORPHONE HYDROCHLORIDE 1 MG: 1 INJECTION, SOLUTION INTRAMUSCULAR; INTRAVENOUS; SUBCUTANEOUS at 08:19

## 2021-07-30 NOTE — DISCHARGE SUMMARY
[facility]  Discharge- Guerita Hays 1995, 27 y o  female MRN: [id number]  Unit/Bed#: -01 Encounter: [id number]  Primary Care Provider: No primary care provider on file  Date and time admitted to hospital: 7/25/2021 10:27 AM    Intractable nausea and vomiting  Assessment & Plan  In the setting of abdominal pain and finding of intussusception on CT scan  ED has discussed with GI who recommended observation  Colonoscopy without any significant finding  Has since resolved, tolerating full diet    Abdominal pain  Assessment & Plan  Somewhat acute in nature  CTA of the abdomen does not reveal a lot of inflammation actively but does reveal findings of chronic Crohn's disease and does reveal intussusception which may be contributing to this  Analgesia with hydromorphone  IV fluids as ordered   Monitor clinically  GI input input would be appreciated  Marijuana use  Assessment & Plan  Reportedly chart  Was recommended to stop  PDMP reviewed, no narcotics filled recently  Crohn's disease [facility])  Assessment & Plan  Not on any suppressive therapy for this  Denies any bloody bowel movements  Will need close outpatient f/u    * Intussusception [facility])  Assessment & Plan  Finding on CT scan  No significant bowel obstruction however on CT  Patient does mention abdominal pain and nausea however  Results were discussed by ED with by GI who recommended observation and celiac panel  GI consult  Suspect secondary Crohn's ileitis  Small-bowel follow-through ordered by GI and reveals intussusception has resolved  Advance diet as tolerated  Appreciate GI recommendations  No need for surgical intervention at this time per surgery      Discharging Physician / Practitioner: Sergio Rosales MD  PCP: No primary care provider on file    Admission Date:   Admission Orders (From admission, onward)     Ordered        07/28/21 0907  Inpatient Admission  Once         07/25/21 1605  Place in Observation Once                   Discharge Date: 07/30/21    Medical Problems     Resolved Problems  Date Reviewed: 7/30/2021    None                Consultations During Hospital Stay:  · GI    Procedures Performed:   · none    Significant Findings / Test Results:   FL upper GI / small bowel with air    Result Date: 7/27/2021  Impression: 1  Terminal ileal luminal narrowing compatible with patient history of Crohn's disease  No evidence of fistula  2  No evidence of small bowel obstruction  3  Previously seen intussusception in the left upper quadrant no longer present 4  A minor degree of spontaneous gastroesophageal reflux occurred  Upper GI portion of the study otherwise unremarkable Workstation performed: Hermann Patch right upper quadrant    Result Date: 7/27/2021  Impression: Unremarkable right upper quadrant ultrasound Workstation performed: [id number]     CT abdomen pelvis with contrast    Result Date: 7/25/2021  · Impression: There is small segment of the terminal ileum which demonstrates enhancement and demonstrates some distortion without significant perienteric stranding, unchanged from the previous study this is probably related to low-grade fibrosing stenosing disease Small mesenteric lymph nodes, stable Small bowel intussusception seen left upper quadrant without associated bowel obstruction probably a transient and incidental finding   However a nonemergent CT enterography on MR enterography suggest in this patient with Crohn's disease No intra-abdominal fluid collection Chronic enlargement of the tip of the appendix without surrounding inflammatory changes, stable  This is likely sequela of  inflammatory changes seen in relation to the tip of the appendix in 2016 The study was marked in [facility] for immediate notification   Workstation performed: [id number]     Incidental Findings:   · none     Test Results Pending at Discharge (will require follow up):   · none     Outpatient Tests Requested:  · none    Complications:  none    Reason for Admission: Intractable nausea vomiting      Hospital Course:     Nayely Cantrell is a 27 y o  female patient who originally presented to the hospital on 7/25/2021 due to intractable nausea and vomiting  Found to have intussusception  Received IV steroids with resolution  Will be discharged on steroid taper  Will have outpatient follow-up  Please see above list of diagnoses and related plan for additional information  Condition at Discharge: stable     Discharge Day Visit / Exam:     Subjective:  Acute complaints  Exam:   Physical Exam  Constitutional:       General: She is not in acute distress  Appearance: She is well-developed  She is not diaphoretic  HENT:      Head: Normocephalic and atraumatic  Nose: Nose normal       Mouth/Throat:      Pharynx: No oropharyngeal exudate  Eyes:      General: No scleral icterus  Right eye: No discharge  Left eye: No discharge  Conjunctiva/sclera: Conjunctivae normal    Neck:      Thyroid: No thyromegaly  Vascular: No JVD  Cardiovascular:      Rate and Rhythm: Normal rate and regular rhythm  Heart sounds: Normal heart sounds  No murmur heard  No friction rub  No gallop  Pulmonary:      Effort: Pulmonary effort is normal  No respiratory distress  Breath sounds: Normal breath sounds  No wheezing or rales  Chest:      Chest wall: No tenderness  Abdominal:      General: Bowel sounds are normal  There is no distension  Palpations: Abdomen is soft  Tenderness: There is no abdominal tenderness  There is no guarding or rebound  Musculoskeletal:         General: No tenderness or deformity  Normal range of motion  Cervical back: Normal range of motion and neck supple  Skin:     General: Skin is warm and dry  Findings: No erythema or rash  Neurological:      Mental Status: She is alert  Mental status is at baseline  Cranial Nerves:  No cranial nerve deficit  Sensory: No sensory deficit  Motor: No abnormal muscle tone  Coordination: Coordination normal          Discussion with Family: pt    Discharge instructions/Information to patient and family:   See after visit summary for information provided to patient and family  Provisions for Follow-Up Care:  See after visit summary for information related to follow-up care and any pertinent home health orders  Disposition:     Home    For Discharges to [facility] SNF:   · Not Applicable to this Patient - Not Applicable to this Patient    Planned Readmission: none     Discharge Statement:  I spent 60 minutes discharging the patient  This time was spent on the day of discharge  I had direct contact with the patient on the day of discharge  Greater than 50% of the total time was spent examining patient, answering all patient questions, arranging and discussing plan of care with patient as well as directly providing post-discharge instructions  Additional time then spent on discharge activities  Discharge Medications:  See after visit summary for reconciled discharge medications provided to patient and family        ** Please Note: This note has been constructed using a voice recognition system ** (4) no limitations

## 2021-07-30 NOTE — ASSESSMENT & PLAN NOTE
Finding on CT scan  No significant bowel obstruction however on CT  Patient does mention abdominal pain and nausea however  Results were discussed by ED with by GI who recommended observation and celiac panel    GI consult  Suspect secondary Crohn's ileitis  Small-bowel follow-through ordered by GI and reveals intussusception has resolved  Advance diet as tolerated  Appreciate GI recommendations  No need for surgical intervention at this time per surgery

## 2021-07-30 NOTE — PLAN OF CARE
Problem: PAIN - ADULT  Goal: Verbalizes/displays adequate comfort level or baseline comfort level  Description: Interventions:  - Encourage patient to monitor pain and request assistance  - Assess pain using appropriate pain scale  - Administer analgesics based on type and severity of pain and evaluate response  - Implement non-pharmacological measures as appropriate and evaluate response  - Consider cultural and social influences on pain and pain management  - Notify physician/advanced practitioner if interventions unsuccessful or patient reports new pain  Outcome: Progressing     Problem: INFECTION - ADULT  Goal: Absence or prevention of progression during hospitalization  Description: INTERVENTIONS:  - Assess and monitor for signs and symptoms of infection  - Monitor lab/diagnostic results  - Monitor all insertion sites, i e  indwelling lines, tubes, and drains  - Monitor endotracheal if appropriate and nasal secretions for changes in amount and color  - [place] appropriate cooling/warming therapies per order  - Administer medications as ordered  - Instruct and encourage patient and family to use good hand hygiene technique  - Identify and instruct in appropriate isolation precautions for identified infection/condition  Outcome: Progressing     Problem: SAFETY ADULT  Goal: Maintain or return to baseline ADL function  Description: INTERVENTIONS:  -  Assess patient's ability to carry out ADLs; assess patient's baseline for ADL function and identify physical deficits which impact ability to perform ADLs (bathing, care of mouth/teeth, toileting, grooming, dressing, etc )  - Assess/evaluate cause of self-care deficits   - Assess range of motion  - Assess patient's mobility; develop plan if impaired  - Assess patient's need for assistive devices and provide as appropriate  - Encourage maximum independence but intervene and supervise when necessary  - Involve family in performance of ADLs  - Assess for home care needs following discharge   - Consider OT consult to assist with ADL evaluation and planning for discharge  - Provide patient education as appropriate  Outcome: Progressing     Problem: GASTROINTESTINAL - ADULT  Goal: Maintains or returns to baseline bowel function  Description: INTERVENTIONS:  - Assess bowel function  - Encourage oral fluids to ensure adequate hydration  - Administer IV fluids if ordered to ensure adequate hydration  - Administer ordered medications as needed  - Encourage mobilization and activity  - Consider nutritional services referral to assist patient with adequate nutrition and appropriate food choices  Outcome: Progressing     Problem: GASTROINTESTINAL - ADULT  Goal: Maintains adequate nutritional intake  Description: INTERVENTIONS:  - Monitor percentage of each meal consumed  - Identify factors contributing to decreased intake, treat as appropriate  - Assist with meals as needed  - Monitor I&O, weight, and lab values if indicated  - Obtain nutrition services referral as needed  Outcome: Progressing     Problem: Knowledge Deficit  Goal: Patient/family/caregiver demonstrates understanding of disease process, treatment plan, medications, and discharge instructions  Description: Complete learning assessment and assess knowledge base    Interventions:  - Provide teaching at level of understanding  - Provide teaching via preferred learning methods  Outcome: Progressing     Problem: DISCHARGE PLANNING  Goal: Discharge to home or other facility with appropriate resources  Description: INTERVENTIONS:  - Identify barriers to discharge w/patient and caregiver  - Arrange for needed discharge resources and transportation as appropriate  - Identify discharge learning needs (meds, wound care, etc )  - Arrange for interpretive services to assist at discharge as needed  - Refer to Case Management Department for coordinating discharge planning if the patient needs post-hospital services based on physician/advanced practitioner order or complex needs related to functional status, cognitive ability, or social support system  Outcome: Progressing

## 2021-07-30 NOTE — ASSESSMENT & PLAN NOTE
In the setting of abdominal pain and finding of intussusception on CT scan  ED has discussed with GI who recommended observation    Colonoscopy without any significant finding  Has since resolved, tolerating full diet

## 2021-07-30 NOTE — PLAN OF CARE
Problem: Potential for Falls  Goal: Patient will remain free of falls  Description: INTERVENTIONS:  - Educate patient/family on patient safety including physical limitations  - Instruct patient to call for assistance with activity   - Consult OT/PT to assist with strengthening/mobility   - Keep Call bell within reach  - Keep bed low and locked with side rails adjusted as appropriate  - Keep care items and personal belongings within reach  - Initiate and maintain comfort rounds  - Make Fall Risk Sign visible to staff  - Offer Toileting every Hours, in advance of need  - Initiate/Maintain alarm  - Obtain necessary fall risk management equipment:   - Apply yellow socks and bracelet for high fall risk patients  - Consider moving patient to room near nurses station  Outcome: Progressing     Problem: PAIN - ADULT  Goal: Verbalizes/displays adequate comfort level or baseline comfort level  Description: Interventions:  - Encourage patient to monitor pain and request assistance  - Assess pain using appropriate pain scale  - Administer analgesics based on type and severity of pain and evaluate response  - Implement non-pharmacological measures as appropriate and evaluate response  - Consider cultural and social influences on pain and pain management  - Notify physician/advanced practitioner if interventions unsuccessful or patient reports new pain  Outcome: Progressing     Problem: INFECTION - ADULT  Goal: Absence or prevention of progression during hospitalization  Description: INTERVENTIONS:  - Assess and monitor for signs and symptoms of infection  - Monitor lab/diagnostic results  - Monitor all insertion sites, i e  indwelling lines, tubes, and drains  - Monitor endotracheal if appropriate and nasal secretions for changes in amount and color  - [place] appropriate cooling/warming therapies per order  - Administer medications as ordered  - Instruct and encourage patient and family to use good hand hygiene technique  - Identify and instruct in appropriate isolation precautions for identified infection/condition  Outcome: Progressing  Goal: Absence of fever/infection during neutropenic period  Description: INTERVENTIONS:  - Monitor WBC    Outcome: Progressing     Problem: SAFETY ADULT  Goal: Patient will remain free of falls  Description: INTERVENTIONS:  - Educate patient/family on patient safety including physical limitations  - Instruct patient to call for assistance with activity   - Consult OT/PT to assist with strengthening/mobility   - Keep Call bell within reach  - Keep bed low and locked with side rails adjusted as appropriate  - Keep care items and personal belongings within reach  - Initiate and maintain comfort rounds  - Make Fall Risk Sign visible to staff  - Offer Toileting every  Hours, in advance of need  - Initiate/Maintain alarm  - Obtain necessary fall risk management equipment:   - Apply yellow socks and bracelet for high fall risk patients  - Consider moving patient to room near nurses station  Outcome: Progressing  Goal: Maintain or return to baseline ADL function  Description: INTERVENTIONS:  -  Assess patient's ability to carry out ADLs; assess patient's baseline for ADL function and identify physical deficits which impact ability to perform ADLs (bathing, care of mouth/teeth, toileting, grooming, dressing, etc )  - Assess/evaluate cause of self-care deficits   - Assess range of motion  - Assess patient's mobility; develop plan if impaired  - Assess patient's need for assistive devices and provide as appropriate  - Encourage maximum independence but intervene and supervise when necessary  - Involve family in performance of ADLs  - Assess for home care needs following discharge   - Consider OT consult to assist with ADL evaluation and planning for discharge  - Provide patient education as appropriate  Outcome: Progressing  Goal: Maintains/Returns to pre admission functional level  Description: INTERVENTIONS:  - Perform BMAT or MOVE assessment daily    - Set and communicate daily mobility goal to care team and patient/family/caregiver  - Collaborate with rehabilitation services on mobility goals if consulted  - Perform Range of Motion times a day  - Reposition patient every  hours  - Dangle patient  times a day  - Stand patient  times a day  - Ambulate patient  times a day  - Out of bed to chair times a day   - Out of bed for meals  times a day  - Out of bed for toileting  - Record patient progress and toleration of activity level   Outcome: Progressing     Problem: DISCHARGE PLANNING  Goal: Discharge to home or other facility with appropriate resources  Description: INTERVENTIONS:  - Identify barriers to discharge w/patient and caregiver  - Arrange for needed discharge resources and transportation as appropriate  - Identify discharge learning needs (meds, wound care, etc )  - Arrange for interpretive services to assist at discharge as needed  - Refer to Case Management Department for coordinating discharge planning if the patient needs post-hospital services based on physician/advanced practitioner order or complex needs related to functional status, cognitive ability, or social support system  Outcome: Progressing     Problem: Knowledge Deficit  Goal: Patient/family/caregiver demonstrates understanding of disease process, treatment plan, medications, and discharge instructions  Description: Complete learning assessment and assess knowledge base    Interventions:  - Provide teaching at level of understanding  - Provide teaching via preferred learning methods  Outcome: Progressing     Problem: GASTROINTESTINAL - ADULT  Goal: Minimal or absence of nausea and/or vomiting  Description: INTERVENTIONS:  - Administer IV fluids if ordered to ensure adequate hydration  - Maintain NPO status until nausea and vomiting are resolved  - Nasogastric tube if ordered  - Administer ordered antiemetic medications as needed  - Provide nonpharmacologic comfort measures as appropriate  - Advance diet as tolerated, if ordered  - Consider nutrition services referral to assist patient with adequate nutrition and appropriate food choices  Outcome: Progressing  Goal: Maintains or returns to baseline bowel function  Description: INTERVENTIONS:  - Assess bowel function  - Encourage oral fluids to ensure adequate hydration  - Administer IV fluids if ordered to ensure adequate hydration  - Administer ordered medications as needed  - Encourage mobilization and activity  - Consider nutritional services referral to assist patient with adequate nutrition and appropriate food choices  Outcome: Progressing  Goal: Maintains adequate nutritional intake  Description: INTERVENTIONS:  - Monitor percentage of each meal consumed  - Identify factors contributing to decreased intake, treat as appropriate  - Assist with meals as needed  - Monitor I&O, weight, and lab values if indicated  - Obtain nutrition services referral as needed  Outcome: Progressing     Problem: METABOLIC, FLUID AND ELECTROLYTES - ADULT  Goal: Electrolytes maintained within normal limits  Description: INTERVENTIONS:  - Monitor labs and assess patient for signs and symptoms of electrolyte imbalances  - Administer electrolyte replacement as ordered  - Monitor response to electrolyte replacements, including repeat lab results as appropriate  - Instruct patient on fluid and nutrition as appropriate  Outcome: Progressing  Goal: Fluid balance maintained  Description: INTERVENTIONS:  - Monitor labs   - Monitor I/O and WT  - Instruct patient on fluid and nutrition as appropriate  - Assess for signs & symptoms of volume excess or deficit  Outcome: Progressing

## 2021-08-02 NOTE — UTILIZATION REVIEW
Notification of Discharge   This is a Notification of Discharge from our facility [address]  Please be advised that this patient has been discharge from our facility  Below you will find the admission and discharge date and time including the patients disposition  UTILIZATION REVIEW CONTACT:  Chiara Canseco  Utilization   Network Utilization Review Department  Phone: [phone] x carefully listen to the prompts  All voicemails are confidential   Email: [email or web address]     PHYSICIAN ADVISORY SERVICES:  FOR [id number]-[id number] REVIEW - MEDICAL NECESSITY DENIAL  Phone: [phone]  Fax: [phone]  Email: [email or web address]     PRESENTATION DATE: 7/25/2021 10:27 AM  OBERVATION ADMISSION DATE: 07/25/2021  INPATIENT ADMISSION DATE: 7/28/21  9:07 AM   DISCHARGE DATE: 7/30/2021  1:30 PM  DISPOSITION: Home/Self Care Home/Self Care      IMPORTANT INFORMATION:  Send all requests for admission clinical reviews, approved or denied determinations and any other requests to dedicated fax number below belonging to the campus where the patient is receiving treatment   List of dedicated fax numbers:  [address] [facility] [address] DENIALS (Administrative/Medical Necessity) [phone]   [address] (Maternity/NICU/Pediatrics) [phone]   Sy Coleman [phone]   Christiano Han [phone]   [place] [phone]   [address] [phone]   [facility]  [phone]   [address] [place] [address], [address]  [address] [facility] [address] [facility] [phone]

## 2021-08-03 ENCOUNTER — ANNUAL EXAM (OUTPATIENT)
Dept: OBGYN CLINIC | Facility: CLINIC | Age: 26
End: 2021-08-03
Payer: COMMERCIAL

## 2021-08-03 VITALS
SYSTOLIC BLOOD PRESSURE: 102 MMHG | WEIGHT: 108 LBS | BODY MASS INDEX: 19.14 KG/M2 | HEIGHT: 63 IN | DIASTOLIC BLOOD PRESSURE: 62 MMHG

## 2021-08-03 DIAGNOSIS — Z01.419 ENCOUNTER FOR ANNUAL ROUTINE GYNECOLOGICAL EXAMINATION: Primary | ICD-10-CM

## 2021-08-03 PROCEDURE — S0612 ANNUAL GYNECOLOGICAL EXAMINA: HCPCS | Performed by: OBSTETRICS & GYNECOLOGY

## 2021-08-04 NOTE — PROGRESS NOTES
Assessment /Plan:    Encounter for annual routine gynecological examination    Scar improving; encouraged continued manual stretching  RTO 1 year      Subjective      Cindy Peralta is a 27 y o  female who presents for annual exam  Periods are regular every 28-30 days, lasting 4 days  Dysmenorrhea:mild, occurring first 1-2 days of flow  Cyclic symptoms include none  No intermenstrual bleeding, spotting, or discharge  The patient reports vaginal scar is better  Current contraception: none  History of abnormal Pap smear: no  Regular self breast exam: yes  History of abnormal mammogram: no  History of abnormal lipids: no  Menstrual History:  OB History        0    Para   0    Term   0       0    AB   0    Living   0       SAB   0    TAB   0    Ectopic   0    Multiple   0    Live Births   0                  Patient's last menstrual period was 2021 (exact date)  Period Cycle (Days): 28  Period Duration (Days): 4  Period Pattern: Regular  Menstrual Flow: Heavy, Light (Heavy x 2 days only)  Dysmenorrhea: (!) Mild (Back pain)    Past Medical History:   Diagnosis Date    Anxiety     [facility] )     Depression     [facility] attempt ([facility] )        Family History   Problem Relation Age of Onset    Vitiligo Paternal Grandfather     Lupus Paternal Aunt        The following portions of the patient's history were reviewed and updated as appropriate: allergies, current medications, past family history, past medical history, past social history, past surgical history and problem list     Review of Systems  Pertinent items are noted in HPI        Objective      /62 (BP Location: Right arm, Patient Position: Sitting, Cuff Size: Standard)   Ht 5' 2 5" (1 588 m)   Wt 49 kg (108 lb)   LMP 2021 (Exact Date)   BMI 19 44 kg/m²     General:   alert and oriented, in no acute distress   Heart:    Breasts: regular rate and rhythm, S1, S2 normal, no murmur, click, rub or gallop   appear normal, no suspicious masses, no skin or nipple changes or axillary nodes     Lungs: clear to auscultation bilaterally   Abdomen: soft, non-tender, without masses or organomegaly   Vulva: normal   Vagina: normal mucosa; scar well-healed without edema or tenderness   Cervix: no lesions   Uterus: normal size, mobile, non-tender   Adnexa: normal adnexa and no mass, fullness, tenderness

## 2021-09-14 ENCOUNTER — TELEPHONE (OUTPATIENT)
Dept: GASTROENTEROLOGY | Facility: CLINIC | Age: 26
End: 2021-09-14

## 2021-09-14 ENCOUNTER — OFFICE VISIT (OUTPATIENT)
Dept: GASTROENTEROLOGY | Facility: CLINIC | Age: 26
End: 2021-09-14
Payer: COMMERCIAL

## 2021-09-14 VITALS
DIASTOLIC BLOOD PRESSURE: 70 MMHG | HEART RATE: 66 BPM | BODY MASS INDEX: 20.48 KG/M2 | HEIGHT: 63 IN | WEIGHT: 115.6 LBS | SYSTOLIC BLOOD PRESSURE: 118 MMHG

## 2021-09-14 DIAGNOSIS — K56.1 INTUSSUSCEPTION (HCC): ICD-10-CM

## 2021-09-14 DIAGNOSIS — K50.012 CROHN'S DISEASE OF SMALL INTESTINE WITH INTESTINAL OBSTRUCTION (HCC): Primary | ICD-10-CM

## 2021-09-14 DIAGNOSIS — R11.2 INTRACTABLE NAUSEA AND VOMITING: ICD-10-CM

## 2021-09-14 PROCEDURE — 99214 OFFICE O/P EST MOD 30 MIN: CPT | Performed by: INTERNAL MEDICINE

## 2021-09-14 NOTE — TELEPHONE ENCOUNTER
Called [facility]    appt made at the [facility] in [facility] for Monday Sept 20 at [facility] but pt needs to be there at 8am to drink the barium  Called pt and advised  Pt voiced understanding  Called [facility]    [place] [id number] [place] until 11/12/2021 auth:  [id number]

## 2021-09-14 NOTE — PROGRESS NOTES
Follow-up Note -  [facility]  Mikhail Maria 1995 27 y o  female     ASSESSMENT @ PLAN:   She is a 31-year-old female with ileocolonic Crohns disease status post hospitalization with intussusception and Crohn's exacerbation with possible fibrosed stenosing disease in the terminal ileum  She is feeling much better on her prednisone taper and is about to finish  She was on adalimumab from 2015 to 2017 and went off of it secondary to severe eczema and noncompliance  The risks and benefits of current biologic treatments including vedolizumab and Stelara were reviewed  1 I am going to order a CT enterography to see if her segment appears stenotic and she will require a small-bowel resection  2 if we think that it she will respond to medical therapy we will likely start Stelara  3 talked about the COVID vaccine series    Reason:  Small bowel Crohn's disease    HPI:  She is a 31-year-old female with small bowel Crohn's disease here for evaluation after being in the hospital   She was admitted with severe pain nausea and vomiting in July  A CT scan showed fiber stenosing Crohn's in the terminal ileum with a small-bowel intussusception inflamed appendix  She had a having a small bowel series which showed terminal ileum narrowing in she had a colonoscopy with Dr Kun Arellano on July 29, 2020 that showed cecal inflammation with Crohn's disease and a fibrosed ileocolonic valve that could not be intubated  She was placed on prednisone after having IV Solu-Medrol in the hospital and reports that she is feeling great  She is not having abdominal pain nausea vomiting  She is having no melena hematochezia she is having waking from the prednisone  She is in the taper and is only on 10 mg now will be finishing stay June  She was on adalimumab after being diagnosed in 2015  She was on this medication for 2 years and it sounds like she developed eczema    She went off of the medication and was not on medication or having medical care for 4 years  She tells me that she will be compliant with medication in the future  She does have anxiety so we did speak about this  REVIEW OF SYSTEMS:     CONSTITUTIONAL: Denies any fever, chills, or rigors  Good appetite, and no recent weight loss  HEENT: No earache or tinnitus  Denies hearing loss or visual disturbances  CARDIOVASCULAR: No chest pain or palpitations  RESPIRATORY: Denies any cough, hemoptysis, shortness of breath or dyspnea on exertion  GASTROINTESTINAL: As noted in the History of Present Illness  GENITOURINARY: No problems with urination  Denies any hematuria or dysuria  NEUROLOGIC: No dizziness or vertigo, denies headaches  MUSCULOSKELETAL: Denies any muscle or joint pain  SKIN: Denies skin rashes or itching  ENDOCRINE: Denies excessive thirst  Denies intolerance to heat or cold  PSYCHOSOCIAL: Denies depression or anxiety  Denies any recent memory loss       Past Medical History:   Diagnosis Date    Anxiety     [facility]     Depression     [facility]icide attempt [facility])       Past Surgical History:   Procedure Laterality Date    COLONOSCOPY      VULVA BIOPSY Right 8/26/2020    Procedure: Excision of Right Labial Lesion;  Surgeon: She River MD;  [facility];  Service: Gynecology     Social History     Socioeconomic History    Marital status: Unknown     Spouse name: Not on file    Number of children: Not on file    Years of education: Not on file    Highest education level: Not on file   Occupational History    Not on file   Tobacco Use    Smoking status: Never Smoker    Smokeless tobacco: Never Used   Vaping Use    Vaping Use: Never used   Substance and Sexual Activity    Alcohol use: Yes     Comment: occasionally    Drug use: Not Currently     Frequency: 7 0 times per week     Types: Marijuana     Comment: Smokes weed daily     Sexual activity: Yes     Partners: Female   Other Topics Concern    Not on file   Social History Narrative    ** Merged History Encounter **          Social Determinants of Health     Financial Resource Strain:     Difficulty of Paying Living Expenses:    Food Insecurity:     Worried About Running Out of Food in the Last Year:     Ran Out of Food in the Last Year:    Transportation Needs:     Lack of Transportation (Medical):  Lack of Transportation (Non-Medical):    Physical Activity:     Days of Exercise per Week:     Minutes of Exercise per Session:    Stress:     Feeling of Stress :    Social Connections:     Frequency of Communication with Friends and Family:     Frequency of Social Gatherings with Friends and Family:     Attends Baptism Services:     Active Member of Clubs or Organizations:     Attends Club or Organization Meetings:     Marital Status:    Intimate Partner Violence:     Fear of Current or Ex-Partner:     Emotionally Abused:     Physically Abused:     Sexually Abused:      Family History   Problem Relation Age of Onset    Vitiligo Paternal Grandfather     Lupus Paternal Aunt      Amoxicillin and Amoxicillin  Current Outpatient Medications   Medication Sig Dispense Refill    predniSONE 5 mg tablet Take 8 tablets (40 mg total) by mouth daily for 7 days, THEN 7 tablets (35 mg total) daily for 7 days, THEN 6 tablets (30 mg total) daily for 7 days, THEN 5 tablets (25 mg total) daily for 7 days, THEN 4 tablets (20 mg total) daily for 7 days, THEN 3 tablets (15 mg total) daily for 7 days, THEN 2 tablets (10 mg total) daily for 7 days  245 tablet 0    ondansetron (ZOFRAN-ODT) 4 mg disintegrating tablet Take 1 tablet (4 mg total) by mouth every 8 (eight) hours as needed for nausea or vomiting (Patient not taking: Reported on 9/14/2021) 12 tablet 0     No current facility-administered medications for this visit  Blood pressure 118/70, pulse 66, height 5' 3" (1 6 m), weight 52 4 kg (115 lb 9 6 oz), not currently breastfeeding      PHYSICAL EXAM:     General Appearance:   Alert, cooperative, no distress, appears stated age    HEENT:   Normocephalic, atraumatic, anicteric      Neck:  Supple, symmetrical, trachea midline, no adenopathy;    thyroid: no enlargement/tenderness/nodules; no carotid  bruit or JVD    Lungs:   Clear to auscultation bilaterally; no rales, rhonchi or wheezing; respirations unlabored    Heart[de-identified]   S1 and S2 normal; regular rate and rhythm; no murmur, rub, or gallop     Abdomen:   Soft, non-tender, non-distended; normal bowel sounds; no masses, no organomegaly    Genitalia:   Deferred    Rectal:   Deferred    Extremities:  No cyanosis, clubbing or edema    Pulses:  2+ and symmetric all extremities    Skin:  Skin color, texture, turgor normal, no rashes or lesions    Lymph nodes:  No palpable cervical, axillary or inguinal lymphadenopathy        Lab Results   Component Value Date    WBC 9 21 07/28/2021    HGB 12 4 07/28/2021    HCT 36 5 07/28/2021    MCV 88 07/28/2021     07/28/2021     Lab Results   Component Value Date    GLUCOSE 111 06/22/2020    CALCIUM 8 5 07/28/2021    K 3 9 07/28/2021    CO2 27 07/28/2021     07/28/2021    BUN 10 07/28/2021    CREATININE 0 64 07/28/2021     Lab Results   Component Value Date    ALT 8 (L) 07/26/2021    AST 18 07/26/2021    ALKPHOS 51 07/26/2021     No results found for: INR, PROTIME

## 2021-09-14 NOTE — TELEPHONE ENCOUNTER
----- Message from Yahir Fisher MD sent at 9/14/2021 12:23 PM EDT -----  Pls get her ct enterography asap

## 2021-09-15 ENCOUNTER — TELEPHONE (OUTPATIENT)
Dept: GASTROENTEROLOGY | Facility: CLINIC | Age: 26
End: 2021-09-15

## 2021-09-20 ENCOUNTER — HOSPITAL ENCOUNTER (OUTPATIENT)
Dept: RADIOLOGY | Age: 26
Discharge: HOME | End: 2021-09-20
Payer: COMMERCIAL

## 2021-09-20 DIAGNOSIS — R11.2 INTRACTABLE NAUSEA AND VOMITING: ICD-10-CM

## 2021-09-20 DIAGNOSIS — K50.012 CROHN'S DISEASE OF SMALL INTESTINE WITH INTESTINAL OBSTRUCTION (HCC): ICD-10-CM

## 2021-09-20 DIAGNOSIS — K56.1 INTUSSUSCEPTION (HCC): ICD-10-CM

## 2021-09-20 PROCEDURE — G1004 CDSM NDSC: HCPCS

## 2021-09-20 PROCEDURE — 74177 CT ABD & PELVIS W/CONTRAST: CPT

## 2021-09-20 RX ADMIN — IOHEXOL 100 ML: 350 INJECTION, SOLUTION INTRAVENOUS at 09:09

## 2021-10-05 ENCOUNTER — TELEPHONE (OUTPATIENT)
Dept: GASTROENTEROLOGY | Facility: CLINIC | Age: 26
End: 2021-10-05

## 2021-10-05 DIAGNOSIS — K50.012 CROHN'S DISEASE OF SMALL INTESTINE WITH INTESTINAL OBSTRUCTION (HCC): Primary | ICD-10-CM

## 2021-10-06 ENCOUNTER — APPOINTMENT (OUTPATIENT)
Dept: LAB | Facility: HOSPITAL | Age: 26
End: 2021-10-06
Payer: COMMERCIAL

## 2021-10-06 DIAGNOSIS — K50.012 CROHN'S DISEASE OF SMALL INTESTINE WITH INTESTINAL OBSTRUCTION (HCC): ICD-10-CM

## 2021-10-06 PROCEDURE — 36415 COLL VENOUS BLD VENIPUNCTURE: CPT

## 2021-10-06 PROCEDURE — 86480 TB TEST CELL IMMUN MEASURE: CPT

## 2021-10-07 ENCOUNTER — OFFICE VISIT (OUTPATIENT)
Dept: GASTROENTEROLOGY | Facility: CLINIC | Age: 26
End: 2021-10-07
Payer: COMMERCIAL

## 2021-10-07 ENCOUNTER — TELEPHONE (OUTPATIENT)
Dept: GASTROENTEROLOGY | Facility: CLINIC | Age: 26
End: 2021-10-07

## 2021-10-07 VITALS
WEIGHT: 115.2 LBS | BODY MASS INDEX: 20.41 KG/M2 | HEIGHT: 63 IN | SYSTOLIC BLOOD PRESSURE: 108 MMHG | HEART RATE: 85 BPM | DIASTOLIC BLOOD PRESSURE: 82 MMHG

## 2021-10-07 DIAGNOSIS — K50.012 CROHN'S DISEASE OF SMALL INTESTINE WITH INTESTINAL OBSTRUCTION (HCC): Primary | ICD-10-CM

## 2021-10-07 PROCEDURE — 99213 OFFICE O/P EST LOW 20 MIN: CPT | Performed by: PHYSICIAN ASSISTANT

## 2021-10-07 RX ORDER — PREDNISONE 1 MG/1
TABLET ORAL
Qty: 240 TABLET | Refills: 0 | Status: SHIPPED | OUTPATIENT
Start: 2021-10-07

## 2021-10-08 ENCOUNTER — IMMUNIZATIONS (OUTPATIENT)
Dept: FAMILY MEDICINE CLINIC | Facility: HOSPITAL | Age: 26
End: 2021-10-08

## 2021-10-08 ENCOUNTER — TELEPHONE (OUTPATIENT)
Dept: GASTROENTEROLOGY | Facility: CLINIC | Age: 26
End: 2021-10-08

## 2021-10-08 DIAGNOSIS — Z23 ENCOUNTER FOR IMMUNIZATION: Primary | ICD-10-CM

## 2021-10-08 PROCEDURE — 0001A SARS-COV-2 / COVID-19 MRNA VACCINE (PFIZER-BIONTECH) 30 MCG: CPT

## 2021-10-08 PROCEDURE — 91300 SARS-COV-2 / COVID-19 MRNA VACCINE (PFIZER-BIONTECH) 30 MCG: CPT

## 2021-10-11 ENCOUNTER — TELEPHONE (OUTPATIENT)
Dept: GASTROENTEROLOGY | Facility: CLINIC | Age: 26
End: 2021-10-11

## 2021-10-11 LAB
GAMMA INTERFERON BACKGROUND BLD IA-ACNC: 0.03 IU/ML
M TB IFN-G BLD-IMP: NEGATIVE
M TB IFN-G CD4+ BCKGRND COR BLD-ACNC: 0 IU/ML
M TB IFN-G CD4+ BCKGRND COR BLD-ACNC: 0 IU/ML
MITOGEN IGNF BCKGRD COR BLD-ACNC: >10 IU/ML

## 2021-10-12 ENCOUNTER — TELEPHONE (OUTPATIENT)
Dept: GASTROENTEROLOGY | Facility: CLINIC | Age: 26
End: 2021-10-12

## 2021-10-13 ENCOUNTER — TELEPHONE (OUTPATIENT)
Dept: GASTROENTEROLOGY | Facility: CLINIC | Age: 26
End: 2021-10-13

## 2021-10-13 ENCOUNTER — HOSPITAL ENCOUNTER (EMERGENCY)
Facility: HOSPITAL | Age: 26
Discharge: HOME | End: 2021-10-13
Attending: EMERGENCY MEDICINE | Admitting: EMERGENCY MEDICINE
Payer: COMMERCIAL

## 2021-10-13 ENCOUNTER — TELEPHONE (OUTPATIENT)
Dept: FAMILY MEDICINE CLINIC | Facility: CLINIC | Age: 26
End: 2021-10-13

## 2021-10-13 VITALS
HEART RATE: 78 BPM | SYSTOLIC BLOOD PRESSURE: 113 MMHG | TEMPERATURE: 98.2 F | WEIGHT: 113 LBS | DIASTOLIC BLOOD PRESSURE: 68 MMHG | BODY MASS INDEX: 20.02 KG/M2 | RESPIRATION RATE: 18 BRPM | HEIGHT: 63 IN | OXYGEN SATURATION: 99 %

## 2021-10-13 DIAGNOSIS — J03.90 TONSILLITIS: Primary | ICD-10-CM

## 2021-10-13 LAB
ANION GAP SERPL CALCULATED.3IONS-SCNC: 16 MMOL/L (ref 4–13)
BACTERIA UR QL AUTO: ABNORMAL /HPF
BASOPHILS # BLD AUTO: 0.08 THOUSANDS/ΜL (ref 0–0.1)
BASOPHILS NFR BLD AUTO: 0 % (ref 0–1)
BILIRUB UR QL STRIP: ABNORMAL
BUN SERPL-MCNC: 8 MG/DL (ref 5–25)
CALCIUM SERPL-MCNC: 9.6 MG/DL (ref 8.3–10.1)
CHLORIDE SERPL-SCNC: 99 MMOL/L (ref 100–108)
CLARITY UR: ABNORMAL
CO2 SERPL-SCNC: 23 MMOL/L (ref 21–32)
COLOR UR: ABNORMAL
CREAT SERPL-MCNC: 0.71 MG/DL (ref 0.6–1.3)
EOSINOPHIL # BLD AUTO: 0.03 THOUSAND/ΜL (ref 0–0.61)
EOSINOPHIL NFR BLD AUTO: 0 % (ref 0–6)
ERYTHROCYTE [DISTWIDTH] IN BLOOD BY AUTOMATED COUNT: 13.6 % (ref 11.6–15.1)
EXT PREG TEST URINE: NEGATIVE
EXT. CONTROL ED NAV: NORMAL
FLUAV RNA RESP QL NAA+PROBE: NEGATIVE
FLUBV RNA RESP QL NAA+PROBE: NEGATIVE
GFR SERPL CREATININE-BSD FRML MDRD: 120 ML/MIN/1.73SQ M
GLUCOSE SERPL-MCNC: 89 MG/DL (ref 65–140)
GLUCOSE UR STRIP-MCNC: NEGATIVE MG/DL
HCG SERPL QL: NEGATIVE
HCT VFR BLD AUTO: 38.6 % (ref 34.8–46.1)
HGB BLD-MCNC: 13.6 G/DL (ref 11.5–15.4)
HGB UR QL STRIP.AUTO: ABNORMAL
IMM GRANULOCYTES # BLD AUTO: 0.11 THOUSAND/UL (ref 0–0.2)
IMM GRANULOCYTES NFR BLD AUTO: 1 % (ref 0–2)
KETONES UR STRIP-MCNC: ABNORMAL MG/DL
LEUKOCYTE ESTERASE UR QL STRIP: NEGATIVE
LYMPHOCYTES # BLD AUTO: 2.2 THOUSANDS/ΜL (ref 0.6–4.47)
LYMPHOCYTES NFR BLD AUTO: 11 % (ref 14–44)
MCH RBC QN AUTO: 29.4 PG (ref 26.8–34.3)
MCHC RBC AUTO-ENTMCNC: 35.2 G/DL (ref 31.4–37.4)
MCV RBC AUTO: 83 FL (ref 82–98)
MONOCYTES # BLD AUTO: 1.47 THOUSAND/ΜL (ref 0.17–1.22)
MONOCYTES NFR BLD AUTO: 7 % (ref 4–12)
MUCOUS THREADS UR QL AUTO: ABNORMAL
NEUTROPHILS # BLD AUTO: 16.8 THOUSANDS/ΜL (ref 1.85–7.62)
NEUTS SEG NFR BLD AUTO: 81 % (ref 43–75)
NITRITE UR QL STRIP: NEGATIVE
NON-SQ EPI CELLS URNS QL MICRO: ABNORMAL /HPF
NRBC BLD AUTO-RTO: 0 /100 WBCS
PH UR STRIP.AUTO: 5.5 [PH]
PLATELET # BLD AUTO: 257 THOUSANDS/UL (ref 149–390)
PMV BLD AUTO: 9.1 FL (ref 8.9–12.7)
POTASSIUM SERPL-SCNC: 3.5 MMOL/L (ref 3.5–5.3)
PROT UR STRIP-MCNC: ABNORMAL MG/DL
RBC # BLD AUTO: 4.63 MILLION/UL (ref 3.81–5.12)
RBC #/AREA URNS AUTO: ABNORMAL /HPF
RSV RNA RESP QL NAA+PROBE: NEGATIVE
SARS-COV-2 RNA RESP QL NAA+PROBE: NEGATIVE
SODIUM SERPL-SCNC: 138 MMOL/L (ref 136–145)
SP GR UR STRIP.AUTO: >=1.03 (ref 1–1.03)
UROBILINOGEN UR QL STRIP.AUTO: 0.2 E.U./DL
WBC # BLD AUTO: 20.69 THOUSAND/UL (ref 4.31–10.16)
WBC #/AREA URNS AUTO: ABNORMAL /HPF

## 2021-10-13 PROCEDURE — 96375 TX/PRO/DX INJ NEW DRUG ADDON: CPT

## 2021-10-13 PROCEDURE — 0241U HB NFCT DS VIR RESP RNA 4 TRGT: CPT | Performed by: EMERGENCY MEDICINE

## 2021-10-13 PROCEDURE — 36415 COLL VENOUS BLD VENIPUNCTURE: CPT | Performed by: EMERGENCY MEDICINE

## 2021-10-13 PROCEDURE — 81001 URINALYSIS AUTO W/SCOPE: CPT | Performed by: EMERGENCY MEDICINE

## 2021-10-13 PROCEDURE — 96365 THER/PROPH/DIAG IV INF INIT: CPT

## 2021-10-13 PROCEDURE — 80048 BASIC METABOLIC PNL TOTAL CA: CPT | Performed by: EMERGENCY MEDICINE

## 2021-10-13 PROCEDURE — 96366 THER/PROPH/DIAG IV INF ADDON: CPT

## 2021-10-13 PROCEDURE — 81025 URINE PREGNANCY TEST: CPT | Performed by: EMERGENCY MEDICINE

## 2021-10-13 PROCEDURE — 84703 CHORIONIC GONADOTROPIN ASSAY: CPT | Performed by: EMERGENCY MEDICINE

## 2021-10-13 PROCEDURE — 99284 EMERGENCY DEPT VISIT MOD MDM: CPT | Performed by: EMERGENCY MEDICINE

## 2021-10-13 PROCEDURE — 99283 EMERGENCY DEPT VISIT LOW MDM: CPT

## 2021-10-13 PROCEDURE — 96367 TX/PROPH/DG ADDL SEQ IV INF: CPT

## 2021-10-13 PROCEDURE — 85025 COMPLETE CBC W/AUTO DIFF WBC: CPT | Performed by: EMERGENCY MEDICINE

## 2021-10-13 PROCEDURE — 86308 HETEROPHILE ANTIBODY SCREEN: CPT | Performed by: EMERGENCY MEDICINE

## 2021-10-13 RX ORDER — KETOROLAC TROMETHAMINE 30 MG/ML
15 INJECTION, SOLUTION INTRAMUSCULAR; INTRAVENOUS ONCE
Status: COMPLETED | OUTPATIENT
Start: 2021-10-13 | End: 2021-10-13

## 2021-10-13 RX ORDER — DEXAMETHASONE SODIUM PHOSPHATE 10 MG/ML
10 INJECTION, SOLUTION INTRAMUSCULAR; INTRAVENOUS ONCE
Status: COMPLETED | OUTPATIENT
Start: 2021-10-13 | End: 2021-10-13

## 2021-10-13 RX ORDER — ONDANSETRON 2 MG/ML
4 INJECTION INTRAMUSCULAR; INTRAVENOUS ONCE
Status: COMPLETED | OUTPATIENT
Start: 2021-10-13 | End: 2021-10-13

## 2021-10-13 RX ORDER — ONDANSETRON 4 MG/1
4 TABLET, ORALLY DISINTEGRATING ORAL EVERY 6 HOURS PRN
Qty: 20 TABLET | Refills: 0 | Status: SHIPPED | OUTPATIENT
Start: 2021-10-13

## 2021-10-13 RX ORDER — CLINDAMYCIN HYDROCHLORIDE 300 MG/1
300 CAPSULE ORAL 4 TIMES DAILY
Qty: 40 CAPSULE | Refills: 0 | Status: SHIPPED | OUTPATIENT
Start: 2021-10-13 | End: 2021-10-23

## 2021-10-13 RX ORDER — CLINDAMYCIN PHOSPHATE 600 MG/50ML
600 INJECTION INTRAVENOUS ONCE
Status: COMPLETED | OUTPATIENT
Start: 2021-10-13 | End: 2021-10-13

## 2021-10-13 RX ADMIN — CLINDAMYCIN PHOSPHATE 600 MG: 600 INJECTION, SOLUTION INTRAVENOUS at 19:44

## 2021-10-13 RX ADMIN — KETOROLAC TROMETHAMINE 15 MG: 30 INJECTION, SOLUTION INTRAMUSCULAR at 18:04

## 2021-10-13 RX ADMIN — DEXAMETHASONE SODIUM PHOSPHATE 10 MG: 10 INJECTION, SOLUTION INTRAMUSCULAR; INTRAVENOUS at 18:05

## 2021-10-13 RX ADMIN — SODIUM CHLORIDE, SODIUM LACTATE, POTASSIUM CHLORIDE, AND CALCIUM CHLORIDE 1000 ML: .6; .31; .03; .02 INJECTION, SOLUTION INTRAVENOUS at 18:08

## 2021-10-13 RX ADMIN — ONDANSETRON 4 MG: 2 INJECTION INTRAMUSCULAR; INTRAVENOUS at 18:05

## 2021-10-14 ENCOUNTER — TELEPHONE (OUTPATIENT)
Dept: GASTROENTEROLOGY | Facility: CLINIC | Age: 26
End: 2021-10-14

## 2021-10-14 LAB — HETEROPH AB SER QL: NEGATIVE

## 2021-10-27 ENCOUNTER — TELEPHONE (OUTPATIENT)
Dept: GASTROENTEROLOGY | Facility: CLINIC | Age: 26
End: 2021-10-27

## 2021-10-28 ENCOUNTER — TELEPHONE (OUTPATIENT)
Dept: GASTROENTEROLOGY | Facility: CLINIC | Age: 26
End: 2021-10-28

## 2021-11-03 ENCOUNTER — HOSPITAL ENCOUNTER (OUTPATIENT)
Dept: INFUSION CENTER | Facility: CLINIC | Age: 26
Discharge: HOME | End: 2021-11-03
Payer: COMMERCIAL

## 2021-11-03 VITALS
RESPIRATION RATE: 16 BRPM | HEIGHT: 63 IN | HEART RATE: 76 BPM | SYSTOLIC BLOOD PRESSURE: 117 MMHG | WEIGHT: 118.17 LBS | TEMPERATURE: 96 F | BODY MASS INDEX: 20.94 KG/M2 | DIASTOLIC BLOOD PRESSURE: 70 MMHG

## 2021-11-03 DIAGNOSIS — K50.012 CROHN'S DISEASE OF SMALL INTESTINE WITH INTESTINAL OBSTRUCTION (HCC): Primary | ICD-10-CM

## 2021-11-03 PROCEDURE — 96365 THER/PROPH/DIAG IV INF INIT: CPT

## 2021-11-03 RX ORDER — SODIUM CHLORIDE 9 MG/ML
20 INJECTION, SOLUTION INTRAVENOUS ONCE
Status: CANCELLED | OUTPATIENT
Start: 2021-11-03

## 2021-11-03 RX ORDER — SODIUM CHLORIDE 9 MG/ML
20 INJECTION, SOLUTION INTRAVENOUS ONCE
Status: COMPLETED | OUTPATIENT
Start: 2021-11-03 | End: 2021-11-03

## 2021-11-03 RX ADMIN — USTEKINUMAB 260 MG: 130 SOLUTION INTRAVENOUS at 08:59

## 2021-11-03 RX ADMIN — SODIUM CHLORIDE 20 ML/HR: 0.9 INJECTION, SOLUTION INTRAVENOUS at 08:59

## 2021-11-05 DIAGNOSIS — K50.012 CROHN'S DISEASE OF SMALL INTESTINE WITH INTESTINAL OBSTRUCTION (HCC): Primary | ICD-10-CM

## 2021-11-08 ENCOUNTER — TELEPHONE (OUTPATIENT)
Dept: GASTROENTEROLOGY | Facility: CLINIC | Age: 26
End: 2021-11-08

## 2021-11-08 DIAGNOSIS — K50.012 CROHN'S DISEASE OF SMALL INTESTINE WITH INTESTINAL OBSTRUCTION (HCC): ICD-10-CM

## 2021-11-12 ENCOUNTER — IMMUNIZATIONS (OUTPATIENT)
Dept: FAMILY MEDICINE CLINIC | Facility: HOSPITAL | Age: 26
End: 2021-11-12

## 2021-11-12 DIAGNOSIS — Z23 ENCOUNTER FOR IMMUNIZATION: Primary | ICD-10-CM

## 2021-11-12 PROCEDURE — 0002A COVID-19 PFIZER VACC 0.3 ML: CPT

## 2021-11-12 PROCEDURE — 91300 COVID-19 PFIZER VACC 0.3 ML: CPT

## 2022-07-30 ENCOUNTER — HOSPITAL ENCOUNTER (EMERGENCY)
Facility: HOSPITAL | Age: 27
Discharge: HOME | End: 2022-07-30
Attending: EMERGENCY MEDICINE
Payer: COMMERCIAL

## 2022-07-30 VITALS
HEIGHT: 63 IN | WEIGHT: 110 LBS | SYSTOLIC BLOOD PRESSURE: 118 MMHG | RESPIRATION RATE: 16 BRPM | BODY MASS INDEX: 19.49 KG/M2 | HEART RATE: 76 BPM | OXYGEN SATURATION: 99 % | TEMPERATURE: 98.1 F | DIASTOLIC BLOOD PRESSURE: 63 MMHG

## 2022-07-30 DIAGNOSIS — S01.21XA LACERATION OF NOSE WITHOUT COMPLICATION, INITIAL ENCOUNTER: Primary | ICD-10-CM
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
## 2022-07-30 PROCEDURE — 12011 RPR F/E/E/N/L/M 2.5 CM/<: CPT | Performed by: PHYSICIAN ASSISTANT

## 2022-07-30 PROCEDURE — 99283 EMERGENCY DEPT VISIT LOW MDM: CPT

## 2022-07-30 PROCEDURE — 99282 EMERGENCY DEPT VISIT SF MDM: CPT | Performed by: PHYSICIAN ASSISTANT

## 2022-07-30 RX ORDER — LIDOCAINE HYDROCHLORIDE 10 MG/ML
5 INJECTION, SOLUTION EPIDURAL; INFILTRATION; INTRACAUDAL; PERINEURAL ONCE
Status: COMPLETED | OUTPATIENT
Start: 2022-07-30 | End: 2022-07-30

## 2022-07-30 RX ORDER — BACITRACIN, NEOMYCIN, POLYMYXIN B 400; 3.5; 5 [USP'U]/G; MG/G; [USP'U]/G
1 OINTMENT TOPICAL ONCE
Status: COMPLETED | OUTPATIENT
Start: 2022-07-30 | End: 2022-07-30

## 2022-07-30 RX ADMIN — LIDOCAINE HYDROCHLORIDE 5 ML: 10 INJECTION, SOLUTION EPIDURAL; INFILTRATION; INTRACAUDAL at 04:57

## 2022-07-30 RX ADMIN — BACITRACIN ZINC, NEOMYCIN, POLYMYXIN B 1 LARGE APPLICATION: 400; 3.5; 5 OINTMENT TOPICAL at 04:57

## 2022-07-30 NOTE — Clinical Note
Greta Khan was seen and treated in our emergency department on 7/30/2022     ?    ?    ? Diagnosis: ?    Neda Tucker  may return to work on return date  She may return on this date: 08/01/2022    ? If you have any questions or concerns, please don't hesitate to call        Noel Blair RN    ______________________________           _______________          _______________  Hospital Representative                              Date                                Time

## 2022-08-09 ENCOUNTER — HOSPITAL ENCOUNTER (EMERGENCY)
Facility: HOSPITAL | Age: 27
Discharge: HOME | End: 2022-08-09
Attending: EMERGENCY MEDICINE
Payer: COMMERCIAL

## 2022-08-09 VITALS
TEMPERATURE: 98.8 F | OXYGEN SATURATION: 99 % | RESPIRATION RATE: 18 BRPM | DIASTOLIC BLOOD PRESSURE: 62 MMHG | HEART RATE: 88 BPM | SYSTOLIC BLOOD PRESSURE: 102 MMHG

## 2022-08-09 DIAGNOSIS — Z48.02 ENCOUNTER FOR REMOVAL OF SUTURES: ICD-10-CM

## 2022-08-09 DIAGNOSIS — R68.89 FLU-LIKE SYMPTOMS: Primary | ICD-10-CM

## 2022-08-09 LAB
FLUAV RNA RESP QL NAA+PROBE: NEGATIVE
FLUBV RNA RESP QL NAA+PROBE: NEGATIVE
RSV RNA RESP QL NAA+PROBE: NEGATIVE
SARS-COV-2 RNA RESP QL NAA+PROBE: POSITIVE

## 2022-08-09 PROCEDURE — 99283 EMERGENCY DEPT VISIT LOW MDM: CPT

## 2022-08-09 PROCEDURE — 99282 EMERGENCY DEPT VISIT SF MDM: CPT | Performed by: PHYSICIAN ASSISTANT

## 2022-08-09 PROCEDURE — 0241U HB NFCT DS VIR RESP RNA 4 TRGT: CPT | Performed by: PHYSICIAN ASSISTANT

## 2022-08-09 NOTE — ED PROVIDER NOTES
History  Chief Complaint   Patient presents with    Cough     Pt  Presents to ER with c/o cough, headache and sore throat x3 days  Also she needs a suture removal to her nose  Patient is a 17-year-old female presenting to the ED for evaluation of flu-like symptoms and suture removal   Patient states that she has had a mild cough, headaches and a sore throat over the past 3 days  She denies any fevers, chills, chest pain, shortness of breath, nausea, vomiting, diarrhea, abdominal pain or urinary symptoms  She denies any sick contacts or known COVID exposures  Patient also states that she was seen in the ED on  for a laceration to her nose and needs the sutures removed  Prior to Admission Medications   Prescriptions Last Dose Informant Patient Reported? Taking?   ondansetron (ZOFRAN-ODT) 4 mg disintegrating tablet   No No   Sig: Take 1 tablet (4 mg total) by mouth every 6 (six) hours as needed for nausea or vomiting   predniSONE 5 mg tablet   No No   Simg PO daily x 7 days to taper by 5mg weekly until finished   Patient not taking: Reported on 11/3/2021   ustekinumab (STELARA) 90 mg/mL subcutaneous injection   No No   Sig: Inject 1 ml (90 mg total) under skin every 8 weeks  Facility-Administered Medications: None       Past Medical History:   Diagnosis Date    Anxiety     [facility]     Depression     Su[facility] attempt [facility])        Past Surgical History:   Procedure Laterality Date    COLONOSCOPY      VULVA BIOPSY Right 2020    Procedure: Excision of Right Labial Lesion;  Surgeon: Reynold Gomez MD;  [facility];  Service: Gynecology       Family History   Problem Relation Age of Onset    Vitiligo Paternal Grandfather     Lupus Paternal Aunt      I have reviewed and agree with the history as documented      E-Cigarette/Vaping    E-Cigarette Use Never User      E-Cigarette/Vaping Substances    Nicotine No     THC No     CBD No     Flavoring No  Other No     Unknown No      Social History     Tobacco Use    Smoking status: Never Smoker    Smokeless tobacco: Never Used   Vaping Use    Vaping Use: Never used   Substance Use Topics    Alcohol use: Yes     Comment: occasionally    Drug use: Not Currently     Frequency: 7 0 times per week     Types: Marijuana     Comment: Smokes weed daily        Review of Systems   Constitutional: Negative for appetite change, chills, fatigue and fever  HENT: Positive for sore throat  Negative for congestion, rhinorrhea, sinus pressure and sinus pain  Eyes: Negative for photophobia and visual disturbance  Respiratory: Positive for cough  Negative for shortness of breath and wheezing  Cardiovascular: Negative for chest pain, palpitations and leg swelling  Gastrointestinal: Negative for abdominal pain, blood in stool, constipation, diarrhea, nausea and vomiting  Genitourinary: Negative for difficulty urinating, dysuria, flank pain, frequency, hematuria and urgency  Musculoskeletal: Negative for arthralgias, back pain, joint swelling, myalgias and neck pain  Neurological: Positive for headaches  Negative for dizziness, syncope, weakness and light-headedness  All other systems reviewed and are negative  Physical Exam  Physical Exam  Vitals and nursing note reviewed  Constitutional:       General: She is awake  Appearance: Normal appearance  She is well-developed  She is not toxic-appearing or diaphoretic  HENT:      Head: Normocephalic and atraumatic  Right Ear: External ear normal       Left Ear: External ear normal       Nose: Nose normal         Mouth/Throat:      Lips: Pink  Mouth: Mucous membranes are moist       Pharynx: Oropharynx is clear  Uvula midline  No pharyngeal swelling, oropharyngeal exudate, posterior oropharyngeal erythema or uvula swelling  Tonsils: No tonsillar exudate or tonsillar abscesses  Eyes:      General: Lids are normal  No scleral icterus  Conjunctiva/sclera: Conjunctivae normal       Pupils: Pupils are equal, round, and reactive to light  Cardiovascular:      Rate and Rhythm: Normal rate and regular rhythm  Pulses: Normal pulses  Radial pulses are 2+ on the right side and 2+ on the left side  Heart sounds: Normal heart sounds, S1 normal and S2 normal    Pulmonary:      Effort: Pulmonary effort is normal  No accessory muscle usage  Breath sounds: Normal breath sounds  No stridor  No decreased breath sounds, wheezing, rhonchi or rales  Comments: Lungs are clear and equal to auscultation bilaterally  No wheezing, rhonchi or rales  Abdominal:      General: Abdomen is flat  Bowel sounds are normal  There is no distension  Palpations: Abdomen is soft  Tenderness: There is no abdominal tenderness  There is no right CVA tenderness, left CVA tenderness, guarding or rebound  Musculoskeletal:      Cervical back: Full passive range of motion without pain and neck supple  No signs of trauma  No pain with movement  Right lower leg: No edema  Left lower leg: No edema  Lymphadenopathy:      Cervical: No cervical adenopathy  Skin:     General: Skin is warm and dry  Capillary Refill: Capillary refill takes less than 2 seconds  Coloration: Skin is not cyanotic, jaundiced or pale  Neurological:      Mental Status: She is alert and oriented to person, place, and time  GCS: GCS eye subscore is 4  GCS verbal subscore is 5  GCS motor subscore is 6  Gait: Gait normal    Psychiatric:         Mood and Affect: Mood normal          Speech: Speech normal          Behavior: Behavior is cooperative           Vital Signs  ED Triage Vitals [08/09/22 1153]   Temperature Pulse Respirations Blood Pressure SpO2   98 8 °F (37 1 °C) 88 18 102/62 99 %      Temp Source Heart Rate Source Patient Position - Orthostatic VS BP Location FiO2 (%)   Oral Monitor Sitting Right arm --      Pain Score       7 Vitals:    08/09/22 1153   BP: 102/62   Pulse: 88   Patient Position - Orthostatic VS: Sitting         Visual Acuity      ED Medications  Medications - No data to display    Diagnostic Studies  Results Reviewed     Procedure Component Value Units Date/Time    FLU/RSV/COVID - if FLU/RSV clinically relevant [[id number]]  (Abnormal) Collected: 08/09/22 1245    Lab Status: Final result Specimen: Nares from Nose Updated: 08/09/22 1345     SARS-CoV-2 Positive     INFLUENZA A PCR Negative     INFLUENZA B PCR Negative     RSV PCR Negative    Narrative:      FOR PEDIATRIC PATIENTS - copy/paste COVID Guidelines URL to browser: [email or web address]  [employer]    SARS-CoV-2 assay is a Nucleic Acid Amplification assay intended for the  qualitative detection of nucleic acid from SARS-CoV-2 in nasopharyngeal  swabs  Results are for the presumptive identification of SARS-CoV-2 RNA  Positive results are indicative of infection with SARS-CoV-2, the virus  causing COVID-19, but do not rule out bacterial infection or co-infection  with other viruses  Laboratories within the [place] and its  territories are required to report all positive results to the appropriate  public health authorities  Negative results do not preclude SARS-CoV-2  infection and should not be used as the sole basis for treatment or other  patient management decisions  Negative results must be combined with  clinical observations, patient history, and epidemiological information  This test has not been FDA cleared or approved  This test has been authorized by FDA under an Emergency Use Authorization  (EUA)  This test is only authorized for the duration of time the  declaration that circumstances exist justifying the authorization of the  emergency use of an in vitro diagnostic tests for detection of SARS-CoV-2  virus and/or diagnosis of COVID-19 infection under section 564(b)(1) of  the Act, 21 U  S C  360bbb-3(b)(1), unless the authorization is terminated  or revoked sooner  The test has been validated but independent review by FDA  and CLIA is pending  Test performed using [employer] Gene[employer]pert: This RT-PCR assay targets N2,  a region unique to SARS-CoV-2  A conserved region in the E-gene was chosen  for pan-Sarbecovirus detection which includes SARS-CoV-2  No orders to display              Procedures  Suture removal    Date/Time: 8/9/2022 12:23 PM  Performed by: Lisa Gavin PA-C  Authorized by: Lisa Gavin PA-C   Universal Protocol:  Consent: Verbal consent obtained  Risks and benefits: risks, benefits and alternatives were discussed  Consent given by: patient  Time out: Immediately prior to procedure a "time out" was called to verify the correct patient, procedure, equipment, support staff and site/side marked as required  Timeout called at: 8/9/2022 12:23 PM   Patient understanding: patient states understanding of the procedure being performed  Required items: required blood products, implants, devices, and special equipment available  Patient identity confirmed: verbally with patient        Patient location:  ED  Location:     Laterality:  Median    Location:  [address] location:  Nose  Procedure details: Tools used:  Suture removal kit    Wound appearance:  No sign(s) of infection, good wound healing, nontender, nonpurulent and clean    Number of sutures removed:  2  Post-procedure details:     Post-removal:  No dressing applied    Patient tolerance of procedure: Tolerated well, no immediate complications             ED Course                               SBIRT 20yo+    Flowsheet Row Most Recent Value   SBIRT (23 yo +)    In order to provide better care to our patients, we are screening all of our patients for alcohol and drug use  Would it be okay to ask you these screening questions?  Yes Filed at: 08/09/2022 1305   Initial Alcohol Screen: US AUDIT-C 1  How often do you have a drink containing alcohol? 0 Filed at: 08/09/2022 1302   2  How many drinks containing alcohol do you have on a typical day you are drinking? 0 Filed at: 08/09/2022 1302   3b  FEMALE Any Age, or MALE 65+: How often do you have 4 or more drinks on one occassion? 0 Filed at: 08/09/2022 1302   Audit-C Score 0 Filed at: 08/09/2022 1302   TK: How many times in the past year have you    Used an illegal drug or used a prescription medication for non-medical reasons? Never Filed at: 08/09/2022 1302                    MDM  Number of Diagnoses or Management Options  Encounter for removal of sutures  Flu-like symptoms  Diagnosis management comments: Patient is a 61-year-old female presenting to the ED for evaluation of flu-like symptoms and suture removal   Viral swab obtained and pending at time of discharge  Sutures removed  Wound is clean, dry and intact  Supportive care measures discussed in detail patient was advised to return to the ED for any new worsening symptoms  The management plan was discussed in detail with the patient at bedside and all questions were answered  Strict ED return instructions were discussed at bedside  Prior to discharge, both verbal and written instructions were provided  We discussed the signs and symptoms that should prompt the patient to return to the ED  All questions were answered and the patient was comfortable with the plan of care and discharged home  The patient agrees to return to the Emergency Department for concerns and/or progression of illness         Amount and/or Complexity of Data Reviewed  Clinical lab tests: ordered and reviewed        Disposition  Final diagnoses:   Flu-like symptoms   Encounter for removal of sutures     Time reflects when diagnosis was documented in both MDM as applicable and the Disposition within this note     Time User Action Codes Description Comment    8/9/2022 12:23 PM Ryan Doctor Add [R62 90] Flu-like symptoms 8/9/2022 12:23 PM Caitlin Bustamante [Z48 02] Encounter for removal of sutures       ED Disposition     ED Disposition   Discharge    Condition   Stable    Date/Time   Tue Aug 9, 2022 12:23 PM    Comment   Marian Ruddy discharge to home/self care  Follow-up Information     Follow up With Specialties Details Why Contact Info Additional Information    Theresa Perales MD Gastroenterology Schedule an appointment as soon as possible for a visit   [address]   [address] [facility] [address]        [address] [place] [address] Emergency Department Emergency Medicine  If symptoms worsen [address] Emergency Department, [address] [place] [address], [id number]          Discharge Medication List as of 8/9/2022 12:27 PM      CONTINUE these medications which have NOT CHANGED    Details   ondansetron (ZOFRAN-ODT) 4 mg disintegrating tablet Take 1 tablet (4 mg total) by mouth every 6 (six) hours as needed for nausea or vomiting, Starting Wed 10/13/2021, Print      predniSONE 5 mg tablet 40mg PO daily x 7 days to taper by 5mg weekly until finished, Normal      ustekinumab (STELARA) 90 mg/mL subcutaneous injection Inject 1 ml (90 mg total) under skin every 8 weeks  , Normal             No discharge procedures on file      PDMP Review       Value Time User    PDMP Reviewed  Yes 10/18/2019  1:59 AM Kitty De León MD          ED Provider  Electronically Signed by           Enrique Cameron PA-C  08/10/22 4189

## 2022-08-09 NOTE — Clinical Note
Virgie Duque was seen and treated in our emergency department on 8/9/2022  Diagnosis:     Brad Peacock  may return to work on return date  She may return on this date: 08/11/2022         If you have any questions or concerns, please don't hesitate to call        Osvaldo Winslow PA-C    ______________________________           _______________          _______________  Hospital Representative                              Date                                Time

## 2022-08-09 NOTE — Clinical Note
Williamtori Shea was seen and treated in our emergency department on 8/9/2022  Diagnosis:     Cortney Booker    She may return on this date: 08/13/2022    If COVID test is positive - may return to work on 8/13/22  If you have any questions or concerns, please don't hesitate to call        Leandro NecessaryCARINA    ______________________________           _______________          _______________  Hospital Representative                              Date                                Time

## 2022-08-22 ENCOUNTER — TELEPHONE (OUTPATIENT)
Dept: GASTROENTEROLOGY | Facility: CLINIC | Age: 27
End: 2022-08-22

## 2022-08-22 ENCOUNTER — APPOINTMENT (OUTPATIENT)
Dept: LAB | Facility: HOSPITAL | Age: 27
End: 2022-08-22

## 2022-08-22 ENCOUNTER — HOSPITAL ENCOUNTER (EMERGENCY)
Facility: HOSPITAL | Age: 27
Discharge: HOME | End: 2022-08-22
Attending: EMERGENCY MEDICINE | Admitting: EMERGENCY MEDICINE
Payer: COMMERCIAL

## 2022-08-22 VITALS
HEART RATE: 69 BPM | DIASTOLIC BLOOD PRESSURE: 59 MMHG | TEMPERATURE: 97.7 F | OXYGEN SATURATION: 97 % | SYSTOLIC BLOOD PRESSURE: 114 MMHG | RESPIRATION RATE: 18 BRPM

## 2022-08-22 DIAGNOSIS — K50.012 CROHN'S DISEASE OF SMALL INTESTINE WITH INTESTINAL OBSTRUCTION (HCC): Primary | ICD-10-CM

## 2022-08-22 DIAGNOSIS — H92.09 EAR PAIN: ICD-10-CM

## 2022-08-22 DIAGNOSIS — U07.1 COVID-19: Primary | ICD-10-CM

## 2022-08-22 PROCEDURE — 99282 EMERGENCY DEPT VISIT SF MDM: CPT | Performed by: EMERGENCY MEDICINE

## 2022-08-22 PROCEDURE — 99283 EMERGENCY DEPT VISIT LOW MDM: CPT

## 2022-08-22 RX ORDER — MOMETASONE FUROATE 50 UG/1
2 SPRAY, METERED NASAL DAILY
Qty: 17 G | Refills: 0 | Status: SHIPPED | OUTPATIENT
Start: 2022-08-22 | End: 2022-09-30 | Stop reason: ALTCHOICE

## 2022-08-22 NOTE — ED PROVIDER NOTES
History  Chief Complaint   Patient presents with    Flu Symptoms     Pt states she had covid 13 days ago and is still having symptoms      25 yo female who presents to ED c/o 2 weeks of malaise and b/l ear pain, cough, fatigue and congestion  Started two weeks ago when she was diagnosed w covid  Has not been taking any medicine  No fever  No dyspnea  No hemoptysis  No aggravating or alleviating factors  Prior to Admission Medications   Prescriptions Last Dose Informant Patient Reported? Taking?   ondansetron (ZOFRAN-ODT) 4 mg disintegrating tablet   No No   Sig: Take 1 tablet (4 mg total) by mouth every 6 (six) hours as needed for nausea or vomiting   predniSONE 5 mg tablet   No No   Simg PO daily x 7 days to taper by 5mg weekly until finished   Patient not taking: Reported on 11/3/2021   ustekinumab (STELARA) 90 mg/mL subcutaneous injection   No No   Sig: Inject 1 ml (90 mg total) under skin every 8 weeks  Facility-Administered Medications: None       Past Medical History:   Diagnosis Date    Anxiety     Crohn disease ([facility] [address] )     Depression     Suicide attempt [facility])        Past Surgical History:   Procedure Laterality Date    COLONOSCOPY      VULVA BIOPSY Right 2020    Procedure: Excision of Right Labial Lesion;  Surgeon: Jimmy Amin MD;  Location: [facility];  Service: Gynecology       Family History   Problem Relation Age of Onset    Vitiligo Paternal Grandfather     Lupus Paternal Aunt      I have reviewed and agree with the history as documented      E-Cigarette/Vaping    E-Cigarette Use Never User      E-Cigarette/Vaping Substances    Nicotine No     THC No     CBD No     Flavoring No     Other No     Unknown No      Social History     Tobacco Use    Smoking status: Never Smoker    Smokeless tobacco: Never Used   Vaping Use    Vaping Use: Never used   Substance Use Topics    Alcohol use: Yes     Comment: occasionally    Drug use: Not Currently Frequency: 7 0 times per week     Types: Marijuana     Comment: Smokes weed daily        Review of Systems   Constitutional: Positive for fatigue  Negative for chills and fever  HENT: Positive for ear pain  Negative for ear discharge  Respiratory: Positive for cough  All other systems reviewed and are negative  Physical Exam  Physical Exam  Vitals and nursing note reviewed  Constitutional:       General: She is not in acute distress  Appearance: Normal appearance  She is well-developed  She is not ill-appearing, toxic-appearing or diaphoretic  HENT:      Head: Normocephalic and atraumatic  Right Ear: Tympanic membrane, ear canal and external ear normal       Left Ear: Tympanic membrane, ear canal and external ear normal       Mouth/Throat:      Mouth: Mucous membranes are moist       Pharynx: Oropharynx is clear  Eyes:      General:         Right eye: No discharge  Left eye: No discharge  Conjunctiva/sclera: Conjunctivae normal       Pupils: Pupils are equal, round, and reactive to light  Neck:      Vascular: No JVD  Cardiovascular:      Rate and Rhythm: Normal rate  Pulses: Normal pulses  Heart sounds: No murmur heard  Pulmonary:      Effort: Pulmonary effort is normal  No respiratory distress  Breath sounds: No stridor  No wheezing, rhonchi or rales  Musculoskeletal:         General: No tenderness or deformity  Normal range of motion  Cervical back: Normal range of motion and neck supple  No rigidity or tenderness  Skin:     General: Skin is warm and dry  Capillary Refill: Capillary refill takes less than 2 seconds  Coloration: Skin is not jaundiced or pale  Findings: No bruising, erythema, lesion or rash  Neurological:      General: No focal deficit present  Mental Status: She is alert and oriented to person, place, and time  Cranial Nerves: No cranial nerve deficit  Sensory: No sensory deficit        Motor: No weakness or abnormal muscle tone  Coordination: Coordination normal          Vital Signs  ED Triage Vitals [08/22/22 0926]   Temperature Pulse Respirations Blood Pressure SpO2   97 7 °F (36 5 °C) 69 18 114/59 97 %      Temp Source Heart Rate Source Patient Position - Orthostatic VS BP Location FiO2 (%)   Oral Monitor -- Right arm --      Pain Score       --           Vitals:    08/22/22 0926   BP: 114/59   Pulse: 69         Visual Acuity      ED Medications  Medications - No data to display    Diagnostic Studies  Results Reviewed     None                 No orders to display              Procedures  Procedures         ED Course                               SBIRT 20yo+    Flowsheet Row Most Recent Value   SBIRT (25 yo +)    In order to provide better care to our patients, we are screening all of our patients for alcohol and drug use  Would it be okay to ask you these screening questions? No Filed at: 08/22/2022 6872                    MDM    Disposition  Final diagnoses:   COVID-19   Ear pain     Time reflects when diagnosis was documented in both MDM as applicable and the Disposition within this note     Time User Action Codes Description Comment    8/22/2022  9:29 AM Bonita Bustamante [U07 1] COVID-19     8/22/2022  9:29 AM Bonita Bustamante [H92 09] Ear pain       ED Disposition     ED Disposition   Discharge    Condition   Stable    Date/Time   Mon Aug 22, 2022  9:29 AM    Comment   Bon Mcfadden discharge to home/self care                 Follow-up Information    None         Patient's Medications   Discharge Prescriptions    GUAIFENESIN (ROBITUSSIN) 100 MG/5 ML SYRUP    Take 10 mL (200 mg total) by mouth 3 (three) times a day as needed for cough for up to 10 days       Start Date: 8/22/2022 End Date: 9/1/2022       Order Dose: 200 mg       Quantity: 120 mL    Refills: 0    MOMETASONE (NASONEX) 50 MCG/ACT NASAL SPRAY    2 sprays into each nostril daily       Start Date: 8/22/2022 End Date: --       Order Dose: 2 sprays       Quantity: 17 g    Refills: 0       No discharge procedures on file      PDMP Review       Value Time User    PDMP Reviewed  Yes 10/18/2019  1:59 AM Gordo Baez MD          ED Provider  Electronically Signed by           Yaritza Hays MD  08/22/22 8010

## 2022-08-22 NOTE — Clinical Note
Jama Mayorga was seen and treated in our emergency department on 8/22/2022  Diagnosis:     Lora Cleary  may return to work on return date  She may return on this date: 08/23/2022         If you have any questions or concerns, please don't hesitate to call        Elvi Magallanes RN    ______________________________           _______________          _______________  Hospital Representative                              Date                                Time

## 2022-08-22 NOTE — Clinical Note
Idania Gio was seen and treated in our emergency department on 8/22/2022  Diagnosis:     Radha Viramontes  may return to work on return date  She may return on this date: 08/29/2022         If you have any questions or concerns, please don't hesitate to call        Sonia Cox MD    ______________________________           _______________          _______________  Hospital Representative                              Date                                Time

## 2022-08-22 NOTE — TELEPHONE ENCOUNTER
Patient was in the office today, no OV schedule  She came over after an ED visit  She has not follow up in the last year  OV is schedule, Stool studies sent

## 2022-08-23 ENCOUNTER — HOSPITAL ENCOUNTER (EMERGENCY)
Facility: HOSPITAL | Age: 27
Discharge: ANOTHER HOSPITAL | End: 2022-08-23
Attending: EMERGENCY MEDICINE
Payer: COMMERCIAL

## 2022-08-23 ENCOUNTER — HOSPITAL ENCOUNTER (INPATIENT)
Facility: HOSPITAL | Age: 27
LOS: 1 days | Discharge: HOME | End: 2022-08-23
Attending: EMERGENCY MEDICINE | Admitting: EMERGENCY MEDICINE
Payer: COMMERCIAL

## 2022-08-23 VITALS
DIASTOLIC BLOOD PRESSURE: 74 MMHG | RESPIRATION RATE: 16 BRPM | OXYGEN SATURATION: 97 % | HEIGHT: 63 IN | BODY MASS INDEX: 19.49 KG/M2 | SYSTOLIC BLOOD PRESSURE: 107 MMHG | WEIGHT: 110 LBS | HEART RATE: 57 BPM | TEMPERATURE: 98.5 F

## 2022-08-23 VITALS
SYSTOLIC BLOOD PRESSURE: 113 MMHG | RESPIRATION RATE: 19 BRPM | HEART RATE: 73 BPM | TEMPERATURE: 98.1 F | OXYGEN SATURATION: 97 % | DIASTOLIC BLOOD PRESSURE: 91 MMHG

## 2022-08-23 DIAGNOSIS — F10.929 ALCOHOL INTOXICATION (HCC): ICD-10-CM

## 2022-08-23 DIAGNOSIS — T50.902A SUICIDE ATTEMPT BY DRUG OVERDOSE (HCC): Primary | ICD-10-CM

## 2022-08-23 DIAGNOSIS — T50.902A OVERDOSE, INTENTIONAL SELF-HARM, INITIAL ENCOUNTER (HCC): Primary | ICD-10-CM

## 2022-08-23 PROBLEM — F12.90 MARIJUANA USE: Chronic | Status: ACTIVE | Noted: 2020-08-27

## 2022-08-23 PROBLEM — U07.1 COVID-19: Status: ACTIVE | Noted: 2022-08-23

## 2022-08-23 PROBLEM — E87.20 LACTIC ACIDOSIS: Status: ACTIVE | Noted: 2022-08-23

## 2022-08-23 PROBLEM — F10.920: Status: ACTIVE | Noted: 2022-08-23

## 2022-08-23 PROBLEM — Z78.9 ADMITS TO ALCOHOL USE: Status: RESOLVED | Noted: 2019-10-18 | Resolved: 2022-08-23

## 2022-08-23 PROBLEM — E87.2 LACTIC ACIDOSIS: Status: ACTIVE | Noted: 2022-08-23

## 2022-08-23 PROBLEM — E87.29 METABOLIC ACIDOSIS, INCREASED ANION GAP: Status: ACTIVE | Noted: 2022-08-23

## 2022-08-23 LAB
ALBUMIN SERPL BCP-MCNC: 5 G/DL (ref 3.5–5)
ALP SERPL-CCNC: 71 U/L (ref 46–116)
ALT SERPL W P-5'-P-CCNC: 25 U/L (ref 12–78)
AMMONIA PLAS-SCNC: <10 UMOL/L (ref 11–35)
AMPHETAMINES SERPL QL SCN: NEGATIVE
ANION GAP SERPL CALCULATED.3IONS-SCNC: 19 MMOL/L (ref 4–13)
APAP SERPL-MCNC: <2 UG/ML (ref 10–20)
AST SERPL W P-5'-P-CCNC: 26 U/L (ref 5–45)
BARBITURATES UR QL: NEGATIVE
BASE EX.OXY STD BLDV CALC-SCNC: 80.5 % (ref 60–80)
BASE EXCESS BLDV CALC-SCNC: -3.6 MMOL/L
BASOPHILS # BLD AUTO: 0.13 THOUSANDS/ΜL (ref 0–0.1)
BASOPHILS NFR BLD AUTO: 1 % (ref 0–1)
BENZODIAZ UR QL: NEGATIVE
BILIRUB SERPL-MCNC: 0.59 MG/DL (ref 0.2–1)
BUN SERPL-MCNC: 4 MG/DL (ref 5–25)
CALCIUM SERPL-MCNC: 9.7 MG/DL (ref 8.3–10.1)
CARDIAC TROPONIN I PNL SERPL HS: <2 NG/L
CARDIAC TROPONIN I PNL SERPL HS: <2 NG/L
CHLORIDE SERPL-SCNC: 105 MMOL/L (ref 96–108)
CK SERPL-CCNC: 116 U/L (ref 26–192)
CO2 SERPL-SCNC: 22 MMOL/L (ref 21–32)
COCAINE UR QL: NEGATIVE
CREAT SERPL-MCNC: 0.73 MG/DL (ref 0.6–1.3)
EOSINOPHIL # BLD AUTO: 0.42 THOUSAND/ΜL (ref 0–0.61)
EOSINOPHIL NFR BLD AUTO: 4 % (ref 0–6)
ERYTHROCYTE [DISTWIDTH] IN BLOOD BY AUTOMATED COUNT: 13.2 % (ref 11.6–15.1)
ETHANOL EXG-MCNC: 0.1 MG/DL
ETHANOL SERPL-MCNC: 182 MG/DL (ref 0–3)
EXT PREG TEST URINE: NEGATIVE
EXT. CONTROL ED NAV: NORMAL
FLUAV RNA RESP QL NAA+PROBE: NEGATIVE
FLUBV RNA RESP QL NAA+PROBE: NEGATIVE
GFR SERPL CREATININE-BSD FRML MDRD: 114 ML/MIN/1.73SQ M
GLUCOSE SERPL-MCNC: 90 MG/DL (ref 65–140)
HCO3 BLDV-SCNC: 21.1 MMOL/L (ref 24–30)
HCT VFR BLD AUTO: 43.7 % (ref 34.8–46.1)
HGB BLD-MCNC: 14.9 G/DL (ref 11.5–15.4)
IMM GRANULOCYTES # BLD AUTO: 0.08 THOUSAND/UL (ref 0–0.2)
IMM GRANULOCYTES NFR BLD AUTO: 1 % (ref 0–2)
LACTATE SERPL-SCNC: 2 MMOL/L (ref 0.5–2)
LACTATE SERPL-SCNC: 3.6 MMOL/L (ref 0.5–2)
LYMPHOCYTES # BLD AUTO: 3.89 THOUSANDS/ΜL (ref 0.6–4.47)
LYMPHOCYTES NFR BLD AUTO: 38 % (ref 14–44)
MCH RBC QN AUTO: 29.3 PG (ref 26.8–34.3)
MCHC RBC AUTO-ENTMCNC: 34.1 G/DL (ref 31.4–37.4)
MCV RBC AUTO: 86 FL (ref 82–98)
METHADONE UR QL: NEGATIVE
MONOCYTES # BLD AUTO: 0.59 THOUSAND/ΜL (ref 0.17–1.22)
MONOCYTES NFR BLD AUTO: 6 % (ref 4–12)
NEUTROPHILS # BLD AUTO: 5.05 THOUSANDS/ΜL (ref 1.85–7.62)
NEUTS SEG NFR BLD AUTO: 50 % (ref 43–75)
NRBC BLD AUTO-RTO: 0 /100 WBCS
O2 CT BLDV-SCNC: 14.5 ML/DL
OPIATES UR QL SCN: NEGATIVE
OXYCODONE+OXYMORPHONE UR QL SCN: NEGATIVE
PCO2 BLDV: 37.4 MM HG (ref 42–50)
PCP UR QL: NEGATIVE
PH BLDV: 7.37 [PH] (ref 7.3–7.4)
PLATELET # BLD AUTO: 522 THOUSANDS/UL (ref 149–390)
PMV BLD AUTO: 9.4 FL (ref 8.9–12.7)
PO2 BLDV: 50 MM HG (ref 35–45)
POTASSIUM SERPL-SCNC: 3.6 MMOL/L (ref 3.5–5.3)
PROT SERPL-MCNC: 9 G/DL (ref 6.4–8.4)
RBC # BLD AUTO: 5.09 MILLION/UL (ref 3.81–5.12)
RSV RNA RESP QL NAA+PROBE: NEGATIVE
SALICYLATES SERPL-MCNC: 3.4 MG/DL (ref 3–20)
SARS-COV-2 RNA RESP QL NAA+PROBE: POSITIVE
SODIUM SERPL-SCNC: 146 MMOL/L (ref 135–147)
THC UR QL: POSITIVE
TSH SERPL DL<=0.05 MIU/L-ACNC: 1.69 UIU/ML (ref 0.45–4.5)
WBC # BLD AUTO: 10.16 THOUSAND/UL (ref 4.31–10.16)

## 2022-08-23 PROCEDURE — 80053 COMPREHEN METABOLIC PANEL: CPT | Performed by: PHYSICIAN ASSISTANT

## 2022-08-23 PROCEDURE — 99285 EMERGENCY DEPT VISIT HI MDM: CPT

## 2022-08-23 PROCEDURE — 93005 ELECTROCARDIOGRAM TRACING: CPT

## 2022-08-23 PROCEDURE — 80179 DRUG ASSAY SALICYLATE: CPT | Performed by: PHYSICIAN ASSISTANT

## 2022-08-23 PROCEDURE — 80307 DRUG TEST PRSMV CHEM ANLYZR: CPT | Performed by: PHYSICIAN ASSISTANT

## 2022-08-23 PROCEDURE — 81025 URINE PREGNANCY TEST: CPT | Performed by: PHYSICIAN ASSISTANT

## 2022-08-23 PROCEDURE — 99291 CRITICAL CARE FIRST HOUR: CPT | Performed by: PHYSICIAN ASSISTANT

## 2022-08-23 PROCEDURE — 84484 ASSAY OF TROPONIN QUANT: CPT | Performed by: PHYSICIAN ASSISTANT

## 2022-08-23 PROCEDURE — 36415 COLL VENOUS BLD VENIPUNCTURE: CPT | Performed by: PHYSICIAN ASSISTANT

## 2022-08-23 PROCEDURE — 0241U HB NFCT DS VIR RESP RNA 4 TRGT: CPT | Performed by: PHYSICIAN ASSISTANT

## 2022-08-23 PROCEDURE — 96374 THER/PROPH/DIAG INJ IV PUSH: CPT

## 2022-08-23 PROCEDURE — 82075 ASSAY OF BREATH ETHANOL: CPT | Performed by: PHYSICIAN ASSISTANT

## 2022-08-23 PROCEDURE — 82550 ASSAY OF CK (CPK): CPT | Performed by: PHYSICIAN ASSISTANT

## 2022-08-23 PROCEDURE — 85025 COMPLETE CBC W/AUTO DIFF WBC: CPT | Performed by: PHYSICIAN ASSISTANT

## 2022-08-23 PROCEDURE — NC001 PR NO CHARGE: Performed by: EMERGENCY MEDICINE

## 2022-08-23 PROCEDURE — 96360 HYDRATION IV INFUSION INIT: CPT

## 2022-08-23 PROCEDURE — 99235 HOSP IP/OBS SAME DATE MOD 70: CPT | Performed by: EMERGENCY MEDICINE

## 2022-08-23 PROCEDURE — 83605 ASSAY OF LACTIC ACID: CPT | Performed by: PHYSICIAN ASSISTANT

## 2022-08-23 PROCEDURE — 80143 DRUG ASSAY ACETAMINOPHEN: CPT | Performed by: PHYSICIAN ASSISTANT

## 2022-08-23 PROCEDURE — 82140 ASSAY OF AMMONIA: CPT | Performed by: PHYSICIAN ASSISTANT

## 2022-08-23 PROCEDURE — 82805 BLOOD GASES W/O2 SATURATION: CPT | Performed by: PHYSICIAN ASSISTANT

## 2022-08-23 PROCEDURE — 82077 ASSAY SPEC XCP UR&BREATH IA: CPT | Performed by: PHYSICIAN ASSISTANT

## 2022-08-23 PROCEDURE — 84443 ASSAY THYROID STIM HORMONE: CPT | Performed by: PHYSICIAN ASSISTANT

## 2022-08-23 RX ORDER — ACETAMINOPHEN 325 MG/1
650 TABLET ORAL EVERY 6 HOURS PRN
Status: DISCONTINUED | OUTPATIENT
Start: 2022-08-23 | End: 2022-08-23 | Stop reason: HOSPADM

## 2022-08-23 RX ORDER — MAGNESIUM HYDROXIDE/ALUMINUM HYDROXICE/SIMETHICONE 120; 1200; 1200 MG/30ML; MG/30ML; MG/30ML
30 SUSPENSION ORAL EVERY 6 HOURS PRN
Status: DISCONTINUED | OUTPATIENT
Start: 2022-08-23 | End: 2022-08-23 | Stop reason: HOSPADM

## 2022-08-23 RX ORDER — ONDANSETRON 2 MG/ML
INJECTION INTRAMUSCULAR; INTRAVENOUS
Status: COMPLETED
Start: 2022-08-23 | End: 2022-08-23

## 2022-08-23 RX ORDER — ONDANSETRON 2 MG/ML
4 INJECTION INTRAMUSCULAR; INTRAVENOUS ONCE
Status: COMPLETED | OUTPATIENT
Start: 2022-08-23 | End: 2022-08-23

## 2022-08-23 RX ORDER — TRAZODONE HYDROCHLORIDE 50 MG/1
50 TABLET ORAL
Status: DISCONTINUED | OUTPATIENT
Start: 2022-08-23 | End: 2022-08-23 | Stop reason: HOSPADM

## 2022-08-23 RX ORDER — ONDANSETRON 4 MG/1
4 TABLET, ORALLY DISINTEGRATING ORAL EVERY 6 HOURS PRN
Status: DISCONTINUED | OUTPATIENT
Start: 2022-08-23 | End: 2022-08-23 | Stop reason: HOSPADM

## 2022-08-23 RX ADMIN — ONDANSETRON 4 MG: 2 INJECTION INTRAMUSCULAR; INTRAVENOUS at 06:28

## 2022-08-23 RX ADMIN — SODIUM CHLORIDE 1497 ML: 0.9 INJECTION, SOLUTION INTRAVENOUS at 08:03

## 2022-08-23 NOTE — ASSESSMENT & PLAN NOTE
· Hx of Crohn's with home regimen of Stelara 90mg/mL [id number] (last dose was 07/25/22)  · History of loose stools  · Previously on prednisone  · Continue with outpatient GI follow up

## 2022-08-23 NOTE — DISCHARGE SUMMARY
MEDICAL DETOX UNIT, LEVEL 4  Department of Medical Toxicology  Reason for Admission/Principal Problem: intoxication   Admitting provider: DO Chivo Gupta DO   8/23/2022  1:07 PM       Discharging Physician / Practitioner: Chivo Gama DO  PCP: Graciela Oliver MD  Admission Date:   Admission Orders (From admission, onward)     Ordered        08/23/22 1326  Inpatient Admission  Once            08/23/22 1326  Inpatient Admission  Once                      Discharge Date: 08/23/22    Medical Problems             Resolved Problems  Date Reviewed: 8/23/2022          Resolved    Admits to alcohol use 8/23/2022     Resolved by  Chivo aGma DO                Metabolic acidosis, increased anion gap  Assessment & Plan  · POA in ED: 19    Recent Labs     08/23/22  0653   AGAP 19*       COVID-19  Assessment & Plan  · Hx of Immunizations with Pfizer 2 doses (10/8/21 & 11/12/21)   · Tested Covid-19 positive on 8/9/22 and again on 8/23/22  · Current Covid symptoms include: dry cough and runny nose    Marijuana use  Assessment & Plan  · Reports smoking medical marijuana daily as treatment for Crohn's  · Counseled on cessation    Crohn's disease ([facility] )  Assessment & Plan  · Hx of Crohn's with home regimen of Stelara 90mg/mL l2kfeur (last dose was 07/25/22)  · History of loose stools  · Previously on prednisone  · Continue with outpatient GI follow up    * Alcohol intoxication without use disorder, uncomplicated ([facility] [address] )  Assessment & Plan  · Patient consumed excessive amount of alcohol last night and was unresponsive  Friend was concerned that she overdosed on something due to the unresponsive state and brought her to the emergency department where she was further evaluated  Her ethanol concentration was 182 mg/dL in the morning  ED AP was informed by friend that she overdosed on meloxicam and clonazepam   Patient was transferred to Toxicology Service for observation    Upon arrival, she was completely sober and denied any overdose  She admitted to drinking too much last night  · Patient very clearly denies suicidal ideation, attempt and even depression  No HI, hallucinations  Normal mental status, A&Ox4  · Discharge to home  Admits to alcohol use-resolved as of 8/23/2022  Assessment & Plan  · Pt has hx of alcohol usage of four beers occasionally, denies daily or weekly use  · POA in ED ethanol lvl 182  · Counseled on alcohol cessation        Reason for Admission:  Intoxication with concern for suicide attempt    Hospital Course:     Lanette Thompson is a 24 y o  female patient who originally presented to the hospital on 8/23/2022 due to intoxication with concern her suicide attempt  When patient was sober, she revealed that she had drank too much  She denied any suicide attempt  She denied any suicidal lesions or depression  She had a normal mental status and was eager to go home to care for her dog  She reports that her girlfriend validated this information in the ED and that she was told she would come to the detox unit to be re-evaluated when sober  The patient, initially admitted to the hospital as inpatient, was discharged earlier than expected given the following: She was not suicidal as reported and was not intoxicated or at risk of withdrawal       Please see above list of diagnoses and related plan for additional information  Condition at Discharge: good     Discharge Day Visit / Exam:     Subjective:  Patient is eager to go home and denies any symptoms  Vitals: Blood Pressure: 107/74 (08/23/22 1309)  Pulse: 57 (08/23/22 1309)  Temperature: 98 5 °F (36 9 °C) (08/23/22 1309)  Temp Source: Temporal (08/23/22 1309)  Respirations: 16 (08/23/22 1309)  Height: 5' 3" (160 cm) (08/23/22 1309)  Weight - Scale: 49 9 kg (110 lb) (08/23/22 1309)  SpO2: 97 % (08/23/22 1313)  Exam:   Physical Exam  Vitals and nursing note reviewed     Constitutional:       Appearance: Normal appearance  HENT:      Head: Normocephalic and atraumatic  Mouth/Throat:      Mouth: Mucous membranes are moist    Eyes:      Pupils: Pupils are equal, round, and reactive to light  Cardiovascular:      Rate and Rhythm: Normal rate  Pulmonary:      Effort: Pulmonary effort is normal    Abdominal:      General: Abdomen is flat  Musculoskeletal:         General: Normal range of motion  Cervical back: Normal range of motion  Skin:     General: Skin is warm  Neurological:      General: No focal deficit present  Mental Status: She is alert and oriented to person, place, and time  Psychiatric:         Mood and Affect: Mood normal          Behavior: Behavior normal          Thought Content: Thought content normal          Judgment: Judgment normal          Discharge instructions/Information to patient and family:   See after visit summary for information provided to patient and family  Provisions for Follow-Up Care:  See after visit summary for information related to follow-up care and any pertinent home health orders  Disposition:     Home       Discharge Statement:  I spent 30 minutes discharging the patient  This time was spent on the day of discharge  I had direct contact with the patient on the day of discharge  Greater than 50% of the total time was spent examining patient, answering all patient questions, arranging and discussing plan of care with patient as well as directly providing post-discharge instructions  Additional time then spent on discharge activities  Discharge Medications:  See after visit summary for reconciled discharge medications provided to patient and family        ** Please Note: This note has been constructed using a voice recognition system **
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
## 2022-08-23 NOTE — PLAN OF CARE
Problem: SUBSTANCE USE/ABUSE  Goal: By discharge, will develop insight into their chemical dependency and sustain motivation to continue in recovery  Description: INTERVENTIONS:  - Attends all daily group sessions and scheduled AA groups  - Actively practices coping skills through participation in the therapeutic community and adherence to program rules  - Reviews and completes assignments from individual treatment plan  - Assist patient development of understanding of their personal cycle of addiction and relapse triggers  Outcome: Progressing  Goal: By discharge, patient will have ongoing treatment plan addressing chemical dependency  Description: INTERVENTIONS:  - Assist patient with resources and/or appointments for ongoing recovery based living  Outcome: Progressing     Problem: NEUROSENSORY - ADULT  Goal: Achieves stable or improved neurological status  Description: INTERVENTIONS  - Monitor and report changes in neurological status  - Monitor vital signs such as temperature, blood pressure, glucose, and any other labs ordered   - Initiate measures to prevent increased intracranial pressure  - Monitor for seizure activity and implement precautions if appropriate      Outcome: Progressing  Goal: Remains free of injury related to seizures activity  Description: INTERVENTIONS  - Maintain airway, patient safety  and administer oxygen as ordered  - Monitor patient for seizure activity, document and report duration and description of seizure to physician/advanced practitioner  - If seizure occurs,  ensure patient safety during seizure  - Reorient patient post seizure  - Seizure pads on all 4 side rails  - Instruct patient/family to notify RN of any seizure activity including if an aura is experienced  - Instruct patient/family to call for assistance with activity based on nursing assessment  - Administer anti-seizure medications if ordered    Outcome: Progressing  Goal: Achieves maximal functionality and self care  Description: INTERVENTIONS  - Monitor swallowing and airway patency with patient fatigue and changes in neurological status  - Encourage and assist patient to increase activity and self care  - Encourage visually impaired, hearing impaired and aphasic patients to use assistive/communication devices  Outcome: Progressing     Problem: COPING  Goal: Pt/Family able to verbalize concerns and demonstrate effective coping strategies  Description: INTERVENTIONS:  - Assist patient/family to identify coping skills, available support systems and cultural and spiritual values  - Provide emotional support, including active listening and acknowledgement of concerns of patient and caregivers  - Reduce environmental stimuli, as able  - Provide patient education  - Assess for spiritual pain/suffering and initiate spiritual care, including notification of Pastoral Care or marcella based community as needed  - Assess effectiveness of coping strategies  Outcome: Progressing  Goal: Will report anxiety at manageable levels  Description: INTERVENTIONS:  - Administer medication as ordered  - Teach and encourage coping skills  - Provide emotional support  - Assess patient/family for anxiety and ability to cope  Outcome: Progressing     Problem: SELF HARM  Goal: Effect of psychiatric condition will be minimized and patient will be protected from self harm  Description: INTERVENTIONS:  - Assess impact of patient's symptoms on level of functioning, self-care needs and offer support as indicated  - Assess patient/family knowledge of depression, impact on illness and need for teaching  - Provide emotional support, presence and reassurance  - Assess for possible suicidal thoughts, ideation or self-harm  If patient expresses suicidal thoughts or statements do not leave alone, notify physician/AP immediately, initiate suicide precautions, and determine need for continual observation    - initiate consults and referrals as appropriate (a mental health professional, Spiritual Care  Outcome: Progressing     Problem: SUBSTANCE USE/ABUSE  Goal: By discharge, will develop insight into their chemical dependency and sustain motivation to continue in recovery  Description: INTERVENTIONS:  - Attends all daily group sessions and scheduled AA groups  - Actively practices coping skills through participation in the therapeutic community and adherence to program rules  - Reviews and completes assignments from individual treatment plan  - Assist patient development of understanding of their personal cycle of addiction and relapse triggers  Outcome: Progressing  Goal: By discharge, patient will have ongoing treatment plan addressing chemical dependency  Description: INTERVENTIONS:  - Assist patient with resources and/or appointments for ongoing recovery based living  Outcome: Progressing  Goal: Will have no detox symptoms and will verbalize plan for changing substance-related behavior  Description: INTERVENTIONS:  - Monitor physical status and assess for symptoms of withdrawal  - Administer medication as ordered  - Provide emotional support with 1 on 1 interaction with staff  - Encourage recovery focused program/ addiction education  - Assess for verbalization of changing behaviors related to substance abuse  - Initiate consults and referrals as appropriate (Case Management, Spiritual Care, etc )  Outcome: Progressing     Problem: SPIRITUAL CARE  Goal: Pt/Family able to move forward in process of forgiving self, others and/or higher power  Description: INTERVENTIONS:  - Assist patient with any spiritual needs/requests such as communion, confession, anointing, etc  - Explore guilt and help patient/family identify possible spiritual/cultural beliefs and values  - Explore possibilities of making amends & reconciliation with self, others, and/or a greater power  - Guide patient/family in identifying painful feelings  - Help patient explore and identify spiritual beliefs, cultural understandings or values that may help or hinder letting go of issue  - Help patient explore feelings of anger, bitterness, resentment, anxiety   Help patient/family identify and examine the situation in which these feelings are experienced  - Help patient/family identify destructive displacement of feelings onto other individuals  - Refer patient to formal counseling and/or to marcella community for further support as needed or per request  Outcome: Progressing  Goal: Patient feels balance and connection with others and/or higher power that empowers the self during times of loss, guilt and fear  Description: INTERVENTIONS:  - Create safety for patient through empathic presence and non-judgmental listening  - Encourage patient to explore his/her values, beliefs and/or spiritual images and practices  - Encourage use of breath work, imagery, meditation, relaxation, reiki to ease distress and provide healing  - Encourage use of cultural and spiritual celebrations and rituals  - Facilitate discussion that helps patient sort out spiritual concerns  - Help patient identify where meaning/hope/comfort & strength are in his/her life  - Refer patient to marcella community for assistance, as appropriate  - Respond to patient/family need for prayer/ritual/sacrament/ceremony  Outcome: Progressing

## 2022-08-23 NOTE — ASSESSMENT & PLAN NOTE
· Hx of Immunizations with Pfizer 2 doses (10/8/21 & 11/12/21)   · Tested Covid-19 positive on 8/9/22 and again on 8/23/22  · Current Covid symptoms include: dry cough and runny nose

## 2022-08-23 NOTE — ASSESSMENT & PLAN NOTE
· Pt has hx of alcohol usage of four beers occasionally, denies daily or weekly use  · POA in ED ethanol lvl 182  · Counseled on alcohol cessation

## 2022-08-23 NOTE — ED PROVIDER NOTES
History  Chief Complaint   Patient presents with    Overdose - Intentional     Overdose on alcohol, meloxicam, and possibly klonopin  Patient with apneic episode on arrival  Provider to bedside      24year old female with past medical history significant for crohn's diasease, depression, anxiety and prior suicide attempts presents to ED with chief complaint of possible overdose  Patient unable to provide specific details secondary to alternating episodes of rapid breathing/panic attack  Brought in by friends who reports patient was drinking alcohol and took unknown amount of klonopin and meloxicam last night  Apparently was at party drinking alcohol and went outside and when she returned friends reported she was more confused/sedated/not acting right  EMS was called and there were reported episodes of apnea but on arrival to ED patient with episodes of somnolence alternating with episodes of hyperventilation and anxiety  No reported fall, injury or trauma  Patient with past history of anxiety, depression and suicide attempts  Old charts reviewed: patient last seen in ed on 2022 for COVID 19 infection  History provided by:  Patient  History limited by:  Mental status change   used: No    Overdose - Intentional      Prior to Admission Medications   Prescriptions Last Dose Informant Patient Reported? Taking?    guaiFENesin (ROBITUSSIN) 100 mg/5 mL syrup   No No   Sig: Take 10 mL (200 mg total) by mouth 3 (three) times a day as needed for cough for up to 10 days   mometasone (Nasonex) 50 mcg/act nasal spray   No No   Si sprays into each nostril daily   ondansetron (ZOFRAN-ODT) 4 mg disintegrating tablet   No No   Sig: Take 1 tablet (4 mg total) by mouth every 6 (six) hours as needed for nausea or vomiting   predniSONE 5 mg tablet   No No   Simg PO daily x 7 days to taper by 5mg weekly until finished   Patient not taking: Reported on 11/3/2021   ustekinumab (Florina Mcdonald 950 90 mg/mL subcutaneous injection   No No   Sig: Inject 1 ml (90 mg total) under skin every 8 weeks  Facility-Administered Medications: None       Past Medical History:   Diagnosis Date    Anxiety     [facility]hn disease ([facility] )     Depression     Suicide attempt [facility])        Past Surgical History:   Procedure Laterality Date    COLONOSCOPY      VULVA BIOPSY Right 8/26/2020    Procedure: Excision of Right Labial Lesion;  Surgeon: Elida Tee MD;  [facility]: [facility];  Service: Gynecology       Family History   Problem Relation Age of Onset    Vitiligo Paternal Grandfather     Lupus Paternal Aunt      I have reviewed and agree with the history as documented  E-Cigarette/Vaping    E-Cigarette Use Never User      E-Cigarette/Vaping Substances    Nicotine No     THC No     CBD No     Flavoring No     Other No     Unknown No      Social History     Tobacco Use    Smoking status: Never Smoker    Smokeless tobacco: Never Used   Vaping Use    Vaping Use: Never used   Substance Use Topics    Alcohol use: Yes     Comment: occasionally    Drug use: Not Currently     Frequency: 7 0 times per week     Types: Marijuana     Comment: Smokes weed daily        Review of Systems   Unable to perform ROS: Psychiatric disorder       Physical Exam  Physical Exam  Vitals and nursing note reviewed  Constitutional:       Appearance: She is well-developed  HENT:      Head: Normocephalic and atraumatic  Right Ear: External ear normal       Left Ear: External ear normal       Nose: Nose normal       Mouth/Throat:      Mouth: Mucous membranes are moist    Eyes:      General: No scleral icterus  Right eye: No discharge  Left eye: No discharge  Extraocular Movements: Extraocular movements intact  Conjunctiva/sclera: Conjunctivae normal    Cardiovascular:      Rate and Rhythm: Normal rate  Pulses: Normal pulses     Pulmonary:      Effort: Pulmonary effort is normal  No respiratory distress  Abdominal:      General: Abdomen is flat  There is no distension  Palpations: There is no mass  Hernia: No hernia is present  Musculoskeletal:         General: No swelling, tenderness, deformity or signs of injury  Cervical back: Normal range of motion and neck supple  No rigidity or tenderness  No muscular tenderness  Skin:     Capillary Refill: Capillary refill takes less than 2 seconds  Coloration: Skin is not jaundiced or pale  Findings: No erythema or rash  Neurological:      Cranial Nerves: No facial asymmetry  Sensory: No sensory deficit  Motor: No weakness, tremor or abnormal muscle tone  Comments: Uncooperative with exam   Responds to verbal stimuli  Psychiatric:         Behavior: Behavior is uncooperative           Vital Signs  ED Triage Vitals   Temperature Pulse Respirations Blood Pressure SpO2   08/23/22 0830 08/23/22 0624 08/23/22 0624 08/23/22 0624 08/23/22 0624   98 1 °F (36 7 °C) (!) 116 (!) 10 139/88 100 %      Temp Source Heart Rate Source Patient Position - Orthostatic VS BP Location FiO2 (%)   08/23/22 0830 08/23/22 0624 08/23/22 0624 08/23/22 0624 --   Oral Monitor Lying Left arm       Pain Score       --                  Vitals:    08/23/22 0624 08/23/22 0645 08/23/22 0715 08/23/22 0900   BP: 139/88 125/88 103/59 134/73   Pulse: (!) 116 70 59 79   Patient Position - Orthostatic VS: Lying            Visual Acuity      ED Medications  Medications   ondansetron (ZOFRAN) injection 4 mg (4 mg Intravenous Given 8/23/22 0628)   sodium chloride 0 9 % bolus 1,497 mL (0 mL/kg × 49 9 kg Intravenous Stopped 8/23/22 0903)       Diagnostic Studies  Results Reviewed     Procedure Component Value Units Date/Time    HS Troponin I 2hr [[id number]] Collected: 08/23/22 0850    Lab Status: Final result Specimen: Blood from Arm, Right Updated: 08/23/22 0920     hs TnI 2hr <2 ng/L      Delta 2hr hsTnI --    Lactic acid 2 Hours [[id number]]  (Normal) Collected: 08/23/22 0850    Lab Status: Final result Specimen: Blood from Arm, Right Updated: 08/23/22 0920     LACTIC ACID 2 0 mmol/L     Narrative:      Result may be elevated if tourniquet was used during collection  FLU/RSV/COVID - if FLU/RSV clinically relevant [[id number]]  (Abnormal) Collected: 08/23/22 0653    Lab Status: Final result Specimen: Nares from Nasopharyngeal Swab Updated: 08/23/22 0852     SARS-CoV-2 Positive     INFLUENZA A PCR Negative     INFLUENZA B PCR Negative     RSV PCR Negative    Narrative:      FOR PEDIATRIC PATIENTS - copy/paste COVID Guidelines URL to browser: [email or web address]  [employer]    SARS-CoV-2 assay is a Nucleic Acid Amplification assay intended for the  qualitative detection of nucleic acid from SARS-CoV-2 in nasopharyngeal  swabs  Results are for the presumptive identification of SARS-CoV-2 RNA  Positive results are indicative of infection with SARS-CoV-2, the virus  causing COVID-19, but do not rule out bacterial infection or co-infection  with other viruses  Laboratories within the [place] and its  territories are required to report all positive results to the appropriate  public health authorities  Negative results do not preclude SARS-CoV-2  infection and should not be used as the sole basis for treatment or other  patient management decisions  Negative results must be combined with  clinical observations, patient history, and epidemiological information  This test has not been FDA cleared or approved  This test has been authorized by FDA under an Emergency Use Authorization  (EUA)  This test is only authorized for the duration of time the  declaration that circumstances exist justifying the authorization of the  emergency use of an in vitro diagnostic tests for detection of SARS-CoV-2  virus and/or diagnosis of COVID-19 infection under section 564(b)(1) of  the Act, 21 U  S C  871HUX-9(C)(4), unless the authorization is terminated  or revoked sooner  The test has been validated but independent review by FDA  and CLIA is pending  Test performed using [employer] GeneXpert: This RT-PCR assay targets N2,  a region unique to SARS-CoV-2  A conserved region in the E-gene was chosen  for pan-Sarbecovirus detection which includes SARS-CoV-2  POCT alcohol breath test [[id number]]  (Normal) Resulted: 08/23/22 0829    Lab Status: Final result Updated: 08/23/22 0829     EXTBreath Alcohol 0 105    HS Troponin I 4hr [[id number]]     Lab Status: No result Specimen: Blood     Rapid drug screen, urine [[id number]]  (Abnormal) Collected: 08/23/22 0742    Lab Status: Final result Specimen: Urine, Clean Catch Updated: 08/23/22 0759     Amph/Meth UR Negative     Barbiturate Ur Negative     Benzodiazepine Urine Negative     Cocaine Urine Negative     Methadone Urine Negative     Opiate Urine Negative     PCP Ur Negative     THC Urine Positive     Oxycodone Urine Negative    Narrative:      Presumptive report  If requested, specimen will be sent to reference lab for confirmation  FOR MEDICAL PURPOSES ONLY  IF CONFIRMATION NEEDED PLEASE CONTACT THE LAB WITHIN 5 DAYS      Drug Screen Cutoff Levels:  AMPHETAMINE/METHAMPHETAMINES  1000 ng/mL  BARBITURATES     200 ng/mL  BENZODIAZEPINES     200 ng/mL  COCAINE      300 ng/mL  METHADONE      300 ng/mL  OPIATES      300 ng/mL  PHENCYCLIDINE     25 ng/mL  THC       50 ng/mL  OXYCODONE      100 ng/mL    POCT pregnancy, urine [[id number]]  (Normal) Resulted: 08/23/22 0756    Lab Status: Final result Specimen: Urine Updated: 08/23/22 0756     EXT PREG TEST UR (Ref: Negative) negative     Control valid    TSH [[id number]]  (Normal) Collected: 08/23/22 0653    Lab Status: Final result Specimen: Blood from Arm, Right Updated: 08/23/22 0749     TSH 3RD GENERATON 1 685 uIU/mL     Narrative:      Patients undergoing fluorescein dye angiography may retain small amounts of fluorescein in the body for 48-72 hours post procedure  Samples containing fluorescein can produce falsely depressed TSH values  If the patient had this procedure,a specimen should be resubmitted post fluorescein clearance  Salicylate level [[id number]]  (Normal) Collected: 08/23/22 0653    Lab Status: Final result Specimen: Blood from Arm, Right Updated: 47/05/24 6433     Salicylate Lvl 3 4 mg/dL     Acetaminophen level-If concentration is detectable, please discuss with medical  on call  [[id number]]  (Abnormal) Collected: 08/23/22 0653    Lab Status: Final result Specimen: Blood from Arm, Right Updated: 08/23/22 0749     Acetaminophen Level <2 0 ug/mL     Lactic acid [[id number]]  (Abnormal) Collected: 08/23/22 0653    Lab Status: Final result Specimen: Blood from Arm, Right Updated: 08/23/22 0748     LACTIC ACID 3 6 mmol/L     Narrative:      Result may be elevated if tourniquet was used during collection      HS Troponin 0hr (reflex protocol) [[id number]]  (Normal) Collected: 08/23/22 0653    Lab Status: Final result Specimen: Blood from Arm, Right Updated: 08/23/22 0747     hs TnI 0hr <2 ng/L     CK Total with Reflex CKMB [[id number]]  (Normal) Collected: 08/23/22 0653    Lab Status: Final result Specimen: Blood from Arm, Right Updated: 08/23/22 0745     Total  U/L     Comprehensive metabolic panel [[id number]]  (Abnormal) Collected: 08/23/22 0653    Lab Status: Final result Specimen: Blood from Arm, Right Updated: 08/23/22 0736     Sodium 146 mmol/L      Potassium 3 6 mmol/L      Chloride 105 mmol/L      CO2 22 mmol/L      ANION GAP 19 mmol/L      BUN 4 mg/dL      Creatinine 0 73 mg/dL      Glucose 90 mg/dL      Calcium 9 7 mg/dL      AST 26 U/L      ALT 25 U/L      Alkaline Phosphatase 71 U/L      Total Protein 9 0 g/dL      Albumin 5 0 g/dL      Total Bilirubin 0 59 mg/dL      eGFR 114 ml/min/1 73sq m     Narrative:      [place] guidelines for Chronic Kidney Disease (CKD):     Stage 1 with normal or high GFR (GFR > 90 mL/min/1 73 square meters)    Stage 2 Mild CKD (GFR = 60-89 mL/min/1 73 square meters)    Stage 3A Moderate CKD (GFR = 45-59 mL/min/1 73 square meters)    Stage 3B Moderate CKD (GFR = 30-44 mL/min/1 73 square meters)    Stage 4 Severe CKD (GFR = 15-29 mL/min/1 73 square meters)    Stage 5 End Stage CKD (GFR <15 mL/min/1 73 square meters)  Note: GFR calculation is accurate only with a steady state creatinine    Ammonia [[id number]]  (Abnormal) Collected: 08/23/22 0653    Lab Status: Final result Specimen: Blood from Arm, Right Updated: 08/23/22 0736     Ammonia <10 umol/L     Ethanol [[id number]]  (Abnormal) Collected: 08/23/22 0653    Lab Status: Final result Specimen: Blood from Arm, Right Updated: 08/23/22 0731     Ethanol Lvl 182 mg/dL     Blood gas, venous [[id number]]  (Abnormal) Collected: 08/23/22 0653    Lab Status: Final result Specimen: Blood from Arm, Right Updated: 08/23/22 0731     pH, Shawn 7 370     pCO2, Shawn 37 4 mm Hg      pO2, Shawn 50 0 mm Hg      HCO3, Shawn 21 1 mmol/L      Base Excess, Shawn -3 6 mmol/L      O2 Content, Shawn 14 5 ml/dL      O2 HGB, VENOUS 80 5 %     CBC and differential [[id number]]  (Abnormal) Collected: 08/23/22 0653    Lab Status: Final result Specimen: Blood from Arm, Right Updated: 08/23/22 0703     WBC 10 16 Thousand/uL      RBC 5 09 Million/uL      Hemoglobin 14 9 g/dL      Hematocrit 43 7 %      MCV 86 fL      MCH 29 3 pg      MCHC 34 1 g/dL      RDW 13 2 %      MPV 9 4 fL      Platelets 394 Thousands/uL      nRBC 0 /100 WBCs      Neutrophils Relative 50 %      Immat GRANS % 1 %      Lymphocytes Relative 38 %      Monocytes Relative 6 %      Eosinophils Relative 4 %      Basophils Relative 1 %      Neutrophils Absolute 5 05 Thousands/µL      Immature Grans Absolute 0 08 Thousand/uL      Lymphocytes Absolute 3 89 Thousands/µL      Monocytes Absolute 0 59 Thousand/µL      Eosinophils Absolute 0 42 Thousand/µL      Basophils Absolute 0 13 Thousands/µL No orders to display              Procedures  ECG 12 Lead Documentation Only    Date/Time: 8/23/2022 6:28 AM  Performed by: Ton Ruff PA-C  Authorized by: Ton Ruff PA-C     Indications / Diagnosis:  Overdose  ECG reviewed by me, the ED Provider: yes    Patient location:  ED  Previous ECG:     Previous ECG:  Compared to current    Comparison ECG info:  10/17/2019    Similarity:  No change    Comparison to cardiac monitor: Yes    Interpretation:     Interpretation: normal    Rate:     ECG rate:  101    ECG rate assessment: tachycardic    Rhythm:     Rhythm: sinus tachycardia    Ectopy:     Ectopy: none    QRS:     QRS axis:  Normal    QRS intervals:  Normal  Conduction:     Conduction: normal    ST segments:     ST segments:  Normal  T waves:     T waves: normal               ED Course  ED Course as of 08/23/22 0945   Tue Aug 23, 2022   0827 Re-evaluation - patient awake and alert  Spoke with [facility], will accept in transfer for overdose attempt on ETOH/mobic/klonopin  Vital signs stable  1:1 observation present  0079 Transfer ordered              HEART Risk Score    Flowsheet Row Most Recent Value   Heart Score Risk Calculator    History 0 Filed at: 08/23/2022 0944   ECG 0 Filed at: 08/23/2022 0944   Age 0 Filed at: 08/23/2022 0944   Risk Factors 0 Filed at: 08/23/2022 0944   Troponin 0 Filed at: 08/23/2022 0944   HEART Score 0 Filed at: 08/23/2022 0355                        SBIRT 20yo+    Flowsheet Row Most Recent Value   SBIRT (23 yo +)    In order to provide better care to our patients, we are screening all of our patients for alcohol and drug use  Would it be okay to ask you these screening questions?  Unable to answer at this time Filed at: 08/23/2022 8570                    MDM  Number of Diagnoses or Management Options  Alcohol intoxication [facility]): new and requires workup  Overdose, intentional self-harm, initial encounter [facility]): new and requires workup  Diagnosis management comments: MDM:   ddx includes but is not limited to alcohol intoxication, toxic metabolic encephalopathy, overdose, suicide attempts, hypoglycemia  ED plan:  Check labs including UDS, coma panel to assess for overdose  VBG, BMP for anion gap, bicarb, CO2 levels  EKG for arrhythmia or QT prolongation  Will need medical workup and once clear psychiatric evaluation for possible intentional overdose  The cardiac monitor revealed sinus rhythm/occasional sinus tachycardia/bradycardia as interpreted by me  The cardiac monitor was ordered secondary to history of overdose and to monitor patient for potential dysrhythmia  Given the large differential diagnosis for this patient, the decision making in this case is of high complexity  ED results:  ED results:   I have reviewed the patient's vital signs, nursing notes, and other relevant ancillary testing/information  I have had a detailed discussion with the patient regarding the historical points, examination findings, and any diagnostic results    ED coarse: 24year old female brought to ED for concern about possible intentional alcohol/klonopin/meloxicam overdose  Consumed unknown amount of alcohol and klonopin last night - noted by friends becoming more unresponsive/somnolent  ED workup:  No QTC prolongation or arrhythmias on EKG or telemetry monitoring  On arrival area was patent, patient is somnolent but protecting airway  No indication for intubation on arrival but patient closely observed  1:1 observation performed 2/2 concern for intentional overdose  Airway monitoring continued as mental status gradually improved/patient more awake  UDS positive for THC  Alcohol elevated 183  Elevated anion gap and lactic acid secondary to alcohol consumption  No infectious sources identified  , no rhabnomyolysis  Initial troponin less than 2  IV fluids given     More awake, answering questions as ED stay progressed but will need further monitoring for possible klonopin overdose and will need psych evaluation once medically cleared  Case discussed with Toxicology, Dr Bertha Maria who accepted patient in transfer to [facility]  The critical care time is separate of other billable procedures, treating other patients, and any teaching time  The critical care time was for: obtaining history from patient or surrogate, development of treatment plan with patient or surrogate, discussion with any applicable consultants, examination of the patient,ordering and performing treatments and interventions, ordering and reviewing any applicable laboratory or radiographic studies, reassessment of the patient for response to treatment, reviewing any pertinent and available old records, documentation time  The critical care time was necessary to prevent deterioration of the following organ systems: intentional overdose/alcohol intoxication/klonopin overdose/suicide attempt requiring 1:1 observation, close airway and mental status monitoring        Time spent (in minutes):50             Amount and/or Complexity of Data Reviewed  Clinical lab tests: ordered and reviewed  Tests in the medicine section of CPT®: ordered and reviewed  Discussion of test results with the performing providers: yes  Obtain history from someone other than the patient: yes (friends)  Review and summarize past medical records: yes  Discuss the patient with other providers: yes (Toxicology/[facility])  Independent visualization of images, tracings, or specimens: yes        Disposition  Final diagnoses:   Overdose[facility]   Alcohol intoxication ([facility] )     Time reflects when diagnosis was documented in both MDM as applicable and the Disposition within this note     Time User Action Codes Description Comment    8/23/2022  8:45 AM Johann Phillips Add [Z39 [id number]] Overdose, intentional self-harm, initial encounter ([facility] )     8/23/2022  8:45 AM Surinder Squires Add [Y74 [id number]] Alcohol intoxication [facility])       ED Disposition     ED Disposition   Transfer to Another Facility-In Network    Condition   --    Date/Time   Tue Aug 23, 2022  8:45 AM    Comment   Aristeo Kerr should be transferred out to [facility]           Follow-up Information    None         Patient's Medications   Discharge Prescriptions    No medications on file       No discharge procedures on file      PDMP Review       Value Time User    PDMP Reviewed  Yes 10/18/2019  1:59 AM Sylvie Dubose MD          ED Provider  Electronically Signed by           Andi Mireles PA-C  08/23/22 0945

## 2022-08-23 NOTE — ED NOTES
Transport Info:    with [facility] at [facility] to [place] [address]  Accepting: Dr Doyle Skelton  Call report to: 1000 Salbador Foster RN  08/23/22 5084

## 2022-08-23 NOTE — NURSING NOTE
Discharge instructions given both written and verbally with details on f/u  No new medications order  Dressed in street clothes  IV d/c awaiting ride home

## 2022-08-23 NOTE — NURSING NOTE
Patient seen by attending  Attending deemed patient appropriate to be discharged and discontinue 1:1  RN will continue to monitor and give emotional support

## 2022-08-23 NOTE — ASSESSMENT & PLAN NOTE
· Patient consumed excessive amount of alcohol last night and was unresponsive  Friend was concerned that she overdosed on something due to the unresponsive state and brought her to the emergency department where she was further evaluated  Her ethanol concentration was 182 mg/dL in the morning  ED AP was informed by friend that she overdosed on meloxicam and clonazepam   Patient was transferred to Toxicology Service for observation  Upon arrival, she was completely sober and denied any overdose  She admitted to drinking too much last night  · Patient very clearly denies suicidal ideation, attempt and even depression  No HI, hallucinations  Normal mental status, A&Ox4  · Discharge to home

## 2022-08-23 NOTE — H&P
HISTORY & PHYSICAL EXAM  DEPARTMENT OF MEDICAL TOXICOLOGY  LEVEL 4 MEDICAL DETOX UNIT  Ronan Feliciano 24 y o  female MRN: [id number]  Unit/Bed#: 5T [facility] 508-01 Encounter: [id number]    Reason for Admission/Principal Problem: Ethanol withdrawal, Ethanol use disorder  Admitting Provider: Ariella Bess DO  Attending Provider: Shwtea Marie DO   8/23/2022  8:68 PM    Metabolic acidosis, increased anion gap  Assessment & Plan  · POA in ED: 19    Recent Labs     08/23/22  0653   AGAP 19*       COVID-19  Assessment & Plan  · Hx of Immunizations with Pfizer 2 doses (10/8/21 & 11/12/21)   · Tested Covid-19 positive on 8/9/22 and again on 8/23/22  · Current Covid symptoms include: dry cough and runny nose    Marijuana use  Assessment & Plan  · Reports smoking medical marijuana daily as treatment for Crohn's  · Counseled on cessation    Crohn's disease ([facility] )  Assessment & Plan  · Hx of Crohn's with home regimen of Stelara 90mg/mL [id number] (last dose was 07/25/22)  · History of loose stools  · Previously on prednisone  · Continue with outpatient GI follow up    * Alcohol intoxication without use disorder, uncomplicated ([facility] )  Assessment & Plan  · Patient consumed excessive amount of alcohol last night and was unresponsive  Friend was concerned that she overdosed on something due to the unresponsive state and brought her to the emergency department where she was further evaluated  Her ethanol concentration was 182 mg/dL in the morning  ED AP was informed by friend that she overdosed on meloxicam and clonazepam   Patient was transferred to Toxicology Service for observation  Upon arrival, she was completely sober and denied any overdose  She admitted to drinking too much last night  · Patient very clearly denies suicidal ideation, attempt and even depression  No HI, hallucinations  Normal mental status, A&Ox4  · Discharge to home       Admits to alcohol use-resolved as of 8/23/2022  Assessment & Plan  · Pt has hx of alcohol usage of four beers occasionally, denies daily or weekly use  · POA in ED ethanol lvl 182  · Counseled on alcohol cessation       Code Status: Level 1 - Full code      Hx and PE limited by: N/A    HPI: Candi Muñoz is a 24y o  year old female who presents with alcohol ingestion and intoxication  Patient reports she was drinking with her girlfriend when she became intoxicated and "blacked out"  Patient states her girlfriend called another friend for help who became concerned and called 911 due to suspicion of suicide attempt  Patient denies any SI, HI, suicide attempt, plan, or audio/visual hallucinations  Patient states her girlfriend was present the entire time and corroborated her story in the ED and is unaware of why she was transferred or what was told to staff  Patient denies any Klonopin ingestion  Patient states she drank multiple beers and took two meloxicam that day as prescribed  Patient denies any daily or weekly alcohol use  Patient states she smokes medical marijuana daily for her Crohn's disease  Patient reports a dry cough with congestion for the past two weeks when she initially tested positive for COVID on 8/9/22  Patient reports mild abdominal pain consistent with her Crohn's disease  Patient denies any chest pain, headache, SOB, back pain, nausea, vomiting, or other complaints or concerns at this time  Preferred alcoholic beverage(s): Beer  Quantity and frequency of alcohol intake: 4 beers every other weekend  Use of any ethanol substitutes (toxic alcohols): no  Date/Time of last alcohol intake: Yesterday PM  Current signs and symptoms of ethanol withdrawal: None        Ethanol Withdrawal History  Previous ethanol withdrawal? no  Prior inpatient treatment for ethanol withdrawal? no  Prior outpatient treatment for ethanol withdrawal? no  History of seizures with prior ethanol withdrawal? no  Prior treatment with naltrexone (Vivitrol)?  no  Current treatment with naltrexone (Vivitrol)? no  Other current treatment for ethanol use disorder? no  Co-existing substance use? yes    Review of PDMP: yes     Social History     Substance and Sexual Activity   Alcohol Use Yes    Comment: occasionally     Social History     Substance and Sexual Activity   Drug Use Not Currently    Frequency: 7 0 times per week    Types: Marijuana    Comment: Smokes weed daily      Social History     Tobacco Use   Smoking Status Never Smoker   Smokeless Tobacco Never Used       Review of Systems   Constitutional: Negative for chills and fever  HENT: Positive for congestion  Negative for ear pain and sore throat  Eyes: Negative for pain and visual disturbance  Respiratory: Positive for cough  Negative for shortness of breath  Cardiovascular: Negative for chest pain and palpitations  Gastrointestinal: Positive for abdominal pain (chronic)  Negative for vomiting  Genitourinary: Negative for dysuria and hematuria  Musculoskeletal: Negative for arthralgias and back pain  Skin: Negative for color change and rash  Neurological: Negative for seizures and syncope  All other systems reviewed and are negative        Historical Information   Past Medical History:   Diagnosis Date    Anxiety     Crohn disease ([facility] )     Depression     Suicide attempt [facility])      Past Surgical History:   Procedure Laterality Date    COLONOSCOPY      VULVA BIOPSY Right 8/26/2020    Procedure: Excision of Right Labial Lesion;  Surgeon: Mario Saucedo MD;  Location: [facility];  Service: Gynecology     Family History   Problem Relation Age of Onset    Vitiligo Paternal Grandfather     Lupus Paternal Aunt      Social History   Marital Status: Single   Occupation: [employer]  Patient Pre-hospital Living Situation: House  Patient Pre-hospital Level of Mobility: Full, no restrictions  Patient Pre-hospital Diet Restrictions: Regular    Allergies   Allergen Reactions    Amoxicillin Anaphylaxis    Amoxicillin Abdominal Pain       Prior to Admission medications    Medication Sig Start Date End Date Taking? Authorizing Provider   guaiFENesin (ROBITUSSIN) 100 mg/5 mL syrup Take 10 mL (200 mg total) by mouth 3 (three) times a day as needed for cough for up to 10 days  Patient not taking: Reported on 8/23/2022 8/22/22 9/1/22  Lee Lino MD   mometasone (Nasonex) 50 mcg/act nasal spray 2 sprays into each nostril daily  Patient not taking: Reported on 8/23/2022 8/22/22   Lee Lino MD   ondansetron (ZOFRAN-ODT) 4 mg disintegrating tablet Take 1 tablet (4 mg total) by mouth every 6 (six) hours as needed for nausea or vomiting  Patient not taking: Reported on 8/23/2022 10/13/21   Bettye Lawton MD   predniSONE 5 mg tablet 40mg PO daily x 7 days to taper by 5mg weekly until finished  Patient not taking: No sig reported 10/7/21   Micaela Ruiz PA-C   ustekinumab (STELARA) 90 mg/mL subcutaneous injection Inject 1 ml (90 mg total) under skin every 8 weeks  1/11/22   Micaela Ruiz PA-C       Current Facility-Administered Medications   Medication Dose Route Frequency    acetaminophen (TYLENOL) tablet 650 mg  650 mg Oral Q6H PRN    aluminum-magnesium hydroxide-simethicone (MYLANTA) oral suspension 30 mL  30 mL Oral Q6H PRN    ondansetron (ZOFRAN-ODT) dispersible tablet 4 mg  4 mg Oral Q6H PRN    traZODone (DESYREL) tablet 50 mg  50 mg Oral HS PRN       Continuous Infusions:          Objective     No intake or output data in the 24 hours ending 08/23/22 1442    Invasive Devices:   Peripheral IV 08/23/22 Left Antecubital (Active)   Site Assessment WDL; Clean; Intact;Dry 08/23/22 0628   Dressing Type Gauze;Transparent 08/23/22 0628   Line Status Blood return noted; Flushed 08/23/22 1804   Dressing Status Clean; Intact;Dry 08/23/22 0628       Vitals   Vitals:    08/23/22 1309   BP: 107/74   TempSrc: Temporal   Pulse: 57   Resp: 16   Patient Position - Orthostatic VS: Lying   Temp: 98 5 °F (36 9 °C)       Physical Exam  Constitutional:       General: She is not in acute distress  Appearance: She is not diaphoretic  HENT:      Head: Normocephalic and atraumatic  Nose: Signs of injury (healing abrasion to mid nasal bridge) present  Mouth/Throat:      Mouth: Mucous membranes are moist       Pharynx: Oropharynx is clear  Eyes:      Extraocular Movements: Extraocular movements intact  Conjunctiva/sclera: Conjunctivae normal       Pupils: Pupils are equal, round, and reactive to light  Cardiovascular:      Rate and Rhythm: Normal rate and regular rhythm  Pulses: Normal pulses  Heart sounds: Normal heart sounds  No murmur heard  Pulmonary:      Effort: Pulmonary effort is normal  No respiratory distress  Breath sounds: Normal breath sounds  No wheezing, rhonchi or rales  Chest:      Chest wall: No tenderness  Abdominal:      General: Bowel sounds are normal       Palpations: Abdomen is soft  Tenderness: There is abdominal tenderness (mild suprapubic)  There is no guarding or rebound  Musculoskeletal:         General: Normal range of motion  Cervical back: Normal range of motion and neck supple  No rigidity or tenderness  Right lower leg: No edema  Left lower leg: No edema  Skin:     General: Skin is warm and dry  Capillary Refill: Capillary refill takes less than 2 seconds  Findings: No bruising  Neurological:      General: No focal deficit present  Mental Status: She is alert and oriented to person, place, and time  Gait: Gait normal    Psychiatric:         Mood and Affect: Mood normal          Speech: Speech normal          Behavior: Behavior normal  Behavior is cooperative  Data:    EKG, Pathology, and Other Studies: I have personally reviewed pertinent reports  EKG performed in ED, reviewed by me: Sinus tachycardia at 101 bpm, normal axis, normal interviews, no ST-T abnormalities      Lab Results:  CBC ETOH     Lab Results Component Value Date    WBC 10 16 08/23/2022    RBC 5 09 08/23/2022    HGB 14 9 08/23/2022    HCT 43 7 08/23/2022    MCV 86 08/23/2022    MCH 29 3 08/23/2022    MCHC 34 1 08/23/2022    RDW 13 2 08/23/2022     (H) 08/23/2022    MPV 9 4 08/23/2022      Lab Results   Component Value Date    LACTICACID 2 0 08/23/2022      CMP UA         Component Value Date/Time    K 3 6 08/23/2022 0653     08/23/2022 0653    CO2 22 08/23/2022 0653    CO2 25 06/22/2020 1618    BUN 4 (L) 08/23/2022 0653    CREATININE 0 73 08/23/2022 0653         Component Value Date/Time    CALCIUM 9 7 08/23/2022 0653    ALKPHOS 71 08/23/2022 0653    AST 26 08/23/2022 0653    ALT 25 08/23/2022 0653      Lab Results   Component Value Date    CLARITYU Slightly Cloudy 10/13/2021    COLORU Dk Yellow 10/13/2021    SPECGRAV >=1 030 10/13/2021    PHUR 5 5 10/13/2021    PHUR 7 0 02/18/2016    GLUCOSEU Negative 10/13/2021    KETONESU >=80 (3+) (A) 10/13/2021    BLOODU Moderate (A) 10/13/2021    PROTEIN  (2+) (A) 10/13/2021    NITRITE Negative 10/13/2021    BILIRUBINUR Moderate (A) 10/13/2021    UROBILINOGEN 0 2 10/13/2021    LEUKOCYTESUR Negative 10/13/2021    WBCUA 4-10 (A) 10/13/2021    RBCUA 10-20 (A) 10/13/2021    BACTERIA Occasional 10/13/2021    EPIS Occasional 10/13/2021        Liver Function Test: ASA     Lab Results   Component Value Date    TBILI 0 59 08/23/2022    BILIDIR 0 23 (H) 11/14/2020    ALKPHOS 71 08/23/2022    AST 26 08/23/2022    ALT 25 08/23/2022    TP 9 0 (H) 08/23/2022    ALB 5 0 08/23/2022      Lab Results   Component Value Date    SALICYLATE 3 4 68/39/8883      Troponin APAP     No results found for: TROPONINI   Lab Results   Component Value Date    ACTMNPHEN <2 0 (L) 08/23/2022      VBG HCG     No results found for: PHVEN, YWH0AXP, PO2VEN, KCL3QVF, BEVEN, N0SBFSXNX, W4VYRZC   No results found for: HCGQUANT   ABG Urine Drug Screen     No results found for: PHART, CXS8IMI, PO2ART, CTA4PQF, BEART, V9GNFLSGG, O2HGB, SOURC, JUANITO, VTAC, ACRATE, INSPIREDAIR, PEEP   Lab Results   Component Value Date    AMPMETHUR Negative 08/23/2022    BARBTUR Negative 08/23/2022    BDZUR Negative 08/23/2022    COCAINEUR Negative 08/23/2022    METHADONEUR Negative 08/23/2022    OPIATEUR Negative 08/23/2022    PCPUR Negative 08/23/2022    THCUR Positive (A) 08/23/2022    OXYCODONEUR Negative 08/23/2022      Lactate INR     Lab Results   Component Value Date    LACTICACID 2 0 08/23/2022      No results found for: INR   PTT Protime     No results found for: PTT     No results found for: PROTIME           Imaging Studies: I have personally reviewed pertinent reports  Counseling / Coordination of Care  Total floor / unit time spent today 45 minutes  Greater than 50% of total time was spent with the patient and / or family counseling and / or coordination of care  ** Please Note: This note has been constructed using a voice recognition system   **

## 2022-08-23 NOTE — DISCHARGE INSTR - OTHER ORDERS
Outpatient Drug & Alcohol Treatment   The [facility] currently operates an outpatient treatment unit:  [address] in [facility] AFFILIATED WITH [place], [place] as a functional unit of the [facility]  The Outpatient treatment units are licensed by the [place] Department of Drug & Alcohol Programs to provide individual and group counseling for those with substance abuse and dependency problems  The clinical staff is experienced in a variety of therapeutic modalities and provides treatment that is individualized to meet the particular needs of each person  These units are drug-free treatment programs  The [facility] accepts most major healthcare insurance coverage plans, [place] Medical Assistance and in those cases where the consumer has no third party healthcare coverage, a liberal sliding fee schedule is utilized  The length of service and type of outpatient service provided is based on the results of the Level of Care Assessment            There are currently three treatment protocols available: Outpatient  Intensive Outpatient  Contracted services for Partial Hospitalization  Therapy is provided in both Individual and Group counseling formats  The Outpatient department offers individual counseling for the family members of substance abusers to address co-dependent and enabling behaviors      Outpatient treatment services in BE[facility]AL [facility]IC[facility] AT [facility] are purchased through a fee-for-service subcontract with:  [place] [facility]  [address] [facility], [address]   Phone: [phone], [address] [facility]  [facility], [address]   New Admissions [phone]  Local office [phone]    Outpatient treatment services in [facility] are purchased through fee-for-service subcontracts with:  [address]  [address] [facility] [address]  Phone: [phone], [address] [facility]  Phone: [address]  [phone] / [address] [phone]  Outpatient treatment services are also available to [facility] residents in our [facility]

## 2022-08-23 NOTE — Clinical Note
Rey Vásquez was seen and treated in our emergency department on 8/23/2022  No restrictions            Diagnosis:     Snachez Conway  may return to work on return date  She may return on this date: 08/26/2022         If you have any questions or concerns, please don't hesitate to call        Nicole Theodore RN    ______________________________           _______________          _______________  Hospital Representative                              Date                                Time

## 2022-08-23 NOTE — EMTALA/ACUTE CARE TRANSFER
[address] [facility] [address]  [address]  [facility] [address]  Dept: [phone]      EMTALA TRANSFER CONSENT    NAME Marleen Ogden                                         1995                              MRN [id number]    I have been informed of my rights regarding examination, treatment, and transfer   by Dr Raulito Martin DO    Benefits:  toxicology service    Risks:  heart attack, stroke, respiratory arrest, pain, disability, transportation accident and death  Consent for Transfer:  I acknowledge that my medical condition has been evaluated and explained to me by the emergency department physician or other qualified medical person and/or my attending physician, who has recommended that I be transferred to the service of   Dr Nielsen/Toxicology at  [place] [address]  The above potential benefits of such transfer, the potential risks associated with such transfer, and the probable risks of not being transferred have been explained to me, and I fully understand them  The doctor has explained that, in my case, the benefits of transfer outweigh the risks  I agree to be transferred  I authorize the performance of emergency medical procedures and treatments upon me in both transit and upon arrival at the receiving facility  Additionally, I authorize the release of any and all medical records to the receiving facility and request they be transported with me, if possible  I understand that the safest mode of transportation during a medical emergency is an ambulance and that the Hospital advocates the use of this mode of transport  Risks of traveling to the receiving facility by car, including absence of medical control, life sustaining equipment, such as oxygen, and medical personnel has been explained to me and I fully understand them  (CARLITOS CORRECT BOX BELOW)  [  ]  I consent to the stated transfer and to be transported by ambulance/helicopter    [ ]  I consent to the stated transfer, but refuse transportation by ambulance and accept full responsibility for my transportation by car  I understand the risks of non-ambulance transfers and I exonerate the Hospital and its staff from any deterioration in my condition that results from this refusal     X___________________________________________    DATE  22  TIME________  Signature of patient or legally responsible individual signing on patient behalf           RELATIONSHIP TO PATIENT_________________________          Provider Certification    NAME Kamala Hartley                                         1995                              MRN [id number]    A medical screening exam was performed on the above named patient  Based on the examination:    Condition Necessitating Transfer The primary encounter diagnosis was Overdose, intentional self-harm, initial encounter ([facility] )  A diagnosis of Alcohol intoxication ([facility] ) was also pertinent to this visit  Patient Condition:      Reason for Transfer:      Transfer Requirements: Facility     · Space available and qualified personnel available for treatment as acknowledged by    · Agreed to accept transfer and to provide appropriate medical treatment as acknowledged by          · Appropriate medical records of the examination and treatment of the patient are provided at the time of transfer   [address], [address] _______  · Transfer will be performed by qualified personnel from    and appropriate transfer equipment as required, including the use of necessary and appropriate life support measures      Provider Certification: I have examined the patient and explained the following risks and benefits of being transferred/refusing transfer to the patient/family:         Based on these reasonable risks and benefits to the patient and/or the unborn child(ed), and based upon the information available at the time of the patients examination, I certify that the medical benefits reasonably to be expected from the provision of appropriate medical treatments at another medical facility outweigh the increasing risks, if any, to the individuals medical condition, and in the case of labor to the unborn child, from effecting the transfer      X____________________________________________ DATE 08/23/22        TIME_______      ORIGINAL - SEND TO MEDICAL RECORDS   COPY - SEND WITH PATIENT DURING TRANSFER

## 2022-08-24 LAB
ATRIAL RATE: 101 BPM
P AXIS: 85 DEGREES
PR INTERVAL: 140 MS
QRS AXIS: 84 DEGREES
QRSD INTERVAL: 78 MS
QT INTERVAL: 386 MS
QTC INTERVAL: 500 MS
T WAVE AXIS: 18 DEGREES
VENTRICULAR RATE: 101 BPM

## 2022-08-24 PROCEDURE — 93010 ELECTROCARDIOGRAM REPORT: CPT | Performed by: INTERNAL MEDICINE

## 2022-08-24 NOTE — UTILIZATION REVIEW
Initial Clinical Review    Admission: Date/Time/Statement:   Admission Orders (From admission, onward)     Ordered        08/23/22 1326  Inpatient Admission  Once                      Orders Placed This Encounter   Procedures    Inpatient Admission     Standing Status:   Standing     Number of Occurrences:   1     Order Specific Question:   Level of Care     Answer:   Med Surg [16]     Order Specific Question:   Estimated length of stay     Answer:   More than 2 Midnights     Order Specific Question:   Certification     Answer:   I certify that inpatient services are medically necessary for this patient for a duration of greater than two midnights  See H&P and MD Progress Notes for additional information about the patient's course of treatment  Initial Presentation: 24 y o  female who presented to [facility] ED, was transferred by EMS to [address] [place] [address] as a direct admission  Inpatient admission for evaluation and treatment of alcohol intoxication w/ concern for suicide attempt  PMHx: Anxiety, Crohn's disease, Depression  Presented w/ alcohol intoxication at [facility]  On exam, abrasion healing on mid-nasal bridge, suprapubic tenderness  Reports drinking 4 beers every other weekend  Admits to drinking too much prior to ED arrival  Once pt sober, denied suicide attempt or depression  Plan: counseling on cessation of excessive alcohol use, supportive care   The patient, initially admitted to the hospital as inpatient, was discharged earlier than expected given the following: She was not suicidal as reported and was not intoxicated or at risk of withdrawal         Wt Readings from Last 1 Encounters:   08/23/22 49 9 kg (110 lb)     Vital Signs:   Date/Time Temp Pulse Resp BP MAP (mmHg) SpO2 O2 Device   08/23/22 1313 -- -- -- -- -- 97 % None (Room air)   08/23/22 1309 98 5 °F (36 9 °C) 57 16 107/74 85 97 % None (Room air)       Pertinent Labs/Diagnostic Test Results:   Results from last 7 days   Lab Units 08/23/22  0653   SARS-COV-2  Positive*     Results from last 7 days   Lab Units 08/23/22  0653   WBC Thousand/uL 10 16   HEMOGLOBIN g/dL 14 9   HEMATOCRIT % 43 7   PLATELETS Thousands/uL 522*   NEUTROS ABS Thousands/µL 5 05         Results from last 7 days   Lab Units 08/23/22  0653   SODIUM mmol/L 146   POTASSIUM mmol/L 3 6   CHLORIDE mmol/L 105   CO2 mmol/L 22   ANION GAP mmol/L 19*   BUN mg/dL 4*   CREATININE mg/dL 0 73   EGFR ml/min/1 73sq m 114   CALCIUM mg/dL 9 7     Results from last 7 days   Lab Units 08/23/22  0653   AST U/L 26   ALT U/L 25   ALK PHOS U/L 71   TOTAL PROTEIN g/dL 9 0*   ALBUMIN g/dL 5 0   TOTAL BILIRUBIN mg/dL 0 59   AMMONIA umol/L <10*         Results from last 7 days   Lab Units 08/23/22  0653   GLUCOSE RANDOM mg/dL 90     Results from last 7 days   Lab Units 08/23/22  0653   PH DENIA  7 370   PCO2 DENIA mm Hg 37 4*   PO2 DENIA mm Hg 50 0*   HCO3 DENIA mmol/L 21 1*   BASE EXC DENIA mmol/L -3 6   O2 CONTENT DENIA ml/dL 14 5   O2 HGB, VENOUS % 80 5*         Results from last 7 days   Lab Units 08/23/22  0653   CK TOTAL U/L 116     Results from last 7 days   Lab Units 08/23/22  0850 08/23/22  0653   HS TNI 0HR ng/L  --  <2   HS TNI 2HR ng/L <2  --              Results from last 7 days   Lab Units 08/23/22  0653   TSH 3RD GENERATON uIU/mL 1 685         Results from last 7 days   Lab Units 08/23/22  0850 08/23/22  0653   LACTIC ACID mmol/L 2 0 3 6*     Results from last 7 days   Lab Units 08/23/22  0653   INFLUENZA A PCR  Negative   INFLUENZA B PCR  Negative   RSV PCR  Negative         Results from last 7 days   Lab Units 08/23/22  0742   AMPH/METH  Negative   BARBITURATE UR  Negative   BENZODIAZEPINE UR  Negative   COCAINE UR  Negative   METHADONE URINE  Negative   OPIATE UR  Negative   PCP UR  Negative   THC UR  Positive*     Results from last 7 days   Lab Units 08/23/22  0653   ETHANOL LVL mg/dL 182*   ACETAMINOPHEN LVL ug/mL <2 4*   SALICYLATE LVL mg/dL 3 4         Past Medical History:   Diagnosis Date    Anxiety     Crohn disease ([facility] )     Depression     Suicide attempt ([facility] )      Present on Admission:   [facility] ([facility] )   Marijuana use   (Resolved) Admits to alcohol use   COVID-19      Admitting Diagnosis: Alcohol intoxication ([facility] ) [F10 649]  Age/Sex: 24 y o  female  Admission Orders:  Regular Diet  SCDs  Telemetry & Continuous Pulse Ox  Medications:  None administered outside of ED  Network Utilization Review Department  ATTENTION: Please call with any questions or concerns to [phone] and carefully listen to the prompts so that you are directed to the right person  All voicemails are confidential   Sudie Crigler all requests for admission clinical reviews, approved or denied determinations and any other requests to dedicated fax number below belonging to the campus where the patient is receiving treatment   List of dedicated fax numbers for the Facilities:  [address] DENIALS (Administrative/Medical Necessity) [phone]   [address] (Maternity/NICU/Pediatrics) [phone]   [address] [place] [address]  [address] [facility] [place] [address] [address] [place] [address] [address] [facility] [phone]

## 2022-08-24 NOTE — UTILIZATION REVIEW
Please call of fax determination to Fax: [phone], phone: [phone]    Inpatient Admission Authorization Request   NOTIFICATION OF INPATIENT ADMISSION/INPATIENT AUTHORIZATION REQUEST   SERVICING FACILITY:   [address] [place] [address]  Paul Palm [address] , [address]  Tax ID: [id number]  NPI: [id number]  Place of Service: Inpatient [address] [facility]  [address]  Place of Service Code: 24     ATTENDING PROVIDER:  Attending Name and NPI#: Lina John [[id number]]  Address: Paul Palm  , [address]  Phone: [phone]     UTILIZATION REVIEW CONTACT:  Lee Petersen Utilization   Network Utilization Review Department  Phone: [phone]  Fax [phone]  Email: [email or web address]     PHYSICIAN ADVISORY SERVICES:  FOR [id number] REVIEW - MEDICAL NECESSITY DENIAL  Phone: [phone]  Fax: [phone]  Email: [email or web address]     TYPE OF REQUEST:  Inpatient Status     ADMISSION INFORMATION:  ADMISSION DATE/TIME: 8/23/22  1:07 PM  PATIENT DIAGNOSIS CODE/DESCRIPTION:  Alcohol intoxication ([address] ) [F10 929]  DISCHARGE DATE/TIME: 8/23/2022  2:49 PM   IMPORTANT INFORMATION:  Please contact Lee Petersen directly with any questions or concerns regarding this request  Department voicemails are confidential     Send requests for admission clinical reviews, concurrent reviews, approvals, and administrative denials due to lack of clinical to fax [phone]

## 2022-08-30 NOTE — UTILIZATION REVIEW
Notification of Discharge   This is a Notification of Discharge from our facility [address]  Please be advised that this patient has been discharge from our facility  Below you will find the admission and discharge date and time including the patients disposition  UTILIZATION REVIEW CONTACT:  Jessie Escalante MA  Utilization   Network Utilization Review Department  Phone: [phone] x carefully listen to the prompts  All voicemails are confidential   Email: [email or web address]     PHYSICIAN ADVISORY SERVICES:  FOR [id number] REVIEW - MEDICAL NECESSITY DENIAL  Phone: [phone]  Fax: [phone]  Email: [email or web address]     PRESENTATION DATE: 8/23/2022  1:07 PM  OBERVATION ADMISSION DATE:   INPATIENT ADMISSION DATE: 8/23/22  1:07 PM   DISCHARGE DATE: 8/23/2022  2:49 PM  DISPOSITION: Home/Self Care Home/Self Care      IMPORTANT INFORMATION:  Send all requests for admission clinical reviews, approved or denied determinations and any other requests to dedicated fax number below belonging to the campus where the patient is receiving treatment   List of dedicated fax numbers:  [address] DENIALS (Administrative/Medical Necessity) [phone]   [address] [place] [address] (Maternity/NICU/Pediatrics) [phone]   [facility] [phone]   130 [facility] [phone]   [address] [place] [address] [phone]   2000 [facility] [address] [place] [address],[address] [place] [address] [phone]   [facility]  [phone]   [address] [place] [address], [facility]  2401 [facility] 1000 [facility] [phone]

## 2022-09-20 ENCOUNTER — HOSPITAL ENCOUNTER (EMERGENCY)
Facility: HOSPITAL | Age: 27
Discharge: HOME | End: 2022-09-20
Attending: EMERGENCY MEDICINE
Payer: COMMERCIAL

## 2022-09-20 VITALS
SYSTOLIC BLOOD PRESSURE: 120 MMHG | OXYGEN SATURATION: 99 % | DIASTOLIC BLOOD PRESSURE: 80 MMHG | TEMPERATURE: 98 F | HEART RATE: 80 BPM | WEIGHT: 105 LBS | RESPIRATION RATE: 16 BRPM | BODY MASS INDEX: 18.6 KG/M2
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
## 2022-09-20 PROCEDURE — 99284 EMERGENCY DEPT VISIT MOD MDM: CPT | Performed by: PHYSICIAN ASSISTANT

## 2022-09-20 PROCEDURE — 99282 EMERGENCY DEPT VISIT SF MDM: CPT

## 2022-09-20 RX ORDER — CEPHALEXIN 500 MG/1
500 CAPSULE ORAL EVERY 6 HOURS SCHEDULED
Qty: 28 CAPSULE | Refills: 0 | Status: SHIPPED | OUTPATIENT
Start: 2022-09-20 | End: 2022-09-27

## 2022-09-20 RX ORDER — CEPHALEXIN 250 MG/1
500 CAPSULE ORAL ONCE
Status: COMPLETED | OUTPATIENT
Start: 2022-09-20 | End: 2022-09-20

## 2022-09-20 RX ADMIN — CEPHALEXIN 500 MG: 250 CAPSULE ORAL at 01:22

## 2022-09-20 NOTE — ED PROVIDER NOTES
History  Chief Complaint   Patient presents with    Insect Bite     Pt reporting insect bites "everywhere"  Pt reports some are itchy and painful  Patient is a 61-year-old female with a past medical history significant for anxiety, depression, substance abuse presenting to the emergency department for evaluation of painful insect bites that she 1st noticed approximately 4 days ago  She reports insect bites on her bilateral forearms, abdomen, right glute, mons pubis  They are red, painful, itchy  She is any fevers chills chest pain, difficulty breathing, abdominal pain, nausea, vomiting, diarrhea  No other complaints at this time  History provided by:  Patient   used: No    Insect Bite  Contact animal:  Unable to specify  Location:  Pelvis and shoulder/arm  Shoulder/arm injury location:  L forearm and R forearm  Pelvic injury location:  R buttock and perineum  Time since incident:  4 days  Pain details:     Quality:  Sore    Severity:  Mild    Timing:  Constant    Progression:  Worsening  Tetanus status:  Unknown  Relieved by:  Nothing  Worsened by:  Nothing  Ineffective treatments:  None tried  Associated symptoms: no fever, no numbness, no rash and no swelling        Prior to Admission Medications   Prescriptions Last Dose Informant Patient Reported?  Taking?   meloxicam (MOBIC) 15 mg tablet   Yes No   Sig: Take 15 mg by mouth daily   mometasone (Nasonex) 50 mcg/act nasal spray   No No   Si sprays into each nostril daily   Patient not taking: Reported on 2022   ondansetron (ZOFRAN-ODT) 4 mg disintegrating tablet   No No   Sig: Take 1 tablet (4 mg total) by mouth every 6 (six) hours as needed for nausea or vomiting   Patient not taking: Reported on 2022   predniSONE 5 mg tablet   No No   Simg PO daily x 7 days to taper by 5mg weekly until finished   Patient not taking: No sig reported   ustekinumab (STELARA) 90 mg/mL subcutaneous injection   No No   Sig: Inject 1 ml (90 mg total) under skin every 8 weeks  Facility-Administered Medications: None       Past Medical History:   Diagnosis Date    Anxiety     [facility] ([facility] )     Depression     Suicide attempt [facility])        Past Surgical History:   Procedure Laterality Date    COLONOSCOPY      VULVA BIOPSY Right 8/26/2020    Procedure: Excision of Right Labial Lesion;  Surgeon: Mario Saucedo MD;  [facility]: [facility];  Service: Gynecology       Family History   Problem Relation Age of Onset    Vitiligo Paternal Grandfather     Lupus Paternal Aunt      I have reviewed and agree with the history as documented  E-Cigarette/Vaping    E-Cigarette Use Never User      E-Cigarette/Vaping Substances    Nicotine No     THC No     CBD No     Flavoring No     Other No     Unknown No      Social History     Tobacco Use    Smoking status: Never Smoker    Smokeless tobacco: Never Used   Vaping Use    Vaping Use: Never used   Substance Use Topics    Alcohol use: Yes     Comment: occasionally    Drug use: Not Currently     Frequency: 7 0 times per week     Types: Marijuana     Comment: Smokes weed daily        Review of Systems   Constitutional: Negative for chills and fever  Respiratory: Negative for cough and shortness of breath  Cardiovascular: Negative for chest pain, palpitations and leg swelling  Gastrointestinal: Negative for abdominal pain, diarrhea, nausea and vomiting  Skin: Positive for wound  Negative for rash  Neurological: Negative for numbness  All other systems reviewed and are negative  Physical Exam  Physical Exam  Vitals reviewed  Constitutional:       General: She is not in acute distress  Appearance: Normal appearance  She is not ill-appearing, toxic-appearing or diaphoretic  HENT:      Head: Normocephalic and atraumatic  Right Ear: External ear normal       Left Ear: External ear normal       Nose: Nose normal  No congestion or rhinorrhea  Mouth/Throat:      Mouth: Mucous membranes are moist       Pharynx: Oropharynx is clear  No oropharyngeal exudate or posterior oropharyngeal erythema  Eyes:      General: No scleral icterus  Right eye: No discharge  Left eye: No discharge  Extraocular Movements: Extraocular movements intact  Conjunctiva/sclera: Conjunctivae normal    Cardiovascular:      Rate and Rhythm: Normal rate and regular rhythm  Pulses: Normal pulses  Heart sounds: Normal heart sounds  No murmur heard  No friction rub  No gallop  Pulmonary:      Effort: Pulmonary effort is normal  No respiratory distress  Breath sounds: Normal breath sounds  No stridor  No wheezing, rhonchi or rales  Musculoskeletal:      Cervical back: Normal range of motion and neck supple  Right lower leg: No edema  Left lower leg: No edema  Skin:     General: Skin is warm and dry  Capillary Refill: Capillary refill takes less than 2 seconds  Findings: Lesion (Raised, erythematous, nodular lesions present to bilateral forearms, right glute, mons pubis  No active drainage  tender to palpation ) present  Neurological:      General: No focal deficit present  Mental Status: She is alert and oriented to person, place, and time     Psychiatric:         Mood and Affect: Mood normal          Behavior: Behavior normal          Vital Signs  ED Triage Vitals   Temperature Pulse Respirations Blood Pressure SpO2   09/20/22 0014 09/20/22 0014 09/20/22 0015 09/20/22 0015 09/20/22 0015   98 5 °F (36 9 °C) 80 15 111/76 99 %      Temp Source Heart Rate Source Patient Position - Orthostatic VS BP Location FiO2 (%)   09/20/22 0014 -- 09/20/22 0015 09/20/22 0015 --   Oral  Sitting Left arm       Pain Score       --                  Vitals:    09/20/22 0014 09/20/22 0015   BP:  111/76   Pulse: 80    Patient Position - Orthostatic VS:  Sitting         Visual Acuity      ED Medications  Medications   cephalexin (KEFLEX) capsule 500 mg (has no administration in time range)       Diagnostic Studies  Results Reviewed     None                 No orders to display              Procedures  Procedures         ED Course                               SBIRT 20yo+    Flowsheet Row Most Recent Value   SBIRT (25 yo +)    In order to provide better care to our patients, we are screening all of our patients for alcohol and drug use  Would it be okay to ask you these screening questions? Yes Filed at: 09/20/2022 1972   Initial Alcohol Screen: US AUDIT-C     1  How often do you have a drink containing alcohol? 1 Filed at: 09/20/2022 0022   2  How many drinks containing alcohol do you have on a typical day you are drinking? 0 Filed at: 09/20/2022 0022   3b  FEMALE Any Age, or MALE 65+: How often do you have 4 or more drinks on one occassion? 0 Filed at: 09/20/2022 0022   Audit-C Score 1 Filed at: 09/20/2022 0022   TK: How many times in the past year have you    Used an illegal drug or used a prescription medication for non-medical reasons? Never Filed at: 09/20/2022 0022                    MDM  Number of Diagnoses or Management Options  Multiple insect bites  Diagnosis management comments: Patient presenting for evaluation of multiple insect bites present to her bilateral forearms, mons pubis, right glute  Tender, raised, nodular  Will treat with antibiotics  Discharged home with instructions to follow-up with her primary care provider  Strict return precautions were discussed  She is in stable condition at time of discharge  Patient Progress  Patient progress: stable      Disposition  Final diagnoses:   Multiple insect bites     Time reflects when diagnosis was documented in both MDM as applicable and the Disposition within this note     Time User Action Codes Description Comment    9/20/2022  1:00 AM Cesar Mcarthur Add Erica Larkin  XXXA] Multiple insect bites       ED Disposition     ED Disposition   Discharge    Condition   Stable Date/Time   Tue Sep 20, 2022  1:00 AM    Comment   Salvador Vogt discharge to home/self care  Follow-up Information     Follow up With Specialties Details Why Contact Info Additional 2000 [facility] Emergency Department Emergency Medicine Go to  If symptoms worsen 34 [facility] 109 [facility] Emergency Department, [address] [place] [address], [facility], [address]    Javad Mcbride MD Gastroenterology Schedule an appointment as soon as possible for a visit  For follow up [address] [address]  [phone]             Patient's Medications   Discharge Prescriptions    CEPHALEXIN (KEFLEX) 500 MG CAPSULE    Take 1 capsule (500 mg total) by mouth every 6 (six) hours for 7 days       Start Date: 9/20/2022 End Date: 9/27/2022       Order Dose: 500 mg       Quantity: 28 capsule    Refills: 0       No discharge procedures on file      PDMP Review       Value Time User    PDMP Reviewed  Yes 10/18/2019  1:59 AM Oz Spencer MD          ED Provider  Electronically Signed by           Shahana Lujan PA-C  09/20/22 0105

## 2022-09-21 ENCOUNTER — HOSPITAL ENCOUNTER (EMERGENCY)
Facility: HOSPITAL | Age: 27
Discharge: HOME | End: 2022-09-21
Attending: EMERGENCY MEDICINE
Payer: COMMERCIAL

## 2022-09-21 VITALS
TEMPERATURE: 97.8 F | SYSTOLIC BLOOD PRESSURE: 114 MMHG | HEART RATE: 72 BPM | RESPIRATION RATE: 19 BRPM | DIASTOLIC BLOOD PRESSURE: 64 MMHG
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
## 2022-09-21 PROCEDURE — 99282 EMERGENCY DEPT VISIT SF MDM: CPT | Performed by: PHYSICIAN ASSISTANT

## 2022-09-21 PROCEDURE — 99281 EMR DPT VST MAYX REQ PHY/QHP: CPT

## 2022-09-21 NOTE — ED NOTES
Per primary nurse notes under downtime, multiple reddened bites to arm, legs and groin assessed     Morenita Gibson RN  09/21/22 2671

## 2022-09-21 NOTE — ED PROVIDER NOTES
History  No chief complaint on file  Patient is a 51-year-old female with a past medical history significant for anxiety, depression, Crohn's disease presenting to the emergency department for re-evaluation of multiple insect bites  She was seen at our facility yesterday for the same complaint, started on Keflex  She is reporting insect bites to her bilateral forearms, right glute, abdomen, mons pubis  No fevers, chills, streaking, abdominal pain, nausea, vomiting, diarrhea  She has been taking her antibiotics as prescribed  She does not state that the insect bites or getting any worse, however they are not getting any better  No other complaints at this time  History provided by:  Patient   used: No    Insect Bite  Contact animal:  Insect  Location:  Shoulder/arm and pelvis  Shoulder/arm injury location:  L forearm and R forearm  Pelvic injury location:  R buttock and perineum  Time since incident:  5 days  Pain details:     Quality:  Sore and burning    Severity:  Mild    Timing:  Constant    Progression:  Unchanged  Animal in possession: no    Tetanus status:  Unknown  Relieved by:  Nothing  Worsened by:  Nothing  Associated symptoms: no fever, no numbness, no rash and no swelling        Prior to Admission Medications   Prescriptions Last Dose Informant Patient Reported? Taking?    cephalexin (KEFLEX) 500 mg capsule   No No   Sig: Take 1 capsule (500 mg total) by mouth every 6 (six) hours for 7 days   meloxicam (MOBIC) 15 mg tablet   Yes No   Sig: Take 15 mg by mouth daily   mometasone (Nasonex) 50 mcg/act nasal spray   No No   Si sprays into each nostril daily   Patient not taking: Reported on 2022   ondansetron (ZOFRAN-ODT) 4 mg disintegrating tablet   No No   Sig: Take 1 tablet (4 mg total) by mouth every 6 (six) hours as needed for nausea or vomiting   Patient not taking: Reported on 2022   predniSONE 5 mg tablet   No No   Simg PO daily x 7 days to taper by 5mg weekly until finished   Patient not taking: No sig reported   ustekinumab (STELARA) 90 mg/mL subcutaneous injection   No No   Sig: Inject 1 ml (90 mg total) under skin every 8 weeks  Facility-Administered Medications: None       Past Medical History:   Diagnosis Date    Anxiety     Crohn disease ([facility] [address] )     Depression     [facility] attempt [facility])        Past Surgical History:   Procedure Laterality Date    COLONOSCOPY      VULVA BIOPSY Right 8/26/2020    Procedure: Excision of Right Labial Lesion;  Surgeon: Jackalyn Meigs, MD;  [facility];  Service: Gynecology       Family History   Problem Relation Age of Onset    Vitiligo Paternal Grandfather     Lupus Paternal Aunt      I have reviewed and agree with the history as documented  E-Cigarette/Vaping    E-Cigarette Use Never User      E-Cigarette/Vaping Substances    Nicotine No     THC No     CBD No     Flavoring No     Other No     Unknown No      Social History     Tobacco Use    Smoking status: Never Smoker    Smokeless tobacco: Never Used   Vaping Use    Vaping Use: Never used   Substance Use Topics    Alcohol use: Yes     Comment: occasionally    Drug use: Not Currently     Frequency: 7 0 times per week     Types: Marijuana     Comment: Smokes weed daily        Review of Systems   Constitutional: Negative for chills and fever  Respiratory: Negative for cough and shortness of breath  Cardiovascular: Negative for chest pain and palpitations  Gastrointestinal: Negative for abdominal pain, diarrhea, nausea and vomiting  Skin: Positive for wound  Negative for rash  Neurological: Negative for numbness  All other systems reviewed and are negative  Physical Exam  Physical Exam  Vitals reviewed  Constitutional:       General: She is not in acute distress  Appearance: Normal appearance  She is not ill-appearing, toxic-appearing or diaphoretic  HENT:      Head: Normocephalic and atraumatic        Right Ear: External ear normal       Left Ear: External ear normal    Eyes:      General: No scleral icterus  Right eye: No discharge  Left eye: No discharge  Extraocular Movements: Extraocular movements intact  Conjunctiva/sclera: Conjunctivae normal    Cardiovascular:      Rate and Rhythm: Normal rate and regular rhythm  Pulses: Normal pulses  Heart sounds: Normal heart sounds  No murmur heard  No friction rub  No gallop  Pulmonary:      Effort: Pulmonary effort is normal  No respiratory distress  Breath sounds: Normal breath sounds  No stridor  No wheezing, rhonchi or rales  Musculoskeletal:      Cervical back: Normal range of motion and neck supple  Right lower leg: No edema  Left lower leg: No edema  Skin:     General: Skin is warm and dry  Capillary Refill: Capillary refill takes less than 2 seconds  Findings: Lesion (Erythematous, tender, nodular lesions present to bilateral forearms, a right glute, mons pubis  No fluctuance  No streaking ) present  Neurological:      General: No focal deficit present  Mental Status: She is alert and oriented to person, place, and time  Psychiatric:         Mood and Affect: Mood normal          Behavior: Behavior normal          Vital Signs  ED Triage Vitals   Temp Pulse Resp BP SpO2   -- -- -- -- --      Temp src Heart Rate Source Patient Position - Orthostatic VS BP Location FiO2 (%)   -- -- -- -- --      Pain Score       --           There were no vitals filed for this visit  Visual Acuity      ED Medications  Medications - No data to display    Diagnostic Studies  Results Reviewed     None                 No orders to display              Procedures  Procedures         ED Course                                             MDM  Number of Diagnoses or Management Options  Diagnosis management comments: Patient presenting for evaluation of multiple insect bites    This is her 2nd visit for the same complaint  Started antibiotics yesterday  She has taken 3 doses so far  Insect bites have the same appearance as the day prior  Vital signs unremarkable  She appears comfortable, not in any acute distress  She was discharged home with instructions to continue her antibiotics  She was given instructions to follow-up with dermatology  Strict return precautions were discussed  She is in stable condition at time of discharge  Patient Progress  Patient progress: stable      Disposition  Final diagnoses:   Multiple insect bites     Time reflects when diagnosis was documented in both MDM as applicable and the Disposition within this note     Time User Action Codes Description Comment    9/21/2022  3:17 AM Gwendolyn Varghese Add [P58  XXXA] Multiple insect bites       ED Disposition     ED Disposition   Discharge    Condition   Stable    Date/Time   Wed Sep 21, 2022  3:17 AM    Comment   Marleen Ogden discharge to home/self care  Follow-up Information     Follow up With Specialties Details Why Contact Info Additional [address] [facility] Emergency Department Emergency Medicine Go to  If symptoms worsen [address] Emergency Department, [address] [place] [address], [facility]    Nakia Espinoza MD Gastroenterology Schedule an appointment as soon as possible for a visit  For follow-up [address]  [phone]             Patient's Medications   Discharge Prescriptions    No medications on file       No discharge procedures on file      PDMP Review       Value Time User    PDMP Reviewed  Yes 10/18/2019  1:59 AM Ashley Pfeiffer MD          ED Provider  Electronically Signed by           Mike Krueger PA-C  09/21/22 8264

## 2022-09-30 ENCOUNTER — OFFICE VISIT (OUTPATIENT)
Dept: OBGYN CLINIC | Facility: CLINIC | Age: 27
End: 2022-09-30
Payer: COMMERCIAL

## 2022-09-30 VITALS — WEIGHT: 100.4 LBS | BODY MASS INDEX: 17.79 KG/M2 | DIASTOLIC BLOOD PRESSURE: 74 MMHG | SYSTOLIC BLOOD PRESSURE: 110 MMHG

## 2022-09-30 DIAGNOSIS — N89.8 VAGINAL CYST: Primary | ICD-10-CM

## 2022-09-30 PROCEDURE — 99214 OFFICE O/P EST MOD 30 MIN: CPT | Performed by: OBSTETRICS & GYNECOLOGY

## 2022-09-30 NOTE — PROGRESS NOTES
Gynecology   Marleen Ogden 24 y o  female MRN: [id number]    Assessment/Plan :    Vaginal cyst - recurrent    - given broad tissue base in same location as previous mass I discussed repeat surgical excision  - plan excision of cyst under anesthesia  - consent signed        HPI:  Marleen Ogden is a 24 y o  female who presents with recurrent vaginal mass  Tender  Appeared with initiation of Stelara  She is s/p excision of right vaginal mucinous cyst 2020  No recent trauma to area  No drainage  Historical Information   Past Medical History:   Diagnosis Date    Anxiety     Crohn disease ([facility] [address] )     Depression     Suicide attempt [facility])      Past Surgical History:   Procedure Laterality Date    COLONOSCOPY      VULVA BIOPSY Right 8/26/2020    Procedure: Excision of Right Labial Lesion;  Surgeon: Ivory Cummings MD;  [facility];  Service: Gynecology     OB/GYN History:   Nulligravida  Same sex relationship  No h/o STD, cervical dysplasia    Family History   Problem Relation Age of Onset    Vitiligo Paternal Grandfather    Ardeth Needs Lupus Paternal Aunt      Social History   Social History     Substance and Sexual Activity   Alcohol Use Yes    Comment: occasionally     Social History     Substance and Sexual Activity   Drug Use Not Currently    Frequency: 7 0 times per week    Types: Marijuana    Comment: Smokes weed daily      Social History     Tobacco Use   Smoking Status Never Smoker   Smokeless Tobacco Never Used     E-Cigarette/Vaping    E-Cigarette Use Never User      E-Cigarette/Vaping Substances    Nicotine No     THC No     CBD No     Flavoring No     Other No     Unknown No        Meds/Allergies   Current Outpatient Medications on File Prior to Visit   Medication Sig    ustekinumab (STELARA) 90 mg/mL subcutaneous injection Inject 1 ml (90 mg total) under skin every 8 weeks      [DISCONTINUED] meloxicam (MOBIC) 15 mg tablet Take 15 mg by mouth daily    [DISCONTINUED] mometasone (Nasonex) 50 mcg/act nasal spray 2 sprays into each nostril daily (Patient not taking: Reported on 8/23/2022)    [DISCONTINUED] ondansetron (ZOFRAN-ODT) 4 mg disintegrating tablet Take 1 tablet (4 mg total) by mouth every 6 (six) hours as needed for nausea or vomiting (Patient not taking: Reported on 8/23/2022)    [DISCONTINUED] predniSONE 5 mg tablet 40mg PO daily x 7 days to taper by 5mg weekly until finished (Patient not taking: No sig reported)     No current facility-administered medications on file prior to visit  Allergies   Allergen Reactions    Amoxicillin Anaphylaxis    Amoxicillin Abdominal Pain     Review of Systems   Constitutional: Negative for chills, decreased appetite, fever and malaise/fatigue  Respiratory: Negative for cough, shortness of breath, sputum production and wheezing  Gastrointestinal: Positive for abdominal pain and change in bowel habit  Negative for bloating and nausea  Genitourinary: Negative for bladder incontinence, dysuria, non-menstrual bleeding and pelvic pain  Objective   Vitals: Blood pressure 110/74, weight 45 5 kg (100 lb 6 4 oz), last menstrual period 09/15/2022    Physical Exam  Constitutional:       Appearance: Normal appearance  Genitourinary:      There are lesions in the vagina  Right Labia: lesions  Left Labia: No lesions  No inguinal adenopathy present in the right or left side  No vaginal discharge  Vaginal exam comments: 2 5cm cystic right vaginal introitus mass protruding; soft  Dimpled area superiorly c/w previous scar  Gregorio Rachel HENT:      Head: Normocephalic  Cardiovascular:      Rate and Rhythm: Normal rate and regular rhythm  Pulmonary:      Effort: Pulmonary effort is normal    Abdominal:      General: There is no distension  Palpations: Abdomen is soft  Musculoskeletal:         General: No swelling  Lymphadenopathy:      Lower Body: No right inguinal adenopathy  No left inguinal adenopathy  Neurological:      General: No focal deficit present  Mental Status: She is alert and oriented to person, place, and time  Skin:     General: Skin is warm and dry  Psychiatric:         Mood and Affect: Mood normal          Behavior: Behavior normal    Vitals reviewed

## 2022-10-04 ENCOUNTER — TELEPHONE (OUTPATIENT)
Dept: OBGYN CLINIC | Facility: CLINIC | Age: 27
End: 2022-10-04

## 2022-10-10 ENCOUNTER — TELEPHONE (OUTPATIENT)
Dept: GASTROENTEROLOGY | Facility: CLINIC | Age: 27
End: 2022-10-10

## 2022-10-10 NOTE — TELEPHONE ENCOUNTER
Will await submission until after follow up is complete so there is clinical documentation of patient being on medication   This gives us the best chance at approval

## 2022-10-10 NOTE — TELEPHONE ENCOUNTER
Patients authorization for stelara 90mg every 8 weeks expires on 11/8  She has not been seen in office since 10/2021  Follow up scheduled for 11/1

## 2022-10-20 ENCOUNTER — ANESTHESIA EVENT (OUTPATIENT)
Dept: PERIOP | Facility: AMBULARY SURGERY CENTER | Age: 27
End: 2022-10-20
Payer: COMMERCIAL

## 2022-10-20 RX ORDER — MELOXICAM 15 MG/1
15 TABLET ORAL DAILY
COMMUNITY

## 2022-10-20 NOTE — PRE-PROCEDURE INSTRUCTIONS
Pre-Surgery Instructions:   Medication Instructions   • meloxicam (MOBIC) 15 mg tablet last dose 10-19-22   • ustekinumab (STELARA) 90 mg/mL subcutaneous injection Q 8 weeks   Pre-op medication, and showering instructions with antibacteral soap reviewed  Pt  Verbalized understanding of current visitor restrictions  Pt  Verbalized an understanding of all instructions reviewed and offers no concerns at this time  Instructed to avoid all ASA/NSAIDs and OTC Vit/Supp from now until after surgery per anesthesia guidelines   Tylenol ok prn

## 2022-10-21 ENCOUNTER — HOSPITAL ENCOUNTER (OUTPATIENT)
Facility: AMBULARY SURGERY CENTER | Age: 27
Setting detail: OUTPATIENT SURGERY
Discharge: HOME | End: 2022-10-21
Attending: OBSTETRICS & GYNECOLOGY | Admitting: OBSTETRICS & GYNECOLOGY
Payer: COMMERCIAL

## 2022-10-21 ENCOUNTER — ANESTHESIA (OUTPATIENT)
Dept: PERIOP | Facility: AMBULARY SURGERY CENTER | Age: 27
End: 2022-10-21
Payer: COMMERCIAL

## 2022-10-21 VITALS
DIASTOLIC BLOOD PRESSURE: 63 MMHG | OXYGEN SATURATION: 97 % | WEIGHT: 100 LBS | TEMPERATURE: 97.3 F | BODY MASS INDEX: 17.72 KG/M2 | HEART RATE: 74 BPM | RESPIRATION RATE: 17 BRPM | SYSTOLIC BLOOD PRESSURE: 110 MMHG | HEIGHT: 63 IN

## 2022-10-21 DIAGNOSIS — N90.7 LABIAL CYST: ICD-10-CM

## 2022-10-21 LAB
EXT PREGNANCY TEST URINE: NEGATIVE
EXT. CONTROL: NORMAL

## 2022-10-21 PROCEDURE — 81025 URINE PREGNANCY TEST: CPT | Performed by: OBSTETRICS & GYNECOLOGY

## 2022-10-21 RX ORDER — FENTANYL CITRATE 50 UG/ML
INJECTION, SOLUTION INTRAMUSCULAR; INTRAVENOUS AS NEEDED
Status: DISCONTINUED | OUTPATIENT
Start: 2022-10-21 | End: 2022-10-21

## 2022-10-21 RX ORDER — PROPOFOL 10 MG/ML
INJECTION, EMULSION INTRAVENOUS AS NEEDED
Status: DISCONTINUED | OUTPATIENT
Start: 2022-10-21 | End: 2022-10-21

## 2022-10-21 RX ORDER — EPHEDRINE SULFATE 50 MG/ML
INJECTION INTRAVENOUS AS NEEDED
Status: DISCONTINUED | OUTPATIENT
Start: 2022-10-21 | End: 2022-10-21

## 2022-10-21 RX ORDER — IBUPROFEN 600 MG/1
600 TABLET ORAL EVERY 6 HOURS PRN
Status: DISCONTINUED | OUTPATIENT
Start: 2022-10-21 | End: 2022-10-21 | Stop reason: HOSPADM

## 2022-10-21 RX ORDER — MAGNESIUM HYDROXIDE 1200 MG/15ML
LIQUID ORAL AS NEEDED
Status: DISCONTINUED | OUTPATIENT
Start: 2022-10-21 | End: 2022-10-21 | Stop reason: HOSPADM

## 2022-10-21 RX ORDER — BUPIVACAINE HYDROCHLORIDE 5 MG/ML
INJECTION, SOLUTION EPIDURAL; INTRACAUDAL AS NEEDED
Status: DISCONTINUED | OUTPATIENT
Start: 2022-10-21 | End: 2022-10-21 | Stop reason: HOSPADM

## 2022-10-21 RX ORDER — ACETAMINOPHEN 325 MG/1
975 TABLET ORAL ONCE
Status: COMPLETED | OUTPATIENT
Start: 2022-10-21 | End: 2022-10-21

## 2022-10-21 RX ORDER — ONDANSETRON 2 MG/ML
4 INJECTION INTRAMUSCULAR; INTRAVENOUS ONCE AS NEEDED
Status: DISCONTINUED | OUTPATIENT
Start: 2022-10-21 | End: 2022-10-21 | Stop reason: HOSPADM

## 2022-10-21 RX ORDER — FENTANYL CITRATE/PF 50 MCG/ML
25 SYRINGE (ML) INJECTION
Status: DISCONTINUED | OUTPATIENT
Start: 2022-10-21 | End: 2022-10-21 | Stop reason: HOSPADM

## 2022-10-21 RX ORDER — DEXAMETHASONE SODIUM PHOSPHATE 10 MG/ML
INJECTION, SOLUTION INTRAMUSCULAR; INTRAVENOUS AS NEEDED
Status: DISCONTINUED | OUTPATIENT
Start: 2022-10-21 | End: 2022-10-21

## 2022-10-21 RX ORDER — SODIUM CHLORIDE, SODIUM LACTATE, POTASSIUM CHLORIDE, CALCIUM CHLORIDE 600; 310; 30; 20 MG/100ML; MG/100ML; MG/100ML; MG/100ML
INJECTION, SOLUTION INTRAVENOUS CONTINUOUS PRN
Status: DISCONTINUED | OUTPATIENT
Start: 2022-10-21 | End: 2022-10-21

## 2022-10-21 RX ORDER — OXYCODONE HYDROCHLORIDE 5 MG/1
2.5 TABLET ORAL ONCE
Status: COMPLETED | OUTPATIENT
Start: 2022-10-21 | End: 2022-10-21

## 2022-10-21 RX ORDER — LIDOCAINE HYDROCHLORIDE 10 MG/ML
INJECTION, SOLUTION EPIDURAL; INFILTRATION; INTRACAUDAL; PERINEURAL AS NEEDED
Status: DISCONTINUED | OUTPATIENT
Start: 2022-10-21 | End: 2022-10-21

## 2022-10-21 RX ORDER — ACETAMINOPHEN 325 MG/1
975 TABLET ORAL EVERY 6 HOURS PRN
Status: DISCONTINUED | OUTPATIENT
Start: 2022-10-21 | End: 2022-10-21 | Stop reason: HOSPADM

## 2022-10-21 RX ORDER — ONDANSETRON 2 MG/ML
4 INJECTION INTRAMUSCULAR; INTRAVENOUS EVERY 6 HOURS PRN
Status: DISCONTINUED | OUTPATIENT
Start: 2022-10-21 | End: 2022-10-21 | Stop reason: HOSPADM

## 2022-10-21 RX ORDER — MIDAZOLAM HYDROCHLORIDE 2 MG/2ML
INJECTION, SOLUTION INTRAMUSCULAR; INTRAVENOUS AS NEEDED
Status: DISCONTINUED | OUTPATIENT
Start: 2022-10-21 | End: 2022-10-21

## 2022-10-21 RX ORDER — HYDROMORPHONE HCL/PF 1 MG/ML
0.2 SYRINGE (ML) INJECTION
Status: DISCONTINUED | OUTPATIENT
Start: 2022-10-21 | End: 2022-10-21 | Stop reason: HOSPADM

## 2022-10-21 RX ORDER — ONDANSETRON 2 MG/ML
INJECTION INTRAMUSCULAR; INTRAVENOUS AS NEEDED
Status: DISCONTINUED | OUTPATIENT
Start: 2022-10-21 | End: 2022-10-21

## 2022-10-21 RX ADMIN — OXYCODONE HYDROCHLORIDE 2.5 MG: 5 TABLET ORAL at 13:55

## 2022-10-21 RX ADMIN — FENTANYL CITRATE 25 MCG: 50 INJECTION INTRAMUSCULAR; INTRAVENOUS at 11:38

## 2022-10-21 RX ADMIN — ONDANSETRON 4 MG: 2 INJECTION INTRAMUSCULAR; INTRAVENOUS at 12:59

## 2022-10-21 RX ADMIN — EPHEDRINE SULFATE 5 MG: 50 INJECTION, SOLUTION INTRAVENOUS at 11:19

## 2022-10-21 RX ADMIN — FENTANYL CITRATE 25 MCG: 50 INJECTION INTRAMUSCULAR; INTRAVENOUS at 12:00

## 2022-10-21 RX ADMIN — PROPOFOL 200 MG: 10 INJECTION, EMULSION INTRAVENOUS at 10:47

## 2022-10-21 RX ADMIN — EPHEDRINE SULFATE 5 MG: 50 INJECTION, SOLUTION INTRAVENOUS at 11:12

## 2022-10-21 RX ADMIN — FENTANYL CITRATE 50 MCG: 50 INJECTION INTRAMUSCULAR; INTRAVENOUS at 10:52

## 2022-10-21 RX ADMIN — ACETAMINOPHEN 975 MG: 325 TABLET ORAL at 09:53

## 2022-10-21 RX ADMIN — FENTANYL CITRATE 25 MCG: 50 INJECTION INTRAMUSCULAR; INTRAVENOUS at 12:23

## 2022-10-21 RX ADMIN — EPHEDRINE SULFATE 5 MG: 50 INJECTION, SOLUTION INTRAVENOUS at 11:09

## 2022-10-21 RX ADMIN — DEXAMETHASONE SODIUM PHOSPHATE 10 MG: 10 INJECTION, SOLUTION INTRAMUSCULAR; INTRAVENOUS at 10:47

## 2022-10-21 RX ADMIN — ONDANSETRON 4 MG: 2 INJECTION INTRAMUSCULAR; INTRAVENOUS at 10:47

## 2022-10-21 RX ADMIN — SODIUM CHLORIDE, SODIUM LACTATE, POTASSIUM CHLORIDE, AND CALCIUM CHLORIDE: .6; .31; .03; .02 INJECTION, SOLUTION INTRAVENOUS at 10:27

## 2022-10-21 RX ADMIN — IBUPROFEN 600 MG: 600 TABLET ORAL at 13:24

## 2022-10-21 RX ADMIN — FENTANYL CITRATE 25 MCG: 50 INJECTION INTRAMUSCULAR; INTRAVENOUS at 11:19

## 2022-10-21 RX ADMIN — LIDOCAINE HYDROCHLORIDE 50 MG: 10 INJECTION, SOLUTION EPIDURAL; INFILTRATION; INTRACAUDAL; PERINEURAL at 10:47

## 2022-10-21 RX ADMIN — FENTANYL CITRATE 25 MCG: 50 INJECTION INTRAMUSCULAR; INTRAVENOUS at 12:26

## 2022-10-21 RX ADMIN — EPHEDRINE SULFATE 5 MG: 50 INJECTION, SOLUTION INTRAVENOUS at 11:05

## 2022-10-21 RX ADMIN — MIDAZOLAM 2 MG: 1 INJECTION INTRAMUSCULAR; INTRAVENOUS at 10:39

## 2022-10-21 RX ADMIN — FENTANYL CITRATE 25 MCG: 50 INJECTION INTRAMUSCULAR; INTRAVENOUS at 12:11

## 2022-10-21 NOTE — DISCHARGE INSTRUCTIONS
Discharge Instructions    DISCHARGE INSTRUCTIONS:   Contact your doctor at the number above if:   You have a fever over 101o  You have nausea or are vomiting that does not improve after a light meal    Your pain is getting worse, even after you take medicine  You feel pain or burning when you urinate, or you have trouble urinating  You have pus or a foul-smelling odor coming from your vagina and/or wound  Your wound is red, swollen, or draining pus  You see new or an increased amount of bright red blood coming from your vagina or your incisions  You have questions or concerns about your condition or care  Seek care immediately:   Your arm or leg feels warm, tender, and painful  It may look swollen and red  You have increasing abdominal or pelvic pain  You have heavy vaginal bleeding that fills 1 or more sanitary pads in 1 hour  Call 911 for any of the following: You feel lightheaded, short of breath, and have chest pain  You cough up blood  Medicines: You may need any of the following:  Prescription medicine will not be given for this procedure  Resume your previous medications as directed  Extra-strength Tylenol: This medications over-the-counter  Take 2 tablets every 6 hours as needed for mild-to-moderate pain  Ibuprofen/Advil/Motrin 200 mg tablets: This medication is over-the-counter  Take 3 tablets every 8 hours as needed for moderate to severe pain  Take this medication with food  The drink plenty of fluids while using this medication to prevent kidney injury  Metamucil or MiraLax: This product is over-the-counter  Distal 1 large tbsp in a large glass of water  Use once or twice a day for 1-2 weeks prevent and or treat constipation  Take your medicines as directed  Contact your healthcare provider if you think your medicine is not helping or if you have side effects  Tell him or her if you are allergic to any medicine   Keep a list of the medicines, vitamins, and herbs you take  Include the amounts, and when and why you take them  Bring the list or the pill bottles to follow-up visits  Carry your medicine list with you in case of an emergency  Activity:   Rest as needed  Get up and move around as directed to help prevent blood clots  Start with short walks and slowly increase the distance every day  Limit the number of times you climb stairs to 2 times each day for the first week  Plan most of your daily activities on one level of your home  Do not lift objects heavier than 10 pounds until seen in the office for your follow-up appointment  Do not strain during bowel movements  High-fiber foods and extra liquids can help you prevent constipation  Examples of high-fiber foods are fruit and bran  Prune juice and water are good liquids to drink  Do not have sex, use tampons, or douche and do not do tub baths/swimming until cleared by your physician at your postoperative appointment  You may shower as soon as the day after surgery  Do not go into pools or hot tubs until cleared by your doctor  Ask when it is safe for you to drive  It is generally safe to drive as soon as your legs are strong, you are off pain medicines and you feel like you will have the appropriate reflexes to step on the breaks in case of an emergency  Ask when you may return to work and to other regular activities  Wound care: Care for your abdominal incisions as directed  Carefully wash around the wound with soap and water  If you have Hibiclens or medicated soap that you were instructed to use before surgery, you may use that to wash with for up to 2 days after surgery  If not, any mild non-scented, non-abrasive soap is safe  It is okay to let the soap and water run over your incision  Do not scrub your incision  Dry the area and put on new, clean bandages as directed  Change your bandages when they get wet or dirty   If you have strips of medical tape, let them fall off on their own  It may take 7 to 14 days for them to fall off  Check your incision every day for redness, swelling, or pus  Deep breathing: Take deep breaths and cough 10 times each hour  This will decrease your risk for a lung infection  Take a deep breath and hold it for as long as you can  Let the air out and then cough strongly  Deep breaths help open your airway  You may be given an incentive spirometer to help you take deep breaths  Put the plastic piece in your mouth and take a slow, deep breath, then let the air out and cough  Repeat these steps 10 times every hour  Follow up with your healthcare provider or gynecologist as directed: You may need to return to have stitches removed, and for other tests  Write down your questions so you remember to ask them during your visits  © 2017 [employer] Information is for End User's use only and may not be sold, redistributed or otherwise used for commercial purposes  All illustrations and images included in CareNotes® are the copyrighted property of A [employer] M [employer]  or [employer]  The above information is an  only  It is not intended as medical advice for individual conditions or treatments  Talk to your doctor, nurse or pharmacist before following any medical regimen to see if it is safe and effective for you

## 2022-10-21 NOTE — OP NOTE
OPERATIVE REPORT  PATIENT NAME: Danley Siemens    :  1995  MRN: [id number]  Pt Location: [facility] OR [facility] 04    SURGERY DATE: 10/21/2022    Surgeon(s) and Role:     * Ivory Cummings MD - Primary     * Alicia Smiley MD - Assisting    Preop Diagnosis:  Labial cyst [N90 7]    Post-Op Diagnosis Codes:     * Labial cyst [N90 7]    Procedure(s) (LRB):  RESECTION OF RIGHT LABIAL CYST (N/A)    Specimen(s):  ID Type Source Tests Collected by Time Destination   1 : Right Labial cyst Tissue Labia TISSUE EXAM Ivory Cummings MD 10/21/2022 1058        Estimated Blood Loss:   Minimal    Drains:  * No LDAs found *    Anesthesia Type:   Choice    Operative Indications:  Labial cyst [N90 7]    Operative Findings:  Right labial cyst measuring 3-4cm in size  Excised with sharp dissection with mucinous material appreciated within cystic sac  Multilayered repair completed with 0 Vicryl in figure-of-eight fashion and vaginal mucosa approximation with 4-0 Monocryl in running fashion    Complications:   None apparent    Procedure and Technique:  Patient was taken to the operating room  General LMA anesthesia (LMA) was administered and the patient was positioned on the OR table in the dorsal lithotomy position  All pressure points were padded and a naila hugger was placed to maintain control of core body temperature  The patient was prepped and draped in the usual sterile fashion  A time out was performed to confirm correct patient and correct procedure  Right labial cyst was visualized, approximately 3-4cm in size extending past the introitus  Lesion was excised with sharp dissection using scalpel  Entirety of cystic sac was appreciated  Repair was completed in a multi-layer closure  Dead space was closed with two layers of 0 Vicryl in a figure of eight fashion  Vaginal mucosa was approximated with running 4-0 Monocryl  Additional hemostatic sutures of 4-0 Monocryl were utilized in a figure-of-eight fashion   Excellent hemostasis was appreciated  20cc Marcaine 0 5% was injected at the repair sites  At the conclusion of the procedure, all needle, sponge, and instrument counts were noted to be correct x2  Dr Jose Stearns was present and participated in all key portions of the case               Patient Disposition:  PACU         SIGNATURE: Mango Wills MD  DATE: October 21, 2022  TIME: 11:50 AM

## 2022-10-21 NOTE — ANESTHESIA PREPROCEDURE EVALUATION
Medical History    History Comments   Crohn disease ([facility] [address] )    Suicide attempt ([facility] [address] )    Depression    Anxiety    COVID      Procedure:  RESECTION OF RIGHT LABIAL CYST (N/A Vagina )    Relevant Problems   ANESTHESIA (within normal limits)        Physical Exam    Airway    Mallampati score: II  TM Distance: >3 FB  Neck ROM: full     Dental   No notable dental hx     Cardiovascular  Rate: normal,     Pulmonary  Pulmonary exam normal     Other Findings        Anesthesia Plan  ASA Score- 1     Anesthesia Type- general with ASA Monitors  Additional Monitors:   Airway Plan: LMA  Plan Factors-Exercise tolerance (METS): >4 METS  Chart reviewed  Patient summary reviewed  Patient is a current smoker  Induction- intravenous  Postoperative Plan- Plan for postoperative opioid use  Informed Consent- Anesthetic plan and risks discussed with patient  I personally reviewed this patient with the CRNA  Discussed and agreed on the Anesthesia Plan with the CRNA  Annabelle Hooks

## 2022-10-21 NOTE — ANESTHESIA POSTPROCEDURE EVALUATION
Post-Op Assessment Note    CV Status:  Stable  Pain Score: 0    Pain management: adequate     Mental Status:  Alert and awake   Hydration Status:  Euvolemic   PONV Controlled:  Controlled   Airway Patency:  Patent      Post Op Vitals Reviewed: Yes      Staff: Anesthesiologist, CRNA         No complications documented      BP   112/62   Temp  96 8   Pulse  99   Resp   16   SpO2   99

## 2022-10-21 NOTE — H&P
H&P reviewed  After examining the patient I find no changes in the patients condition since the H&P had been written      Vitals:    10/21/22 0937   BP: 115/81   Pulse: 61   Resp: 20   Temp: (!) 97 3 °F (36 3 °C)   SpO2: 100%

## 2022-10-24 ENCOUNTER — TELEPHONE (OUTPATIENT)
Dept: OBGYN CLINIC | Facility: CLINIC | Age: 27
End: 2022-10-24

## 2022-10-24 NOTE — LETTER
October 24, 2022    [address] [facility] [address]      To Whom It May Concern:    Please be advised that Michell Krishnamurthy is under my professional care  She is able to return to work on 10/29/2022  Thank you         Sincerely,    Nelly Orellana MD

## 2022-10-24 NOTE — TELEPHONE ENCOUNTER
Pt had a procedure done with SRIKANTH on 10/21   She would need a note stating she can return to work on the 29th     Please advise

## 2022-10-24 NOTE — LETTER
October 28, 2022    [address] [facility] [address]           To Whom It May Concern:    Please be advised that Rubia Cottrell is under my professional care and is able to to return to work on 10/31/22  If you have any questions please call our office at [phone]        Sincerely,    Jazmyne Burton MD

## 2022-10-27 NOTE — TELEPHONE ENCOUNTER
Spoke to pt and she had asked to go back on 10/29 but she is still having a little trouble walking and her job requires her to walk a lot during the day  Asking to extened it to 10/31- shes off on Mondays anyway because she goes to school

## 2022-10-27 NOTE — TELEPHONE ENCOUNTER
Pt would like to know if she can get her note extended until 10/31   Have her back on Saturday but she can barely stand for more then 20 mins     pleae advise

## 2022-11-01 ENCOUNTER — OFFICE VISIT (OUTPATIENT)
Dept: GASTROENTEROLOGY | Facility: CLINIC | Age: 27
End: 2022-11-01

## 2022-11-01 VITALS
DIASTOLIC BLOOD PRESSURE: 58 MMHG | HEIGHT: 63 IN | OXYGEN SATURATION: 98 % | BODY MASS INDEX: 18.25 KG/M2 | SYSTOLIC BLOOD PRESSURE: 102 MMHG | WEIGHT: 103 LBS | HEART RATE: 90 BPM

## 2022-11-01 DIAGNOSIS — K50.012 CROHN'S DISEASE OF SMALL INTESTINE WITH INTESTINAL OBSTRUCTION (HCC): Primary | ICD-10-CM

## 2022-11-01 RX ORDER — HYDROCORTISONE 25 MG/G
CREAM TOPICAL 2 TIMES DAILY
Qty: 28 G | Refills: 0 | Status: SHIPPED | OUTPATIENT
Start: 2022-11-01

## 2022-11-01 NOTE — PROGRESS NOTES
[facility] - Outpatient Follow-up Note  Kodak Castro 24 y o  female MRN: [id number]  Encounter: [id number]          ASSESSMENT AND PLAN:      1  Crohn's disease of small intestine with intestinal obstruction ([address] )  She is on Stelara  She is overall feeing better than last year  She injects 90mg q 8 weeks  Update labs  Stelara levels prior to next injection scheduled for 12/5 - she will come in to have Aleah Henry our nurse inject for her as she is struggling with this    She notes blood on the toilet tissue related to hemorrhoids - start ansuol cream  She notes 3 soft stools daily - not diarrhea and non bloody    She notes a 15lb weight loss over the past year despite a good appetite  Will check CTe    Colonoscopy 7/2021 showed cobblestoning and erythema of the ileocecal valve     ______________________________________________________________________    SUBJECTIVE:  22-year-old female with a history of small bowel Crohn's diagnosed in 2015 previously treated with Humira 40 mg q 14 days which was discontinued around 2019 due to medical noncompliance currently taking Stelara 90 mg Q 8 weeks  She started Stelara last October and has not had a follow-up office appointment since that time  Over the summer she struggled with diarrhea however this was related to a COVID-19 infection has completely resolved  She reports that recently she has noted blood on the toilet tissue and a tender lump around the outside of the anal canal   She is also noting several soft stools a day with out watery stool  She has no abdominal pain  She reports that she has a good appetite without nausea or vomiting  Despite this she has lost approximately 15 lb since last year  Her last colonoscopy was in July of 2021 with noted cobblestoning and erythema of the ileocecal valve  The terminal ileum could not be intubated at that time  She recently underwent an excision of the labial cyst which was recurrent 2018  She is having significant pain related to this  She is going to follow-up with Gynecology  REVIEW OF SYSTEMS IS OTHERWISE NEGATIVE  Historical Information   Past Medical History:   Diagnosis Date   • Anxiety    • COVID 08/08/2022   • Crohn disease ([facility] [address] )    • Depression    • Suicide attempt [facility])      Past Surgical History:   Procedure Laterality Date   • COLONOSCOPY     • VULVA BIOPSY Right 8/26/2020    Procedure: Excision of Right Labial Lesion;  Surgeon: Mikel Boykin MD;  [facility];  Service: Gynecology   • VULVA BIOPSY N/A 10/21/2022    Procedure: RESECTION OF RIGHT LABIAL CYST;  Surgeon: Mikel Boykin MD;  [facility];  Service: Gynecology     Social History   Social History     Substance and Sexual Activity   Alcohol Use Yes    Comment: occasionally     Social History     Substance and Sexual Activity   Drug Use Yes   • Frequency: 7 0 times per week   • Types: Marijuana    Comment: Smokes weed daily      Social History     Tobacco Use   Smoking Status Never Smoker   Smokeless Tobacco Never Used     Family History   Problem Relation Age of Onset   • Vitiligo Paternal Grandfather    • Lupus Paternal Aunt        Meds/Allergies       Current Outpatient Medications:   •  hydrocortisone (ANUSOL-HC) 2 5 % rectal cream  •  meloxicam (MOBIC) 15 mg tablet  •  ustekinumab (STELARA) 90 mg/mL subcutaneous injection    Allergies   Allergen Reactions   • Amoxicillin Anaphylaxis           Objective     Blood pressure 102/58, pulse 90, height 5' 3" (1 6 m), weight 46 7 kg (103 lb), SpO2 98 %, not currently breastfeeding  Body mass index is 18 25 kg/m²        PHYSICAL EXAM:      General Appearance:   Alert, cooperative, no distress   HEENT:   Normocephalic, atraumatic, anicteric      Neck:  Supple, symmetrical, trachea midline   Lungs:   Clear to auscultation bilaterally; no rales, rhonchi or wheezing; respirations unlabored    Heart[de-identified]   Regular rate and rhythm; no murmur, rub, or gallop  Abdomen:   Soft, non-tender, non-distended; normal bowel sounds; no masses, no organomegaly    Genitalia:   Deferred    Rectal:   Deferred    Extremities:  No cyanosis, clubbing or edema    Pulses:  2+ and symmetric    Skin:  No jaundice, rashes, or lesions    Lymph nodes:  No palpable cervical lymphadenopathy        Lab Results:   No visits with results within 1 Day(s) from this visit  Latest known visit with results is:   Admission on 10/21/2022, Discharged on 10/21/2022   Component Date Value   • EXT Preg Test, Ur 10/21/2022 Negative    • Control 10/21/2022 Valid          Radiology Results:   No results found

## 2022-11-13 ENCOUNTER — APPOINTMENT (EMERGENCY)
Dept: CT IMAGING | Facility: HOSPITAL | Age: 27
End: 2022-11-13

## 2022-11-13 ENCOUNTER — HOSPITAL ENCOUNTER (EMERGENCY)
Facility: HOSPITAL | Age: 27
Discharge: HOME | End: 2022-11-13
Attending: EMERGENCY MEDICINE

## 2022-11-13 VITALS
BODY MASS INDEX: 18.25 KG/M2 | WEIGHT: 103 LBS | DIASTOLIC BLOOD PRESSURE: 53 MMHG | HEART RATE: 64 BPM | RESPIRATION RATE: 20 BRPM | SYSTOLIC BLOOD PRESSURE: 108 MMHG | HEIGHT: 63 IN | TEMPERATURE: 97.4 F | OXYGEN SATURATION: 96 %

## 2022-11-13 DIAGNOSIS — R19.7 BLOODY DIARRHEA: ICD-10-CM

## 2022-11-13 DIAGNOSIS — R10.13 EPIGASTRIC PAIN: Primary | ICD-10-CM

## 2022-11-13 DIAGNOSIS — K50.90 EXACERBATION OF CROHN'S DISEASE (HCC): ICD-10-CM

## 2022-11-13 LAB
ALBUMIN SERPL BCP-MCNC: 4 G/DL (ref 3.5–5)
ALP SERPL-CCNC: 71 U/L (ref 46–116)
ALT SERPL W P-5'-P-CCNC: 12 U/L (ref 12–78)
ANION GAP SERPL CALCULATED.3IONS-SCNC: 9 MMOL/L (ref 4–13)
AST SERPL W P-5'-P-CCNC: 17 U/L (ref 5–45)
BACTERIA UR QL AUTO: ABNORMAL /HPF
BASOPHILS # BLD AUTO: 0.09 THOUSANDS/ÂΜL (ref 0–0.1)
BASOPHILS NFR BLD AUTO: 1 % (ref 0–1)
BILIRUB DIRECT SERPL-MCNC: 0.1 MG/DL (ref 0–0.2)
BILIRUB SERPL-MCNC: 0.36 MG/DL (ref 0.2–1)
BILIRUB UR QL STRIP: NEGATIVE
BUN SERPL-MCNC: 17 MG/DL (ref 5–25)
CALCIUM SERPL-MCNC: 9 MG/DL (ref 8.3–10.1)
CHLORIDE SERPL-SCNC: 104 MMOL/L (ref 96–108)
CLARITY UR: CLEAR
CO2 SERPL-SCNC: 26 MMOL/L (ref 21–32)
COLOR UR: ABNORMAL
CREAT SERPL-MCNC: 0.64 MG/DL (ref 0.6–1.3)
EOSINOPHIL # BLD AUTO: 0.11 THOUSAND/ÂΜL (ref 0–0.61)
EOSINOPHIL NFR BLD AUTO: 1 % (ref 0–6)
ERYTHROCYTE [DISTWIDTH] IN BLOOD BY AUTOMATED COUNT: 12.4 % (ref 11.6–15.1)
EXT PREG TEST URINE: NEGATIVE
EXT. CONTROL ED NAV: NORMAL
GFR SERPL CREATININE-BSD FRML MDRD: 124 ML/MIN/1.73SQ M
GLUCOSE SERPL-MCNC: 92 MG/DL (ref 65–140)
GLUCOSE UR STRIP-MCNC: NEGATIVE MG/DL
HCT VFR BLD AUTO: 35.2 % (ref 34.8–46.1)
HGB BLD-MCNC: 11.7 G/DL (ref 11.5–15.4)
HGB UR QL STRIP.AUTO: NEGATIVE
IMM GRANULOCYTES # BLD AUTO: 0.02 THOUSAND/UL (ref 0–0.2)
IMM GRANULOCYTES NFR BLD AUTO: 0 % (ref 0–2)
KETONES UR STRIP-MCNC: NEGATIVE MG/DL
LEUKOCYTE ESTERASE UR QL STRIP: NEGATIVE
LIPASE SERPL-CCNC: 89 U/L (ref 73–393)
LYMPHOCYTES # BLD AUTO: 2.5 THOUSANDS/ÂΜL (ref 0.6–4.47)
LYMPHOCYTES NFR BLD AUTO: 29 % (ref 14–44)
MAGNESIUM SERPL-MCNC: 2 MG/DL (ref 1.6–2.6)
MCH RBC QN AUTO: 28.8 PG (ref 26.8–34.3)
MCHC RBC AUTO-ENTMCNC: 33.2 G/DL (ref 31.4–37.4)
MCV RBC AUTO: 87 FL (ref 82–98)
MONOCYTES # BLD AUTO: 0.6 THOUSAND/ÂΜL (ref 0.17–1.22)
MONOCYTES NFR BLD AUTO: 7 % (ref 4–12)
MUCOUS THREADS UR QL AUTO: ABNORMAL
NEUTROPHILS # BLD AUTO: 5.46 THOUSANDS/ÂΜL (ref 1.85–7.62)
NEUTS SEG NFR BLD AUTO: 62 % (ref 43–75)
NITRITE UR QL STRIP: NEGATIVE
NON-SQ EPI CELLS URNS QL MICRO: ABNORMAL /HPF
NRBC BLD AUTO-RTO: 0 /100 WBCS
PH UR STRIP.AUTO: 7.5 [PH]
PLATELET # BLD AUTO: 337 THOUSANDS/UL (ref 149–390)
PMV BLD AUTO: 9.1 FL (ref 8.9–12.7)
POTASSIUM SERPL-SCNC: 3.8 MMOL/L (ref 3.5–5.3)
PROT SERPL-MCNC: 8.1 G/DL (ref 6.4–8.4)
PROT UR STRIP-MCNC: ABNORMAL MG/DL
RBC # BLD AUTO: 4.06 MILLION/UL (ref 3.81–5.12)
RBC #/AREA URNS AUTO: ABNORMAL /HPF
SODIUM SERPL-SCNC: 139 MMOL/L (ref 135–147)
SP GR UR STRIP.AUTO: 1.03 (ref 1–1.03)
UROBILINOGEN UR STRIP-ACNC: <2 MG/DL
WBC # BLD AUTO: 8.78 THOUSAND/UL (ref 4.31–10.16)
WBC #/AREA URNS AUTO: ABNORMAL /HPF

## 2022-11-13 RX ORDER — ONDANSETRON 4 MG/1
4 TABLET, ORALLY DISINTEGRATING ORAL EVERY 8 HOURS PRN
Qty: 20 TABLET | Refills: 0 | Status: SHIPPED | OUTPATIENT
Start: 2022-11-13

## 2022-11-13 RX ORDER — DICYCLOMINE HCL 20 MG
20 TABLET ORAL EVERY 8 HOURS PRN
Qty: 20 TABLET | Refills: 0 | Status: SHIPPED | OUTPATIENT
Start: 2022-11-13

## 2022-11-13 RX ORDER — PREDNISONE 1 MG/1
TABLET ORAL
Qty: 244 TABLET | Refills: 0 | Status: SHIPPED | OUTPATIENT
Start: 2022-11-13 | End: 2023-01-07

## 2022-11-13 RX ORDER — PREDNISONE 20 MG/1
40 TABLET ORAL ONCE
Status: COMPLETED | OUTPATIENT
Start: 2022-11-13 | End: 2022-11-13

## 2022-11-13 RX ORDER — DICYCLOMINE HCL 20 MG
20 TABLET ORAL ONCE
Status: COMPLETED | OUTPATIENT
Start: 2022-11-13 | End: 2022-11-13

## 2022-11-13 RX ORDER — ONDANSETRON 2 MG/ML
4 INJECTION INTRAMUSCULAR; INTRAVENOUS ONCE
Status: COMPLETED | OUTPATIENT
Start: 2022-11-13 | End: 2022-11-13

## 2022-11-13 RX ADMIN — PREDNISONE 40 MG: 20 TABLET ORAL at 16:12

## 2022-11-13 RX ADMIN — DICYCLOMINE HYDROCHLORIDE 20 MG: 20 TABLET ORAL at 13:53

## 2022-11-13 RX ADMIN — IOHEXOL 100 ML: 350 INJECTION, SOLUTION INTRAVENOUS at 14:38

## 2022-11-13 RX ADMIN — SODIUM CHLORIDE 1000 ML: 0.9 INJECTION, SOLUTION INTRAVENOUS at 13:55

## 2022-11-13 RX ADMIN — ONDANSETRON 4 MG: 2 INJECTION INTRAMUSCULAR; INTRAVENOUS at 13:54

## 2022-11-13 NOTE — Clinical Note
Bonny Guy was seen and treated in our emergency department on 11/13/2022  No restrictions            Diagnosis:     Lolly Yanez  may return to work on return date  She may return on this date: 11/15/2022         If you have any questions or concerns, please don't hesitate to call        Reena Ortega, DO    ______________________________           _______________          _______________  Hospital Representative                              Date                                Time

## 2022-11-13 NOTE — ED PROVIDER NOTES
History  Chief Complaint   Patient presents with   • Abdominal Pain     Patient abdominal pain and diarrhea over the past few days  Hx of Crohn's Disease  Patient is a 22-year-old female with past medical history of Crohn's disease currently on Stelara, anxiety, depression, presents to the emergency department complaining of epigastric pain and diarrhea for the past 3 days  Patient states she is concerned about possible Crohn's flare  She has had constant pain in her epigastrium, nonradiating for the past 3 days  She reports diarrhea at least 6 episodes per day and has noticed bright red blood in her stool  Denies any current nausea but does report that she vomited 1 time than the past 3 days  Vomitus nonbilious nonbloody  She denies any fevers or chills, headache, dizziness or near syncope, cough, URI symptoms, chest pain palpitations, dyspnea, abdominal distension, melena, dysuria, change in frequency, hematuria, flank pain, skin rash color change, extremity weakness or paresthesia or other focal neurologic deficits  Denies any prior abdominal surgeries  Patient follows with Dr Leigh Holman with [facility]  History provided by:  Patient and friend   used: No    Abdominal Pain  Associated symptoms: diarrhea and vomiting    Associated symptoms: no chest pain, no chills, no constipation, no cough, no dysuria, no fever, no hematuria, no nausea, no shortness of breath and no sore throat        Prior to Admission Medications   Prescriptions Last Dose Informant Patient Reported? Taking?   hydrocortisone (ANUSOL-HC) 2 5 % rectal cream   No No   Sig: Apply topically 2 (two) times a day   meloxicam (MOBIC) 15 mg tablet  Self Yes No   Sig: Take 15 mg by mouth daily   ustekinumab (STELARA) 90 mg/mL subcutaneous injection  Self No No   Sig: Inject 1 ml (90 mg total) under skin every 8 weeks        Facility-Administered Medications: None       Past Medical History:   Diagnosis Date   • Anxiety    • COVID 08/08/2022   • Crohn disease ([address] )    • Depression    • Suicide attempt [facility])        Past Surgical History:   Procedure Laterality Date   • COLONOSCOPY     • VULVA BIOPSY Right 8/26/2020    Procedure: Excision of Right Labial Lesion;  Surgeon: Yaquelin Lee MD;  [facility];  Service: Gynecology   • VULVA BIOPSY N/A 10/21/2022    Procedure: RESECTION OF RIGHT LABIAL CYST;  Surgeon: Yaquelin Lee MD;  [facility];  Service: Gynecology       Family History   Problem Relation Age of Onset   • Vitiligo Paternal Grandfather    • Lupus Paternal Aunt      I have reviewed and agree with the history as documented  E-Cigarette/Vaping   • E-Cigarette Use Never User      E-Cigarette/Vaping Substances   • Nicotine No    • THC No    • CBD No    • Flavoring No    • Other No    • Unknown No      Social History     Tobacco Use   • Smoking status: Never Smoker   • Smokeless tobacco: Never Used   Vaping Use   • Vaping Use: Never used   Substance Use Topics   • Alcohol use: Yes     Comment: occasionally   • Drug use: Yes     Frequency: 7 0 times per week     Types: Marijuana     Comment: Smokes weed daily        Review of Systems   Constitutional: Negative for chills and fever  HENT: Negative for congestion, ear pain, rhinorrhea and sore throat  Respiratory: Negative for cough, chest tightness, shortness of breath and wheezing  Cardiovascular: Negative for chest pain and palpitations  Gastrointestinal: Positive for abdominal pain, blood in stool, diarrhea and vomiting  Negative for abdominal distention, constipation and nausea  Genitourinary: Negative for dysuria, flank pain, frequency and hematuria  Musculoskeletal: Negative for back pain and neck pain  Skin: Negative for color change, pallor, rash and wound  Allergic/Immunologic: Negative for immunocompromised state  Neurological: Negative for dizziness, syncope, weakness, light-headedness, numbness and headaches  Hematological: Negative for adenopathy  Psychiatric/Behavioral: Negative for confusion and decreased concentration  All other systems reviewed and are negative  Physical Exam  Physical Exam  Vitals and nursing note reviewed  Constitutional:       General: She is not in acute distress  Appearance: Normal appearance  She is well-developed  She is not ill-appearing, toxic-appearing or diaphoretic  HENT:      Head: Normocephalic and atraumatic  Right Ear: External ear normal       Left Ear: External ear normal       Mouth/Throat:      Comments: Orpharyngeal exam deferred at this time due to risk of exposure to COVID-19 during current pandemic  Patient has no oropharyngeal complaints  Eyes:      Extraocular Movements: Extraocular movements intact  Conjunctiva/sclera: Conjunctivae normal    Neck:      Vascular: No JVD  Cardiovascular:      Rate and Rhythm: Normal rate and regular rhythm  Pulses: Normal pulses  Heart sounds: Normal heart sounds  No murmur heard  No friction rub  No gallop  Pulmonary:      Effort: Pulmonary effort is normal  No respiratory distress  Breath sounds: Normal breath sounds  No wheezing, rhonchi or rales  Abdominal:      General: There is no distension  Palpations: Abdomen is soft  Tenderness: There is abdominal tenderness  There is no right CVA tenderness, guarding or rebound  Comments: +Tenderness in epigastrium and LUQ  Musculoskeletal:         General: No swelling or tenderness  Normal range of motion  Cervical back: Normal range of motion and neck supple  No rigidity  Skin:     General: Skin is warm and dry  Coloration: Skin is not pale  Findings: No erythema or rash  Neurological:      General: No focal deficit present  Mental Status: She is alert and oriented to person, place, and time  Sensory: No sensory deficit  Motor: No weakness     Psychiatric:         Mood and Affect: Mood normal  Behavior: Behavior normal          Vital Signs  ED Triage Vitals [11/13/22 1250]   Temperature Pulse Respirations Blood Pressure SpO2   (!) 97 4 °F (36 3 °C) 75 18 117/72 99 %      Temp Source Heart Rate Source Patient Position - Orthostatic VS BP Location FiO2 (%)   Tympanic Monitor Sitting Left arm --      Pain Score       --         Vitals:    11/13/22 1400 11/13/22 1430 11/13/22 1509 11/13/22 1601   BP: 107/69  96/53 108/53   BP Location:       Pulse: 75 59 61 64   Resp: 20      Temp:       TempSrc:       SpO2: 99% 99% 98% 96%   Weight:       Height:           Visual Acuity      ED Medications  Medications   predniSONE tablet 40 mg (has no administration in time range)   sodium chloride 0 9 % bolus 1,000 mL (0 mL Intravenous Stopped 11/13/22 1521)   ondansetron (ZOFRAN) injection 4 mg (4 mg Intravenous Given 11/13/22 1354)   dicyclomine (BENTYL) tablet 20 mg (20 mg Oral Given 11/13/22 1353)   iohexol (OMNIPAQUE) 350 MG/ML injection (SINGLE-DOSE) 100 mL (100 mL Intravenous Given 11/13/22 1438)       Diagnostic Studies  Results Reviewed     Procedure Component Value Units Date/Time    Urine Microscopic [[id number]]  (Abnormal) Collected: 11/13/22 1354    Lab Status: Final result Specimen: Urine, Clean Catch Updated: 11/13/22 1426     RBC, UA 4-10 /hpf      WBC, UA 4-10 /hpf      Epithelial Cells Occasional /hpf      Bacteria, UA None Seen /hpf      MUCUS THREADS Occasional    Basic metabolic panel [[id number]] Collected: 11/13/22 1353    Lab Status: Final result Specimen: Blood from Arm, Right Updated: 11/13/22 1425     Sodium 139 mmol/L      Potassium 3 8 mmol/L      Chloride 104 mmol/L      CO2 26 mmol/L      ANION GAP 9 mmol/L      BUN 17 mg/dL      Creatinine 0 64 mg/dL      Glucose 92 mg/dL      Calcium 9 0 mg/dL      eGFR 124 ml/min/1 73sq m     Narrative:      [place] guidelines for Chronic Kidney Disease (CKD):   •  Stage 1 with normal or high GFR (GFR > 90 mL/min/1 73 square meters)  •  Stage 2 Mild CKD (GFR = 60-89 mL/min/1 73 square meters)  •  Stage 3A Moderate CKD (GFR = 45-59 mL/min/1 73 square meters)  •  Stage 3B Moderate CKD (GFR = 30-44 mL/min/1 73 square meters)  •  Stage 4 Severe CKD (GFR = 15-29 mL/min/1 73 square meters)  •  Stage 5 End Stage CKD (GFR <15 mL/min/1 73 square meters)  Note: GFR calculation is accurate only with a steady state creatinine    Hepatic function panel [[id number]]  (Normal) Collected: 11/13/22 1353    Lab Status: Final result Specimen: Blood from Arm, Right Updated: 11/13/22 1425     Total Bilirubin 0 36 mg/dL      Bilirubin, Direct 0 10 mg/dL      Alkaline Phosphatase 71 U/L      AST 17 U/L      ALT 12 U/L      Total Protein 8 1 g/dL      Albumin 4 0 g/dL     Lipase [[id number]]  (Normal) Collected: 11/13/22 1353    Lab Status: Final result Specimen: Blood from Arm, Right Updated: 11/13/22 1425     Lipase 89 u/L     Magnesium [[id number]]  (Normal) Collected: 11/13/22 1353    Lab Status: Final result Specimen: Blood from Arm, Right Updated: 11/13/22 1425     Magnesium 2 0 mg/dL     UA (URINE) with reflex to Scope [[id number]]  (Abnormal) Collected: 11/13/22 1354    Lab Status: Final result Specimen: Urine, Clean Catch Updated: 11/13/22 1424     Color, UA Light Yellow     Clarity, UA Clear     Specific Gravity, UA 1 027     pH, UA 7 5     Leukocytes, UA Negative     Nitrite, UA Negative     Protein, UA Trace mg/dl      Glucose, UA Negative mg/dl      Ketones, UA Negative mg/dl      Urobilinogen, UA <2 0 mg/dl      Bilirubin, UA Negative     Occult Blood, UA Negative    CBC and differential [[id number]] Collected: 11/13/22 1353    Lab Status: Final result Specimen: Blood from Arm, Right Updated: 11/13/22 1402     WBC 8 78 Thousand/uL      RBC 4 06 Million/uL      Hemoglobin 11 7 g/dL      Hematocrit 35 2 %      MCV 87 fL      MCH 28 8 pg      MCHC 33 2 g/dL      RDW 12 4 %      MPV 9 1 fL      Platelets 082 Thousands/uL      nRBC 0 /100 WBCs Neutrophils Relative 62 %      Immat GRANS % 0 %      Lymphocytes Relative 29 %      Monocytes Relative 7 %      Eosinophils Relative 1 %      Basophils Relative 1 %      Neutrophils Absolute 5 46 Thousands/µL      Immature Grans Absolute 0 02 Thousand/uL      Lymphocytes Absolute 2 50 Thousands/µL      Monocytes Absolute 0 60 Thousand/µL      Eosinophils Absolute 0 11 Thousand/µL      Basophils Absolute 0 09 Thousands/µL     POCT pregnancy, urine [[id number]]  (Normal) Resulted: 11/13/22 1401    Lab Status: Final result Updated: 11/13/22 1401     EXT PREG TEST UR (Ref: Negative) negative     Control valid                 CT abdomen pelvis with contrast   Final Result by Mary Carmen Loomis MD ([id number]/93 [id number])      1  Findings consistent with chronic Crohn's disease involving the terminal ileum without evidence of obstruction  2   Otherwise normal-appearing GI tract  3   Intrahepatic periportal edema, developing since a CT from 9/20/2021  This is a nonspecific finding that can be seen in a number of entities including fluid overload, passive hepatic congestion, acute hepatitis or cholangitis and several additional    less likely etiologies  4   Otherwise, no evidence of acute abnormality in the abdomen or pelvis  Workstation performed: [id number]                    Procedures  Procedures         ED Course  ED Course as of 11/13/22 1611   Sun Nov 13, 2022   1433 Bacteria, UA: None Seen   1433 Hemoglobin: 11 7   1602 Spoke with GI AP, Aliza Carpenter, who reviewed CT findings and recommended starting patient on a prednisone taper starting at 40 mg daily for 10 days and decreasing by 5 mg every week  They will have office call patient tomorrow to schedule follow-up appointment  Updated patient about workup thus far and discussion with GI  Patient agreeable  ED return parameters discussed                                 SBIRT 20yo+    Flowsheet Row Most Recent Value   SBIRT (23 yo +)    In order to provide better care to our patients, we are screening all of our patients for alcohol and drug use  Would it be okay to ask you these screening questions? Yes Filed at: 11/13/2022 1306   Initial Alcohol Screen: US AUDIT-C     1  How often do you have a drink containing alcohol? 0 Filed at: 11/13/2022 1306   2  How many drinks containing alcohol do you have on a typical day you are drinking? 0 Filed at: 11/13/2022 1306   3a  Male UNDER 65: How often do you have five or more drinks on one occasion? 0 Filed at: 11/13/2022 1306   3b  FEMALE Any Age, or MALE 65+: How often do you have 4 or more drinks on one occassion? 0 Filed at: 11/13/2022 1306   Audit-C Score 0 Filed at: 11/13/2022 1306   TK: How many times in the past year have you    Used an illegal drug or used a prescription medication for non-medical reasons? Never Filed at: 11/13/2022 1306                    MDM  Number of Diagnoses or Management Options  Diagnosis management comments: 22-year-old female presents to the ED for 3 days of epigastric pain, diarrhea and blood per rectum  Differential includes acute Crohn's colitis/flare up, nonspecific enteritis, colitis gastritis, peptic ulcer disease  Will workup with abdominal labs, UA, pregnancy test and CT abdomen and pelvis with IV contrast   Will give IV fluids, Zofran and Bentyl for symptom relief         Amount and/or Complexity of Data Reviewed  Clinical lab tests: ordered and reviewed  Tests in the radiology section of CPT®: ordered and reviewed  Independent visualization of images, tracings, or specimens: yes        Disposition  Final diagnoses:   Epigastric pain   Bloody diarrhea   Exacerbation of Crohn's disease ([facility] [address] )     Time reflects when diagnosis was documented in both MDM as applicable and the Disposition within this note     Time User Action Codes Description Comment    11/13/2022  4:06 PM Eugene MYERS Add [R10 13] Epigastric pain     11/13/2022  4:06 PM Eugene MYERS Add [R19 7] Bloody diarrhea     11/13/2022  4:06 PM Ranulfo MYERS Add [K50 90] Exacerbation of Crohn's disease [facility])       ED Disposition     ED Disposition   Discharge    Condition   Stable    Date/Time   Sun Nov 13, 2022  4:06 PM    Comment   Marc Miguel [facility] discharge to home/self care                 Follow-up Information     Follow up With Specialties Details Why Contact Info Additional [facility] Gastroenterology Schedule an appointment as soon as possible for a visit   [address]  [address] [address]  Florina Mendiola [address] [facility], [address] [facility], [facility] [address], [facility], [place], [address] [facility] Emergency Department Emergency Medicine Go to  If symptoms worsen [address] [facility] [address] [facility] Emergency Department, [address] [facility], [address], [place], [address]          Patient's Medications   Discharge Prescriptions    DICYCLOMINE (BENTYL) 20 MG TABLET    Take 1 tablet (20 mg total) by mouth every 8 (eight) hours as needed (abdominal pain or diarrhea)       Start Date: 11/13/2022End Date: --       Order Dose: 20 mg       Quantity: 20 tablet    Refills: 0    ONDANSETRON (ZOFRAN-ODT) 4 MG DISINTEGRATING TABLET    Take 1 tablet (4 mg total) by mouth every 8 (eight) hours as needed for nausea or vomiting       Start Date: 11/13/2022End Date: --       Order Dose: 4 mg       Quantity: 20 tablet    Refills: 0    PREDNISONE 5 MG TABLET    Take 8 tablets (40 mg total) by mouth daily for 6 days, THEN 7 tablets (35 mg total) daily for 7 days, THEN 6 tablets (30 mg total) daily for 7 days, THEN 5 tablets (25 mg total) daily for 7 days, THEN 4 tablets (20 mg total) daily for 7 days, THEN 3 tablets (15 mg total) daily for 7 days, THEN 2 tablets (10 mg total) daily for 7 days, THEN 1 tablet (5 mg total) daily for 7 days  Start Date: 11/13/2022End Date: 1/7/2023       Order Dose: --       Quantity: 244 tablet    Refills: 0       No discharge procedures on file      PDMP Review       Value Time User    PDMP Reviewed  Yes 10/18/2019  1:59 AM Joelle Salinas MD          ED Provider  Electronically Signed by           Jemima Hoover DO  11/13/22 6589

## 2022-11-14 ENCOUNTER — TELEPHONE (OUTPATIENT)
Dept: GASTROENTEROLOGY | Facility: CLINIC | Age: 27
End: 2022-11-14

## 2022-11-14 NOTE — TELEPHONE ENCOUNTER
LMOM for patient to phone back for an appt     The appt being offered is 11/18 @ 8 per [facility] for crohns disease  Daina Brothers

## 2022-12-13 DIAGNOSIS — K50.012 CROHN'S DISEASE OF SMALL INTESTINE WITH INTESTINAL OBSTRUCTION (HCC): ICD-10-CM

## 2022-12-13 RX ORDER — USTEKINUMAB 90 MG/ML
INJECTION, SOLUTION SUBCUTANEOUS
Qty: 1 ML | Refills: 8 | Status: SHIPPED | OUTPATIENT
Start: 2022-12-13

## 2022-12-22 ENCOUNTER — HOSPITAL ENCOUNTER (EMERGENCY)
Facility: HOSPITAL | Age: 27
Discharge: HOME | End: 2022-12-22
Attending: EMERGENCY MEDICINE

## 2022-12-22 VITALS
DIASTOLIC BLOOD PRESSURE: 77 MMHG | RESPIRATION RATE: 20 BRPM | SYSTOLIC BLOOD PRESSURE: 122 MMHG | TEMPERATURE: 100.5 F | HEART RATE: 76 BPM | OXYGEN SATURATION: 98 %

## 2022-12-22 DIAGNOSIS — R68.89 FLU-LIKE SYMPTOMS: Primary | ICD-10-CM

## 2022-12-22 LAB
FLUAV RNA RESP QL NAA+PROBE: POSITIVE
FLUBV RNA RESP QL NAA+PROBE: NEGATIVE
RSV RNA RESP QL NAA+PROBE: NEGATIVE
SARS-COV-2 RNA RESP QL NAA+PROBE: NEGATIVE

## 2022-12-22 RX ORDER — ACETAMINOPHEN 325 MG/1
650 TABLET ORAL ONCE
Status: COMPLETED | OUTPATIENT
Start: 2022-12-22 | End: 2022-12-22

## 2022-12-22 RX ADMIN — DEXAMETHASONE SODIUM PHOSPHATE 6 MG: 10 INJECTION, SOLUTION INTRAMUSCULAR; INTRAVENOUS at 09:30

## 2022-12-22 RX ADMIN — ACETAMINOPHEN 650 MG: 325 TABLET, FILM COATED ORAL at 09:43

## 2022-12-22 NOTE — DISCHARGE INSTRUCTIONS
Follow up with PCP  Tylenol/motrin as needed for fevers  Return to the ED with new or worsening symptoms including but not limited to worsening cough, shortness of breath, difficulty breathing, abdominal pain, urinary symptoms

## 2022-12-22 NOTE — Clinical Note
Sheila Lenz was seen and treated in our emergency department on 12/22/2022  Diagnosis:     Rylee So    She may return on this date:     CAN RETURN TO WORK ONCE 24 HOURS FEVER FREE     If you have any questions or concerns, please don't hesitate to call        Clare Merino PA-C    ______________________________           _______________          _______________  Hospital Representative                              Date                                Time

## 2022-12-22 NOTE — ED PROVIDER NOTES
History  Chief Complaint   Patient presents with   • Flu Symptoms     "I think I have the flu" symptoms since yesterday including chills, cough, and HA      Patient is a 61-year-old female with a past medical history of Crohn's disease presenting to the emergency department with flulike symptoms  Reports yesterday she began having chills, cough, sinus pressure, and body aches  Reports she has been in contact with her grandmother who has been sick  She has been avoiding coughing secondary to pain with coughing  Patient denies fevers at home  Patient reports she is eating, drinking and urinating without difficulty  Denies urinary symptoms  Denies rash, headache, weakness, dizziness, visual changes, abdominal pain, nausea, vomiting, diarrhea, constipation, chest pain, shortness of breath or difficulty breathing  Does not offer any other concerns or complaints   -Patient denies headache, in contrast with triage note  History provided by:  Patient   used: No    Flu Symptoms  Presenting symptoms: cough and myalgias    Presenting symptoms: no diarrhea, no fatigue, no fever, no headaches, no nausea, no rhinorrhea, no shortness of breath, no sore throat and no vomiting    Cough:     Cough characteristics:  Non-productive and dry    Duration:  1 day    Timing:  Intermittent  Myalgias:     Location:  Generalized    Duration:  1 day    Timing:  Constant  Relieved by:  None tried  Worsened by:  Nothing  Ineffective treatments:  None tried  Associated symptoms: chills and nasal congestion    Associated symptoms: no decreased appetite, no decrease in physical activity, no ear pain, no mental status change, no neck stiffness and no syncope    Risk factors: sick contacts    Risk factors: no diabetes problem        Prior to Admission Medications   Prescriptions Last Dose Informant Patient Reported?  Taking?   chlorhexidine (PERIDEX) 0 12 % solution   Yes No   Sig: USE ONE-HALF OUNCE 15ML) TWO TIMES A DAY RINSE FOR 30 SECONDS AND EXPECTORATE *DO NOT SWALLOW*   clindamycin (CLEOCIN) 300 MG capsule   Yes No   Sig: Take 300 mg by mouth every 6 (six) hours   dicyclomine (BENTYL) 20 mg tablet   No No   Sig: Take 1 tablet (20 mg total) by mouth every 8 (eight) hours as needed (abdominal pain or diarrhea)   hydrocortisone (ANUSOL-HC) 2 5 % rectal cream   No No   Sig: Apply topically 2 (two) times a day   meloxicam (MOBIC) 15 mg tablet   Yes No   Sig: Take 15 mg by mouth daily   ondansetron (ZOFRAN-ODT) 4 mg disintegrating tablet   No No   Sig: Take 1 tablet (4 mg total) by mouth every 8 (eight) hours as needed for nausea or vomiting   predniSONE 5 mg tablet   No No   Sig: Take 8 tablets (40 mg total) by mouth daily for 6 days, THEN 7 tablets (35 mg total) daily for 7 days, THEN 6 tablets (30 mg total) daily for 7 days, THEN 5 tablets (25 mg total) daily for 7 days, THEN 4 tablets (20 mg total) daily for 7 days, THEN 3 tablets (15 mg total) daily for 7 days, THEN 2 tablets (10 mg total) daily for 7 days, THEN 1 tablet (5 mg total) daily for 7 days     ustekinumab (Stelara) 90 mg/mL subcutaneous injection   No No   Sig: INJECT 1 ML (90 MG TOTAL) UNDER THE SKIN EVERY 8 WEEKS      Facility-Administered Medications: None       Past Medical History:   Diagnosis Date   • Anxiety    • COVID 08/08/2022   • Crohn disease ([facility] [address] )    • Depression    • Suicide attempt [facility])        Past Surgical History:   Procedure Laterality Date   • COLONOSCOPY     • VULVA BIOPSY Right 8/26/2020    Procedure: Excision of Right Labial Lesion;  Surgeon: Pako Holman MD;  Location[facility] MAIN OR;  Service: Gynecology   • VULVA BIOPSY N/A 10/21/2022    Procedure: RESECTION OF RIGHT LABIAL CYST;  Surgeon: Pako Holman MD;  [facility];  Service: Gynecology       Family History   Problem Relation Age of Onset   • Vitiligo Paternal Grandfather    • Lupus Paternal Aunt      I have reviewed and agree with the history as documented  E-Cigarette/Vaping   • E-Cigarette Use Never User      E-Cigarette/Vaping Substances   • Nicotine No    • THC No    • CBD No    • Flavoring No    • Other No    • Unknown No      Social History     Tobacco Use   • Smoking status: Never   • Smokeless tobacco: Never   Vaping Use   • Vaping Use: Never used   Substance Use Topics   • Alcohol use: Yes     Comment: occasionally   • Drug use: Yes     Frequency: 7 0 times per week     Types: Marijuana     Comment: Smokes weed daily        Review of Systems   Constitutional: Positive for chills  Negative for decreased appetite, fatigue and fever  HENT: Positive for congestion and sinus pressure  Negative for drooling, ear pain, rhinorrhea, sinus pain and sore throat  Eyes: Negative for pain and visual disturbance  Respiratory: Positive for cough  Negative for chest tightness, shortness of breath and wheezing  Cardiovascular: Negative for chest pain and palpitations  Gastrointestinal: Negative for abdominal pain, constipation, diarrhea, nausea and vomiting  Genitourinary: Negative for difficulty urinating, dysuria, flank pain, frequency, hematuria and urgency  Musculoskeletal: Positive for myalgias  Negative for arthralgias, back pain and neck stiffness  Skin: Negative for color change and rash  Neurological: Negative for dizziness, seizures, syncope, weakness, light-headedness and headaches  All other systems reviewed and are negative  Physical Exam  Physical Exam  Vitals and nursing note reviewed  Constitutional:       General: She is awake  She is not in acute distress  Appearance: Normal appearance  She is well-developed  She is not ill-appearing, toxic-appearing or diaphoretic  HENT:      Head: Normocephalic and atraumatic  Jaw: There is normal jaw occlusion  Right Ear: External ear normal       Left Ear: External ear normal       Nose: Nose normal       Mouth/Throat:      Lips: Pink        Mouth: Mucous membranes are moist       Tongue: No lesions  Tongue does not deviate from midline  Palate: No mass and lesions  Pharynx: Oropharynx is clear  Uvula midline  Eyes:      General: No scleral icterus  Right eye: No discharge  Left eye: No discharge  Conjunctiva/sclera: Conjunctivae normal    Cardiovascular:      Rate and Rhythm: Normal rate and regular rhythm  Heart sounds: No murmur heard  Pulmonary:      Effort: Pulmonary effort is normal  No respiratory distress  Breath sounds: Normal breath sounds and air entry  No decreased breath sounds, wheezing, rhonchi or rales  Abdominal:      Palpations: Abdomen is soft  Tenderness: There is no abdominal tenderness  Musculoskeletal:         General: No swelling, deformity or signs of injury  Normal range of motion  Cervical back: Normal range of motion and neck supple  No rigidity  Skin:     General: Skin is warm and dry  Capillary Refill: Capillary refill takes less than 2 seconds  Coloration: Skin is not jaundiced  Findings: No erythema or rash  Neurological:      General: No focal deficit present  Mental Status: She is alert and oriented to person, place, and time  Mental status is at baseline  Cranial Nerves: No cranial nerve deficit  Gait: Gait normal    Psychiatric:         Mood and Affect: Mood normal          Behavior: Behavior normal  Behavior is cooperative  Thought Content:  Thought content normal          Judgment: Judgment normal          Vital Signs  ED Triage Vitals [12/22/22 0905]   Temperature Pulse Respirations Blood Pressure SpO2   98 5 °F (36 9 °C) 76 20 122/77 98 %      Temp Source Heart Rate Source Patient Position - Orthostatic VS BP Location FiO2 (%)   Oral Monitor Sitting Right arm --      Pain Score       --           Vitals:    12/22/22 0905   BP: 122/77   Pulse: 76   Patient Position - Orthostatic VS: Sitting         Visual Acuity      ED Medications  Medications   dexamethasone oral liquid 6 mg 0 6 mL (6 mg Oral Given 12/22/22 0930)       Diagnostic Studies  Results Reviewed     Procedure Component Value Units Date/Time    FLU/RSV/COVID - if FLU/RSV clinically relevant [[id number]] Collected: 12/22/22 0930    Lab Status: In process Specimen: Nares from Nose Updated: 12/22/22 0934                 No orders to display              Procedures  Procedures         ED Course               MDM  Number of Diagnoses or Management Options  Flu-like symptoms: new and requires workup  Diagnosis management comments: This is a 16-year-old female resenting to the emergency department for flulike symptoms  Reports yesterday she began having cough, chills, sinus congestion body aches  Reports her grandmother sick recently  Denies fever, urinary symptoms, abdominal pain, change in appetite, difficulty swallowing  Differential diagnosis to include but is not limited to: COVID/flu/RSV, acute viral syndrome    Initial ED Plan: COVID/flu/RSV swab; dexamethasone  -Patient discharged prior to COVID/flu/RSV swab  Reports she will follow-up on her MyChart  Final ED assessment: Patient is stable and well appearing  Discussed follow-up with PCP  Discussed COVID/flu/RSV swab results populate on her MyChart  Discussed Tylenol/Motrin as needed for pain and fevers  Strict return precautions were discussed including but not limited to fevers uncontrolled by Tylenol/Motrin, abdominal pain, shortness of breath, difficulty breathing  Patient verbalized understanding and is agreeable with the plan for charge          Amount and/or Complexity of Data Reviewed  Clinical lab tests: ordered and reviewed  Independent visualization of images, tracings, or specimens: yes        Disposition  Final diagnoses:   Flu-like symptoms     Time reflects when diagnosis was documented in both MDM as applicable and the Disposition within this note     Time User Action Codes Description Comment    12/22/2022  9:38 AM Kym Lynn Add [R68 89] Flu-like symptoms       ED Disposition     ED Disposition   Discharge    Condition   Stable    Date/Time   Thu Dec 22, 2022  9:38 AM    Comment   [facility] discharge to home/self care  Follow-up Information     Follow up With Specialties Details Why Contact Info Additional Information    Lynsey Smith MD Gastroenterology Call in 3 days For follow up [address]   [address] [facility] [address] [place] [address] Emergency Department Emergency Medicine Go to  If symptoms worsen [address] [facility] [id number] [facility] Emergency Department, [address] [place] [address], [facility]          Patient's Medications   Discharge Prescriptions    No medications on file       No discharge procedures on file      PDMP Review       Value Time User    PDMP Reviewed  Yes 10/18/2019  1:59 AM Omar Chávez MD          ED Provider  Electronically Signed by           Joanna Pastrana PA-C  12/22/22 6709

## 2023-01-12 ENCOUNTER — OFFICE VISIT (OUTPATIENT)
Dept: GASTROENTEROLOGY | Facility: CLINIC | Age: 28
End: 2023-01-12

## 2023-01-12 VITALS
WEIGHT: 112 LBS | HEART RATE: 70 BPM | DIASTOLIC BLOOD PRESSURE: 80 MMHG | OXYGEN SATURATION: 90 % | BODY MASS INDEX: 19.84 KG/M2 | HEIGHT: 63 IN | SYSTOLIC BLOOD PRESSURE: 122 MMHG

## 2023-01-12 DIAGNOSIS — K50.012 CROHN'S DISEASE OF SMALL INTESTINE WITH INTESTINAL OBSTRUCTION (HCC): Primary | ICD-10-CM

## 2023-01-12 PROBLEM — E44.1 MILD PROTEIN-CALORIE MALNUTRITION (HCC): Status: ACTIVE | Noted: 2023-01-12

## 2023-01-12 NOTE — PROGRESS NOTES
[facility] - Outpatient Follow-up Note  Branden Emerson 24 y o  female MRN: [id number]  Encounter: [id number]          ASSESSMENT AND PLAN:      1  Crohn's disease of small intestine with intestinal obstruction ([address] )  Dx 2015  Initially treated with Humira however stopped due to noncompliance  Colonoscopy 7/2021 with terminal ileitis  She started Stelara in the fall of 2021  She still notes abdominal pain and loose stools but is overall better than last year    CT in November still suggested terminal ileitis but her labs are normal including CBC  She is compliant with her Stelara injections as her friend administers them for her  She is due for her next injection 2/5  She will have labs prior - consider need to increase stelara to q 4 weeks    ______________________________________________________________________    SUBJECTIVE: 19-year-old female with a history of Crohn's ileitis who presents for routine follow-up  She was diagnosed in 2015  She was initially started on Humira however due to noncompliance this was stopped  She was admitted to [facility] in the summer 2021  At that time she was complaining of abdominal pain and constipation with a CT showing terminal ileitis  A colonoscopy was performed showing active ileitis  She was started on Stelara however due to insurance issues this was not approved until the fall of that year  She has been on Stelara 90 mg every 8 weeks since that time  Although she has missed a few doses due to not wanting to administer the medication to herself she has been mostly compliant  She still reports abdominal pain and loose stools  She had a CT enterography in November 2022  This showed terminal ileitis but labs including CBC were normal   She was given a course of prednisone  She is unsure if this actually helped any of her symptoms  Last injection was in the beginning of December and she is due for an injection February 5    She has no rectal bleeding  She has gained weight  She has no nausea or vomiting  She does not smoke cigarettes  She admits that she does not have a very healthy diet  REVIEW OF SYSTEMS IS OTHERWISE NEGATIVE  Historical Information   Past Medical History:   Diagnosis Date   • Anxiety    • COVID 08/08/2022   • Crohn disease ([facility] [address] )    • Depression    • Suicide attempt [facility])      Past Surgical History:   Procedure Laterality Date   • COLONOSCOPY     • VULVA BIOPSY Right 8/26/2020    Procedure: Excision of Right Labial Lesion;  Surgeon: Rachel Davis MD;  [facility] MAIN OR;  Service: Gynecology   • VULVA BIOPSY N/A 10/21/2022    Procedure: RESECTION OF RIGHT LABIAL CYST;  Surgeon: Rachel Davis MD;  [facility];  Service: Gynecology     Social History   Social History     Substance and Sexual Activity   Alcohol Use Yes    Comment: occasionally     Social History     Substance and Sexual Activity   Drug Use Yes   • Frequency: 7 0 times per week   • Types: Marijuana    Comment: Smokes weed daily , medical card     Social History     Tobacco Use   Smoking Status Never   Smokeless Tobacco Never     Family History   Problem Relation Age of Onset   • Vitiligo Paternal Grandfather    • Lupus Paternal Aunt        Meds/Allergies       Current Outpatient Medications:   •  ustekinumab (Stelara) 90 mg/mL subcutaneous injection    Allergies   Allergen Reactions   • Amoxicillin Anaphylaxis           Objective     Blood pressure 122/80, pulse 70, height 5' 3" (1 6 m), weight 50 8 kg (112 lb), SpO2 90 %, not currently breastfeeding  Body mass index is 19 84 kg/m²        PHYSICAL EXAM:      General Appearance:   Alert, cooperative, no distress   HEENT:   Normocephalic, atraumatic, anicteric      Neck:  Supple, symmetrical, trachea midline   Lungs:   Clear to auscultation bilaterally; no rales, rhonchi or wheezing; respirations unlabored    Heart[de-identified]   Regular rate and rhythm; no murmur, rub, or gallop  Abdomen:   Soft, non-tender, non-distended; normal bowel sounds; no masses, no organomegaly    Genitalia:   Deferred    Rectal:   Deferred    Extremities:  No cyanosis, clubbing or edema    Pulses:  2+ and symmetric    Skin:  No jaundice, rashes, or lesions    Lymph nodes:  No palpable cervical lymphadenopathy        Lab Results:   No visits with results within 1 Day(s) from this visit  Latest known visit with results is:   Admission on 12/22/2022, Discharged on 12/22/2022   Component Date Value   • SARS-CoV-2 12/22/2022 Negative    • INFLUENZA A PCR 12/22/2022 Positive (A)    • INFLUENZA B PCR 12/22/2022 Negative    • RSV PCR 12/22/2022 Negative          Radiology Results:   No results found

## 2023-02-06 ENCOUNTER — APPOINTMENT (OUTPATIENT)
Dept: LAB | Facility: HOSPITAL | Age: 28
End: 2023-02-06

## 2023-02-06 DIAGNOSIS — K50.012 CROHN'S DISEASE OF SMALL INTESTINE WITH INTESTINAL OBSTRUCTION (HCC): ICD-10-CM

## 2023-02-14 LAB
USTEKINUMAB AB SERPL IA-MCNC: <40 NG/ML
USTEKINUMAB SERPL IA-MCNC: 2.1 UG/ML

## 2023-05-31 ENCOUNTER — TELEPHONE (OUTPATIENT)
Dept: GASTROENTEROLOGY | Facility: CLINIC | Age: 28
End: 2023-05-31

## 2023-05-31 NOTE — TELEPHONE ENCOUNTER
Patients GI provider:  ROHITH Heard    Number to return call: [phone]    Reason for call: Pt calling  Stating she had to leave work early yesterday and call out today due to symptoms  She is requesting a work note due to her paperwork saying one day a week  Please return her call to discuss      Scheduled procedure/appointment date if applicable: Apt/procedure 1/12/23

## 2023-06-01 NOTE — TELEPHONE ENCOUNTER
LMOM for patient to call back to discuss  Consider increasing Stelara to q 4 weeks    Consider repeating a colonoscopy

## 2023-07-10 ENCOUNTER — TELEPHONE (OUTPATIENT)
Age: 28
End: 2023-07-10

## 2023-07-10 NOTE — TELEPHONE ENCOUNTER
Patients GI provider:  LEXIE Tatum     Number to return call: [phone]    Reason for call: Pt calling stating she changed her insurance to [employer] and used to use [facility] but no longer uses that insurance due to insurance change. Pt states she was scheduled for her stelara 8/05/2023 and would like to know how it will be sent to her seeing as though she does not have a pharmacy at the moment.     Scheduled procedure/appointment date if applicable: Appt: 2/46/5043

## 2023-07-18 DIAGNOSIS — K50.012 CROHN'S DISEASE OF SMALL INTESTINE WITH INTESTINAL OBSTRUCTION (HCC): ICD-10-CM

## 2023-07-18 RX ORDER — USTEKINUMAB 90 MG/ML
INJECTION, SOLUTION SUBCUTANEOUS
Qty: 1 ML | Refills: 8 | Status: SHIPPED | OUTPATIENT
Start: 2023-07-18 | End: 2023-08-24 | Stop reason: SDUPTHER

## 2023-07-19 ENCOUNTER — TELEPHONE (OUTPATIENT)
Age: 28
End: 2023-07-19

## 2023-07-19 NOTE — TELEPHONE ENCOUNTER
Pt called with new pharmcy benefits    [employer] plan  ID [id number]  Neel Michele [id number]  PCN [id number]    Pt stated this is concerning her Stelara  Routing to IBD team

## 2023-07-19 NOTE — TELEPHONE ENCOUNTER
Called patient and reached voicemail.  I asked for a call back to discuss approval and new specialty pharmacy

## 2023-08-12 ENCOUNTER — HOSPITAL ENCOUNTER (EMERGENCY)
Facility: HOSPITAL | Age: 28
Discharge: HOME | End: 2023-08-12
Attending: EMERGENCY MEDICINE | Admitting: EMERGENCY MEDICINE
Payer: COMMERCIAL

## 2023-08-12 VITALS
DIASTOLIC BLOOD PRESSURE: 67 MMHG | RESPIRATION RATE: 18 BRPM | OXYGEN SATURATION: 100 % | SYSTOLIC BLOOD PRESSURE: 117 MMHG | TEMPERATURE: 97.8 F | HEART RATE: 74 BPM

## 2023-08-12 DIAGNOSIS — M76.32 ILIOTIBIAL BAND SYNDROME, LEFT: Primary | ICD-10-CM

## 2023-08-12 PROCEDURE — 99283 EMERGENCY DEPT VISIT LOW MDM: CPT

## 2023-08-12 PROCEDURE — 99284 EMERGENCY DEPT VISIT MOD MDM: CPT | Performed by: NURSE PRACTITIONER

## 2023-08-12 PROCEDURE — NC001 PR NO CHARGE: Performed by: NURSE PRACTITIONER

## 2023-08-12 RX ORDER — NAPROXEN 500 MG/1
500 TABLET ORAL 2 TIMES DAILY WITH MEALS
Qty: 30 TABLET | Refills: 0 | Status: SHIPPED | OUTPATIENT
Start: 2023-08-12 | End: 2023-08-23

## 2023-08-12 NOTE — ED PROVIDER NOTES
History  Chief Complaint   Patient presents with   • Hip Pain     Pt arrives ambulatory with a c/o hip pain. Denies known injury. 16-year-old female presenting here with a chief complaint of left-sided hip pain and increased pain with walking. She woke up that way yesterday and had pain with ambulation throughout the day. She denies any specific injury. She denies trauma. She has palpable tenderness over the entire iliotibial band on the left side. Prior to Admission Medications   Prescriptions Last Dose Informant Patient Reported? Taking?   meloxicam (MOBIC) 15 mg tablet   Yes No   Sig: Take 15 mg by mouth daily As instructed   ustekinumab (Stelara) 90 mg/mL subcutaneous injection   No No   Sig: INJECT 1 ML (90 MG TOTAL) UNDER THE SKIN EVERY 8 WEEKS      Facility-Administered Medications: None       Past Medical History:   Diagnosis Date   • Anxiety    • COVID 08/08/2022   • Crohn disease ([address])    • Depression    • Suicide attempt [facility])        Past Surgical History:   Procedure Laterality Date   • COLONOSCOPY     • VULVA BIOPSY Right 8/26/2020    Procedure: Excision of Right Labial Lesion;  Surgeon: Lizzeth Gomez MD;  Location:  MAIN OR;  Service: Gynecology   • VULVA BIOPSY N/A 10/21/2022    Procedure: RESECTION OF RIGHT LABIAL CYST;  Surgeon: Lizzeth Gomez MD;  [facility] MAIN OR;  Service: Gynecology       Family History   Problem Relation Age of Onset   • Vitiligo Paternal Grandfather    • Lupus Paternal Aunt      I have reviewed and agree with the history as documented. E-Cigarette/Vaping   • E-Cigarette Use Never User      E-Cigarette/Vaping Substances   • Nicotine No    • THC No    • CBD No    • Flavoring No    • Other No    • Unknown No      Social History     Tobacco Use   • Smoking status: Never   • Smokeless tobacco: Never   Vaping Use   • Vaping Use: Never used   Substance Use Topics   • Alcohol use: Yes     Comment: occasionally   • Drug use:  Yes Frequency: 7.0 times per week     Types: Marijuana     Comment: Smokes weed daily , medical card       Review of Systems   Constitutional: Negative for diaphoresis, fatigue and fever. HENT: Negative for congestion, ear pain, nosebleeds and sore throat. Eyes: Negative for photophobia, pain, discharge and visual disturbance. Respiratory: Negative for cough, choking, chest tightness, shortness of breath and wheezing. Cardiovascular: Negative for chest pain and palpitations. Gastrointestinal: Negative for abdominal distention, abdominal pain, diarrhea and vomiting. Genitourinary: Negative for dysuria, flank pain and frequency. Musculoskeletal: Positive for gait problem. Negative for back pain and joint swelling. Skin: Negative for color change and rash. Neurological: Negative for dizziness, syncope and headaches. Psychiatric/Behavioral: Negative for behavioral problems and confusion. The patient is not nervous/anxious. All other systems reviewed and are negative. Physical Exam  Physical Exam  Vitals and nursing note reviewed. Constitutional:       General: She is not in acute distress. Appearance: She is well-developed. She is not ill-appearing or toxic-appearing. HENT:      Head: Normocephalic and atraumatic. Mouth/Throat:      Dentition: Normal dentition. Eyes:      General:         Right eye: No discharge. Left eye: No discharge. Cardiovascular:      Rate and Rhythm: Normal rate and regular rhythm. Pulmonary:      Effort: Pulmonary effort is normal. No accessory muscle usage or respiratory distress. Abdominal:      General: There is no distension. Tenderness: There is no guarding. Musculoskeletal:         General: Normal range of motion. Cervical back: Normal range of motion and neck supple. Comments: Tenderness along the entire left iliotibial band from the hip to the knee   Skin:     General: Skin is warm and dry.    Neurological: Mental Status: She is alert and oriented to person, place, and time. Coordination: Coordination normal.   Psychiatric:         Behavior: Behavior is cooperative. Vital Signs  ED Triage Vitals [08/12/23 0532]   Temperature Pulse Respirations Blood Pressure SpO2   97.8 °F (36.6 °C) 74 18 117/67 100 %      Temp Source Heart Rate Source Patient Position - Orthostatic VS BP Location FiO2 (%)   Temporal Monitor Sitting Left arm --      Pain Score       --           Vitals:    08/12/23 0532   BP: 117/67   Pulse: 74   Patient Position - Orthostatic VS: Sitting         Visual Acuity      ED Medications  Medications - No data to display    Diagnostic Studies  Results Reviewed     None                 No orders to display              Procedures  Procedures         ED Course                                             Medical Decision Making  We will begin anti-inflammatory medicine. Recommend heat stretching massage PT. Iliotibial band syndrome, left: acute illness or injury  Risk  Prescription drug management. Disposition  Final diagnoses:   Iliotibial band syndrome, left     Time reflects when diagnosis was documented in both MDM as applicable and the Disposition within this note     Time User Action Codes Description Comment    8/12/2023  6:23 AM Araceli Seen Add [M76.32] Iliotibial band syndrome, left       ED Disposition     ED Disposition   Discharge    Condition   Stable    Date/Time   Sat Aug 12, 2023  6:22 AM    Comment   [facility] Follow-up Information     Follow up With Specialties Details Why Anni Collier MD Sports Medicine, Orthopedic Surgery Schedule an appointment as soon as possible for a visit   [address] [address] [address]            Discharge Medication List as of 8/12/2023  6:24 AM      START taking these medications    Details   naproxen (Naprosyn) 500 mg tablet Take 1 tablet (500 mg total) by mouth 2 (two) times a day with meals, Starting Sat 8/12/2023, Normal         CONTINUE these medications which have NOT CHANGED    Details   meloxicam (MOBIC) 15 mg tablet Take 15 mg by mouth daily As instructed, Starting Fri 1/27/2023, Historical Med      ustekinumab (Stelara) 90 mg/mL subcutaneous injection INJECT 1 ML (90 MG TOTAL) UNDER THE SKIN EVERY 8 WEEKS, Normal             No discharge procedures on file.     PDMP Review       Value Time User    PDMP Reviewed  Yes 10/18/2019  1:59 AM Duglas Modi MD          ED Provider  Electronically Signed by           WARD Carver  08/12/23 9823

## 2023-08-12 NOTE — Clinical Note
Jase Justin was seen and treated in our emergency department on 8/12/2023. Diagnosis:     Aracelis Peguero  . She may return on this date: 08/14/2023         If you have any questions or concerns, please don't hesitate to call.       WARD Carroll    ______________________________           _______________          _______________  Hospital Representative                              Date                                Time

## 2023-08-18 RX ORDER — FLUTICASONE PROPIONATE 50 MCG
2 SPRAY, SUSPENSION (ML) NASAL DAILY
COMMUNITY
Start: 2023-04-28 | End: 2023-08-23

## 2023-08-18 RX ORDER — CETIRIZINE HYDROCHLORIDE 10 MG/1
10 TABLET ORAL DAILY
COMMUNITY
Start: 2023-04-28 | End: 2023-08-23

## 2023-08-23 ENCOUNTER — OFFICE VISIT (OUTPATIENT)
Dept: GASTROENTEROLOGY | Facility: CLINIC | Age: 28
End: 2023-08-23
Payer: COMMERCIAL

## 2023-08-23 VITALS
HEART RATE: 75 BPM | HEIGHT: 63 IN | WEIGHT: 126.2 LBS | DIASTOLIC BLOOD PRESSURE: 72 MMHG | SYSTOLIC BLOOD PRESSURE: 118 MMHG | BODY MASS INDEX: 22.36 KG/M2

## 2023-08-23 DIAGNOSIS — K50.012 CROHN'S DISEASE OF SMALL INTESTINE WITH INTESTINAL OBSTRUCTION (HCC): Primary | ICD-10-CM

## 2023-08-23 PROCEDURE — 99213 OFFICE O/P EST LOW 20 MIN: CPT | Performed by: PHYSICIAN ASSISTANT

## 2023-08-23 NOTE — PROGRESS NOTES
Iliana Kaur [facility] - Outpatient Follow-up Note  Michelle Shaw 34 y.o. female MRN: [id number]  Encounter: [id number]          ASSESSMENT AND PLAN:      1. Crohn's disease of small intestine with intestinal obstruction ([address])  We will increase Stelara from every 8 weeks to every 4 weeks  Patient notes symptomatic improvement for several weeks after injection before they flare again  We will plan Stelara levels after several months  We will plan to repeat colonoscopy and CT enterography as well    Update labs now    ______________________________________________________________________    SUBJECTIVE: 61-year-old female with a history of small bowel Crohn's diagnosed in 2015 previously treated with Humira 40 mg q. to 14 days which was discontinued around 2019 due to medical noncompliance who is currently taking Stelara 90 mg every 6 weeks who presents for routine follow-up. She began Stelara in October 2021. She initially thought that she was getting better symptomatically. Her symptoms tend to be abdominal pain and frequent loose stools. She recently notes that her symptoms are least not getting better if not getting worse. She notes that after she gives her injection she does feel better for a while however by about the 4-week point her symptoms start to return. Reports that the symptoms can be quite severe. She is otherwise tolerating a diet. No fevers or chills. She has actually gained some weight recently. Her last colonoscopy was in July 2021 with cobblestoning, edema and friable scarred mucosa of the ileocecal valve. The ileocecal valve could not be intubated despite multiple attempts. She noted to have erosion of the cecum. The remaining colon appeared normal.  CT enterography from September 2021 showed active Crohn's and inflammation in short segment of the terminal ileum.   CT scan of the abdomen pelvis with contrast from November 2022 showed minimal thickening of the terminal ileum without hyperenhancement consistent with a segment of chronic Crohn's disease. There was no narrowing noted. REVIEW OF SYSTEMS IS OTHERWISE NEGATIVE. Historical Information   Past Medical History:   Diagnosis Date   • Anxiety    • COVID 08/08/2022   • Crohn disease ([address] [facility])    • Depression    • Suicide attempt [facility])      Past Surgical History:   Procedure Laterality Date   • COLONOSCOPY     • VULVA BIOPSY Right 8/26/2020    Procedure: Excision of Right Labial Lesion;  Surgeon: Delroy Oliva MD;  [facility];  Service: Gynecology   • VULVA BIOPSY N/A 10/21/2022    Procedure: RESECTION OF RIGHT LABIAL CYST;  Surgeon: Delroy Oliva MD;  [facility];  Service: Gynecology     Social History   Social History     Substance and Sexual Activity   Alcohol Use Yes    Comment: occasionally     Social History     Substance and Sexual Activity   Drug Use Yes   • Frequency: 7.0 times per week   • Types: Marijuana    Comment: Smokes weed daily , medical card     Social History     Tobacco Use   Smoking Status Never   Smokeless Tobacco Never     Family History   Problem Relation Age of Onset   • Vitiligo Paternal Grandfather    • Lupus Paternal Aunt        Meds/Allergies       Current Outpatient Medications:   •  ustekinumab (Stelara) 90 mg/mL subcutaneous injection    Allergies   Allergen Reactions   • Amoxicillin Anaphylaxis           Objective     Blood pressure 118/72, pulse 75, height 5' 3" (1.6 m), weight 57.2 kg (126 lb 3.2 oz), not currently breastfeeding. Body mass index is 22.36 kg/m². PHYSICAL EXAM:      General Appearance:   Alert, cooperative, no distress   HEENT:   Normocephalic, atraumatic, anicteric.     Neck:  Supple, symmetrical, trachea midline   Lungs:   Clear to auscultation bilaterally; no rales, rhonchi or wheezing; respirations unlabored    Heart[de-identified]   Regular rate and rhythm; no murmur, rub, or gallop.    Abdomen:   Soft, non-tender, non-distended; normal bowel sounds; no masses, no organomegaly    Genitalia:   Deferred    Rectal:   Deferred    Extremities:  No cyanosis, clubbing or edema    Pulses:  2+ and symmetric    Skin:  No jaundice, rashes, or lesions    Lymph nodes:  No palpable cervical lymphadenopathy        Lab Results:   No visits with results within 1 Day(s) from this visit. Latest known visit with results is:   Appointment on 02/06/2023   Component Date Value   • Ustekinumab 02/06/2023 2.1    • Anti-Ustekinumab Antibody 02/06/2023 <40          Radiology Results:   No results found.

## 2023-08-24 ENCOUNTER — TELEPHONE (OUTPATIENT)
Dept: GASTROENTEROLOGY | Facility: CLINIC | Age: 28
End: 2023-08-24

## 2023-08-24 DIAGNOSIS — K50.012 CROHN'S DISEASE OF SMALL INTESTINE WITH INTESTINAL OBSTRUCTION (HCC): ICD-10-CM

## 2023-08-24 RX ORDER — USTEKINUMAB 90 MG/ML
INJECTION, SOLUTION SUBCUTANEOUS
Qty: 1 ML | Refills: 8 | Status: SHIPPED | OUTPATIENT
Start: 2023-08-24

## 2023-08-24 NOTE — LETTER
August 28, 2023          Dear Ms. Ainsley Guo office has been trying to reach you to discuss the approval of your medication, but we have been unsuccessful in reaching you. Insurance has approved the increase to your Stelara. The new prescription was sent over to [facility]. Please follow up with them regarding shipment and let our office know if you have any issues.  Our call back number is [phone]      Thank you,  [facility]

## 2023-08-28 NOTE — TELEPHONE ENCOUNTER
----- [facility] 8[facility]/2023  4:11 PM EDT -----  We need to increase patient's Stelara dose to q 4 weeks please!!
Can updated script be sent to [id number] [address]? Thank you!
Garth Villarreal - can you please mail letter I routed to you, to patient? Thank you!
Letter by [facility] 8/28 mailed to pts listed address.
Myrna Rogers from [place] calling to inform office that a prior authorization is not needed for the increase of Stelara to every 4 weeks. Myrna Rogers states an updated script will be needed and sent to the pharmacy.
Submitted request to [facility]
Tried to call patient and call was declined.  Unable to leave message
Tried to contact patient 2 times. Both of which call was declined after 2 rings.
normal

## 2023-10-26 ENCOUNTER — OFFICE VISIT (OUTPATIENT)
Dept: GASTROENTEROLOGY | Facility: CLINIC | Age: 28
End: 2023-10-26
Payer: COMMERCIAL

## 2023-10-26 VITALS
BODY MASS INDEX: 20.91 KG/M2 | HEIGHT: 63 IN | DIASTOLIC BLOOD PRESSURE: 76 MMHG | OXYGEN SATURATION: 100 % | WEIGHT: 118 LBS | SYSTOLIC BLOOD PRESSURE: 112 MMHG | HEART RATE: 73 BPM

## 2023-10-26 DIAGNOSIS — K50.012 CROHN'S DISEASE OF SMALL INTESTINE WITH INTESTINAL OBSTRUCTION (HCC): Primary | ICD-10-CM

## 2023-10-26 PROCEDURE — 99213 OFFICE O/P EST LOW 20 MIN: CPT | Performed by: PHYSICIAN ASSISTANT

## 2023-10-26 NOTE — H&P (VIEW-ONLY)
[facility] - Outpatient Follow-up Note  Dolores Cowden 34 y.o. female MRN: [id number]  Encounter: [id number]          ASSESSMENT AND PLAN:      1. Crohn's disease of small intestine with intestinal obstruction ([address])  Still notes ongoing diarrhea and pain  She did her last Stelara shot on 9/15  She has been hesitating to do her injection which was due (she was approved for q 4 week dosing) as she notes is hurts when she injects it  She wants to switch medications  She initially reported wanting to try Humira again but agreed to switching to Remicade  Will submit for approval  Cte ordered  Will plan colonoscopy  Update labs    She has been noncompliant with treatment and follow-up over the past few years  ______________________________________________________________________    SUBJECTIVE: 28-year-old female with a long history of small bowel Crohn's who presents for follow-up. She continues to report ongoing diarrhea and abdominal pain. At her last appointment we agreed to increase her Stelara from every 8 to every 4 weeks. Even though this was approved the patient never increase the dosing. She reports she does not like injecting and actually goes out of her way to avoid it as she notes that it hurts. Her last injection was on September 15. She reports her symptoms are becoming intolerable. She denies rectal bleeding. She reports that most days she does not leave the house due to her symptoms. She also admits to a great deal of recent stress. She lost her job and her dog passed away all within the same week. When she was first diagnosed she was put on Humira. She maintained on this from 20 15-20 17 but it was stopped secondary to severe eczema. She reports that her hair was actually falling out. She was reportedly noncompliant with the dosing of this.   She was then hospitalized in 2021 secondary to fibrosis stenosing Crohn's in the terminal ileum with a small bowel intussusception. She had a colonoscopy which showed cecal inflammation and a fibrosed ileocolonic valve which could not be intubated. She was treated with prednisone and was tapered to Stelara. She has been on Stelara every 8 weeks since that time. She admits that she has not been compliant over time with this treatment. She was put on Stelara in October 2021 but did not follow-up in the office again until November 2022. Labs were ordered at her last appointment which she has not yet had done. REVIEW OF SYSTEMS IS OTHERWISE NEGATIVE. Historical Information   Past Medical History:   Diagnosis Date    Anxiety     COVID 08/08/2022    Crohn disease ([address])     Depression     Suicide attempt [facility])      Past Surgical History:   Procedure Laterality Date    COLONOSCOPY      VULVA BIOPSY Right 8/26/2020    Procedure: Excision of Right Labial Lesion;  Surgeon: Rosalba Rodriguez MD;  [facility];  Service: Gynecology    VULVA BIOPSY N/A 10/21/2022    Procedure: RESECTION OF RIGHT LABIAL CYST;  Surgeon: Rosalba Rodriguez MD;  [facility];  Service: Gynecology     Social History   Social History     Substance and Sexual Activity   Alcohol Use Yes    Comment: occasionally     Social History     Substance and Sexual Activity   Drug Use Yes    Frequency: 7.0 times per week    Types: Marijuana    Comment: Smokes weed daily , medical card     Social History     Tobacco Use   Smoking Status Never   Smokeless Tobacco Never     Family History   Problem Relation Age of Onset    Vitiligo Paternal Grandfather     Lupus Paternal Aunt        Meds/Allergies       Current Outpatient Medications:     ustekinumab (Stelara) 90 mg/mL subcutaneous injection    Allergies   Allergen Reactions    Amoxicillin Anaphylaxis           Objective     Blood pressure 112/76, pulse 73, height 5' 3" (1.6 m), weight 53.5 kg (118 lb), SpO2 100 %, not currently breastfeeding.  Body mass index is 20.9 kg/m².      PHYSICAL EXAM:      General Appearance:   Alert, cooperative, no distress   HEENT:   Normocephalic, atraumatic, anicteric. Neck:  Supple, symmetrical, trachea midline   Lungs:   Clear to auscultation bilaterally; no rales, rhonchi or wheezing; respirations unlabored    Heart[de-identified]   Regular rate and rhythm; no murmur, rub, or gallop. Abdomen:   Soft, non-tender, non-distended; normal bowel sounds; no masses, no organomegaly    Genitalia:   Deferred    Rectal:   Deferred    Extremities:  No cyanosis, clubbing or edema    Pulses:  2+ and symmetric    Skin:  No jaundice, rashes, or lesions    Lymph nodes:  No palpable cervical lymphadenopathy        Lab Results:   No visits with results within 1 Day(s) from this visit. Latest known visit with results is:   Appointment on 02/06/2023   Component Date Value    Ustekinumab 02/06/2023 2.1     Anti-Ustekinumab Antibody 02/06/2023 <40          Radiology Results:   No results found.

## 2023-10-26 NOTE — PROGRESS NOTES
[facility] - Outpatient Follow-up Note  Armand Whitt 34 y.o. female MRN: [id number]  Encounter: [id number]          ASSESSMENT AND PLAN:      1. Crohn's disease of small intestine with intestinal obstruction ([address])  Still notes ongoing diarrhea and pain  She did her last Stelara shot on 9/15  She has been hesitating to do her injection which was due (she was approved for q 4 week dosing) as she notes is hurts when she injects it  She wants to switch medications  She initially reported wanting to try Humira again but agreed to switching to Remicade  Will submit for approval  Cte ordered  Will plan colonoscopy  Update labs    She has been noncompliant with treatment and follow-up over the past few years  ______________________________________________________________________    SUBJECTIVE: 22-year-old female with a long history of small bowel Crohn's who presents for follow-up. She continues to report ongoing diarrhea and abdominal pain. At her last appointment we agreed to increase her Stelara from every 8 to every 4 weeks. Even though this was approved the patient never increase the dosing. She reports she does not like injecting and actually goes out of her way to avoid it as she notes that it hurts. Her last injection was on September 15. She reports her symptoms are becoming intolerable. She denies rectal bleeding. She reports that most days she does not leave the house due to her symptoms. She also admits to a great deal of recent stress. She lost her job and her dog passed away all within the same week. When she was first diagnosed she was put on Humira. She maintained on this from 20 15-20 17 but it was stopped secondary to severe eczema. She reports that her hair was actually falling out. She was reportedly noncompliant with the dosing of this.   She was then hospitalized in 2021 secondary to fibrosis stenosing Crohn's in the terminal ileum with a small bowel intussusception. She had a colonoscopy which showed cecal inflammation and a fibrosed ileocolonic valve which could not be intubated. She was treated with prednisone and was tapered to Stelara. She has been on Stelara every 8 weeks since that time. She admits that she has not been compliant over time with this treatment. She was put on Stelara in October 2021 but did not follow-up in the office again until November 2022. Labs were ordered at her last appointment which she has not yet had done. REVIEW OF SYSTEMS IS OTHERWISE NEGATIVE. Historical Information   Past Medical History:   Diagnosis Date    Anxiety     COVID 08/08/2022    Crohn disease ([address])     Depression     Suicide attempt [facility])      Past Surgical History:   Procedure Laterality Date    COLONOSCOPY      VULVA BIOPSY Right 8/26/2020    Procedure: Excision of Right Labial Lesion;  Surgeon: Wes Judge MD;  [facility];  Service: Gynecology    VULVA BIOPSY N/A 10/21/2022    Procedure: RESECTION OF RIGHT LABIAL CYST;  Surgeon: Wes Judge MD;  [facility];  Service: Gynecology     Social History   Social History     Substance and Sexual Activity   Alcohol Use Yes    Comment: occasionally     Social History     Substance and Sexual Activity   Drug Use Yes    Frequency: 7.0 times per week    Types: Marijuana    Comment: Smokes weed daily , medical card     Social History     Tobacco Use   Smoking Status Never   Smokeless Tobacco Never     Family History   Problem Relation Age of Onset    Vitiligo Paternal Grandfather     Lupus Paternal Aunt        Meds/Allergies       Current Outpatient Medications:     ustekinumab (Stelara) 90 mg/mL subcutaneous injection    Allergies   Allergen Reactions    Amoxicillin Anaphylaxis           Objective     Blood pressure 112/76, pulse 73, height 5' 3" (1.6 m), weight 53.5 kg (118 lb), SpO2 100 %, not currently breastfeeding.  Body mass index is 20.9 kg/m².      PHYSICAL EXAM:      General Appearance:   Alert, cooperative, no distress   HEENT:   Normocephalic, atraumatic, anicteric. Neck:  Supple, symmetrical, trachea midline   Lungs:   Clear to auscultation bilaterally; no rales, rhonchi or wheezing; respirations unlabored    Heart[de-identified]   Regular rate and rhythm; no murmur, rub, or gallop. Abdomen:   Soft, non-tender, non-distended; normal bowel sounds; no masses, no organomegaly    Genitalia:   Deferred    Rectal:   Deferred    Extremities:  No cyanosis, clubbing or edema    Pulses:  2+ and symmetric    Skin:  No jaundice, rashes, or lesions    Lymph nodes:  No palpable cervical lymphadenopathy        Lab Results:   No visits with results within 1 Day(s) from this visit. Latest known visit with results is:   Appointment on 02/06/2023   Component Date Value    Ustekinumab 02/06/2023 2.1     Anti-Ustekinumab Antibody 02/06/2023 <40          Radiology Results:   No results found.

## 2023-10-26 NOTE — PATIENT INSTRUCTIONS
Scheduled date of colonoscopy (as of today):11/8/23  Physician performing colonoscopy:Dionicio  Location of colonoscopy:[facility]  Bowel prep reviewed with patient:Ina/Miralax  Instructions reviewed with patient by:Fawad fernandes  Clearances:  none

## 2023-11-03 ENCOUNTER — APPOINTMENT (OUTPATIENT)
Dept: LAB | Facility: HOSPITAL | Age: 28
End: 2023-11-03
Payer: COMMERCIAL

## 2023-11-03 ENCOUNTER — TELEPHONE (OUTPATIENT)
Dept: GASTROENTEROLOGY | Facility: CLINIC | Age: 28
End: 2023-11-03

## 2023-11-03 ENCOUNTER — NURSE TRIAGE (OUTPATIENT)
Age: 28
End: 2023-11-03

## 2023-11-03 DIAGNOSIS — K50.012 CROHN'S DISEASE OF SMALL INTESTINE WITH INTESTINAL OBSTRUCTION (HCC): ICD-10-CM

## 2023-11-03 LAB
ALBUMIN SERPL BCP-MCNC: 5 G/DL (ref 3.5–5)
ALP SERPL-CCNC: 47 U/L (ref 34–104)
ALT SERPL W P-5'-P-CCNC: 8 U/L (ref 7–52)
ANION GAP SERPL CALCULATED.3IONS-SCNC: 7 MMOL/L
AST SERPL W P-5'-P-CCNC: 16 U/L (ref 13–39)
BASOPHILS # BLD AUTO: 0.08 THOUSANDS/ÂΜL (ref 0–0.1)
BASOPHILS NFR BLD AUTO: 2 % (ref 0–1)
BILIRUB SERPL-MCNC: 0.66 MG/DL (ref 0.2–1)
BUN SERPL-MCNC: 12 MG/DL (ref 5–25)
CALCIUM SERPL-MCNC: 9.8 MG/DL (ref 8.4–10.2)
CHLORIDE SERPL-SCNC: 105 MMOL/L (ref 96–108)
CO2 SERPL-SCNC: 26 MMOL/L (ref 21–32)
CREAT SERPL-MCNC: 0.66 MG/DL (ref 0.6–1.3)
EOSINOPHIL # BLD AUTO: 0.33 THOUSAND/ÂΜL (ref 0–0.61)
EOSINOPHIL NFR BLD AUTO: 6 % (ref 0–6)
ERYTHROCYTE [DISTWIDTH] IN BLOOD BY AUTOMATED COUNT: 12.6 % (ref 11.6–15.1)
ERYTHROCYTE [SEDIMENTATION RATE] IN BLOOD: 2 MM/HOUR (ref 0–19)
GFR SERPL CREATININE-BSD FRML MDRD: 122 ML/MIN/1.73SQ M
GLUCOSE P FAST SERPL-MCNC: 97 MG/DL (ref 65–99)
HBV CORE AB SER QL: NORMAL
HBV CORE IGM SER QL: NORMAL
HBV SURFACE AG SER QL: NORMAL
HCT VFR BLD AUTO: 42.1 % (ref 34.8–46.1)
HCV AB SER QL: NORMAL
HGB BLD-MCNC: 14.3 G/DL (ref 11.5–15.4)
IMM GRANULOCYTES # BLD AUTO: 0.01 THOUSAND/UL (ref 0–0.2)
IMM GRANULOCYTES NFR BLD AUTO: 0 % (ref 0–2)
LYMPHOCYTES # BLD AUTO: 2.48 THOUSANDS/ÂΜL (ref 0.6–4.47)
LYMPHOCYTES NFR BLD AUTO: 47 % (ref 14–44)
MCH RBC QN AUTO: 29.1 PG (ref 26.8–34.3)
MCHC RBC AUTO-ENTMCNC: 34 G/DL (ref 31.4–37.4)
MCV RBC AUTO: 86 FL (ref 82–98)
MONOCYTES # BLD AUTO: 0.39 THOUSAND/ÂΜL (ref 0.17–1.22)
MONOCYTES NFR BLD AUTO: 7 % (ref 4–12)
NEUTROPHILS # BLD AUTO: 2.04 THOUSANDS/ÂΜL (ref 1.85–7.62)
NEUTS SEG NFR BLD AUTO: 38 % (ref 43–75)
NRBC BLD AUTO-RTO: 0 /100 WBCS
PLATELET # BLD AUTO: 338 THOUSANDS/UL (ref 149–390)
PMV BLD AUTO: 9.5 FL (ref 8.9–12.7)
POTASSIUM SERPL-SCNC: 3.8 MMOL/L (ref 3.5–5.3)
PROT SERPL-MCNC: 8.4 G/DL (ref 6.4–8.4)
RBC # BLD AUTO: 4.91 MILLION/UL (ref 3.81–5.12)
SODIUM SERPL-SCNC: 138 MMOL/L (ref 135–147)
WBC # BLD AUTO: 5.33 THOUSAND/UL (ref 4.31–10.16)

## 2023-11-03 PROCEDURE — 87340 HEPATITIS B SURFACE AG IA: CPT

## 2023-11-03 PROCEDURE — 86704 HEP B CORE ANTIBODY TOTAL: CPT

## 2023-11-03 PROCEDURE — 80053 COMPREHEN METABOLIC PANEL: CPT

## 2023-11-03 PROCEDURE — 86803 HEPATITIS C AB TEST: CPT

## 2023-11-03 PROCEDURE — 85025 COMPLETE CBC W/AUTO DIFF WBC: CPT

## 2023-11-03 PROCEDURE — 86480 TB TEST CELL IMMUN MEASURE: CPT

## 2023-11-03 PROCEDURE — 36415 COLL VENOUS BLD VENIPUNCTURE: CPT

## 2023-11-03 PROCEDURE — 86705 HEP B CORE ANTIBODY IGM: CPT

## 2023-11-03 PROCEDURE — 85652 RBC SED RATE AUTOMATED: CPT

## 2023-11-04 ENCOUNTER — APPOINTMENT (OUTPATIENT)
Dept: LAB | Facility: HOSPITAL | Age: 28
End: 2023-11-04
Payer: COMMERCIAL

## 2023-11-04 PROCEDURE — 83993 ASSAY FOR CALPROTECTIN FECAL: CPT

## 2023-11-06 ENCOUNTER — TELEPHONE (OUTPATIENT)
Age: 28
End: 2023-11-06

## 2023-11-06 LAB
GAMMA INTERFERON BACKGROUND BLD IA-ACNC: <0 IU/ML
M TB IFN-G BLD-IMP: NEGATIVE
M TB IFN-G CD4+ BCKGRND COR BLD-ACNC: 0.01 IU/ML
M TB IFN-G CD4+ BCKGRND COR BLD-ACNC: 0.01 IU/ML
MITOGEN IGNF BCKGRD COR BLD-ACNC: 10 IU/ML

## 2023-11-08 ENCOUNTER — ANESTHESIA EVENT (EMERGENCY)
Dept: GASTROENTEROLOGY | Facility: HOSPITAL | Age: 28
End: 2023-11-08
Payer: COMMERCIAL

## 2023-11-08 ENCOUNTER — HOSPITAL ENCOUNTER (OUTPATIENT)
Dept: GASTROENTEROLOGY | Facility: HOSPITAL | Age: 28
Setting detail: OUTPATIENT SURGERY
Discharge: HOME | End: 2023-11-08
Payer: COMMERCIAL

## 2023-11-08 ENCOUNTER — ANESTHESIA (EMERGENCY)
Dept: GASTROENTEROLOGY | Facility: HOSPITAL | Age: 28
End: 2023-11-08
Payer: COMMERCIAL

## 2023-11-08 VITALS
TEMPERATURE: 97 F | SYSTOLIC BLOOD PRESSURE: 97 MMHG | BODY MASS INDEX: 20.82 KG/M2 | OXYGEN SATURATION: 100 % | RESPIRATION RATE: 24 BRPM | HEART RATE: 57 BPM | WEIGHT: 117.5 LBS | HEIGHT: 63 IN | DIASTOLIC BLOOD PRESSURE: 58 MMHG

## 2023-11-08 DIAGNOSIS — K50.012 CROHN'S DISEASE OF SMALL INTESTINE WITH INTESTINAL OBSTRUCTION (HCC): ICD-10-CM

## 2023-11-08 LAB
EXT PREGNANCY TEST URINE: NEGATIVE
EXT. CONTROL: NORMAL

## 2023-11-08 PROCEDURE — 81025 URINE PREGNANCY TEST: CPT | Performed by: INTERNAL MEDICINE

## 2023-11-08 PROCEDURE — 88305 TISSUE EXAM BY PATHOLOGIST: CPT | Performed by: PATHOLOGY

## 2023-11-08 PROCEDURE — 45380 COLONOSCOPY AND BIOPSY: CPT | Performed by: INTERNAL MEDICINE

## 2023-11-08 RX ORDER — LIDOCAINE HYDROCHLORIDE 20 MG/ML
INJECTION, SOLUTION EPIDURAL; INFILTRATION; INTRACAUDAL; PERINEURAL AS NEEDED
Status: DISCONTINUED | OUTPATIENT
Start: 2023-11-08 | End: 2023-11-08

## 2023-11-08 RX ORDER — SODIUM CHLORIDE, SODIUM LACTATE, POTASSIUM CHLORIDE, CALCIUM CHLORIDE 600; 310; 30; 20 MG/100ML; MG/100ML; MG/100ML; MG/100ML
INJECTION, SOLUTION INTRAVENOUS CONTINUOUS PRN
Status: DISCONTINUED | OUTPATIENT
Start: 2023-11-08 | End: 2023-11-08

## 2023-11-08 RX ORDER — PROPOFOL 10 MG/ML
INJECTION, EMULSION INTRAVENOUS AS NEEDED
Status: DISCONTINUED | OUTPATIENT
Start: 2023-11-08 | End: 2023-11-08

## 2023-11-08 RX ADMIN — PROPOFOL 50 MG: 10 INJECTION, EMULSION INTRAVENOUS at 13:54

## 2023-11-08 RX ADMIN — PROPOFOL 50 MG: 10 INJECTION, EMULSION INTRAVENOUS at 13:53

## 2023-11-08 RX ADMIN — PROPOFOL 35 MG: 10 INJECTION, EMULSION INTRAVENOUS at 13:59

## 2023-11-08 RX ADMIN — PROPOFOL 35 MG: 10 INJECTION, EMULSION INTRAVENOUS at 14:01

## 2023-11-08 RX ADMIN — PROPOFOL 100 MG: 10 INJECTION, EMULSION INTRAVENOUS at 13:52

## 2023-11-08 RX ADMIN — PROPOFOL 60 MG: 10 INJECTION, EMULSION INTRAVENOUS at 13:55

## 2023-11-08 RX ADMIN — SODIUM CHLORIDE, SODIUM LACTATE, POTASSIUM CHLORIDE, AND CALCIUM CHLORIDE: .6; .31; .03; .02 INJECTION, SOLUTION INTRAVENOUS at 13:49

## 2023-11-08 RX ADMIN — LIDOCAINE HYDROCHLORIDE 60 MG: 20 INJECTION, SOLUTION EPIDURAL; INFILTRATION; INTRACAUDAL; PERINEURAL at 13:52

## 2023-11-08 RX ADMIN — PROPOFOL 35 MG: 10 INJECTION, EMULSION INTRAVENOUS at 13:57

## 2023-11-08 RX ADMIN — PROPOFOL 25 MG: 10 INJECTION, EMULSION INTRAVENOUS at 14:03

## 2023-11-08 NOTE — ANESTHESIA POSTPROCEDURE EVALUATION
Post-Op Assessment Note    CV Status:  Stable  Pain Score: 0    Pain management: adequate     Mental Status:  Alert and awake   Hydration Status:  Euvolemic   PONV Controlled:  Controlled   Airway Patency:  Patent      Post Op Vitals Reviewed: Yes      Staff: CRNA         No notable events documented.     /66 (11/08/23 1411)    Temp (Abnormal) 97 °F (36.1 °C) (11/08/23 1411)    Pulse 79 (11/08/23 1411)   Resp 15 (11/08/23 1411)    SpO2 100 % (11/08/23 1411)

## 2023-11-08 NOTE — ANESTHESIA PREPROCEDURE EVALUATION
Procedure:  COLONOSCOPY    Relevant Problems   ANESTHESIA (within normal limits)      CARDIO (within normal limits)   (-) Angina of effort      ENDO (within normal limits)      GI/HEPATIC (within normal limits)   (-) Gastroesophageal reflux disease      /RENAL (within normal limits)      HEMATOLOGY (within normal limits)      PULMONARY   (-) Sleep apnea   (-) URI (upper respiratory infection)      Other   (+) Crohn's disease (HCC)   (+) Mild protein-calorie malnutrition (HCC)      Allergies   Allergen Reactions    Amoxicillin Anaphylaxis     Social History     Tobacco Use    Smoking status: Never    Smokeless tobacco: Never   Vaping Use    Vaping Use: Never used   Substance Use Topics    Alcohol use: Yes     Comment: occasionally    Drug use: Yes     Frequency: 7.0 times per week     Types: Marijuana     Comment: Smokes weed daily , medical card     Current Outpatient Medications   Medication Instructions    ustekinumab (Stelara) 90 mg/mL subcutaneous injection INJECT 1 ML (90 MG TOTAL) UNDER THE SKIN EVERY 4 WEEKS     Lab Results   Component Value Date    WBC 5.33 11/03/2023    HGB 14.3 11/03/2023    HCT 42.1 11/03/2023     11/03/2023    SODIUM 138 11/03/2023    K 3.8 11/03/2023     11/03/2023    CO2 26 11/03/2023    BUN 12 11/03/2023    CREATININE 0.66 11/03/2023    GLUC 92 11/13/2022    AST 16 11/03/2023    ALT 8 11/03/2023    ALKPHOS 47 11/03/2023    TBILI 0.66 11/03/2023    ALB 5.0 11/03/2023     Vitals:    11/08/23 1326   BP: 105/76   Pulse: 64   Resp: 14   Temp: (!) 97 °F (36.1 °C)   SpO2: 100%     EKG 8/23/22  Sinus tachycardia  Possible Left atrial enlargement  Borderline ECG    Physical Exam    Airway    Mallampati score: II  TM Distance: >3 FB  Neck ROM: full     Dental   No notable dental hx     Cardiovascular  Rhythm: regular, Rate: normal    Pulmonary  Pulmonary exam normal Breath sounds clear to auscultation    Other Findings        Anesthesia Plan  ASA Score- 1     Anesthesia Type- IV sedation with anesthesia with ASA Monitors. Additional Monitors:     Airway Plan:     Comment: O2 mask, natural airway, EtCO2 monitor. Risks discussed including awareness, aspiration, drug reactions and conversion to GA. Qasim Aviles Plan Factors-Exercise tolerance (METS): >4 METS. Chart reviewed. Patient summary reviewed. Patient is a current smoker. Induction- intravenous. Postoperative Plan- Plan for postoperative opioid use. Informed Consent- Anesthetic plan and risks discussed with patient. I personally reviewed this patient with the CRNA. Discussed and agreed on the Anesthesia Plan with the CRNA. Qasim Aviles

## 2023-11-08 NOTE — INTERVAL H&P NOTE
H&P reviewed. After examining the patient I find no changes in the patients condition since the H&P had been written.     Vitals:    11/08/23 1326   BP: 105/76   Pulse: 64   Resp: 14   Temp: (!) 97 °F (36.1 °C)   SpO2: 100%

## 2023-11-09 ENCOUNTER — APPOINTMENT (EMERGENCY)
Dept: CT IMAGING | Facility: HOSPITAL | Age: 28
End: 2023-11-09
Payer: COMMERCIAL

## 2023-11-09 ENCOUNTER — OFFICE VISIT (OUTPATIENT)
Dept: INTERNAL MEDICINE CLINIC | Facility: CLINIC | Age: 28
End: 2023-11-09
Payer: COMMERCIAL

## 2023-11-09 ENCOUNTER — HOSPITAL ENCOUNTER (OUTPATIENT)
Facility: HOSPITAL | Age: 28
Setting detail: OUTPATIENT SURGERY
Discharge: HOME | End: 2023-11-10
Attending: EMERGENCY MEDICINE | Admitting: SURGERY
Payer: COMMERCIAL

## 2023-11-09 VITALS
HEART RATE: 79 BPM | DIASTOLIC BLOOD PRESSURE: 64 MMHG | HEIGHT: 63 IN | WEIGHT: 117 LBS | BODY MASS INDEX: 20.73 KG/M2 | TEMPERATURE: 97.4 F | SYSTOLIC BLOOD PRESSURE: 102 MMHG | RESPIRATION RATE: 14 BRPM | OXYGEN SATURATION: 99 %

## 2023-11-09 DIAGNOSIS — Z13.6 SCREENING FOR HEART DISEASE: ICD-10-CM

## 2023-11-09 DIAGNOSIS — K37 APPENDICITIS, UNSPECIFIED APPENDICITIS TYPE: Primary | ICD-10-CM

## 2023-11-09 DIAGNOSIS — K37 APPENDICITIS: ICD-10-CM

## 2023-11-09 DIAGNOSIS — F12.29: ICD-10-CM

## 2023-11-09 DIAGNOSIS — T50.902A OVERDOSE, INTENTIONAL SELF-HARM, INITIAL ENCOUNTER (HCC): ICD-10-CM

## 2023-11-09 DIAGNOSIS — Z11.4 SCREENING FOR HIV (HUMAN IMMUNODEFICIENCY VIRUS): ICD-10-CM

## 2023-11-09 DIAGNOSIS — F41.9 ANXIETY: ICD-10-CM

## 2023-11-09 DIAGNOSIS — Z13.1 SCREENING FOR DIABETES MELLITUS: Primary | ICD-10-CM

## 2023-11-09 LAB
ALBUMIN SERPL BCP-MCNC: 4.6 G/DL (ref 3.5–5)
ALP SERPL-CCNC: 40 U/L (ref 34–104)
ALT SERPL W P-5'-P-CCNC: 10 U/L (ref 7–52)
ANION GAP SERPL CALCULATED.3IONS-SCNC: 11 MMOL/L
AST SERPL W P-5'-P-CCNC: 17 U/L (ref 13–39)
BASOPHILS # BLD AUTO: 0.07 THOUSANDS/ÂΜL (ref 0–0.1)
BASOPHILS NFR BLD AUTO: 1 % (ref 0–1)
BILIRUB SERPL-MCNC: 0.59 MG/DL (ref 0.2–1)
BILIRUB UR QL STRIP: NEGATIVE
BUN SERPL-MCNC: 10 MG/DL (ref 5–25)
CALCIUM SERPL-MCNC: 9.2 MG/DL (ref 8.4–10.2)
CALPROTECTIN STL-MCNT: 23 UG/G (ref 0–120)
CHLORIDE SERPL-SCNC: 107 MMOL/L (ref 96–108)
CLARITY UR: CLEAR
CO2 SERPL-SCNC: 22 MMOL/L (ref 21–32)
COLOR UR: COLORLESS
CREAT SERPL-MCNC: 0.59 MG/DL (ref 0.6–1.3)
EOSINOPHIL # BLD AUTO: 0.32 THOUSAND/ÂΜL (ref 0–0.61)
EOSINOPHIL NFR BLD AUTO: 5 % (ref 0–6)
ERYTHROCYTE [DISTWIDTH] IN BLOOD BY AUTOMATED COUNT: 12.5 % (ref 11.6–15.1)
EXT PREGNANCY TEST URINE: NEGATIVE
EXT. CONTROL: NORMAL
GFR SERPL CREATININE-BSD FRML MDRD: 126 ML/MIN/1.73SQ M
GLUCOSE SERPL-MCNC: 106 MG/DL (ref 65–140)
GLUCOSE UR STRIP-MCNC: NEGATIVE MG/DL
HCT VFR BLD AUTO: 36.2 % (ref 34.8–46.1)
HGB BLD-MCNC: 12.5 G/DL (ref 11.5–15.4)
HGB UR QL STRIP.AUTO: NEGATIVE
IMM GRANULOCYTES # BLD AUTO: 0.02 THOUSAND/UL (ref 0–0.2)
IMM GRANULOCYTES NFR BLD AUTO: 0 % (ref 0–2)
KETONES UR STRIP-MCNC: ABNORMAL MG/DL
LEUKOCYTE ESTERASE UR QL STRIP: NEGATIVE
LIPASE SERPL-CCNC: 13 U/L (ref 11–82)
LYMPHOCYTES # BLD AUTO: 2.81 THOUSANDS/ÂΜL (ref 0.6–4.47)
LYMPHOCYTES NFR BLD AUTO: 41 % (ref 14–44)
MCH RBC QN AUTO: 29.9 PG (ref 26.8–34.3)
MCHC RBC AUTO-ENTMCNC: 34.5 G/DL (ref 31.4–37.4)
MCV RBC AUTO: 87 FL (ref 82–98)
MONOCYTES # BLD AUTO: 0.37 THOUSAND/ÂΜL (ref 0.17–1.22)
MONOCYTES NFR BLD AUTO: 5 % (ref 4–12)
NEUTROPHILS # BLD AUTO: 3.2 THOUSANDS/ÂΜL (ref 1.85–7.62)
NEUTS SEG NFR BLD AUTO: 48 % (ref 43–75)
NITRITE UR QL STRIP: NEGATIVE
NRBC BLD AUTO-RTO: 0 /100 WBCS
PH UR STRIP.AUTO: 6.5 [PH]
PLATELET # BLD AUTO: 258 THOUSANDS/UL (ref 149–390)
PMV BLD AUTO: 9.9 FL (ref 8.9–12.7)
POTASSIUM SERPL-SCNC: 3.4 MMOL/L (ref 3.5–5.3)
PROT SERPL-MCNC: 7.2 G/DL (ref 6.4–8.4)
PROT UR STRIP-MCNC: NEGATIVE MG/DL
RBC # BLD AUTO: 4.18 MILLION/UL (ref 3.81–5.12)
SODIUM SERPL-SCNC: 140 MMOL/L (ref 135–147)
SP GR UR STRIP.AUTO: 1.01 (ref 1–1.03)
UROBILINOGEN UR STRIP-ACNC: <2 MG/DL
WBC # BLD AUTO: 6.79 THOUSAND/UL (ref 4.31–10.16)

## 2023-11-09 PROCEDURE — 81003 URINALYSIS AUTO W/O SCOPE: CPT | Performed by: EMERGENCY MEDICINE

## 2023-11-09 PROCEDURE — 93005 ELECTROCARDIOGRAM TRACING: CPT

## 2023-11-09 PROCEDURE — 96375 TX/PRO/DX INJ NEW DRUG ADDON: CPT

## 2023-11-09 PROCEDURE — 96361 HYDRATE IV INFUSION ADD-ON: CPT

## 2023-11-09 PROCEDURE — 81025 URINE PREGNANCY TEST: CPT | Performed by: EMERGENCY MEDICINE

## 2023-11-09 PROCEDURE — 99284 EMERGENCY DEPT VISIT MOD MDM: CPT

## 2023-11-09 PROCEDURE — 85025 COMPLETE CBC W/AUTO DIFF WBC: CPT | Performed by: EMERGENCY MEDICINE

## 2023-11-09 PROCEDURE — 74177 CT ABD & PELVIS W/CONTRAST: CPT

## 2023-11-09 PROCEDURE — 80053 COMPREHEN METABOLIC PANEL: CPT | Performed by: EMERGENCY MEDICINE

## 2023-11-09 PROCEDURE — 96374 THER/PROPH/DIAG INJ IV PUSH: CPT

## 2023-11-09 PROCEDURE — 99204 OFFICE O/P NEW MOD 45 MIN: CPT | Performed by: PHYSICIAN ASSISTANT

## 2023-11-09 PROCEDURE — 83690 ASSAY OF LIPASE: CPT | Performed by: EMERGENCY MEDICINE

## 2023-11-09 PROCEDURE — 36415 COLL VENOUS BLD VENIPUNCTURE: CPT | Performed by: EMERGENCY MEDICINE

## 2023-11-09 PROCEDURE — 99285 EMERGENCY DEPT VISIT HI MDM: CPT | Performed by: EMERGENCY MEDICINE

## 2023-11-09 RX ORDER — HYDROXYZINE HYDROCHLORIDE 25 MG/1
25 TABLET, FILM COATED ORAL EVERY 6 HOURS PRN
Qty: 30 TABLET | Refills: 0 | Status: SHIPPED | OUTPATIENT
Start: 2023-11-09 | End: 2023-11-09 | Stop reason: SDUPTHER

## 2023-11-09 RX ORDER — ESCITALOPRAM OXALATE 10 MG/1
10 TABLET ORAL DAILY
Qty: 30 TABLET | Refills: 0 | Status: SHIPPED | OUTPATIENT
Start: 2023-11-09

## 2023-11-09 RX ORDER — LORAZEPAM 0.5 MG/1
0.5 TABLET ORAL ONCE
Status: COMPLETED | OUTPATIENT
Start: 2023-11-09 | End: 2023-11-09

## 2023-11-09 RX ORDER — ESCITALOPRAM OXALATE 10 MG/1
5 TABLET ORAL DAILY
Status: DISCONTINUED | OUTPATIENT
Start: 2023-11-10 | End: 2023-11-09

## 2023-11-09 RX ORDER — SODIUM CHLORIDE, SODIUM LACTATE, POTASSIUM CHLORIDE, CALCIUM CHLORIDE 600; 310; 30; 20 MG/100ML; MG/100ML; MG/100ML; MG/100ML
125 INJECTION, SOLUTION INTRAVENOUS CONTINUOUS
Status: DISCONTINUED | OUTPATIENT
Start: 2023-11-09 | End: 2023-11-10 | Stop reason: HOSPADM

## 2023-11-09 RX ORDER — CIPROFLOXACIN 2 MG/ML
400 INJECTION, SOLUTION INTRAVENOUS EVERY 12 HOURS
Status: COMPLETED | OUTPATIENT
Start: 2023-11-09 | End: 2023-11-10

## 2023-11-09 RX ORDER — ESCITALOPRAM OXALATE 10 MG/1
10 TABLET ORAL DAILY
Status: DISCONTINUED | OUTPATIENT
Start: 2023-11-10 | End: 2023-11-10 | Stop reason: HOSPADM

## 2023-11-09 RX ORDER — METRONIDAZOLE 500 MG/100ML
500 INJECTION, SOLUTION INTRAVENOUS EVERY 8 HOURS
Status: DISCONTINUED | OUTPATIENT
Start: 2023-11-09 | End: 2023-11-10

## 2023-11-09 RX ORDER — MORPHINE SULFATE 4 MG/ML
4 INJECTION, SOLUTION INTRAMUSCULAR; INTRAVENOUS ONCE
Status: COMPLETED | OUTPATIENT
Start: 2023-11-09 | End: 2023-11-09

## 2023-11-09 RX ORDER — ONDANSETRON 2 MG/ML
4 INJECTION INTRAMUSCULAR; INTRAVENOUS ONCE
Status: COMPLETED | OUTPATIENT
Start: 2023-11-09 | End: 2023-11-09

## 2023-11-09 RX ORDER — HYDROXYZINE HYDROCHLORIDE 25 MG/1
25 TABLET, FILM COATED ORAL DAILY PRN
Qty: 14 TABLET | Refills: 0 | Status: SHIPPED | OUTPATIENT
Start: 2023-11-09

## 2023-11-09 RX ORDER — HEPARIN SODIUM 5000 [USP'U]/ML
5000 INJECTION, SOLUTION INTRAVENOUS; SUBCUTANEOUS EVERY 8 HOURS SCHEDULED
Status: DISCONTINUED | OUTPATIENT
Start: 2023-11-09 | End: 2023-11-10 | Stop reason: HOSPADM

## 2023-11-09 RX ORDER — ONDANSETRON 2 MG/ML
4 INJECTION INTRAMUSCULAR; INTRAVENOUS EVERY 6 HOURS PRN
Status: DISCONTINUED | OUTPATIENT
Start: 2023-11-09 | End: 2023-11-10 | Stop reason: HOSPADM

## 2023-11-09 RX ADMIN — SODIUM CHLORIDE 1000 ML: 0.9 INJECTION, SOLUTION INTRAVENOUS at 18:09

## 2023-11-09 RX ADMIN — METRONIDAZOLE 500 MG: 500 INJECTION, SOLUTION INTRAVENOUS at 22:16

## 2023-11-09 RX ADMIN — ONDANSETRON 4 MG: 2 INJECTION INTRAMUSCULAR; INTRAVENOUS at 18:58

## 2023-11-09 RX ADMIN — SODIUM CHLORIDE 1000 ML: 0.9 INJECTION, SOLUTION INTRAVENOUS at 19:25

## 2023-11-09 RX ADMIN — SODIUM CHLORIDE, SODIUM LACTATE, POTASSIUM CHLORIDE, AND CALCIUM CHLORIDE 125 ML/HR: .6; .31; .03; .02 INJECTION, SOLUTION INTRAVENOUS at 23:37

## 2023-11-09 RX ADMIN — LORAZEPAM 0.5 MG: 0.5 TABLET ORAL at 22:15

## 2023-11-09 RX ADMIN — CIPROFLOXACIN 400 MG: 2 INJECTION, SOLUTION INTRAVENOUS at 23:33

## 2023-11-09 RX ADMIN — IOHEXOL 100 ML: 350 INJECTION, SOLUTION INTRAVENOUS at 19:11

## 2023-11-09 RX ADMIN — MORPHINE SULFATE 4 MG: 4 INJECTION INTRAVENOUS at 18:09
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
## 2023-11-10 ENCOUNTER — ANESTHESIA (OUTPATIENT)
Dept: PERIOP | Facility: HOSPITAL | Age: 28
End: 2023-11-10
Payer: COMMERCIAL

## 2023-11-10 ENCOUNTER — ANESTHESIA EVENT (OUTPATIENT)
Dept: PERIOP | Facility: HOSPITAL | Age: 28
End: 2023-11-10
Payer: COMMERCIAL

## 2023-11-10 VITALS
SYSTOLIC BLOOD PRESSURE: 106 MMHG | HEART RATE: 60 BPM | RESPIRATION RATE: 20 BRPM | DIASTOLIC BLOOD PRESSURE: 58 MMHG | HEIGHT: 63 IN | BODY MASS INDEX: 20.73 KG/M2 | TEMPERATURE: 98.4 F | WEIGHT: 117 LBS | OXYGEN SATURATION: 98 %

## 2023-11-10 PROBLEM — K37 APPENDICITIS: Status: ACTIVE | Noted: 2023-11-10

## 2023-11-10 PROBLEM — K35.80 ACUTE APPENDICITIS: Status: ACTIVE | Noted: 2023-11-10

## 2023-11-10 PROBLEM — K37 APPENDICITIS: Status: RESOLVED | Noted: 2023-11-10 | Resolved: 2023-11-10

## 2023-11-10 LAB
BASOPHILS # BLD AUTO: 0.07 THOUSANDS/ÂΜL (ref 0–0.1)
BASOPHILS NFR BLD AUTO: 1 % (ref 0–1)
EOSINOPHIL # BLD AUTO: 0.21 THOUSAND/ÂΜL (ref 0–0.61)
EOSINOPHIL NFR BLD AUTO: 3 % (ref 0–6)
ERYTHROCYTE [DISTWIDTH] IN BLOOD BY AUTOMATED COUNT: 12.4 % (ref 11.6–15.1)
HCT VFR BLD AUTO: 36.2 % (ref 34.8–46.1)
HGB BLD-MCNC: 12.3 G/DL (ref 11.5–15.4)
IMM GRANULOCYTES # BLD AUTO: 0.02 THOUSAND/UL (ref 0–0.2)
IMM GRANULOCYTES NFR BLD AUTO: 0 % (ref 0–2)
LYMPHOCYTES # BLD AUTO: 1.83 THOUSANDS/ÂΜL (ref 0.6–4.47)
LYMPHOCYTES NFR BLD AUTO: 25 % (ref 14–44)
MCH RBC QN AUTO: 29.4 PG (ref 26.8–34.3)
MCHC RBC AUTO-ENTMCNC: 34 G/DL (ref 31.4–37.4)
MCV RBC AUTO: 86 FL (ref 82–98)
MONOCYTES # BLD AUTO: 0.38 THOUSAND/ÂΜL (ref 0.17–1.22)
MONOCYTES NFR BLD AUTO: 5 % (ref 4–12)
NEUTROPHILS # BLD AUTO: 4.78 THOUSANDS/ÂΜL (ref 1.85–7.62)
NEUTS SEG NFR BLD AUTO: 66 % (ref 43–75)
NRBC BLD AUTO-RTO: 0 /100 WBCS
PLATELET # BLD AUTO: 264 THOUSANDS/UL (ref 149–390)
PMV BLD AUTO: 9.6 FL (ref 8.9–12.7)
RBC # BLD AUTO: 4.19 MILLION/UL (ref 3.81–5.12)
WBC # BLD AUTO: 7.29 THOUSAND/UL (ref 4.31–10.16)

## 2023-11-10 PROCEDURE — 44970 LAPAROSCOPY APPENDECTOMY: CPT | Performed by: SURGERY

## 2023-11-10 PROCEDURE — 88304 TISSUE EXAM BY PATHOLOGIST: CPT | Performed by: STUDENT IN AN ORGANIZED HEALTH CARE EDUCATION/TRAINING PROGRAM

## 2023-11-10 PROCEDURE — 36415 COLL VENOUS BLD VENIPUNCTURE: CPT | Performed by: SURGERY

## 2023-11-10 PROCEDURE — 99223 1ST HOSP IP/OBS HIGH 75: CPT | Performed by: SURGERY

## 2023-11-10 PROCEDURE — NC001 PR NO CHARGE: Performed by: CLINICAL NURSE SPECIALIST

## 2023-11-10 PROCEDURE — 85025 COMPLETE CBC W/AUTO DIFF WBC: CPT | Performed by: SURGERY

## 2023-11-10 RX ORDER — PROPOFOL 10 MG/ML
INJECTION, EMULSION INTRAVENOUS AS NEEDED
Status: DISCONTINUED | OUTPATIENT
Start: 2023-11-10 | End: 2023-11-10

## 2023-11-10 RX ORDER — HYDROMORPHONE HCL/PF 1 MG/ML
0.2 SYRINGE (ML) INJECTION
Status: DISCONTINUED | OUTPATIENT
Start: 2023-11-10 | End: 2023-11-10 | Stop reason: HOSPADM

## 2023-11-10 RX ORDER — ROCURONIUM BROMIDE 10 MG/ML
INJECTION, SOLUTION INTRAVENOUS AS NEEDED
Status: DISCONTINUED | OUTPATIENT
Start: 2023-11-10 | End: 2023-11-10

## 2023-11-10 RX ORDER — OXYCODONE HYDROCHLORIDE 5 MG/1
5 TABLET ORAL ONCE AS NEEDED
Status: COMPLETED | OUTPATIENT
Start: 2023-11-10 | End: 2023-11-10

## 2023-11-10 RX ORDER — MAGNESIUM HYDROXIDE 1200 MG/15ML
LIQUID ORAL AS NEEDED
Status: DISCONTINUED | OUTPATIENT
Start: 2023-11-10 | End: 2023-11-10 | Stop reason: HOSPADM

## 2023-11-10 RX ORDER — DEXAMETHASONE SODIUM PHOSPHATE 10 MG/ML
INJECTION, SOLUTION INTRAMUSCULAR; INTRAVENOUS AS NEEDED
Status: DISCONTINUED | OUTPATIENT
Start: 2023-11-10 | End: 2023-11-10

## 2023-11-10 RX ORDER — LIDOCAINE HYDROCHLORIDE 10 MG/ML
INJECTION, SOLUTION EPIDURAL; INFILTRATION; INTRACAUDAL; PERINEURAL AS NEEDED
Status: DISCONTINUED | OUTPATIENT
Start: 2023-11-10 | End: 2023-11-10

## 2023-11-10 RX ORDER — ACETAMINOPHEN AND CODEINE PHOSPHATE 300; 30 MG/1; MG/1
1 TABLET ORAL EVERY 4 HOURS PRN
Qty: 10 TABLET | Refills: 0 | Status: SHIPPED | OUTPATIENT
Start: 2023-11-10 | End: 2023-11-13

## 2023-11-10 RX ORDER — ONDANSETRON 2 MG/ML
INJECTION INTRAMUSCULAR; INTRAVENOUS AS NEEDED
Status: DISCONTINUED | OUTPATIENT
Start: 2023-11-10 | End: 2023-11-10

## 2023-11-10 RX ORDER — FENTANYL CITRATE/PF 50 MCG/ML
50 SYRINGE (ML) INJECTION
Status: DISCONTINUED | OUTPATIENT
Start: 2023-11-10 | End: 2023-11-10 | Stop reason: HOSPADM

## 2023-11-10 RX ORDER — MIDAZOLAM HYDROCHLORIDE 2 MG/2ML
INJECTION, SOLUTION INTRAMUSCULAR; INTRAVENOUS AS NEEDED
Status: DISCONTINUED | OUTPATIENT
Start: 2023-11-10 | End: 2023-11-10

## 2023-11-10 RX ORDER — ONDANSETRON 2 MG/ML
4 INJECTION INTRAMUSCULAR; INTRAVENOUS ONCE AS NEEDED
Status: DISCONTINUED | OUTPATIENT
Start: 2023-11-10 | End: 2023-11-10 | Stop reason: HOSPADM

## 2023-11-10 RX ORDER — ACETAMINOPHEN 10 MG/ML
INJECTION, SOLUTION INTRAVENOUS AS NEEDED
Status: DISCONTINUED | OUTPATIENT
Start: 2023-11-10 | End: 2023-11-10

## 2023-11-10 RX ORDER — FENTANYL CITRATE 50 UG/ML
50 INJECTION, SOLUTION INTRAMUSCULAR; INTRAVENOUS ONCE
Status: DISCONTINUED | OUTPATIENT
Start: 2023-11-10 | End: 2023-11-10 | Stop reason: HOSPADM

## 2023-11-10 RX ORDER — PROMETHAZINE HYDROCHLORIDE 25 MG/ML
12.5 INJECTION, SOLUTION INTRAMUSCULAR; INTRAVENOUS ONCE AS NEEDED
Status: DISCONTINUED | OUTPATIENT
Start: 2023-11-10 | End: 2023-11-10 | Stop reason: HOSPADM

## 2023-11-10 RX ORDER — CIPROFLOXACIN 2 MG/ML
400 INJECTION, SOLUTION INTRAVENOUS
Status: COMPLETED | OUTPATIENT
Start: 2023-11-10 | End: 2023-11-10

## 2023-11-10 RX ORDER — FENTANYL CITRATE 50 UG/ML
INJECTION, SOLUTION INTRAMUSCULAR; INTRAVENOUS AS NEEDED
Status: DISCONTINUED | OUTPATIENT
Start: 2023-11-10 | End: 2023-11-10

## 2023-11-10 RX ORDER — METRONIDAZOLE 500 MG/100ML
500 INJECTION, SOLUTION INTRAVENOUS
Status: COMPLETED | OUTPATIENT
Start: 2023-11-10 | End: 2023-11-10

## 2023-11-10 RX ADMIN — METRONIDAZOLE 500 MG: 500 INJECTION, SOLUTION INTRAVENOUS at 05:20

## 2023-11-10 RX ADMIN — FENTANYL CITRATE 50 MCG: 50 INJECTION INTRAMUSCULAR; INTRAVENOUS at 14:53

## 2023-11-10 RX ADMIN — OXYCODONE HYDROCHLORIDE 5 MG: 5 TABLET ORAL at 15:44

## 2023-11-10 RX ADMIN — PROPOFOL 150 MG: 10 INJECTION, EMULSION INTRAVENOUS at 12:32

## 2023-11-10 RX ADMIN — ACETAMINOPHEN 1000 MG: 10 INJECTION INTRAVENOUS at 12:57

## 2023-11-10 RX ADMIN — FENTANYL CITRATE 50 MCG: 50 INJECTION, SOLUTION INTRAMUSCULAR; INTRAVENOUS at 12:35

## 2023-11-10 RX ADMIN — SUGAMMADEX 800 MG: 100 INJECTION, SOLUTION INTRAVENOUS at 13:43

## 2023-11-10 RX ADMIN — SODIUM CHLORIDE 8 MCG: 9 INJECTION, SOLUTION INTRAVENOUS at 12:35

## 2023-11-10 RX ADMIN — SUGAMMADEX 200 MG: 100 INJECTION, SOLUTION INTRAVENOUS at 13:27

## 2023-11-10 RX ADMIN — ONDANSETRON 4 MG: 2 INJECTION INTRAMUSCULAR; INTRAVENOUS at 10:52

## 2023-11-10 RX ADMIN — DEXAMETHASONE SODIUM PHOSPHATE 10 MG: 10 INJECTION, SOLUTION INTRAMUSCULAR; INTRAVENOUS at 12:39

## 2023-11-10 RX ADMIN — ONDANSETRON 4 MG: 2 INJECTION INTRAMUSCULAR; INTRAVENOUS at 13:17

## 2023-11-10 RX ADMIN — FENTANYL CITRATE 50 MCG: 50 INJECTION INTRAMUSCULAR; INTRAVENOUS at 14:42

## 2023-11-10 RX ADMIN — LIDOCAINE HYDROCHLORIDE 50 MG: 10 INJECTION, SOLUTION EPIDURAL; INFILTRATION; INTRACAUDAL; PERINEURAL at 12:35

## 2023-11-10 RX ADMIN — CIPROFLOXACIN: 2 INJECTION INTRAVENOUS at 12:29

## 2023-11-10 RX ADMIN — MIDAZOLAM HYDROCHLORIDE 2 MG: 1 INJECTION, SOLUTION INTRAMUSCULAR; INTRAVENOUS at 12:32

## 2023-11-10 RX ADMIN — METRONIDAZOLE: 500 INJECTION, SOLUTION INTRAVENOUS at 12:40

## 2023-11-10 RX ADMIN — ROCURONIUM BROMIDE 40 MG: 10 INJECTION, SOLUTION INTRAVENOUS at 12:35

## 2023-11-10 NOTE — UTILIZATION REVIEW
Initial Clinical Review    Admission: Date/Time/Statement:   Admission Orders (From admission, onward)       Ordered        11/09/23 2152  Place in Observation  Once                          Orders Placed This Encounter   Procedures    Place in Observation     Standing Status:   Standing     Number of Occurrences:   1     Order Specific Question:   Level of Care     Answer:   Med Surg [16]     ED Arrival Information       Expected   -    Arrival   11/9/2023 17:01    Acuity   Urgent              Means of arrival   Walk-In    Escorted by   Family Member    Service   Surgery-General    Admission type   Emergency              Arrival complaint   Post Op Pain             Chief Complaint   Patient presents with    Abdominal Pain     Pt. Presents to ER via EMS with c/o severe midline abdominal pain that started about 1.5 hours ago, accompanied by nausea. Pt. States "my pain was so bad I thought I was going to pass out so I left and called EMS."        Initial Presentation: 34 y.o. female to the ED from home with complaints of abdominal pain, nausea, s/p colonoscopy 11/8. H/O crohns disease. Admitted under observation for abdominal pain.      Date:     Day 2:      ED Triage Vitals [11/09/23 1743]   Temperature Pulse Respirations Blood Pressure SpO2   97.9 °F (36.6 °C) 77 18 126/73 100 %      Temp Source Heart Rate Source Patient Position - Orthostatic VS BP Location FiO2 (%)   Oral Monitor Lying Right arm --      Pain Score       7          Wt Readings from Last 1 Encounters:   11/09/23 53.1 kg (117 lb)     Additional Vital Signs:   /Time Temp Pulse Resp BP MAP (mmHg) SpO2 O2 Device Patient Position - Orthostatic VS   11/10/23 0530 -- 85 15 107/64 79 100 % None (Room air) Lying   11/10/23 0500 -- 80 20 138/65 94 99 % None (Room air) Lying   11/10/23 0300 -- 87 15 112/73 88 98 % None (Room air) Lying   11/10/23 0130 -- 76 18 136/61 88 97 % None (Room air) Sitting   11/10/23 0100 -- 67 17 112/70 86 99 % None (Room air) Sitting   11/10/23 0030 -- 73 20 118/70 87 99 % None (Room air) Sitting   11/09/23 2330 -- 76 34 Abnormal  130/68 89 99 % -- --   11/09/23 2315 -- 57 23 Abnormal  101/57 79 97 % -- --   11/09/23 2300 -- 63 17 97/58 73 98 % -- --   11/09/23 2245 -- 68 19 97/61 73 98 % -- --   11/09/23 2230 -- 68 24 Abnormal  113/71 86 100 % -- --   11/09/23 2215 -- 71 18 125/60 87 99 % None (Room air) Sitting   11/09/23 2200 -- 72 12 120/69 87 100 % None (Room air) Sitting   11/09/23 2045 -- 67 15 105/66 82 99 % None (Room air) Lying   11/09/23 2000 -- 89 18 113/63 83 100 % None (Room air) Lying   11/09/23 1945 -- 88 17 111/63 75 100 % None (Room air) Lying   11/09/23 1930 -- 87 15 112/60 81 100 % None (Room air) Lying   11/09/23 1915 -- 92 15 116/66 85 99 % None (Room air) Lying   11/09/23 1900 -- 87 18 130/63 84 100 % None (Room air) Sitting   11/09/23 1859 -- 96 15 135/84 103 100 % -- --   11/09/23 1844 -- 66 -- 55/40 Abnormal  45 Abnormal  98 % -- --   11/09/23 1829 -- 61 -- 106/73 85 100 % -- --   11/09/23 1814 -- 73 -- 119/60 84 99 % -- --   11/09/23 1759 -- 59 -- 118/72 89 98 % -- --   11/09/23 1743 97.9 °F (36.6 °C) 77 18 126/73 92 100 % None (Room air) Lying     Pertinent Labs/Diagnostic Test Results:   CT abdomen pelvis with contrast   Final Result by Lynda Peters DO (11/09 2124)   Addendum (preliminary) 1 of 1 by Lynda Peters DO (11/09 2124)   ADDENDUM:   I personally discussed this study with Priscila Ledezma on 11/9/2023 9:24 PM.            Final   Mildly enlarged appendix measuring up to 10 mm diameter. Mild local inflammation. Findings raise concern for mild/early appendicitis. Tiny local mesenteric nodes observed measuring less than 5 mm short axis. No appendicolith. No jessica perforation or    abscess. *Surgical consultation advised. Asymmetric fibroglandular tissue left breast greater than right likely due to positioning in the gantry. Clinical correlation advised.       Attempting to contact referring physician.       Workstation performed: [id number]               Results from last 7 days   Lab Units 11/10/23  0519 11/09/23  1812 11/03/23  1214   WBC Thousand/uL 7.29 6.79 5.33   HEMOGLOBIN g/dL 12.3 12.5 14.3   HEMATOCRIT % 36.2 36.2 42.1   PLATELETS Thousands/uL 264 258 338   NEUTROS ABS Thousands/µL 4.78 3.20 2.04         Results from last 7 days   Lab Units 11/09/23  1812 11/03/23  1214   SODIUM mmol/L 140 138   POTASSIUM mmol/L 3.4* 3.8   CHLORIDE mmol/L 107 105   CO2 mmol/L 22 26   ANION GAP mmol/L 11 7   BUN mg/dL 10 12   CREATININE mg/dL 0.59* 0.66   EGFR ml/min/1.73sq m 126 122   CALCIUM mg/dL 9.2 9.8     Results from last 7 days   Lab Units 11/09/23  1812 11/03/23  1214   AST U/L 17 16   ALT U/L 10 8   ALK PHOS U/L 40 47   TOTAL PROTEIN g/dL 7.2 8.4   ALBUMIN g/dL 4.6 5.0   TOTAL BILIRUBIN mg/dL 0.59 0.66         Results from last 7 days   Lab Units 11/09/23  1812   GLUCOSE RANDOM mg/dL 106         Results from last 7 days   Lab Units 11/03/23  1214   HEP B S AG  Non-reactive   HEP C AB  Non-reactive   HEP B C IGM  Non-reactive   HEP B C TOTAL AB  Non-reactive     Results from last 7 days   Lab Units 11/09/23  1812   LIPASE u/L 13     Results from last 7 days   Lab Units 11/03/23  1214   SED RATE mm/hour 2             Results from last 7 days   Lab Units 11/09/23  1812   CLARITY UA  Clear   COLOR UA  Colorless   SPEC GRAV UA  1.008   PH UA  6.5   GLUCOSE UA mg/dl Negative   KETONES UA mg/dl 20 (1+)*   BLOOD UA  Negative   PROTEIN UA mg/dl Negative   NITRITE UA  Negative   BILIRUBIN UA  Negative   UROBILINOGEN UA (BE) mg/dl <2.0   LEUKOCYTES UA  Negative         ED Treatment:   Medication Administration from 11/09/2023 1701 to 11/10/2023 7636         Date/Time Order Dose Route Action Comments     11/09/2023 1809 EST morphine injection 4 mg 4 mg Intravenous Given --     11/09/2023 1809 EST sodium chloride 0.9 % bolus 1,000 mL 1,000 mL Intravenous New Bag --     11/09/2023 1858 EST ondansetron (Idalmis Almonte) injection 4 mg 4 mg Intravenous Given --     11/09/2023 1925 EST sodium chloride 0.9 % bolus 1,000 mL 1,000 mL Intravenous New Bag --     11/09/2023 2337 EST lactated ringers infusion 125 mL/hr Intravenous New Bag --     11/09/2023 2333 EST ciprofloxacin (CIPRO) IVPB (premix in 5% dextrose) 400 mg 200 mL 400 mg Intravenous New Bag --     11/10/2023 0520 EST metroNIDAZOLE (FLAGYL) IVPB (premix) 500 mg 100 mL 500 mg Intravenous New Bag --     11/09/2023 2216 EST metroNIDAZOLE (FLAGYL) IVPB (premix) 500 mg 100 mL 500 mg Intravenous New Bag --     11/09/2023 2215 EST LORazepam (ATIVAN) tablet 0.5 mg 0.5 mg Oral Given --          Past Medical History:   Diagnosis Date    Anxiety     COVID 08/08/2022    Crohn disease ([address] )     Depression     Inflammatory bowel disease 12/2015    Seizures ([address] [facility])     as a child    Suicide attempt [facility])          Admitting Diagnosis: Post-operative pain [G89.18]  Age/Sex: 34 y.o. female  Admission Orders:  Scheduled Medications:  escitalopram, 10 mg, Oral, Daily  fentanyl citrate (PF), 50 mcg, Intravenous, Once  heparin (porcine), 5,000 Units, Subcutaneous, Q8H [address]  metroNIDAZOLE, 500 mg, Intravenous, Q8H      Continuous IV Infusions:  lactated ringers, 125 mL/hr, Intravenous, Continuous      PRN Meds:  morphine injection, 2 mg, Intravenous, Q2H PRN  ondansetron, 4 mg, Intravenous, Q6H PRN        None    Network Utilization Review Department  ATTENTION: Please call with any questions or concerns to [phone] and carefully listen to the prompts so that you are directed to the right person. All voicemails are confidential.   For Discharge needs, contact Care Management DC Support Team at [phone] opt. 2  Send all requests for admission clinical reviews, approved or denied determinations and any other requests to dedicated fax number below belonging to the campus where the patient is receiving treatment.  List of dedicated fax numbers for the Facilities:  [address] [facility] ADMISSION DENIALS (Administrative/Medical Necessity) [phone]   DISCHARGE SUPPORT TEAM (NETWORK) [address] [facility] (Maternity/NICU/Pediatrics) [address] 1000 [facility] [phone] 1505 [facility] 207 [facility] 5220 [facility] [address] [address] [facility] [address] [place] [address] [facility] [phone]

## 2023-11-10 NOTE — ED NOTES
Patient transported to [address] [place] [address], RN  11/09/23 [address] [place] [address]

## 2023-11-10 NOTE — DISCHARGE INSTR - AVS FIRST PAGE
Follow up: Following discharge from the hospital call the office in 1-2 days to set up a post operative appointment to be seen in 2 weeks. [phone]  Call the office if you have increased pain not relieved with pain medicine. Call the office if you have a fever,redness, the wound opens up, you have pus draining from your incision. AFTER YOU LEAVE: Following discharge from the hospital, you may have some questions about your procedure, your activities or your general condition. These instructions may answer some of your questions and help you adjust during the first few days following your operation. You can expect to be sore and tender mostly around the incisions. This pain should last approximally 5 days and gradually improve daily. Incisions: You may apply ice to the incisions to help with pain. It is normal to have some bruising, swelling or mild discoloration around the incision. If increasing redness or pain develops, call our office immediately. Do not apply any creams, lotions, or ointments. If you have dressings: You may remove the gauze dressing from your incisions 48 hours after surgery. Underneath this dressing is a tape like dressing called Steri Strips. Leave the steri strips in place for 5-7 days. They may fall off on their own. This is okay. Bathing: You may shower daily with soap and water the day after the procedure. It is OK to GENTLY wash the incision with soap and water then pat dry. DO NOT SCRUB. Do NOT soak incision in a tub, pool, or hot tub for 2 weeks. Diet:   Resume your normal diet unless specified otherwise. We recommend you slowly advance your diet. Try to start with softer bland foods and gradually advance as tolerated. Be sure to consume plenty of water. Avoid alcohol. Activity/Restrictions: The evening following the procedure you should rest as much as possible, sitting, lying or reclining.   you should be sure someone remains with you until the next morning. Gradually increase your activity daily. Walking 3-4 times daily is good and stairs are ok. Listen to your body. If you start to get tired or sore then rest.   No strenuous activity or exercise for 3-4 weeks. No heavy lifting, pushing or pulling. No driving for 5 days or while taking narcotics for pain. Return or work: You may return to work or other activities as soon as your pain is controlled and you feel comfortable. For many people, this is 5 to 7 days after surgery. If your job requires heavy lifting you will need to be on light duty for 2-3 weeks. Medication:   If you were given a prescription for Percocet, Norco, or Vicodin for pain be sure to eat prior to taking as these medications as they may cause nausea and vomiting on an empty stomach. If you do not want to take stronger medications for pain, you may take Tylenol, Aleve OR Ibuprofen  DO NOT take Tylenol  (acetaminophen) with these medication for a fever or for further pain control as these medications already contain Tylenol in them. Take one or the other. Do not exceed more than 4000 mg of acetaminophen in 24 hours or 3000 mg if you have liver disease. If you were given an antibiotic take it until it is finished. Constipation:   Patients often experience constipation after surgery. You may take a stool softener (Colace) to help prevent constipation. If you experience significant nausea or vomiting after abdominal surgery, call the office before trying any stronger medications or laxatives  Call the office if you haven't had a Bowel movement in 2-3days after surgery    Contact your healthcare provider if:   You have a fever over 101°F (38°C) or chills. You have pain or nausea that is not relieved by medicine. You have redness and swelling around your incisions, or blood or pus is leaking from your incisions. You are constipated or have diarrhea.     Your skin or eyes are yellow, or your bowel movements are pale. You have questions or concerns about your surgery, condition, or care. Seek care immediately or call 911 if:   You cannot stop vomiting. Your bowel movements are black or bloody. You have pain in your abdomen and it is swollen or hard. Your arm or leg feels warm, tender, and painful. It may look swollen and red. You feel lightheaded, short of breath, and have chest pain. You cough up blood.

## 2023-11-10 NOTE — ANESTHESIA PREPROCEDURE EVALUATION
Procedure:  APPENDECTOMY LAPAROSCOPIC (Abdomen)    Relevant Problems   No relevant active problems      Chron's disease  Depression  Anxiety  +marijuana use    Pt reports hypotension with morphine administration     Physical Exam    Airway    Mallampati score: I  TM Distance: >3 FB  Neck ROM: full     Dental   No notable dental hx     Cardiovascular      Pulmonary      Other Findings  Nasal piercings - pt unable to remove      Anesthesia Plan  ASA Score- 2     Anesthesia Type- general with ASA Monitors. Additional Monitors:     Airway Plan: ETT. Plan Factors-Exercise tolerance (METS): >4 METS. Chart reviewed. Patient summary reviewed. Induction- intravenous. Postoperative Plan- Plan for postoperative opioid use. Planned trial extubation    Informed Consent- Anesthetic plan and risks discussed with patient. I personally reviewed this patient with the CRNA. Discussed and agreed on the Anesthesia Plan with the CRNA. Sabina Morin

## 2023-11-10 NOTE — ED PROVIDER NOTES
History  Chief Complaint   Patient presents with    Abdominal Pain     Pt. Presents to ER via EMS with c/o severe midline abdominal pain that started about 1.5 hours ago, accompanied by nausea. Pt. States "my pain was so bad I thought I was going to pass out so I left and called EMS."      34 female presents emergency department for evaluation of abdominal pain. Patient had colonoscopy yesterday with GI with whom she follows for Crohn's disease. Since then had sharp lower midline abdominal pain earlier today, stated with anorexia, no nausea vomiting no fevers or chills. No diarrhea or constipation. No blood in the stool. Prior to Admission Medications   Prescriptions Last Dose Informant Patient Reported?  Taking?   escitalopram (Lexapro) 10 mg tablet 11/9/2023  No Yes   Sig: Take 1 tablet (10 mg total) by mouth daily   hydrOXYzine HCL (ATARAX) 25 mg tablet 11/9/2023  No Yes   Sig: Take 1 tablet (25 mg total) by mouth daily as needed for anxiety   ustekinumab (Stelara) 90 mg/mL subcutaneous injection Not Taking Self No No   Sig: INJECT 1 ML (90 MG TOTAL) UNDER THE SKIN EVERY 4 WEEKS   Patient not taking: Reported on 11/9/2023      Facility-Administered Medications: None       Past Medical History:   Diagnosis Date    Anxiety     COVID 08/08/2022    Crohn disease ([address])     Depression     Inflammatory bowel disease 12/2015    Seizures ([address])     as a child    Suicide attempt [facility])        Past Surgical History:   Procedure Laterality Date    COLONOSCOPY      VULVA BIOPSY Right 8/26/2020    Procedure: Excision of Right Labial Lesion;  Surgeon: Wilman Hall MD;  Location[facility] MAIN [facility];  Service: Gynecology    VULVA BIOPSY N/A 10/21/2022    Procedure: RESECTION OF RIGHT LABIAL CYST;  Surgeon: Wilman Hall MD;  Location[facility];  Service: Gynecology       Family History   Problem Relation Age of Onset    Vitiligo Paternal Grandfather     Stroke Paternal Grandfather     Lupus Paternal Aunt Completed Suicide  Maternal Grandmother     Autoimmune disease Paternal Aunt      I have reviewed and agree with the history as documented. E-Cigarette/Vaping    E-Cigarette Use Never User      E-Cigarette/Vaping Substances    Nicotine No     THC No     CBD No     Flavoring No     Other No     Unknown No      Social History     Tobacco Use    Smoking status: Never    Smokeless tobacco: Never   Vaping Use    Vaping Use: Never used   Substance Use Topics    Alcohol use: Not Currently     Comment: occasionally    Drug use: Yes     Frequency: 7.0 times per week     Types: Marijuana     Comment: Medical Marijuana       Review of Systems   Constitutional:  Positive for appetite change. Negative for chills, fatigue and fever. HENT:  Negative for sneezing and sore throat. Eyes:  Negative for visual disturbance. Respiratory:  Negative for cough, choking, chest tightness, shortness of breath and wheezing. Cardiovascular:  Negative for chest pain and palpitations. Gastrointestinal:  Positive for abdominal pain. Negative for constipation, diarrhea, nausea and vomiting. Genitourinary:  Negative for difficulty urinating and dysuria. Neurological:  Negative for dizziness, weakness, light-headedness, numbness and headaches. All other systems reviewed and are negative. Physical Exam  Physical Exam  Vitals and nursing note reviewed. Constitutional:       General: She is not in acute distress. Appearance: She is well-developed. She is not diaphoretic. HENT:      Head: Normocephalic and atraumatic. Eyes:      Pupils: Pupils are equal, round, and reactive to light. Neck:      Vascular: No JVD. Trachea: No tracheal deviation. Cardiovascular:      Rate and Rhythm: Normal rate and regular rhythm. Heart sounds: Normal heart sounds. No murmur heard. No friction rub. No gallop. Pulmonary:      Effort: Pulmonary effort is normal. No respiratory distress.       Breath sounds: Normal breath sounds. No wheezing or rales. Abdominal:      General: Bowel sounds are normal. There is no distension. Palpations: Abdomen is soft. Tenderness: There is abdominal tenderness in the right lower quadrant, suprapubic area and left lower quadrant. There is no guarding or rebound. Skin:     General: Skin is warm and dry. Coloration: Skin is not pale. Neurological:      Mental Status: She is alert and oriented to person, place, and time. Cranial Nerves: No cranial nerve deficit. Motor: No abnormal muscle tone.    Psychiatric:         Behavior: Behavior normal.         Vital Signs  ED Triage Vitals [11/09/23 1743]   Temperature Pulse Respirations Blood Pressure SpO2   97.9 °F (36.6 °C) 77 18 126/73 100 %      Temp Source Heart Rate Source Patient Position - Orthostatic VS BP Location FiO2 (%)   Oral Monitor Lying Right arm --      Pain Score       7           Vitals:    11/09/23 2300 11/09/23 2315 11/09/23 2330 11/10/23 0030   BP: 97/58 101/57 130/68 118/70   Pulse: 63 57 76 73   Patient Position - Orthostatic VS:    Sitting         Visual Acuity      ED Medications  Medications   lactated ringers infusion (125 mL/hr Intravenous New Bag 11/9/23 2337)   ondansetron (ZOFRAN) injection 4 mg (has no administration in time range)   heparin (porcine) subcutaneous injection 5,000 Units (5,000 Units Subcutaneous Not Given 11/9/23 2339)   morphine injection 2 mg (has no administration in time range)   metroNIDAZOLE (FLAGYL) IVPB (premix) 500 mg 100 mL (0 mg Intravenous Stopped 11/9/23 2246)   escitalopram (LEXAPRO) tablet 10 mg (has no administration in time range)   morphine injection 4 mg (4 mg Intravenous Given 11/9/23 1809)   sodium chloride 0.9 % bolus 1,000 mL (0 mL Intravenous Stopped 11/9/23 1923)   ondansetron (ZOFRAN) injection 4 mg (4 mg Intravenous Given 11/9/23 1858)   iohexol (OMNIPAQUE) 350 MG/ML injection (MULTI-DOSE) 100 mL (100 mL Intravenous Given 11/9/23 1911)   sodium chloride 0.9 % bolus 1,000 mL (0 mL Intravenous Stopped 11/9/23 2125)   ciprofloxacin (CIPRO) IVPB (premix in 5% dextrose) 400 mg 200 mL (0 mg Intravenous Stopped 11/10/23 0033)   LORazepam (ATIVAN) tablet 0.5 mg (0.5 mg Oral Given 11/9/23 2215)       Diagnostic Studies  Results Reviewed       Procedure Component Value Units Date/Time    Platelet count [[id number]]     Lab Status: No result Specimen: Blood     Comprehensive metabolic panel [[id number]]  (Abnormal) Collected: 11/09/23 1812    Lab Status: Final result Specimen: Blood from Arm, Right Updated: 11/09/23 1853     Sodium 140 mmol/L      Potassium 3.4 mmol/L      Chloride 107 mmol/L      CO2 22 mmol/L      ANION GAP 11 mmol/L      BUN 10 mg/dL      Creatinine 0.59 mg/dL      Glucose 106 mg/dL      Calcium 9.2 mg/dL      AST 17 U/L      ALT 10 U/L      Alkaline Phosphatase 40 U/L      Total Protein 7.2 g/dL      Albumin 4.6 g/dL      Total Bilirubin 0.59 mg/dL      eGFR 126 ml/min/1.73sq m     Narrative:      [place] guidelines for Chronic Kidney Disease (CKD):     Stage 1 with normal or high GFR (GFR > 90 mL/min/1.73 square meters)    Stage 2 Mild CKD (GFR = 60-89 mL/min/1.73 square meters)    Stage 3A Moderate CKD (GFR = 45-59 mL/min/1.73 square meters)    Stage 3B Moderate CKD (GFR = 30-44 mL/min/1.73 square meters)    Stage 4 Severe CKD (GFR = 15-29 mL/min/1.73 square meters)    Stage 5 End Stage CKD (GFR <15 mL/min/1.73 square meters)  Note: GFR calculation is accurate only with a steady state creatinine    Lipase [[id number]]  (Normal) Collected: 11/09/23 1812    Lab Status: Final result Specimen: Blood from Arm, Right Updated: 11/09/23 1853     Lipase 13 u/L     UA w Reflex to Microscopic w Reflex to Culture [[id number]]  (Abnormal) Collected: 11/09/23 1812    Lab Status: Final result Specimen: Urine, Clean Catch Updated: 11/09/23 1844     Color, UA Colorless     Clarity, UA Clear     Specific Gravity, UA 1.008     pH, UA 6.5 Leukocytes, UA Negative     Nitrite, UA Negative     Protein, UA Negative mg/dl      Glucose, UA Negative mg/dl      Ketones, UA 20 (1+) mg/dl      Urobilinogen, UA <2.0 mg/dl      Bilirubin, UA Negative     Occult Blood, UA Negative    CBC and differential [[id number]] Collected: 11/09/23 1812    Lab Status: Final result Specimen: Blood from Arm, Right Updated: 11/09/23 1819     WBC 6.79 Thousand/uL      RBC 4.18 Million/uL      Hemoglobin 12.5 g/dL      Hematocrit 36.2 %      MCV 87 fL      MCH 29.9 pg      MCHC 34.5 g/dL      RDW 12.5 %      MPV 9.9 fL      Platelets 003 Thousands/uL      nRBC 0 /100 WBCs      Neutrophils Relative 48 %      Immat GRANS % 0 %      Lymphocytes Relative 41 %      Monocytes Relative 5 %      Eosinophils Relative 5 %      Basophils Relative 1 %      Neutrophils Absolute 3.20 Thousands/µL      Immature Grans Absolute 0.02 Thousand/uL      Lymphocytes Absolute 2.81 Thousands/µL      Monocytes Absolute 0.37 Thousand/µL      Eosinophils Absolute 0.32 Thousand/µL      Basophils Absolute 0.07 Thousands/µL     POCT pregnancy, urine [[id number]]  (Normal) Resulted: 11/09/23 1815    Lab Status: Final result Updated: 11/09/23 1815     EXT Preg Test, Ur Negative     Control Valid                   CT abdomen pelvis with contrast   Final Result by Dexter Garay DO (11/09 2124)   Addendum (preliminary) 1 of 1 by Dexter Garay DO (11/09 2124)   ADDENDUM:   I personally discussed this study with BA FITZGERALD on 11/9/2023 9:24 PM.            Final   Mildly enlarged appendix measuring up to 10 mm diameter. Mild local inflammation. Findings raise concern for mild/early appendicitis. Tiny local mesenteric nodes observed measuring less than 5 mm short axis. No appendicolith. No jessica perforation or    abscess. *Surgical consultation advised. Asymmetric fibroglandular tissue left breast greater than right likely due to positioning in the gantry. Clinical correlation advised. Attempting to contact referring physician. Workstation performed: [id number]                    Procedures  Procedures         ED Course  ED Course as of 11/10/23 0129   u Nov 09, 2023   1859 Alerted to an episode of hypotension. Came to assess patient at bedside, she is symptomatic lightheaded nauseous, chilled, on recycle blood pressure was 827 systolic, patient was tachycardic to 120 but this resolved spontaneously as well. Second IV is placed, IV fluids infusing. Bedside abdominal ultrasound does not demonstrate any abdominal free fluid and normal cardiac activity. SBIRT 20yo+      Flowsheet Row Most Recent Value   Initial Alcohol Screen: US AUDIT-C     1. How often do you have a drink containing alcohol? 0 Filed at: 11/09/2023 1748   2. How many drinks containing alcohol do you have on a typical day you are drinking? 0 Filed at: 11/09/2023 1748   3b. FEMALE Any Age, or MALE 65+: How often do you have 4 or more drinks on one occassion? 0 Filed at: 11/09/2023 1748   Audit-C Score 0 Filed at: 11/09/2023 1748   TK: How many times in the past year have you. .. Used an illegal drug or used a prescription medication for non-medical reasons? Never Filed at: 11/09/2023 1748                      Medical Decision Making  65-year-old female with abdominal pain will check labs, CT, urine, U preg, reassess. Amount and/or Complexity of Data Reviewed  Labs: ordered. Radiology: ordered. Risk  Prescription drug management.              Disposition  Final diagnoses:   Appendicitis, unspecified appendicitis type     Time reflects when diagnosis was documented in both MDM as applicable and the Disposition within this note       Time User Action Codes Description Comment    11/9/2023  9:53 PM Nola Barksdale, [address] Appendicitis, unspecified appendicitis type           ED Disposition       None          Follow-up Information    None         Patient's Medications   Discharge Prescriptions    No medications on file       No discharge procedures on file.     PDMP Review         Value Time User    PDMP Reviewed  Yes 10/18/2019  1:59 AM Fouzia Jackson MD            ED Provider  Electronically Signed by             Christie Blandon MD  11/10/23 5639

## 2023-11-10 NOTE — ANESTHESIA POSTPROCEDURE EVALUATION
Post-Op Assessment Note    CV Status:  Stable  Pain Score: 0    Pain management: adequate     Mental Status:  Alert and awake   Hydration Status:  Stable   PONV Controlled:  None   Airway Patency:  Patent and adequate      Post Op Vitals Reviewed: Yes      Staff: CRNA, Anesthesiologist         No notable events documented.     BP   134/77   Temp   99.2   Pulse  67   Resp   18   SpO2   100%

## 2023-11-10 NOTE — ED NOTES
Pressure bag applied to remainder of IV fluids at this time. ED provider at bedside.       Meg Fisher RN  11/09/23 5339

## 2023-11-10 NOTE — H&P
GENERAL SURGERY HISTORY AND PHYSICAL    Lesli Polanco 34 y.o. female MRN: [id number]  Unit/Bed#: ED 09 Encounter: [id number]      Assessment/Plan   28y Female with CT scan concerning for acute appendicitis  Crohn's disease  - s/p colonoscopy 11/8/23, pseudopolyp noted in the cecum, biopsies obtained in the cecum, ascending, transverse, descending and sigmoid colon and rectum  - on Stelara  - afebrile, VSS, no leukocytosis, WBC 7.29 from 6.79 on admission  - CT AP 11/9/23: Appendix appears distended up to 10 mm diameter. Mild local inflammation. Findings raise concern for early appendicitis. No jessica perforation or abscess. Tiny local mesenteric nodes observed measuring less than 5 mm. No appendicolith. No jessica perforation or abscess. - presented with sharp lower midline abdominal pain starting yesterday afternoon with associated nausea  - received Cipro/Flagyl in the ED    Plan  Tentative plan for OR today for laparoscopic appendectomy, possible open. This was discussed with the patient who is agreeable with the plan. Informed consent will be obtained by the surgeon after further reviewing CT scan and patient for final determination of intervention  NPO/IVF for OR today  Hold antibiotics for now, antibiotics on-call to the OR for surgical prophylaxis  Trend labs, monitor WBC  Monitor vitals  Pain control PRN  Serial abdominal exams  IS post-operatively  Ambulation/OOB post-operatively  DVT prophylaxis   ______________________________________________________________________  Chief Complaint: abdominal pain    HPI: Lesli Polanco is a 34y.o. year old female with PMHx of Crohn's disease who presented to the emergency department last night with acute onset of mid abdominal pain which awakened her from a nap. She reports associated nausea which did resolve with medication in the emergency department.   The pain was similar to pain she had from an obstruction which was due to to her Crohn's disease in the past. Resolved with medical treatment. She denies any fevers or chills. She does feel slightly bloated. She has not had any further nausea. Her pain is not as bad as it was but she still feels some soreness in her lower abdomen. She did have a colonoscopy for assessment of her Crohn's disease on 11/8/2023. She does not have any issues after anesthesia or the procedure and was tolerating a diet with no abdominal pain that night. She was also feeling well yesterday morning. She is not currently taking anything for Crohn's disease, she is in between medications. She was previously on Stelara and her last dose was about a month ago. She is passing flatus and had a bowel movement this morning which was well formed without blood or mucus. She denies any diarrhea. She is urinating without difficulty. She denies any shortness of breath or chest pain. She has no history of abdominal surgeries. Review of Systems   Constitutional:  Positive for appetite change and chills. Negative for activity change and fever. HENT: Negative. Negative for congestion, postnasal drip and sore throat. Eyes: Negative. Respiratory: Negative. Negative for cough, shortness of breath and wheezing. Cardiovascular: Negative. Negative for chest pain, palpitations and leg swelling. Gastrointestinal:  Positive for abdominal pain. Negative for blood in stool, constipation, diarrhea, nausea and vomiting. Endocrine: Negative. Genitourinary: Negative. Negative for difficulty urinating, dysuria and frequency. Musculoskeletal: Negative. Skin: Negative. Negative for color change. Allergic/Immunologic: Negative. Neurological: Negative. Negative for dizziness, light-headedness and headaches. Hematological: Negative. Psychiatric/Behavioral: Negative. All other systems reviewed and are negative.     Meds/Allergies   Allergies   Allergen Reactions    Amoxicillin Anaphylaxis     all current active meds have been reviewed    Historical Information   Past Medical History:   Diagnosis Date    Anxiety     COVID 08/08/2022    Crohn disease ([address])     Depression     Inflammatory bowel disease 12/2015    Seizures ([address])     as a child    Suicide attempt [facility])      Past Surgical History:   Procedure Laterality Date    COLONOSCOPY      VULVA BIOPSY Right 8/26/2020    Procedure: Excision of Right Labial Lesion;  Surgeon: Sourav Burgos MD;  [facility];  Service: Gynecology    VULVA BIOPSY N/A 10/21/2022    Procedure: RESECTION OF RIGHT LABIAL CYST;  Surgeon: Sourav Burgos MD;  [facility];  Service: Gynecology     Social History   Social History     Substance and Sexual Activity   Alcohol Use Not Currently    Comment: occasionally     Social History     Substance and Sexual Activity   Drug Use Yes    Frequency: 7.0 times per week    Types: Marijuana    Comment: Medical Marijuana     Social History     Tobacco Use   Smoking Status Never   Smokeless Tobacco Never       Family History:  Negative/unremarkable except as detailed in HPI. Objective   Vitals: /64 (BP Location: Right arm)   Pulse 85   Temp 97.9 °F (36.6 °C) (Oral)   Resp 15   Ht 5' 3" (1.6 m)   Wt 53.1 kg (117 lb)   LMP 11/01/2023   SpO2 100%   BMI 20.73 kg/m² ,Body mass index is 20.73 kg/m². Intake/Output Summary (Last 24 hours) at 11/10/2023 0911  Last data filed at 11/10/2023 0550  Gross per 24 hour   Intake 2600 ml   Output --   Net 2600 ml     Invasive Devices       Peripheral Intravenous Line  Duration             Peripheral IV 11/09/23 Left Antecubital <1 day    Peripheral IV 11/09/23 Right Antecubital <1 day                    Lab Results: I have personally reviewed pertinent reports.   , CBC with diff:   Lab Results   Component Value Date    WBC 7.29 11/10/2023    HGB 12.3 11/10/2023    HCT 36.2 11/10/2023    MCV 86 11/10/2023     11/10/2023    RBC 4.19 11/10/2023    MCH 29.4 11/10/2023    MCHC 34.0 11/10/2023    RDW 12.4 11/10/2023    MPV 9.6 11/10/2023    NRBC 0 11/10/2023   , BMP/CMP:   Lab Results   Component Value Date    SODIUM 140 11/09/2023    K 3.4 (L) 11/09/2023     11/09/2023    CO2 22 11/09/2023    BUN 10 11/09/2023    CREATININE 0.59 (L) 11/09/2023    CALCIUM 9.2 11/09/2023    AST 17 11/09/2023    ALT 10 11/09/2023    ALKPHOS 40 11/09/2023    EGFR 126 11/09/2023   , Urinalysis:   Lab Results   Component Value Date    COLORU Colorless 11/09/2023    CLARITYU Clear 11/09/2023    SPECGRAV 1.008 11/09/2023    PHUR 6.5 11/09/2023    LEUKOCYTESUR Negative 11/09/2023    NITRITE Negative 11/09/2023    GLUCOSEU Negative 11/09/2023    KETONESU 20 (1+) (A) 11/09/2023    BILIRUBINUR Negative 11/09/2023    BLOODU Negative 11/09/2023       Physical Exam  Vitals: /64 (BP Location: Right arm)   Pulse 85   Temp 97.9 °F (36.6 °C) (Oral)   Resp 15   Ht 5' 3" (1.6 m)   Wt 53.1 kg (117 lb)   LMP 11/01/2023   SpO2 100%   BMI 20.73 kg/m² ,Body mass index is 20.73 kg/m². General appearance: AAO x3, no distress, she appears stated age, cooperative  HEENT: PERRL, sclera clear, anicterus, oral mucosa is pink  Neck: No carotid bruits, trachea is midline    Back: no tenderness,deformity  Lungs:clear throughout, no wheezes  Heart[de-identified] RRR, S1, S2 normal  Abdomen: +BS, mild distention, nontympanic, tenderness in the right lower quadrant, no rebound or referred tenderness  Extremities: Well perfused, pulses palpable, no tenderness, no edema  Skin: Warm, dry, no rashes, no jaundice  Neurologic: CN II-XII grossly intact, no tremor, affect appropriate    Imaging Studies: CT abdomen pelvis with contrast  Addendum Date: 11/9/2023    ADDENDUM: I personally discussed this study with Darylene Drape on 11/9/2023 9:24 PM.   Result Date: 11/9/2023  Impression: Mildly enlarged appendix measuring up to 10 mm diameter. Mild local inflammation. Findings raise concern for mild/early appendicitis.  Tiny local mesenteric nodes observed measuring less than 5 mm short axis. No appendicolith. No jessica perforation or abscess. *Surgical consultation advised. Asymmetric fibroglandular tissue left breast greater than right likely due to positioning in the gantry. Clinical correlation advised. Attempting to contact referring physician. Workstation performed: [id number]     EKG, Pathology, and Other Studies: I have personally reviewed pertinent reports.     VTE Prophylaxis: Sequential compression device (Venodyne)  and Heparin     Code Status: Level 1 - Full Code    Chela Schneider PA-C  11/10/2023

## 2023-11-12 ENCOUNTER — NURSE TRIAGE (OUTPATIENT)
Dept: OTHER | Facility: OTHER | Age: 28
End: 2023-11-12

## 2023-11-12 NOTE — TELEPHONE ENCOUNTER
Reason for Disposition  • [1] Caller has URGENT question AND [2] triager unable to answer question    Protocols used: Post-Op Incision Symptoms and Questions-ADULT-

## 2023-11-12 NOTE — TELEPHONE ENCOUNTER
Regarding: Post Appendectomy Pain, Lower Back pain, Swollen Stomach, Constipation  ----- Message from [facility] sent at 11/12/2023  1:06 PM EST -----  " I had my Appendices removed 2 days ago, I am having a lot of Pain in my Incisions and today I woke up with Lower Back Pain.  I haven't had a Bowel Movement in 2 to 3 days, I feel like my Stomach is swollen more then usual."

## 2023-11-12 NOTE — TELEPHONE ENCOUNTER
Answer Assessment - Initial Assessment Questions  1. SYMPTOM: "What's the main symptom you're concerned about?" (e.g., redness, pain, drainage)      Pain in entire abdomen. Abdomen appears swollen. 2. ONSET: "When did   start?"      Since yesterday but worse today. 3. SURGERY: "What surgery was performed?"      Lap appendectomy    4. DATE of SURGERY: "When was surgery performed?"       11/10/2023    5. INCISION SITE: "Where is the incision located?"         6. REDNESS: "Is there any redness at the incision site?" If yes, ask: "How wide across is the redness?" (Inches, centimeters)         7. PAIN: "Is there any pain?" If Yes, ask: "How bad is it?"  (Scale 1-10; or mild, moderate, severe)      6/10- Tylenol #3 last dose 9am. States it is not helping. 8. BLEEDING: "Is there any bleeding?" If Yes, ask: "How much?" and "Where?"      No bleeding. 9. DRAINAGE: "Is there any drainage from the incision site?" If yes, ask: "What color and how much?" (e.g., red, cloudy, pus; drops, teaspoon)      One area appears to have some drainage- is covered w/ gauze. 10. FEVER: "Do you have a fever?" If Yes, ask: "What is your temperature, how was it measured, and when did it start?"        Feels really hot but unable to check temp. 11. OTHER SYMPTOMS: "Do you have any other symptoms?" (e.g., shaking chills, weakness, rash elsewhere on body)        Last BM was 3 days ago- states she doesn't feel like she has to go. Protocols used: Post-Op Incision Symptoms and Questions-ADULT-AH        Per Dr. Blane Larose- pt can remove gauze at this time. Current symptoms are expected. Can try taking colace & Senna BID- even though pt does not have the urge to have BM not having BM can be contributing to pain and bloating. Also recommends trying to walk at least three times per day. Recommendation discussed with pt who verbalized understanding.

## 2023-11-13 PROCEDURE — 88305 TISSUE EXAM BY PATHOLOGIST: CPT | Performed by: PATHOLOGY

## 2023-11-13 NOTE — TELEPHONE ENCOUNTER
Left message to patient on calling back office for post op appointment to make and to see what her symptoms are. [address] [facility] phone number to call back [phone].

## 2023-11-14 PROCEDURE — 88304 TISSUE EXAM BY PATHOLOGIST: CPT | Performed by: STUDENT IN AN ORGANIZED HEALTH CARE EDUCATION/TRAINING PROGRAM

## 2023-11-15 LAB
ATRIAL RATE: 99 BPM
P AXIS: 84 DEGREES
PR INTERVAL: 194 MS
QRS AXIS: 91 DEGREES
QRSD INTERVAL: 80 MS
QT INTERVAL: 364 MS
QTC INTERVAL: 467 MS
T WAVE AXIS: 0 DEGREES
VENTRICULAR RATE: 99 BPM

## 2023-11-15 PROCEDURE — 93010 ELECTROCARDIOGRAM REPORT: CPT | Performed by: INTERNAL MEDICINE

## 2023-11-15 NOTE — OP NOTE
OPERATIVE REPORT  PATIENT NAME: Jovani Thompson    :  1995  MRN: [id number]  Pt Location: [facility] OR ROOM 03    SURGERY DATE: 11/10/2023    Surgeon(s) and Role:     * Nelly Wallis MD - Primary     * Yesenia Grijalva PA-C - Assisting    Preop Diagnosis:  Appendicitis, unspecified appendicitis type [K37]  Crohns disease     Post-Op Diagnosis Codes:     * Appendicitis, unspecified appendicitis type [K37]  Crohns disease     Procedure(s):  APPENDECTOMY LAPAROSCOPIC    Specimen(s):  ID Type Source Tests Collected by Time Destination   1 : Appendix Tissue Appendix TISSUE EXAM Nelly Wallis MD 11/10/2023 1316        Estimated Blood Loss:   Minimal    Drains:  none    Anesthesia Type:   General    Operative Indications:  Appendicitis, unspecified appendicitis type [K37]    Operative Findings:  Mild thickening of the cecum and distal ileum as well as the mesentery  Retrocecal appendix is mildly distended but without hyperemia or other jessica signs of appendicitis    Complications:   None    Procedure and Technique:  The patient was seen again in the Holding Room. The risks, benefits, complications, treatment options, and expected outcomes were discussed with the patient and family. There was concurrence with the proposed plan and informed consent was obtained. The site of surgery was properly noted/marked. The patient was taken to Operating Room, identified and the procedure verified as Appendectomy. A Time Out was held and the above information confirmed. The patient was placed in the supine position and general anesthesia was induced, along with placement of orogastric tube, Venodyne boots. r. The abdomen was prepped and draped in a sterile fashion. A one centimeter infraumbilical incision was made and the umbilical stalk was grasped and the fascia incised. A 0 vicryl UR6 was used to place the [employer] port. The pneumoperitoneum was then established to steady pressure of 12 mmHg.   Additional 5 mm cannulas then placed in the left lower quadrant of the abdomen and half way between the umbilicus and pubic symphysis under direct vision. The patient was placed in Trendelenburg and left lateral decubitus position. The small intestines were retracted in the cephalad and left lateral direction away from the pelvis and right lower quadrant. See above findings. The appendix was carefully dissected by freeing it along the line of toldt as it was retrocecal. The length of the appendix was fully mobilized. . A window was made in the mesoappendix at the base of the appendix. A vascular load endo-EDIE was fired across the mesoappendix. The appendix was divided at its base using an endo-EDIE stapler. No appendiceal stump was left in place. There was no evidence of bleeding, leakage, or complication after division of the appendix. Irrigation was also performed and irrigate suctioned from the abdomen as well. The umbilical port site was closed using an additional 0 vicryl suture in a figure eight fashion at the level of the fascia. The trocar site skin wounds were closed using 4-0 monocryl and covered with steris, guaze and tegaderm. Instrument, sponge, and needle counts were correct at the conclusion of the case. I was present for the entire procedure., A qualified resident physician was not available. , and A physician assistant was required during the procedure for retraction, tissue handling, dissection and suturing.     Patient Disposition:  PACU  and extubated and stable    This procedure was not performed to treat colon cancer through resection      SIGNATURE: El Gracia MD  DATE: November 14, 2023  TIME: 11:18 PM W Plasty Text: The lesion was extirpated to the level of the fat with a #15 scalpel blade.  Given the location of the defect, shape of the defect and the proximity to free margins a W-plasty was deemed most appropriate for repair.  Using a sterile surgical marker, the appropriate transposition arms of the W-plasty were drawn incorporating the defect and placing the expected incisions within the relaxed skin tension lines where possible.    The area thus outlined was incised deep to adipose tissue with a #15 scalpel blade.  The skin margins were undermined to an appropriate distance in all directions utilizing iris scissors.  The opposing transposition arms were then transposed into place in opposite direction and anchored with interrupted buried subcutaneous sutures.

## 2023-11-17 ENCOUNTER — TELEPHONE (OUTPATIENT)
Dept: PSYCHIATRY | Facility: CLINIC | Age: 28
End: 2023-11-17

## 2023-11-28 NOTE — PLAN OF CARE
Problem: PAIN - ADULT  Goal: Verbalizes/displays adequate comfort level or baseline comfort level  Description: Interventions:  - Encourage patient to monitor pain and request assistance  - Assess pain using appropriate pain scale  - Administer analgesics based on type and severity of pain and evaluate response  - Implement non-pharmacological measures as appropriate and evaluate response  - Consider cultural and social influences on pain and pain management  - Notify physician/advanced practitioner if interventions unsuccessful or patient reports new pain  Outcome: Progressing     Problem: INFECTION - ADULT  Goal: Absence or prevention of progression during hospitalization  Description: INTERVENTIONS:  - Assess and monitor for signs and symptoms of infection  - Monitor lab/diagnostic results  - Monitor all insertion sites, i.e. indwelling lines, tubes, and drains  - Monitor endotracheal if appropriate and nasal secretions for changes in amount and color  - [place] appropriate cooling/warming therapies per order  - Administer medications as ordered  - Instruct and encourage patient and family to use good hand hygiene technique  - Identify and instruct in appropriate isolation precautions for identified infection/condition  Outcome: Progressing     Problem: SAFETY ADULT  Goal: Patient will remain free of falls  Description: INTERVENTIONS:  - Educate patient/family on patient safety including physical limitations  - Instruct patient to call for assistance with activity   - Consult OT/PT to assist with strengthening/mobility   - Keep Call bell within reach  - Keep bed low and locked with side rails adjusted as appropriate  - Keep care items and personal belongings within reach  - Initiate and maintain comfort rounds    Outcome: Progressing  Goal: Maintain or return to baseline ADL function  Description: INTERVENTIONS:  -  Assess patient's ability to carry out ADLs; assess patient's baseline for ADL function and identify physical deficits which impact ability to perform ADLs (bathing, care of mouth/teeth, toileting, grooming, dressing, etc.)  - Assess/evaluate cause of self-care deficits   - Assess range of motion  - Assess patient's mobility; develop plan if impaired  - Assess patient's need for assistive devices and provide as appropriate  - Encourage maximum independence but intervene and supervise when necessary  - Involve family in performance of ADLs  - Assess for home care needs following discharge   - Consider OT consult to assist with ADL evaluation and planning for discharge  - Provide patient education as appropriate  Outcome: Progressing  Goal: Maintains/Returns to pre admission functional level  Description: INTERVENTIONS:  - Perform BMAT or MOVE assessment daily.   - Set and communicate daily mobility goal to care team and patient/family/caregiver.    - Collaborate with rehabilitation services on mobility goals if consulted  - Ambulate patient 3 times a day  - Out of bed to chair 3 times a day   - Out of bed for meals 3 times a day  - Out of bed for toileting  - Record patient progress and toleration of activity level   Outcome: Progressing     Problem: DISCHARGE PLANNING  Goal: Discharge to home or other facility with appropriate resources  Description: INTERVENTIONS:  - Identify barriers to discharge w/patient and caregiver  - Arrange for needed discharge resources and transportation as appropriate  - Identify discharge learning needs (meds, wound care, etc.)  - Arrange for interpretive services to assist at discharge as needed  - Refer to Case Management Department for coordinating discharge planning if the patient needs post-hospital services based on physician/advanced practitioner order or complex needs related to functional status, cognitive ability, or social support system  Outcome: Progressing     Problem: Knowledge Deficit  Goal: Patient/family/caregiver demonstrates understanding of disease process, treatment plan, medications, and discharge instructions  Description: Complete learning assessment and assess knowledge base. Interventions:  - Provide teaching at level of understanding  - Provide teaching via preferred learning methods  Outcome: Progressing     Problem: Nutrition/Hydration-ADULT  Goal: Nutrient/Hydration intake appropriate for improving, restoring or maintaining nutritional needs  Description: Monitor and assess patient's nutrition/hydration status for malnutrition. Collaborate with interdisciplinary team and initiate plan and interventions as ordered. Monitor patient's weight and dietary intake as ordered or per policy. Utilize nutrition screening tool and intervene as necessary. Determine patient's food preferences and provide high-protein, high-caloric foods as appropriate.      INTERVENTIONS:  - Monitor oral intake, urinary output, labs, and treatment plans  - Assess nutrition and hydration status and recommend course of action  - Evaluate amount of meals eaten  - Assist patient with eating if necessary   - Allow adequate time for meals  - Recommend/ encourage appropriate diets, oral nutritional supplements, and vitamin/mineral supplements  - Order, calculate, and assess calorie counts as needed  - Recommend, monitor, and adjust tube feedings and TPN/PPN based on assessed needs  - Assess need for intravenous fluids  - Provide specific nutrition/hydration education as appropriate  - Include patient/family/caregiver in decisions related to nutrition  Outcome: Progressing no

## 2023-11-29 ENCOUNTER — TELEPHONE (OUTPATIENT)
Dept: SURGERY | Facility: CLINIC | Age: 28
End: 2023-11-29

## 2023-11-29 NOTE — TELEPHONE ENCOUNTER
Left message to patient on making post op appointment. Gave phone number to office to call back [phone].

## 2024-02-13 DIAGNOSIS — F41.9 ANXIETY: ICD-10-CM

## 2024-02-14 ENCOUNTER — TELEPHONE (OUTPATIENT)
Dept: INTERNAL MEDICINE CLINIC | Facility: CLINIC | Age: 29
End: 2024-02-14

## 2024-02-14 DIAGNOSIS — F41.9 ANXIETY: ICD-10-CM

## 2024-02-14 RX ORDER — HYDROXYZINE HYDROCHLORIDE 25 MG/1
25 TABLET, FILM COATED ORAL DAILY PRN
Qty: 14 TABLET | Refills: 0 | Status: SHIPPED | OUTPATIENT
Start: 2024-02-14

## 2024-02-14 RX ORDER — HYDROXYZINE HYDROCHLORIDE 25 MG/1
TABLET, FILM COATED ORAL
Qty: 30 TABLET | Refills: 0 | OUTPATIENT
Start: 2024-02-14

## 2024-02-14 RX ORDER — ESCITALOPRAM OXALATE 10 MG/1
10 TABLET ORAL DAILY
Qty: 30 TABLET | Refills: 0 | Status: SHIPPED | OUTPATIENT
Start: 2024-02-14

## 2024-02-14 NOTE — TELEPHONE ENCOUNTER
Patient is requesting a refill on escitalopram 10 mg tablet (10 mg total) by mouth daily; hydroxyzine HCL 25 mg tablet (25 mg total) by mouth daily as needed for anxiety.     [facility]    Call back #[phone]

## 2024-02-15 ENCOUNTER — TELEPHONE (OUTPATIENT)
Age: 29
End: 2024-02-15

## 2024-02-15 RX ORDER — ESCITALOPRAM OXALATE 10 MG/1
10 TABLET ORAL DAILY
Qty: 30 TABLET | Refills: 0 | OUTPATIENT
Start: 2024-02-15

## 2024-02-15 NOTE — TELEPHONE ENCOUNTER
Patients GI provider: Rc    Number to return call: [phone]    Reason for call: Pt calling to make an appt. For a new medication start. The next avail was 5/3/24. Pt said it would need to be before that. Please call the pt is  a sooner appt is needed    Scheduled procedure/appointment date if applicable: Apt/5/3/24

## 2024-02-20 ENCOUNTER — TELEPHONE (OUTPATIENT)
Age: 29
End: 2024-02-20

## 2024-02-20 NOTE — TELEPHONE ENCOUNTER
Called patient, reached voicemail, left message to call back to discuss questions in more detail.

## 2024-02-20 NOTE — TELEPHONE ENCOUNTER
I spoke with the patient. Pt would like to start Remicade as discussed with Haritha Tatum PA-C in October, pt reports delay in starting due to laparoscopic appendectomy in November . Pt agreeable to wait for Haritha to return on 2/26 week to receive verification ok for patient to start infusions and our team would start authorization/scheduling process.

## 2024-02-20 NOTE — TELEPHONE ENCOUNTER
Patients GI provider:  Dr. NOE    Number to return call: ([phone]     Reason for call: Pt calling requesting update on Remicade. She is interested in starting infusion. Please return patients call to further discuss.    Scheduled procedure/appointment date if applicable: 05/03/2024
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
## 2024-02-21 PROBLEM — N30.00 ACUTE CYSTITIS WITHOUT HEMATURIA: Status: RESOLVED | Noted: 2019-10-18 | Resolved: 2024-02-21

## 2024-02-27 ENCOUNTER — HOSPITAL ENCOUNTER (EMERGENCY)
Facility: HOSPITAL | Age: 29
Discharge: HOME | End: 2024-02-27
Attending: EMERGENCY MEDICINE
Payer: COMMERCIAL

## 2024-02-27 ENCOUNTER — APPOINTMENT (OUTPATIENT)
Dept: CT IMAGING | Facility: HOSPITAL | Age: 29
End: 2024-02-27
Payer: COMMERCIAL

## 2024-02-27 VITALS
TEMPERATURE: 98 F | OXYGEN SATURATION: 98 % | HEART RATE: 90 BPM | SYSTOLIC BLOOD PRESSURE: 122 MMHG | DIASTOLIC BLOOD PRESSURE: 73 MMHG | RESPIRATION RATE: 19 BRPM

## 2024-02-27 DIAGNOSIS — S09.90XA INJURY OF HEAD, INITIAL ENCOUNTER: Primary | ICD-10-CM

## 2024-02-27 PROCEDURE — 99284 EMERGENCY DEPT VISIT MOD MDM: CPT | Performed by: EMERGENCY MEDICINE

## 2024-02-27 PROCEDURE — 70450 CT HEAD/BRAIN W/O DYE: CPT

## 2024-02-27 PROCEDURE — 99284 EMERGENCY DEPT VISIT MOD MDM: CPT

## 2024-02-27 NOTE — Clinical Note
Arminda Gutierrez was seen and treated in our emergency department on 2/27/2024.                Diagnosis: CONCUSSION / HEAD INJURY    Arminda  may return to work on return date.    She may return on this date: 02/29/2024         If you have any questions or concerns, please don't hesitate to call.      Will Roa MD    ______________________________           _______________          _______________  Hospital Representative                              Date                                Time

## 2024-02-28 NOTE — DISCHARGE INSTRUCTIONS
A  personal message from Dr. Will Roa,  Thank you so much for allowing me to care for you today.    I pride myself in the care and attention I give all my patients.  I hope you were a witness to this tonight.   If for any reason your condition does not improve or worsens, or you have a question that was not answered during your visit you can feel free to text me on my personal phone #  # [phone].   I will answer to your message and continue your care past your emergency room visit.     Please understand that although you are being discharged because your condition has been deemed stable and able to be managed on an outpatient setting. However your condition may worsen as part of the natural progression of the illness/condition, if this occurs please come back to the emergency department for a repeat evaluation.

## 2024-02-28 NOTE — ED PROVIDER NOTES
History  Chief Complaint   Patient presents with    Fall     Fell out of shower and hit right side of head on sink and left side of head on the floor and lost consciousness.       Arminda Gutierrez is a 28 y.o.  year old female  Past Medical History:  No date: Anxiety  08/08/2022: COVID  No date: Crohn disease (HCC)  No date: Depression  12/2015: Inflammatory bowel disease  No date: Seizures (HCC)      Comment:  as a child  No date: Suicide attempt ([facility])  Social History    Tobacco Use      Smoking status: Never      Smokeless tobacco: Never    Vaping Use      Vaping status: Never Used    Alcohol use: Not Currently      Comment: occasionally    Drug use: Yes      Frequency: 7.0 times per week      Types: Marijuana      Comment: Medical Marijuana    Patient presents with:  Fall: Fell out of shower and hit right side of head on sink and left side of head on the floor and lost consciousness.    Pt with [facility] with LOC.   Hit on lateral L side of head  No n/v  Patient drove herself here to the ER.     History obtained directly from the PATIENT              History provided by:  Patient   used: No    Fall  Associated symptoms: headaches    Associated symptoms: no abdominal pain, no back pain, no chest pain, no seizures and no vomiting        Prior to Admission Medications   Prescriptions Last Dose Informant Patient Reported? Taking?   escitalopram (Lexapro) 10 mg tablet   No No   Sig: Take 1 tablet (10 mg total) by mouth daily   hydrOXYzine HCL (ATARAX) 25 mg tablet   No No   Sig: Take 1 tablet (25 mg total) by mouth daily as needed for anxiety      Facility-Administered Medications: None       Past Medical History:   Diagnosis Date    Anxiety     COVID 08/08/2022    Crohn disease (HCC)     Depression     Inflammatory bowel disease 12/2015    Seizures (HCC)     as a child    Suicide attempt ([facility])        Past Surgical History:   Procedure Laterality Date    APPENDECTOMY LAPAROSCOPIC N/A 11/10/2023     Procedure: APPENDECTOMY LAPAROSCOPIC;  Surgeon: Mane Kent MD;  Location: [facility];  Service: General    COLONOSCOPY      VULVA BIOPSY Right 8/26/2020    Procedure: Excision of Right Labial Lesion;  Surgeon: Maegan Magana MD;  Location: [facility];  Service: Gynecology    VULVA BIOPSY N/A 10/21/2022    Procedure: RESECTION OF RIGHT LABIAL CYST;  Surgeon: Maegan Magana MD;  [facility];  Service: Gynecology       Family History   Problem Relation Age of Onset    Vitiligo Paternal Grandfather     Stroke Paternal Grandfather     Lupus Paternal Aunt     Completed Suicide  Maternal Grandmother     Autoimmune disease Paternal Aunt      I have reviewed and agree with the history as documented.    E-Cigarette/Vaping    E-Cigarette Use Never User      E-Cigarette/Vaping Substances    Nicotine No     THC No     CBD No     Flavoring No     Other No     Unknown No      Social History     Tobacco Use    Smoking status: Never    Smokeless tobacco: Never   Vaping Use    Vaping status: Never Used   Substance Use Topics    Alcohol use: Not Currently     Comment: occasionally    Drug use: Yes     Frequency: 7.0 times per week     Types: Marijuana     Comment: Medical Marijuana       Review of Systems   Constitutional:  Negative for chills and fever.   HENT:  Negative for ear pain and sore throat.    Eyes:  Negative for pain and visual disturbance.   Respiratory:  Negative for cough and shortness of breath.    Cardiovascular:  Negative for chest pain and palpitations.   Gastrointestinal:  Negative for abdominal pain and vomiting.   Genitourinary:  Negative for dysuria and hematuria.   Musculoskeletal:  Negative for arthralgias and back pain.   Skin:  Negative for color change and rash.   Neurological:  Positive for syncope and headaches. Negative for seizures.   All other systems reviewed and are negative.      Physical Exam  Physical Exam  Vitals and nursing note reviewed.   Constitutional:        General: She is not in acute distress.     Appearance: She is well-developed.   HENT:      Head: Normocephalic.      Right Ear: Ear canal and external ear normal.      Left Ear: Ear canal and external ear normal.      Nose: Nose normal.   Eyes:      Conjunctiva/sclera: Conjunctivae normal.      Pupils: Pupils are equal, round, and reactive to light.   Cardiovascular:      Rate and Rhythm: Normal rate and regular rhythm.      Pulses: Normal pulses.      Heart sounds: No murmur heard.  Pulmonary:      Effort: Pulmonary effort is normal. No respiratory distress.      Breath sounds: Normal breath sounds.   Abdominal:      Palpations: Abdomen is soft.      Tenderness: There is no abdominal tenderness.   Musculoskeletal:         General: No swelling.      Cervical back: Neck supple.   Skin:     General: Skin is warm and dry.      Capillary Refill: Capillary refill takes less than 2 seconds.   Neurological:      General: No focal deficit present.      Mental Status: She is alert and oriented to person, place, and time.      Cranial Nerves: No cranial nerve deficit.      Motor: No weakness.      Gait: Gait normal.   Psychiatric:         Mood and Affect: Mood normal.         Thought Content: Thought content normal.         Vital Signs  ED Triage Vitals   Temperature Pulse Respirations Blood Pressure SpO2   02/27/24 2220 02/27/24 2220 02/27/24 2220 02/27/24 2220 02/27/24 2220   98 °F (36.7 °C) (!) 113 20 129/74 95 %      Temp Source Heart Rate Source Patient Position - Orthostatic VS BP Location FiO2 (%)   02/27/24 2220 02/27/24 2220 02/27/24 2220 02/27/24 2220 --   Oral Monitor Sitting Left arm       Pain Score       02/27/24 2311       3           Vitals:    02/27/24 2220 02/27/24 2311   BP: 129/74 122/73   Pulse: (!) 113 90   Patient Position - Orthostatic VS: Sitting          Visual Acuity  Visual Acuity      Flowsheet Row Most Recent Value   L Pupil Size (mm) 3   R Pupil Size (mm) 3            ED  Medications  Medications - No data to display    Diagnostic Studies  Results Reviewed       None                   CT head without contrast   Final Result by Jean Marie Torres MD (02/27 2321)      No acute intracranial abnormality.                  Workstation performed: [id number]                    Procedures  Procedures         ED Course                                             Medical Decision Making  Patient able to be cleared via [place] [facility] CT rules:  1.) GCS 15 within 2 hours of injury.  2.) There is no open or depressed skull fracture on physical exam.  3.) No signs of basilar skull fracture.  4.) There was not more than 1 episode of vomiting.  5.) There is no retrograde amnesia to greater than 30 minutes prior to the event.  6.) No dangerous mechanism (fall greater than 3 feet or struck as pedetrian)  7.) Patient is less than 65 years old and older than 17.  8.) Patient is not on anticoagulants.      Problems Addressed:  Injury of head, initial encounter: acute illness or injury    Amount and/or Complexity of Data Reviewed  Radiology: ordered.    Risk  OTC drugs.             Disposition  Final diagnoses:   Injury of head, initial encounter     Time reflects when diagnosis was documented in both MDM as applicable and the Disposition within this note       Time User Action Codes Description Comment    2/27/2024 11:08 PM Will Roa Add [S09.90XA] Injury of head, initial encounter           ED Disposition       ED Disposition   Discharge    Condition   Stable    Date/Time   Tue Feb 27, 2024 11:08 PM    Comment   Arminda Gutierrez discharge to home/self care.                   Follow-up Information       Follow up With Specialties Details Why Contact Info    Radha Odell PA-C Physician Assistant, Internal Medicine In 3 days If symptoms worsen [address]  [address]  [facility] [address]  [phone]              Discharge Medication List as of 2/27/2024 11:09 PM        CONTINUE these  medications which have NOT CHANGED    Details   escitalopram (Lexapro) 10 mg tablet Take 1 tablet (10 mg total) by mouth daily, Starting Wed 2/14/2024, Normal      hydrOXYzine HCL (ATARAX) 25 mg tablet Take 1 tablet (25 mg total) by mouth daily as needed for anxiety, Starting Wed 2/14/2024, Normal             No discharge procedures on file.    PDMP Review         Value Time User    PDMP Reviewed  Yes 10/18/2019  1:59 AM Josef Verma MD            ED Provider  Electronically Signed by             Will Roa MD  02/28/24 3320

## 2024-02-29 NOTE — TELEPHONE ENCOUNTER
[facility] inform [facility] has been approved with an  EOC#[id number]. [employer] coverage has ended as of 2/28/2024 due to this approval.

## 2024-02-29 NOTE — TELEPHONE ENCOUNTER
Nicole from [employer] contacting office in regards to authorization received. Nicole is asking for a call back at [phone]

## 2024-03-05 DIAGNOSIS — K50.012 CROHN'S DISEASE OF SMALL INTESTINE WITH INTESTINAL OBSTRUCTION (HCC): Primary | ICD-10-CM

## 2024-03-05 RX ORDER — ACETAMINOPHEN 325 MG/1
650 TABLET ORAL ONCE
OUTPATIENT
Start: 2024-03-15

## 2024-03-05 RX ORDER — SODIUM CHLORIDE 9 MG/ML
20 INJECTION, SOLUTION INTRAVENOUS ONCE
OUTPATIENT
Start: 2024-03-15

## 2024-03-05 RX ORDER — METHYLPREDNISOLONE SODIUM SUCCINATE 40 MG/ML
40 INJECTION, POWDER, LYOPHILIZED, FOR SOLUTION INTRAMUSCULAR; INTRAVENOUS ONCE
OUTPATIENT
Start: 2024-03-15

## 2024-03-05 RX ORDER — DIPHENHYDRAMINE HCL 25 MG
25 TABLET ORAL ONCE
OUTPATIENT
Start: 2024-03-15

## 2024-03-13 ENCOUNTER — TELEPHONE (OUTPATIENT)
Dept: INFUSION CENTER | Facility: CLINIC | Age: 29
End: 2024-03-13

## 2024-03-13 NOTE — TELEPHONE ENCOUNTER
NEW PATIENT PHONE CALL  [facility] for patient to call back if she has any questions regarding her up coming appt.  Left our phone number.

## 2024-03-19 DIAGNOSIS — F41.9 ANXIETY: ICD-10-CM

## 2024-03-19 RX ORDER — HYDROXYZINE HYDROCHLORIDE 25 MG/1
25 TABLET, FILM COATED ORAL DAILY PRN
Qty: 14 TABLET | Refills: 0 | OUTPATIENT
Start: 2024-03-19

## 2024-03-20 ENCOUNTER — HOSPITAL ENCOUNTER (OUTPATIENT)
Dept: INFUSION CENTER | Facility: CLINIC | Age: 29
Discharge: HOME | End: 2024-03-20
Payer: COMMERCIAL

## 2024-03-20 VITALS
DIASTOLIC BLOOD PRESSURE: 76 MMHG | TEMPERATURE: 97.3 F | SYSTOLIC BLOOD PRESSURE: 136 MMHG | HEIGHT: 63 IN | BODY MASS INDEX: 21.48 KG/M2 | WEIGHT: 121.2 LBS | HEART RATE: 72 BPM | RESPIRATION RATE: 18 BRPM

## 2024-03-20 DIAGNOSIS — K50.012 CROHN'S DISEASE OF SMALL INTESTINE WITH INTESTINAL OBSTRUCTION (HCC): Primary | ICD-10-CM

## 2024-03-20 PROCEDURE — 96415 CHEMO IV INFUSION ADDL HR: CPT

## 2024-03-20 PROCEDURE — 96375 TX/PRO/DX INJ NEW DRUG ADDON: CPT

## 2024-03-20 PROCEDURE — 96413 CHEMO IV INFUSION 1 HR: CPT

## 2024-03-20 RX ORDER — DIPHENHYDRAMINE HCL 25 MG
25 TABLET ORAL ONCE
OUTPATIENT
Start: 2024-04-03

## 2024-03-20 RX ORDER — ACETAMINOPHEN 325 MG/1
650 TABLET ORAL ONCE
OUTPATIENT
Start: 2024-04-03

## 2024-03-20 RX ORDER — SODIUM CHLORIDE 9 MG/ML
20 INJECTION, SOLUTION INTRAVENOUS ONCE
Status: COMPLETED | OUTPATIENT
Start: 2024-03-20 | End: 2024-03-20

## 2024-03-20 RX ORDER — SODIUM CHLORIDE 9 MG/ML
20 INJECTION, SOLUTION INTRAVENOUS ONCE
OUTPATIENT
Start: 2024-04-03

## 2024-03-20 RX ORDER — DIPHENHYDRAMINE HCL 25 MG
25 TABLET ORAL ONCE
Status: COMPLETED | OUTPATIENT
Start: 2024-03-20 | End: 2024-03-20

## 2024-03-20 RX ORDER — METHYLPREDNISOLONE SODIUM SUCCINATE 40 MG/ML
40 INJECTION, POWDER, LYOPHILIZED, FOR SOLUTION INTRAMUSCULAR; INTRAVENOUS ONCE
OUTPATIENT
Start: 2024-04-03

## 2024-03-20 RX ORDER — ACETAMINOPHEN 325 MG/1
650 TABLET ORAL ONCE
Status: COMPLETED | OUTPATIENT
Start: 2024-03-20 | End: 2024-03-20

## 2024-03-20 RX ORDER — METHYLPREDNISOLONE SODIUM SUCCINATE 40 MG/ML
40 INJECTION, POWDER, LYOPHILIZED, FOR SOLUTION INTRAMUSCULAR; INTRAVENOUS ONCE
Status: COMPLETED | OUTPATIENT
Start: 2024-03-20 | End: 2024-03-20

## 2024-03-20 RX ADMIN — DIPHENHYDRAMINE HYDROCHLORIDE 25 MG: 25 TABLET ORAL at 09:51

## 2024-03-20 RX ADMIN — METHYLPREDNISOLONE SODIUM SUCCINATE 40 MG: 40 INJECTION, POWDER, FOR SOLUTION INTRAMUSCULAR; INTRAVENOUS at 09:51

## 2024-03-20 RX ADMIN — ACETAMINOPHEN 650 MG: 325 TABLET, FILM COATED ORAL at 09:51

## 2024-03-20 RX ADMIN — INFLIXIMAB-AXXQ 275 MG: 100 INJECTION, POWDER, LYOPHILIZED, FOR SOLUTION INTRAVENOUS at 10:42

## 2024-03-20 RX ADMIN — SODIUM CHLORIDE 20 ML/HR: 0.9 INJECTION, SOLUTION INTRAVENOUS at 09:52

## 2024-03-20 NOTE — PROGRESS NOTES
Patient arrives to infusion center for [facility] infusion. Patient offers no complaints of recent illness/infections or antibiotic use. PIV established. Patient tolerated entire infusion without incident. PIV removed.     Next appointment: 4/3/24 @ 5446

## 2024-03-21 ENCOUNTER — HOSPITAL ENCOUNTER (EMERGENCY)
Facility: HOSPITAL | Age: 29
Discharge: HOME | End: 2024-03-21
Attending: EMERGENCY MEDICINE
Payer: COMMERCIAL

## 2024-03-21 VITALS
HEART RATE: 111 BPM | RESPIRATION RATE: 18 BRPM | TEMPERATURE: 98.1 F | SYSTOLIC BLOOD PRESSURE: 142 MMHG | DIASTOLIC BLOOD PRESSURE: 81 MMHG | OXYGEN SATURATION: 98 %

## 2024-03-21 DIAGNOSIS — F32.A DEPRESSION: Primary | ICD-10-CM

## 2024-03-21 LAB — ETHANOL EXG-MCNC: 0.17 MG/DL

## 2024-03-21 PROCEDURE — 82075 ASSAY OF BREATH ETHANOL: CPT | Performed by: EMERGENCY MEDICINE

## 2024-03-21 PROCEDURE — 99283 EMERGENCY DEPT VISIT LOW MDM: CPT

## 2024-03-21 NOTE — ED PROVIDER NOTES
"History  Chief Complaint   Patient presents with    Psychiatric Evaluation     Pt arrives ambulatory with EMS c/o being mentally unstable for the past few days. Pt cut her L wrist with a knife yesterday but states that it was not a suicide attempt.      28-year-old female presents to the emergency room with a chief complaint of \"I'm feeling a little mentally unstable.\"  Patient states this has been ongoing for the past few days.  Patient states \"I hurt myself with a knife,\" pointing to an abrasion on the left posterior hand.    Patient denies that this was a suicide attempt but is unable to state what she was attempting to do.    Patient denies any acute stressors but has a history of depression.  Patient has been off of her medications since last November when she had surgery and she states she no longer followed up with her providers.    Patient denies any intent or thoughts to harm herself at present.  Patient denies any thoughts of harming anyone else.    Patient states she came to the emergency room as she was concerned that her symptoms would \"spiral\" and that she would worsen so she wanted to seek treatment.    Patient affirms daily alcohol use.  Patient denies use of illicit substances.    Impression and plan: Depression with worsening symptoms.  Patient would likely benefit from inpatient psychiatric evaluation, treatment, and management considering her progressive worsening symptoms with no ability to follow-up as an outpatient at present.  Patient is not on any medication at present.  Per the medical record, patient has history of possible suicide attempt previously though the discharge summary states that patient ultimately stated that she was intoxicated at that time and did not intend to harm herself.  Patient's wound from her reported intentional injury does not require any closure or treatment and it is on the posterior aspect making the intent somewhat questionable.  Considering patient's impulsive " nature, patient went likely benefit from inpatient psychiatric treatment though I do not believe at present she meets criteria for involuntary admission to the hospital.  I have explained to the patient in detail that I strongly believe she would benefit from psychiatric evaluation and continued care and the quickest way to obtain this would be through an inpatient stay.  Patient is amenable for discussion with crisis.        Prior to Admission Medications   Prescriptions Last Dose Informant Patient Reported? Taking?   escitalopram (Lexapro) 10 mg tablet   No No   Sig: Take 1 tablet (10 mg total) by mouth daily   hydrOXYzine HCL (ATARAX) 25 mg tablet   No No   Sig: Take 1 tablet (25 mg total) by mouth daily as needed for anxiety      Facility-Administered Medications: None       Past Medical History:   Diagnosis Date    Anxiety     COVID 08/08/2022    Crohn disease (HCC)     Depression     Inflammatory bowel disease 12/2015    Seizures (HCC)     as a child    Suicide attempt (HCC)        Past Surgical History:   Procedure Laterality Date    APPENDECTOMY LAPAROSCOPIC N/A 11/10/2023    Procedure: APPENDECTOMY LAPAROSCOPIC;  Surgeon: Mane Kent MD;  [facility];  Service: General    COLONOSCOPY      VULVA BIOPSY Right 8/26/2020    Procedure: Excision of Right Labial Lesion;  Surgeon: Maegan Magana MD;  [facility];  Service: Gynecology    VULVA BIOPSY N/A 10/21/2022    Procedure: RESECTION OF RIGHT LABIAL CYST;  Surgeon: Maegan Magana MD;  [facility];  Service: Gynecology       Family History   Problem Relation Age of Onset    Vitiligo Paternal Grandfather     Stroke Paternal Grandfather     Lupus Paternal Aunt     Completed Suicide  Maternal Grandmother     Autoimmune disease Paternal Aunt      I have reviewed and agree with the history as documented.    E-Cigarette/Vaping    E-Cigarette Use Never User      E-Cigarette/Vaping Substances    Nicotine No     THC No  "    CBD No     Flavoring No     Other No     Unknown No      Social History     Tobacco Use    Smoking status: Never    Smokeless tobacco: Never   Vaping Use    Vaping status: Never Used   Substance Use Topics    Alcohol use: Not Currently     Comment: occasionally    Drug use: Yes     Frequency: 7.0 times per week     Types: Marijuana     Comment: Medical Marijuana       Review of Systems    Physical Exam  Physical Exam  Vitals reviewed.   HENT:      Head: Atraumatic.   Eyes:      Pupils: Pupils are equal, round, and reactive to light.   Cardiovascular:      Rate and Rhythm: Normal rate.   Abdominal:      General: There is no distension.   Musculoskeletal:         General: Signs of injury present. No tenderness or deformity.      Cervical back: Neck supple.      Comments: Abrasion on the left posterior wrist.  No active bleeding.  No indications for closure.  No surrounding erythema.  Normal range of motion.  No tenderness to palpation.  2+ radial pulse appropriate cap refill.  Normal motor and sensory in all dermatomes of the hand.   Skin:     General: Skin is dry.   Neurological:      General: No focal deficit present.      Mental Status: She is alert.   Psychiatric:         Mood and Affect: Affect is flat.         Speech: Speech normal.         Behavior: Behavior is withdrawn. Behavior is cooperative.         Thought Content: Thought content does not include homicidal ideation. Thought content does not include homicidal or suicidal plan.      Comments: Patient denies current suicidal thoughts but notes occasional thoughts of feeling \"as if I don't want to be here\"         Vital Signs  ED Triage Vitals   Temperature Pulse Respirations Blood Pressure SpO2   03/21/24 0422 03/21/24 0424 03/21/24 0424 03/21/24 0424 03/21/24 0424   98.1 °F (36.7 °C) (!) 111 18 142/81 98 %      Temp Source Heart Rate Source Patient Position - Orthostatic VS BP Location FiO2 (%)   03/21/24 0422 03/21/24 0424 03/21/24 0424 03/21/24 0424 " --   Oral Monitor Sitting Right arm       Pain Score       --                  Vitals:    03/21/24 0424   BP: 142/81   Pulse: (!) 111   Patient Position - Orthostatic VS: Sitting         Visual Acuity      ED Medications  Medications - No data to display    Diagnostic Studies  Results Reviewed       Procedure Component Value Units Date/Time    POCT alcohol breath test [[id number]]  (Normal) Resulted: 03/21/24 0513    Lab Status: Final result Updated: 03/21/24 0513     EXTBreath Alcohol 0.167    POCT pregnancy, urine [[id number]]     Lab Status: No result     Rapid drug screen, urine [[id number]]     Lab Status: No result Specimen: Urine                    No orders to display              Procedures  Procedures         ED Course  ED Course as of 03/21/24 0533   Thu Mar 21, 2024   0523 After our initial discussion, patient now states that she no longer wishes to stay to see crisis.  Patient continues to state that she does not wish to harm herself.  Patient's friend arrived who is sober and is reportedly a nurse.  Patient's friend states that she will watch her.  I expressed my concerns with the patient and I strongly suggested that she stay to see crisis but after discussion extensively with the patient, she continues to decline.  While patient is intoxicated, she is clinically sober.  I have discussed my concerns with her in detail.  After extensive discussion with her about the risks and benefits, she continues to decline this today to see crisis and I do not have grounds to keep the patient against her will.  I have offered for the patient to see psychiatry in the emergency room and she has declined this as well.  I have encouraged the patient to return to the emergency room with any progression or worsening in her symptoms.  I will provide patient with the information on the walk-in clinic in [place].  I have discussed return precautions in detail with the patient.  Provided information on follow-up and patient  does states she does have a psychiatry appointment in a few weeks.  I suggest that she contact them to see if they can have a closer appointment.                               SBIRT 22yo+      Flowsheet Row Most Recent Value   Initial Alcohol Screen: US AUDIT-C     1. How often do you have a drink containing alcohol? 6 Filed at: 03/21/2024 0426   2. How many drinks containing alcohol do you have on a typical day you are drinking?  4 Filed at: 03/21/2024 0426   3b. FEMALE Any Age, or MALE 65+: How often do you have 4 or more drinks on one occassion? 6 Filed at: 03/21/2024 0426   Audit-C Score 16 Filed at: 03/21/2024 0426   Full Alcohol Screen: US AUDIT    4. How often during the last year have you found that you were not able to stop drinking once you had started? 4 Filed at: 03/21/2024 0426   5. How often during past year have you failed to do what was normally expected of you because of drinking?  4 Filed at: 03/21/2024 0426   6. How often in past year have you needed a first drink in the morning to get yourself going after a heavy drinking session?  0 Filed at: 03/21/2024 0426   7. How often in past year have you had feeling of guilt or remorse after drinking?  4 Filed at: 03/21/2024 0426   8. How often in past year have you been unable to remember what happened night before because you had been drinking?  3 Filed at: 03/21/2024 0426   9. Have you or someone else been injured as a result of your drinking?  2 Filed at: 03/21/2024 0426   10. Has a relative, friend, doctor or other health worker been concerned about your drinking and suggested you cut down?  2 Filed at: 03/21/2024 0426   AUDIT Total Score 35 Filed at: 03/21/2024 0426   TK: How many times in the past year have you...    Used an illegal drug or used a prescription medication for non-medical reasons? Never Filed at: 03/21/2024 0426                      Medical Decision Making  Amount and/or Complexity of Data Reviewed  Labs: ordered.              Disposition  Final diagnoses:   Depression     Time reflects when diagnosis was documented in both MDM as applicable and the Disposition within this note       Time User Action Codes Description Comment    3/21/2024  5:27 AM Ulises Pacheco Add [F32.A] Depression           ED Disposition       ED Disposition   Discharge    Condition   Stable    Date/Time   Thu Mar 21, 2024 0527    Comment   Arminda Gutierrez discharge to home/self care.                   Follow-up Information       Follow up With Specialties Details Why Contact Info Additional Information    [facility] Go to  At any time for immediate psychiatric assistance. [address] [facility] [address] [phone] [facility], [address] [place], [address]    [facility] Emergency Medicine Go to  If symptoms worsen or at any time if you think of harming yourself. [address] [facility] [id number] [phone] [facility], [address] [place], [place], [address]            Patient's Medications   Discharge Prescriptions    No medications on file       No discharge procedures on file.    PDMP Review         Value Time User    PDMP Reviewed  Yes 10/18/2019  1:59 AM Josef Verma MD            ED Provider  Electronically Signed by             Ulises Pacheco MD  03/21/24 0533

## 2024-04-03 ENCOUNTER — HOSPITAL ENCOUNTER (OUTPATIENT)
Dept: INFUSION CENTER | Facility: CLINIC | Age: 29
Discharge: HOME | End: 2024-04-03
Payer: COMMERCIAL

## 2024-04-03 VITALS
HEART RATE: 65 BPM | DIASTOLIC BLOOD PRESSURE: 60 MMHG | TEMPERATURE: 96.9 F | SYSTOLIC BLOOD PRESSURE: 115 MMHG | RESPIRATION RATE: 18 BRPM

## 2024-04-03 DIAGNOSIS — K50.012 CROHN'S DISEASE OF SMALL INTESTINE WITH INTESTINAL OBSTRUCTION (HCC): Primary | ICD-10-CM

## 2024-04-03 PROCEDURE — 96415 CHEMO IV INFUSION ADDL HR: CPT

## 2024-04-03 PROCEDURE — 96413 CHEMO IV INFUSION 1 HR: CPT

## 2024-04-03 PROCEDURE — 96375 TX/PRO/DX INJ NEW DRUG ADDON: CPT

## 2024-04-03 RX ORDER — METHYLPREDNISOLONE SODIUM SUCCINATE 40 MG/ML
40 INJECTION, POWDER, LYOPHILIZED, FOR SOLUTION INTRAMUSCULAR; INTRAVENOUS ONCE
OUTPATIENT
Start: 2024-05-01

## 2024-04-03 RX ORDER — ACETAMINOPHEN 325 MG/1
650 TABLET ORAL ONCE
Status: CANCELLED | OUTPATIENT
Start: 2024-05-01

## 2024-04-03 RX ORDER — METHYLPREDNISOLONE SODIUM SUCCINATE 40 MG/ML
40 INJECTION, POWDER, LYOPHILIZED, FOR SOLUTION INTRAMUSCULAR; INTRAVENOUS ONCE
Status: COMPLETED | OUTPATIENT
Start: 2024-04-03 | End: 2024-04-03

## 2024-04-03 RX ORDER — DIPHENHYDRAMINE HCL 25 MG
25 TABLET ORAL ONCE
Status: COMPLETED | OUTPATIENT
Start: 2024-04-03 | End: 2024-04-03

## 2024-04-03 RX ORDER — ACETAMINOPHEN 325 MG/1
650 TABLET ORAL ONCE
OUTPATIENT
Start: 2024-05-01

## 2024-04-03 RX ORDER — DIPHENHYDRAMINE HCL 25 MG
25 TABLET ORAL ONCE
OUTPATIENT
Start: 2024-05-01

## 2024-04-03 RX ORDER — SODIUM CHLORIDE 9 MG/ML
20 INJECTION, SOLUTION INTRAVENOUS ONCE
OUTPATIENT
Start: 2024-05-01

## 2024-04-03 RX ORDER — SODIUM CHLORIDE 9 MG/ML
20 INJECTION, SOLUTION INTRAVENOUS ONCE
Status: COMPLETED | OUTPATIENT
Start: 2024-04-03 | End: 2024-04-03

## 2024-04-03 RX ORDER — ACETAMINOPHEN 325 MG/1
650 TABLET ORAL ONCE
Status: COMPLETED | OUTPATIENT
Start: 2024-04-03 | End: 2024-04-03

## 2024-04-03 RX ORDER — SODIUM CHLORIDE 9 MG/ML
20 INJECTION, SOLUTION INTRAVENOUS ONCE
Status: CANCELLED | OUTPATIENT
Start: 2024-05-01

## 2024-04-03 RX ORDER — DIPHENHYDRAMINE HCL 25 MG
25 TABLET ORAL ONCE
Status: CANCELLED | OUTPATIENT
Start: 2024-05-01

## 2024-04-03 RX ORDER — METHYLPREDNISOLONE SODIUM SUCCINATE 40 MG/ML
40 INJECTION, POWDER, LYOPHILIZED, FOR SOLUTION INTRAMUSCULAR; INTRAVENOUS ONCE
Status: CANCELLED | OUTPATIENT
Start: 2024-05-01

## 2024-04-03 RX ADMIN — ACETAMINOPHEN 650 MG: 325 TABLET, FILM COATED ORAL at 11:51

## 2024-04-03 RX ADMIN — SODIUM CHLORIDE 20 ML/HR: 0.9 INJECTION, SOLUTION INTRAVENOUS at 11:54

## 2024-04-03 RX ADMIN — INFLIXIMAB-AXXQ 275 MG: 100 INJECTION, POWDER, LYOPHILIZED, FOR SOLUTION INTRAVENOUS at 12:33

## 2024-04-03 RX ADMIN — DIPHENHYDRAMINE HYDROCHLORIDE 25 MG: 25 TABLET ORAL at 11:51

## 2024-04-03 RX ADMIN — METHYLPREDNISOLONE SODIUM SUCCINATE 40 MG: 40 INJECTION, POWDER, FOR SOLUTION INTRAMUSCULAR; INTRAVENOUS at 11:51

## 2024-04-03 NOTE — PROGRESS NOTES
Pt presents for avsola infusion offering no compliants. Pt stated no recent infection or antibiotic treatment. Pt tolerated treatment without incident. PIV removed.  AVS declined. Next appointment reviewed on 5/1 at 11:30am.

## 2024-04-11 ENCOUNTER — TELEPHONE (OUTPATIENT)
Dept: SURGERY | Facility: CLINIC | Age: 29
End: 2024-04-11

## 2024-04-11 NOTE — TELEPHONE ENCOUNTER
"Left msg for pt letting her know that disability requested her records.   I had received ONE request that I forwarded to [facility] on 3/27/24. I had followed with a call to [facility]- I spoke with Aries at Medical Records department who stated he \"had already sent her records on 3/1/2024 but would reach out via fax & would reship\"    Yesterday 4/10/2024, I received another request from [facility] for records. I faxed that request along with the original fax I had sent on 3/27/2024 to [facility]. I asked this to be Expedited and handled by a Supervisor.     I did not want patient to be denied because of \"lack of records\"     I called patient to notify her so that she can get involved and contact [facility]   [phone] to check on status  And to call the  at disability at [phone] case if: [phone]  vendor id: [id number]  "

## 2024-05-14 DIAGNOSIS — K50.012 CROHN'S DISEASE OF SMALL INTESTINE WITH INTESTINAL OBSTRUCTION (HCC): Primary | ICD-10-CM

## 2024-05-14 RX ORDER — SODIUM CHLORIDE 9 MG/ML
20 INJECTION, SOLUTION INTRAVENOUS ONCE
Status: CANCELLED | OUTPATIENT
Start: 2024-05-15

## 2024-05-14 RX ORDER — ACETAMINOPHEN 325 MG/1
650 TABLET ORAL ONCE
Status: CANCELLED | OUTPATIENT
Start: 2024-05-15

## 2024-05-14 RX ORDER — DIPHENHYDRAMINE HCL 25 MG
25 TABLET ORAL ONCE
Status: CANCELLED | OUTPATIENT
Start: 2024-05-15

## 2024-05-14 RX ORDER — METHYLPREDNISOLONE SODIUM SUCCINATE 40 MG/ML
40 INJECTION, POWDER, LYOPHILIZED, FOR SOLUTION INTRAMUSCULAR; INTRAVENOUS ONCE
Status: CANCELLED | OUTPATIENT
Start: 2024-05-15

## 2024-05-23 ENCOUNTER — HOSPITAL ENCOUNTER (OUTPATIENT)
Dept: INFUSION CENTER | Facility: CLINIC | Age: 29
Discharge: HOME | End: 2024-05-23
Payer: COMMERCIAL

## 2024-05-23 VITALS
WEIGHT: 124 LBS | HEART RATE: 66 BPM | SYSTOLIC BLOOD PRESSURE: 104 MMHG | DIASTOLIC BLOOD PRESSURE: 68 MMHG | RESPIRATION RATE: 18 BRPM | TEMPERATURE: 96.9 F | BODY MASS INDEX: 21.97 KG/M2

## 2024-05-23 DIAGNOSIS — K50.012 CROHN'S DISEASE OF SMALL INTESTINE WITH INTESTINAL OBSTRUCTION (HCC): Primary | ICD-10-CM

## 2024-05-23 PROCEDURE — 96413 CHEMO IV INFUSION 1 HR: CPT

## 2024-05-23 PROCEDURE — 96415 CHEMO IV INFUSION ADDL HR: CPT

## 2024-05-23 PROCEDURE — 96375 TX/PRO/DX INJ NEW DRUG ADDON: CPT

## 2024-05-23 RX ORDER — METHYLPREDNISOLONE SODIUM SUCCINATE 40 MG/ML
40 INJECTION, POWDER, LYOPHILIZED, FOR SOLUTION INTRAMUSCULAR; INTRAVENOUS ONCE
Status: COMPLETED | OUTPATIENT
Start: 2024-05-23 | End: 2024-05-23

## 2024-05-23 RX ORDER — DIPHENHYDRAMINE HCL 25 MG
25 TABLET ORAL ONCE
Status: COMPLETED | OUTPATIENT
Start: 2024-05-23 | End: 2024-05-23

## 2024-05-23 RX ORDER — SODIUM CHLORIDE 9 MG/ML
20 INJECTION, SOLUTION INTRAVENOUS ONCE
Status: CANCELLED | OUTPATIENT
Start: 2024-05-23

## 2024-05-23 RX ORDER — SODIUM CHLORIDE 9 MG/ML
20 INJECTION, SOLUTION INTRAVENOUS ONCE
Status: COMPLETED | OUTPATIENT
Start: 2024-05-23 | End: 2024-05-23

## 2024-05-23 RX ORDER — DIPHENHYDRAMINE HCL 25 MG
25 TABLET ORAL ONCE
OUTPATIENT
Start: 2024-07-18

## 2024-05-23 RX ORDER — METHYLPREDNISOLONE SODIUM SUCCINATE 40 MG/ML
40 INJECTION, POWDER, LYOPHILIZED, FOR SOLUTION INTRAMUSCULAR; INTRAVENOUS ONCE
OUTPATIENT
Start: 2024-07-18

## 2024-05-23 RX ORDER — ACETAMINOPHEN 325 MG/1
650 TABLET ORAL ONCE
Status: COMPLETED | OUTPATIENT
Start: 2024-05-23 | End: 2024-05-23

## 2024-05-23 RX ORDER — ACETAMINOPHEN 325 MG/1
650 TABLET ORAL ONCE
Status: CANCELLED | OUTPATIENT
Start: 2024-05-23

## 2024-05-23 RX ORDER — DIPHENHYDRAMINE HCL 25 MG
25 TABLET ORAL ONCE
Status: CANCELLED | OUTPATIENT
Start: 2024-05-23

## 2024-05-23 RX ORDER — METHYLPREDNISOLONE SODIUM SUCCINATE 40 MG/ML
40 INJECTION, POWDER, LYOPHILIZED, FOR SOLUTION INTRAMUSCULAR; INTRAVENOUS ONCE
Status: CANCELLED | OUTPATIENT
Start: 2024-05-23

## 2024-05-23 RX ORDER — ACETAMINOPHEN 325 MG/1
650 TABLET ORAL ONCE
OUTPATIENT
Start: 2024-07-18

## 2024-05-23 RX ORDER — SODIUM CHLORIDE 9 MG/ML
20 INJECTION, SOLUTION INTRAVENOUS ONCE
OUTPATIENT
Start: 2024-07-18

## 2024-05-23 RX ADMIN — INFLIXIMAB-AXXQ 281 MG: 100 INJECTION, POWDER, LYOPHILIZED, FOR SOLUTION INTRAVENOUS at 14:56

## 2024-05-23 RX ADMIN — DIPHENHYDRAMINE HYDROCHLORIDE 25 MG: 25 TABLET ORAL at 14:19

## 2024-05-23 RX ADMIN — ACETAMINOPHEN 650 MG: 325 TABLET, FILM COATED ORAL at 14:19

## 2024-05-23 RX ADMIN — METHYLPREDNISOLONE SODIUM SUCCINATE 40 MG: 40 INJECTION, POWDER, FOR SOLUTION INTRAMUSCULAR; INTRAVENOUS at 14:19

## 2024-05-23 RX ADMIN — SODIUM CHLORIDE 20 ML/HR: 0.9 INJECTION, SOLUTION INTRAVENOUS at 14:19

## 2024-05-23 NOTE — PROGRESS NOTES
Pt presents for avsola infusion, offers no complaints, tolerating infusion, hand off report provided to GREGORIA Martin

## 2024-05-23 NOTE — PROGRESS NOTES
Pt presents for avsola offering no compliants. Pt tolerated treatment without incident. PIV removed. AVS declined. Next appointment reviewed on 7/18 at 2pm.

## 2024-05-28 ENCOUNTER — OFFICE VISIT (OUTPATIENT)
Dept: GASTROENTEROLOGY | Facility: CLINIC | Age: 29
End: 2024-05-28
Payer: COMMERCIAL

## 2024-05-28 VITALS
HEART RATE: 67 BPM | OXYGEN SATURATION: 99 % | HEIGHT: 64 IN | WEIGHT: 124 LBS | DIASTOLIC BLOOD PRESSURE: 72 MMHG | BODY MASS INDEX: 21.17 KG/M2 | SYSTOLIC BLOOD PRESSURE: 113 MMHG | TEMPERATURE: 97.5 F

## 2024-05-28 DIAGNOSIS — K50.012 CROHN'S DISEASE OF SMALL INTESTINE WITH INTESTINAL OBSTRUCTION (HCC): Primary | ICD-10-CM

## 2024-05-28 PROCEDURE — 99213 OFFICE O/P EST LOW 20 MIN: CPT | Performed by: PHYSICIAN ASSISTANT

## 2024-05-28 NOTE — PROGRESS NOTES
[facility] - Outpatient Follow-up Note  Arminda Gutierrez 29 y.o. female MRN: [id number]  Encounter: [id number]          ASSESSMENT AND PLAN:      1. Crohn's disease of small intestine with intestinal obstruction (HCC)  She was successfully transitioned from Stelara to Avsola  She notes right eye puffiness and itching after 2 of her 3 infusions  The left eye is not affected    She continues to describe 5-10 small stools daily without diarrhea    Will repeat inflammatory markers which were normal in November  Colonoscopy from November showed mild proctitis  CT AP from November without active Crohn's but noted appendicitis status post appendectomy on November 10    Patient inflammatory markers are normal would advise fiber and probiotic  If elevated can give a prednisone taper    ______________________________________________________________________    SUBJECTIVE: 29-year-old female with Crohn's who presents for follow-up.  At her last appointment in October of last year she was reporting ongoing diarrhea and abdominal pain.  She admitted that she had not been taking her Stelara consistently as she felt the injections were painful.  She wanted to be transition to an infused medication but prior to doing this underwent a colonoscopy, CT and labs.  Her colonoscopy showed mild rectal inflammation.  Her CAT scan showed appendicitis and she underwent an appendectomy on November 10.  Her inflammatory markers including fecal calprotectin and CRP were normal.  She was successfully transition from Stelara to Avsola and completed her third infusion on May 24.  Overall she feels okay but still reports issues with frequent bowel movements.  She states that she is having 5-10 stools a day.  These are not diarrhea.  There is no abdominal pain.  She denies rectal bleeding.  She is eating a healthy diet.  She admits that she is probably not eating enough fiber.  She reports that after 2 of her 3 infusions of  Avsola she developed puffiness of her right eyelid.  She reports that it was very itchy.  Her left eyelid was unaffected.  She has premedicated with methylprednisolone and Benadryl.  She reports that this symptom lasted for approximately 1 day before resolving spontaneously.  She denies any fevers or chills.      REVIEW OF SYSTEMS IS OTHERWISE NEGATIVE.      Historical Information   Past Medical History:   Diagnosis Date    Anxiety     COVID 08/08/2022    Crohn disease (HCC)     Depression     Inflammatory bowel disease 12/2015    Seizures (HCC)     as a child    Suicide attempt (HCC)      Past Surgical History:   Procedure Laterality Date    APPENDECTOMY LAPAROSCOPIC N/A 11/10/2023    Procedure: APPENDECTOMY LAPAROSCOPIC;  Surgeon: Mane Kent MD;  [facility];  Service: General    COLONOSCOPY      VULVA BIOPSY Right 8/26/2020    Procedure: Excision of Right Labial Lesion;  Surgeon: Maegan Magana MD;  [facility];  Service: Gynecology    VULVA BIOPSY N/A 10/21/2022    Procedure: RESECTION OF RIGHT LABIAL CYST;  Surgeon: Maegan Magana MD;  [facility];  Service: Gynecology     Social History   Social History     Substance and Sexual Activity   Alcohol Use Not Currently    Comment: occasionally     Social History     Substance and Sexual Activity   Drug Use Yes    Frequency: 7.0 times per week    Types: Marijuana    Comment: Medical Marijuana     Social History     Tobacco Use   Smoking Status Never   Smokeless Tobacco Never     Family History   Problem Relation Age of Onset    Vitiligo Paternal Grandfather     Stroke Paternal Grandfather     Lupus Paternal Aunt     Completed Suicide  Maternal Grandmother     Autoimmune disease Paternal Aunt        Meds/Allergies       Current Outpatient Medications:     escitalopram (Lexapro) 10 mg tablet    hydrOXYzine HCL (ATARAX) 25 mg tablet    Allergies   Allergen Reactions    Amoxicillin Anaphylaxis           Objective  "    Blood pressure 113/72, pulse 67, temperature 97.5 °F (36.4 °C), temperature source Tympanic, height 5' 4\" (1.626 m), weight 56.2 kg (124 lb), SpO2 99%, not currently breastfeeding. Body mass index is 21.28 kg/m².      PHYSICAL EXAM:      General Appearance:   Alert, cooperative, no distress   HEENT:   Normocephalic, atraumatic, anicteric.     Neck:  Supple, symmetrical, trachea midline   Lungs:   Clear to auscultation bilaterally; no rales, rhonchi or wheezing; respirations unlabored    Heart::   Regular rate and rhythm; no murmur, rub, or gallop.   Abdomen:   Soft, non-tender, non-distended; normal bowel sounds; no masses, no organomegaly    Genitalia:   Deferred    Rectal:   Deferred    Extremities:  No cyanosis, clubbing or edema    Pulses:  2+ and symmetric    Skin:  No jaundice, rashes, or lesions    Lymph nodes:  No palpable cervical lymphadenopathy        Lab Results:   No visits with results within 1 Day(s) from this visit.   Latest known visit with results is:   Admission on 03/21/2024, Discharged on 03/21/2024   Component Date Value    EXTBreath Alcohol 03/21/2024 0.167          Radiology Results:   No results found.  Answers submitted by the patient for this visit:  Inflammatory Bowel Disease (Submitted on 5/2/2024)  When you are not experiencing symptoms of your inflammatory bowel disease, how many bowel movements do you typically have each day?: 2  Since your last visit, have you received any vaccinations?: No  Since the last visit, have you had an infection?: No  Is there any possibility that you may be pregnant?: No  In the past three months, have you used tobacco in any form?: No  During the last month, have you taken narcotic pain medications (such as Percocet, oxycodone, Oxycontin, morphine, Vicodin, Dilaudid, MS Contin) for your inflammatory bowel disease?: No  Have you awoken at night because you needed to move your bowels during the last month? : Yes  Have you had leakage of stool while " sleeping during the last month?: No  Have you had leakage of stool while you were awake during the last month?: Yes  In the last 6 months, have you unintentionally lost weight?: No  Fever: No  Eye irritation: No  Mouth sores: No  Sore throat: No  Chest pain: No  Shortness of breath: No  Numbness or tingling in your hands or feet: No  Skin rash: No  Pain or swelling in your joints: No  Bruising or bleeding: No  Felt depressed or blue: No

## 2024-07-11 ENCOUNTER — OFFICE VISIT (OUTPATIENT)
Dept: INTERNAL MEDICINE CLINIC | Facility: CLINIC | Age: 29
End: 2024-07-11
Payer: COMMERCIAL

## 2024-07-11 VITALS
RESPIRATION RATE: 17 BRPM | HEIGHT: 64 IN | BODY MASS INDEX: 21.51 KG/M2 | HEART RATE: 71 BPM | DIASTOLIC BLOOD PRESSURE: 76 MMHG | WEIGHT: 126 LBS | SYSTOLIC BLOOD PRESSURE: 124 MMHG | OXYGEN SATURATION: 99 %

## 2024-07-11 DIAGNOSIS — R00.2 PALPITATIONS: Primary | ICD-10-CM

## 2024-07-11 PROCEDURE — 99213 OFFICE O/P EST LOW 20 MIN: CPT | Performed by: PHYSICIAN ASSISTANT

## 2024-07-11 NOTE — PROGRESS NOTES
Assessment/Plan:   Palpations:  Patient reports palpitations that have been ongoing for   Denies chest pain  Likely 2/2 to anxiety  Will order 24 hr holter  Consider cardiology consult       Quality Measures:       No follow-ups on file.    No problem-specific Assessment & Plan notes found for this encounter.       There are no diagnoses linked to this encounter.      Subjective:      Patient ID: Arminda Gutierrez is a 29 y.o. female.    Patient is a 29-year-old female who presents in the office today for evaluation of palpitations.  She denies any active chest pain, shoulder pain, jaw pain.  She has a past medical history significant for severe anxiety.  She has been noncompliant with anxiolytic medications and follow-ups.        ALLERGIES:  Allergies   Allergen Reactions    Amoxicillin Anaphylaxis       CURRENT MEDICATIONS:  No current outpatient medications on file.    ACTIVE PROBLEM LIST:  Patient Active Problem List   Diagnosis    Medical clearance for psychiatric admission    Crohn's disease (HCC)    Adjustment disorder with mixed disturbance of emotions and conduct    Benzodiazepine abuse (HCC)    Right elbow pain    Acute hip pain, left    Labial lesion    S/P genital surgery    Marijuana use    Abdominal pain    Intussusception (HCC)    Intractable nausea and vomiting    Alcohol intoxication without use disorder, uncomplicated (HCC)    COVID-19    Metabolic acidosis, increased anion gap    Mild protein-calorie malnutrition (HCC)    Overdose, intentional self-harm, initial encounter (HCC)    Cannabis dependence with unspecified cannabis-induced disorder (HCC)       PAST MEDICAL HISTORY:  Past Medical History:   Diagnosis Date    Anxiety     COVID 08/08/2022    Crohn disease (HCC)     Depression     Inflammatory bowel disease 12/2015    Seizures (HCC)     as a child    Suicide attempt (HCC)        PAST SURGICAL HISTORY:  Past Surgical History:   Procedure Laterality Date    APPENDECTOMY LAPAROSCOPIC N/A  11/10/2023    Procedure: APPENDECTOMY LAPAROSCOPIC;  Surgeon: Mane Kent MD;  Location: [facility];  Service: General    COLONOSCOPY      VULVA BIOPSY Right 8/26/2020    Procedure: Excision of Right Labial Lesion;  Surgeon: Maegan Magana MD;  [facility];  Service: Gynecology    VULVA BIOPSY N/A 10/21/2022    Procedure: RESECTION OF RIGHT LABIAL CYST;  Surgeon: Maegan Magana MD;  [facility];  Service: Gynecology       FAMILY HISTORY:  Family History   Problem Relation Age of Onset    Vitiligo Paternal Grandfather     Stroke Paternal Grandfather     Lupus Paternal Aunt     Completed Suicide  Maternal Grandmother     Autoimmune disease Paternal Aunt        SOCIAL HISTORY:  Social History     Socioeconomic History    Marital status: Single     Spouse name: Not on file    Number of children: Not on file    Years of education: Not on file    Highest education level: Not on file   Occupational History    Not on file   Tobacco Use    Smoking status: Never    Smokeless tobacco: Never   Vaping Use    Vaping status: Never Used   Substance and Sexual Activity    Alcohol use: Not Currently     Comment: occasionally    Drug use: Yes     Frequency: 7.0 times per week     Types: Marijuana     Comment: Medical Marijuana    Sexual activity: Yes     Partners: Female     Birth control/protection: None   Other Topics Concern    Not on file   Social History Narrative    ** Merged History Encounter **          Social Determinants of Health     Financial Resource Strain: Not on file   Food Insecurity: Not on file   Transportation Needs: Not on file   Physical Activity: Not on file   Stress: Not on file   Social Connections: Unknown (6/18/2024)    Received from [employer]    Social [employer]     How often do you feel lonely or isolated from those around you? (Adult - for ages 18 years and over): Not on file   Intimate Partner Violence: Not on file   Housing Stability: Not on file        Review of Systems   Constitutional:  Negative for diaphoresis.   Eyes:  Negative for visual disturbance.   Respiratory:  Negative for shortness of breath.    Cardiovascular:  Positive for palpitations. Negative for chest pain.   Gastrointestinal:  Negative for nausea.   Neurological:  Negative for dizziness, weakness, light-headedness and headaches.         Objective:  There were no vitals filed for this visit.  There is no height or weight on file to calculate BMI.     Physical Exam  Constitutional:       Appearance: Normal appearance.   HENT:      Nose: Nose normal.      Mouth/Throat:      Mouth: Mucous membranes are moist.   Cardiovascular:      Rate and Rhythm: Normal rate and regular rhythm.   Pulmonary:      Effort: Pulmonary effort is normal.      Breath sounds: Normal breath sounds.   Abdominal:      General: Abdomen is flat.   Skin:     General: Skin is warm and dry.   Neurological:      General: No focal deficit present.      Mental Status: She is alert and oriented to person, place, and time.           RESULTS:  Hemoglobin   Date/Time Value Ref Range Status   11/10/2023 05:19 AM 12.3 11.5 - 15.4 g/dL Final     Hematocrit   Date/Time Value Ref Range Status   11/10/2023 05:19 AM 36.2 34.8 - 46.1 % Final     Platelets   Date/Time Value Ref Range Status   11/10/2023 05:19  149 - 390 Thousands/uL Final     TSH 3RD GENERATON   Date/Time Value Ref Range Status   08/23/2022 06:53 AM 1.685 0.450 - 4.500 uIU/mL Final     Comment:     The recommended reference ranges for TSH during pregnancy are as follows:   First trimester 0.1 to 2.5 uIU/mL   Second trimester  0.2 to 3.0 uIU/mL   Third trimester 0.3 to 3.0 uIU/m    Note: Normal ranges may not apply to patients who are transgender, non-binary, or whose legal sex, sex at birth, and gender identity differ.  Adult TSH (3rd generation) reference range follows the recommended guidelines of the American Thyroid Association, January, 2020.     Sodium    Date/Time Value Ref Range Status   11/09/2023 06:12  135 - 147 mmol/L Final     BUN   Date/Time Value Ref Range Status   11/09/2023 06:12 PM 10 5 - 25 mg/dL Final     Creatinine   Date/Time Value Ref Range Status   11/09/2023 06:12 PM 0.59 (L) 0.60 - 1.30 mg/dL Final     Comment:     Standardized to IDMS reference method      In chart    This note was created with voice recognition software.  Phonic, grammatical and spelling errors may be present within the note as a result.

## 2024-07-18 ENCOUNTER — APPOINTMENT (OUTPATIENT)
Dept: LAB | Facility: HOSPITAL | Age: 29
End: 2024-07-18
Payer: COMMERCIAL

## 2024-07-18 ENCOUNTER — TELEPHONE (OUTPATIENT)
Dept: INFUSION CENTER | Facility: CLINIC | Age: 29
End: 2024-07-18

## 2024-07-18 DIAGNOSIS — F41.9 ANXIETY: ICD-10-CM

## 2024-07-18 DIAGNOSIS — Z11.4 SCREENING FOR HIV (HUMAN IMMUNODEFICIENCY VIRUS): ICD-10-CM

## 2024-07-18 DIAGNOSIS — K50.012 CROHN'S DISEASE OF SMALL INTESTINE WITH INTESTINAL OBSTRUCTION (HCC): ICD-10-CM

## 2024-07-18 DIAGNOSIS — Z13.1 SCREENING FOR DIABETES MELLITUS: ICD-10-CM

## 2024-07-18 DIAGNOSIS — R00.2 PALPITATIONS: ICD-10-CM

## 2024-07-18 DIAGNOSIS — Z13.6 SCREENING FOR HEART DISEASE: ICD-10-CM

## 2024-07-18 LAB
ALBUMIN SERPL BCG-MCNC: 4.5 G/DL (ref 3.5–5)
ALP SERPL-CCNC: 45 U/L (ref 34–104)
ALT SERPL W P-5'-P-CCNC: 12 U/L (ref 7–52)
ANION GAP SERPL CALCULATED.3IONS-SCNC: 8 MMOL/L (ref 4–13)
AST SERPL W P-5'-P-CCNC: 18 U/L (ref 13–39)
BASOPHILS # BLD AUTO: 0.05 THOUSANDS/ÂΜL (ref 0–0.1)
BASOPHILS NFR BLD AUTO: 1 % (ref 0–1)
BILIRUB SERPL-MCNC: 1.03 MG/DL (ref 0.2–1)
BUN SERPL-MCNC: 12 MG/DL (ref 5–25)
CALCIUM SERPL-MCNC: 9.5 MG/DL (ref 8.4–10.2)
CHLORIDE SERPL-SCNC: 103 MMOL/L (ref 96–108)
CHOLEST SERPL-MCNC: 205 MG/DL
CO2 SERPL-SCNC: 26 MMOL/L (ref 21–32)
CREAT SERPL-MCNC: 0.58 MG/DL (ref 0.6–1.3)
CRP SERPL QL: 1.1 MG/L
EOSINOPHIL # BLD AUTO: 0.34 THOUSAND/ÂΜL (ref 0–0.61)
EOSINOPHIL NFR BLD AUTO: 6 % (ref 0–6)
ERYTHROCYTE [DISTWIDTH] IN BLOOD BY AUTOMATED COUNT: 11.8 % (ref 11.6–15.1)
EST. AVERAGE GLUCOSE BLD GHB EST-MCNC: 100 MG/DL
GFR SERPL CREATININE-BSD FRML MDRD: 126 ML/MIN/1.73SQ M
GLUCOSE P FAST SERPL-MCNC: 101 MG/DL (ref 65–99)
HBA1C MFR BLD: 5.1 %
HCT VFR BLD AUTO: 39.5 % (ref 34.8–46.1)
HDLC SERPL-MCNC: 64 MG/DL
HGB BLD-MCNC: 13.6 G/DL (ref 11.5–15.4)
HIV 1+2 AB+HIV1 P24 AG SERPL QL IA: NORMAL
HIV 2 AB SERPL QL IA: NORMAL
HIV1 AB SERPL QL IA: NORMAL
HIV1 P24 AG SERPL QL IA: NORMAL
IMM GRANULOCYTES # BLD AUTO: 0 THOUSAND/UL (ref 0–0.2)
IMM GRANULOCYTES NFR BLD AUTO: 0 % (ref 0–2)
LDLC SERPL CALC-MCNC: 113 MG/DL (ref 0–100)
LYMPHOCYTES # BLD AUTO: 2.58 THOUSANDS/ÂΜL (ref 0.6–4.47)
LYMPHOCYTES NFR BLD AUTO: 44 % (ref 14–44)
MCH RBC QN AUTO: 29.8 PG (ref 26.8–34.3)
MCHC RBC AUTO-ENTMCNC: 34.4 G/DL (ref 31.4–37.4)
MCV RBC AUTO: 87 FL (ref 82–98)
MONOCYTES # BLD AUTO: 0.45 THOUSAND/ÂΜL (ref 0.17–1.22)
MONOCYTES NFR BLD AUTO: 8 % (ref 4–12)
NEUTROPHILS # BLD AUTO: 2.42 THOUSANDS/ÂΜL (ref 1.85–7.62)
NEUTS SEG NFR BLD AUTO: 41 % (ref 43–75)
NONHDLC SERPL-MCNC: 141 MG/DL
NRBC BLD AUTO-RTO: 0 /100 WBCS
PLATELET # BLD AUTO: 281 THOUSANDS/UL (ref 149–390)
PMV BLD AUTO: 9.1 FL (ref 8.9–12.7)
POTASSIUM SERPL-SCNC: 3.7 MMOL/L (ref 3.5–5.3)
PROT SERPL-MCNC: 7.7 G/DL (ref 6.4–8.4)
RBC # BLD AUTO: 4.56 MILLION/UL (ref 3.81–5.12)
SODIUM SERPL-SCNC: 137 MMOL/L (ref 135–147)
TRIGL SERPL-MCNC: 142 MG/DL
TSH SERPL DL<=0.05 MIU/L-ACNC: 1.72 UIU/ML (ref 0.45–4.5)
WBC # BLD AUTO: 5.84 THOUSAND/UL (ref 4.31–10.16)

## 2024-07-18 PROCEDURE — 86140 C-REACTIVE PROTEIN: CPT

## 2024-07-18 PROCEDURE — 36415 COLL VENOUS BLD VENIPUNCTURE: CPT

## 2024-07-18 PROCEDURE — 80061 LIPID PANEL: CPT

## 2024-07-18 PROCEDURE — 84443 ASSAY THYROID STIM HORMONE: CPT

## 2024-07-18 PROCEDURE — 87389 HIV-1 AG W/HIV-1&-2 AB AG IA: CPT

## 2024-07-18 PROCEDURE — 85025 COMPLETE CBC W/AUTO DIFF WBC: CPT

## 2024-07-18 PROCEDURE — 83036 HEMOGLOBIN GLYCOSYLATED A1C: CPT

## 2024-07-18 PROCEDURE — 80053 COMPREHEN METABOLIC PANEL: CPT

## 2024-07-18 NOTE — TELEPHONE ENCOUNTER
Patient called to cancel her appt today.  Today is technically her 4th no show within a 6 month period.  It was explained to patient that she has exceeded the policy but I am willing to schedule her 1 more appt and that moving forward we require 24 hours notice for all cancellations.  Patient verbally understood and was given next appt date.

## 2024-07-19 ENCOUNTER — APPOINTMENT (OUTPATIENT)
Dept: LAB | Facility: HOSPITAL | Age: 29
End: 2024-07-19
Payer: COMMERCIAL

## 2024-07-19 DIAGNOSIS — K50.012 CROHN'S DISEASE OF SMALL INTESTINE WITH INTESTINAL OBSTRUCTION (HCC): ICD-10-CM

## 2024-07-19 PROCEDURE — 83993 ASSAY FOR CALPROTECTIN FECAL: CPT

## 2024-07-21 LAB — CALPROTECTIN STL-MCNC: 7.9 ÂΜG/G

## 2024-07-29 ENCOUNTER — HOSPITAL ENCOUNTER (OUTPATIENT)
Dept: NON INVASIVE DIAGNOSTICS | Facility: CLINIC | Age: 29
Discharge: HOME | End: 2024-07-29
Payer: COMMERCIAL

## 2024-07-29 DIAGNOSIS — R00.2 PALPITATIONS: ICD-10-CM

## 2024-07-29 DIAGNOSIS — K50.012 CROHN'S DISEASE OF SMALL INTESTINE WITH INTESTINAL OBSTRUCTION (HCC): Primary | ICD-10-CM

## 2024-07-29 PROCEDURE — 93225 XTRNL ECG REC<48 HRS REC: CPT

## 2024-07-29 PROCEDURE — 93226 XTRNL ECG REC<48 HR SCAN A/R: CPT

## 2024-07-29 RX ORDER — ACETAMINOPHEN 325 MG/1
650 TABLET ORAL ONCE
Status: CANCELLED | OUTPATIENT
Start: 2024-07-31

## 2024-07-29 RX ORDER — METHYLPREDNISOLONE SODIUM SUCCINATE 40 MG/ML
40 INJECTION, POWDER, LYOPHILIZED, FOR SOLUTION INTRAMUSCULAR; INTRAVENOUS ONCE
Status: CANCELLED | OUTPATIENT
Start: 2024-07-31

## 2024-07-29 RX ORDER — SODIUM CHLORIDE 9 MG/ML
20 INJECTION, SOLUTION INTRAVENOUS ONCE
Status: CANCELLED | OUTPATIENT
Start: 2024-07-31

## 2024-07-29 RX ORDER — DIPHENHYDRAMINE HCL 25 MG
25 TABLET ORAL ONCE
Status: CANCELLED | OUTPATIENT
Start: 2024-07-31

## 2024-07-30 PROCEDURE — 93227 XTRNL ECG REC<48 HR R&I: CPT | Performed by: INTERNAL MEDICINE

## 2024-07-31 ENCOUNTER — OFFICE VISIT (OUTPATIENT)
Dept: GASTROENTEROLOGY | Facility: CLINIC | Age: 29
End: 2024-07-31
Payer: COMMERCIAL

## 2024-07-31 ENCOUNTER — TELEPHONE (OUTPATIENT)
Age: 29
End: 2024-07-31

## 2024-07-31 ENCOUNTER — HOSPITAL ENCOUNTER (OUTPATIENT)
Dept: INFUSION CENTER | Facility: CLINIC | Age: 29
Discharge: HOME | End: 2024-07-31
Payer: COMMERCIAL

## 2024-07-31 VITALS
BODY MASS INDEX: 21.56 KG/M2 | HEART RATE: 58 BPM | TEMPERATURE: 98.5 F | RESPIRATION RATE: 18 BRPM | SYSTOLIC BLOOD PRESSURE: 117 MMHG | WEIGHT: 125.6 LBS | DIASTOLIC BLOOD PRESSURE: 57 MMHG

## 2024-07-31 VITALS
WEIGHT: 126 LBS | BODY MASS INDEX: 21.51 KG/M2 | HEIGHT: 64 IN | DIASTOLIC BLOOD PRESSURE: 64 MMHG | SYSTOLIC BLOOD PRESSURE: 98 MMHG | TEMPERATURE: 98 F | HEART RATE: 78 BPM | OXYGEN SATURATION: 98 %

## 2024-07-31 DIAGNOSIS — K50.012 CROHN'S DISEASE OF SMALL INTESTINE WITH INTESTINAL OBSTRUCTION (HCC): Primary | ICD-10-CM

## 2024-07-31 PROCEDURE — 99213 OFFICE O/P EST LOW 20 MIN: CPT | Performed by: PHYSICIAN ASSISTANT

## 2024-07-31 RX ORDER — METHYLPREDNISOLONE SODIUM SUCCINATE 40 MG/ML
40 INJECTION, POWDER, LYOPHILIZED, FOR SOLUTION INTRAMUSCULAR; INTRAVENOUS ONCE
Status: COMPLETED | OUTPATIENT
Start: 2024-07-31 | End: 2024-07-31

## 2024-07-31 RX ORDER — ACETAMINOPHEN 325 MG/1
650 TABLET ORAL ONCE
Status: COMPLETED | OUTPATIENT
Start: 2024-07-31 | End: 2024-07-31

## 2024-07-31 RX ORDER — ACETAMINOPHEN 325 MG/1
650 TABLET ORAL ONCE
OUTPATIENT
Start: 2024-09-25

## 2024-07-31 RX ORDER — DIPHENHYDRAMINE HCL 25 MG
25 TABLET ORAL ONCE
OUTPATIENT
Start: 2024-09-25

## 2024-07-31 RX ORDER — SODIUM CHLORIDE 9 MG/ML
20 INJECTION, SOLUTION INTRAVENOUS ONCE
Status: COMPLETED | OUTPATIENT
Start: 2024-07-31 | End: 2024-07-31

## 2024-07-31 RX ORDER — DIPHENHYDRAMINE HCL 25 MG
25 TABLET ORAL ONCE
Status: COMPLETED | OUTPATIENT
Start: 2024-07-31 | End: 2024-07-31

## 2024-07-31 RX ORDER — SODIUM CHLORIDE 9 MG/ML
20 INJECTION, SOLUTION INTRAVENOUS ONCE
OUTPATIENT
Start: 2024-09-25

## 2024-07-31 RX ORDER — METHYLPREDNISOLONE SODIUM SUCCINATE 40 MG/ML
40 INJECTION, POWDER, LYOPHILIZED, FOR SOLUTION INTRAMUSCULAR; INTRAVENOUS ONCE
OUTPATIENT
Start: 2024-09-25

## 2024-07-31 RX ADMIN — SODIUM CHLORIDE 20 ML/HR: 0.9 INJECTION, SOLUTION INTRAVENOUS at 11:26

## 2024-07-31 RX ADMIN — DIPHENHYDRAMINE HYDROCHLORIDE 25 MG: 25 TABLET ORAL at 11:25

## 2024-07-31 RX ADMIN — INFLIXIMAB-AXXQ 286 MG: 100 INJECTION, POWDER, LYOPHILIZED, FOR SOLUTION INTRAVENOUS at 12:08

## 2024-07-31 RX ADMIN — ACETAMINOPHEN 650 MG: 325 TABLET, FILM COATED ORAL at 11:25

## 2024-07-31 RX ADMIN — METHYLPREDNISOLONE SODIUM SUCCINATE 40 MG: 40 INJECTION, POWDER, FOR SOLUTION INTRAMUSCULAR; INTRAVENOUS at 11:25

## 2024-07-31 NOTE — PROGRESS NOTES
Pt into clinic for avsola. Pt offers no complaints. Denies reecnt infection/illness/abx use.     Tolerated infusion without reaction. PIV removed. Pt aware of next appointment on 9/25/24 at 12pm. AVS declined.

## 2024-07-31 NOTE — PROGRESS NOTES
[facility] - Outpatient Follow-up Note  Arminda Gutierrez 29 y.o. female MRN: [id number]  Encounter: [id number]          ASSESSMENT AND PLAN:      1. Crohn's disease of small intestine with intestinal obstruction (HCC)  Dx 2015  Initially treated with Humira however stopped due to recurrent fungal infections  Colonoscopy 7/2021 with terminal ileitis  She started Stelara in the fall of 2021  Still having loose stools and diarrhea into 2023 with imaging suggesting ongoing terminal ileum inflammation and colonoscopy noting rectal erythema  She began to struggle with Stelara injections noting they were painful    Transitioned to Remicade in the fall of 2023    She is tolerating the infusions  Initial concern for eye itching and swelling but seems to have tolerated todays infusion without issues  She notes increased symptoms within 2 weeks of becoming due for infusion    Recent fecal calprotectin and CRP are normal    We had a long discussion about healthy diet and incorporating more fiber    Will check Infliximab levels prior to next infusion of Avsola    F/U after next infusion  ______________________________________________________________________    SUBJECTIVE: 29-year-old female with a long history of small bowel Crohn's disease who presents for routine follow-up.  Patient has had a difficult course with her Crohn's disease.  She has been hospitalized several times most recently in 2021 with a small bowel intussusception.  She was initially treated with Humira between the years of 2015 and 2017.  Unfortunately, she developed recurrent fungal infections and so stopped this on her own.  After her hospitalization in 2021 she was put on Stelara.  She maintained on this for several years.  Despite imaging looking mostly okay she continues to report diarrhea and abdominal pain.  She was also reporting that the injections were painful.  She wished to transition to a different medication as of  October 2023.  At this point she was transition to Remicade.  She seems to be tolerating this okay.  She has complained of some eye itching or swelling at the time of the infusion but seems to have tolerated today's infusion without incident.  She reports that her abdominal pain is improved.  She still has multiple stools throughout the day however they are formed and small.  There is no rectal bleeding.  Her last colonoscopy in 2023 noted rectal erythema but normal terminal ileum.  Recent fecal calprotectin and CRP are normal.  She admits that she does not have a very healthy diet.  She probably does not eat enough fiber.  She continues to struggle with anxiety but is now seeing her family doctor regarding this.      REVIEW OF SYSTEMS IS OTHERWISE NEGATIVE.      Historical Information   Past Medical History:   Diagnosis Date    Anxiety     COVID 08/08/2022    Crohn disease (HCC)     Depression     Inflammatory bowel disease 12/2015    Seizures (HCC)     as a child    Suicide attempt (HCC)      Past Surgical History:   Procedure Laterality Date    APPENDECTOMY LAPAROSCOPIC N/A 11/10/2023    Procedure: APPENDECTOMY LAPAROSCOPIC;  Surgeon: Mane Kent MD;  [facility];  Service: General    COLONOSCOPY      VULVA BIOPSY Right 8/26/2020    Procedure: Excision of Right Labial Lesion;  Surgeon: Maegan Magana MD;  [facility];  Service: Gynecology    VULVA BIOPSY N/A 10/21/2022    Procedure: RESECTION OF RIGHT LABIAL CYST;  Surgeon: Maegan Magana MD;  [facility];  Service: Gynecology     Social History   Social History     Substance and Sexual Activity   Alcohol Use Yes    Comment: very occasional     Social History     Substance and Sexual Activity   Drug Use Yes    Frequency: 7.0 times per week    Types: Marijuana    Comment: Medical Marijuana     Social History     Tobacco Use   Smoking Status Never   Smokeless Tobacco Never     Family History   Problem Relation  "Age of Onset    Vitiligo Paternal Grandfather     Stroke Paternal Grandfather     Lupus Paternal Aunt     Completed Suicide  Maternal Grandmother     Autoimmune disease Paternal Aunt        Meds/Allergies     No current outpatient medications on file.  No current facility-administered medications for this visit.    Facility-Administered Medications Ordered in Other Visits:     alteplase (CATHFLO) injection 2 mg, 2 mg, Intracatheter, Q1MIN PRN    Allergies   Allergen Reactions    Amoxicillin Anaphylaxis           Objective     Blood pressure 98/64, pulse 78, temperature 98 °F (36.7 °C), temperature source Tympanic, height 5' 4\" (1.626 m), weight 57.2 kg (126 lb), SpO2 98%, not currently breastfeeding. Body mass index is 21.63 kg/m².      PHYSICAL EXAM:      General Appearance:   Alert, cooperative, no distress   HEENT:   Normocephalic, atraumatic, anicteric.     Neck:  Supple, symmetrical, trachea midline   Lungs:   Clear to auscultation bilaterally; no rales, rhonchi or wheezing; respirations unlabored    Heart::   Regular rate and rhythm; no murmur, rub, or gallop.   Abdomen:   Soft, non-tender, non-distended; normal bowel sounds; no masses, no organomegaly    Genitalia:   Deferred    Rectal:   Deferred    Extremities:  No cyanosis, clubbing or edema    Pulses:  2+ and symmetric    Skin:  No jaundice, rashes, or lesions    Lymph nodes:  No palpable cervical lymphadenopathy        Lab Results:   No visits with results within 1 Day(s) from this visit.   Latest known visit with results is:   Appointment on 07/19/2024   Component Date Value    Calprotectin, Fecal 07/19/2024 7.90          Radiology Results:   Holter monitor    Result Date: 7/30/2024  Narrative: INDICATIONS: Palpitations     Impression: This Holter monitoring and analysis was done for a period of 25 hours and 24 minutes. The rhythm was sinus. Minimum heart rate was 42 bpm. Average heart rate was 63 bpm. Maximum heart rate was 106 bpm. No ventricular " ectopic activity was recorded. Supraventricular ectopic activity consisted of 9 beats, of which 2 were in atrial couplets, 4 were late beats, 3 were single PACs. No irregular rhythm episodes were detected. Patient's rhythm included 13 hours 12 minutes 20 seconds of bradycardia. The slowest single episode of bradycardia lasted 5 minutes and 43 seconds with a minimum heart rate of 42 bpm.The longest R-R interval 1.6 seconds. Patient's rhythm included 1 minutes 2 seconds of tachycardia. The fastest single episode of tachycardia lasted 10 seconds with a maximum heart rate of 106 bpm. Patient's 4 complaints of palpitations had underlying sinus rhythm with 1 episode having an associated isolated PAC. CONCLUSIONS: Sinus rhythm with minimal ectopic activity as detailed above. Patient's 4 complaints of palpitations had underlying sinus rhythm with 1 episode having an associated isolated PAC.

## 2024-07-31 NOTE — TELEPHONE ENCOUNTER
Patients GI provider:  LACI Tatum     Number to return call: [phone]    Reason for call: Pt calling because she has 1:30 with Yris today but is having an infusion now and is not sure she will be done in time, I don't have anything later on my schedule for Yris but PT is asking if its at all possible to come in later as the next appt isn't until 09.16. I reached out via teams and phone but was unable to connect with the office. I let the Pt know they may all be with Pt's right now and asked if she would like to hold the appt to see if she can make it or go ahead and reschedule.   She is holding the appt for now but, because the infusion started late, thinks there is little chance of making it by 1:30, she would still like to ask Yris if it would be possible to be seen later today. I let her know I would ask the office and we would call her back asap.     Scheduled procedure/appointment date if applicable: 07.31.24

## 2024-08-06 ENCOUNTER — TELEPHONE (OUTPATIENT)
Dept: GASTROENTEROLOGY | Facility: CLINIC | Age: 29
End: 2024-08-06

## 2024-08-06 NOTE — TELEPHONE ENCOUNTER
Patients GI provider:  CARINA Mg    Number to return call: [phone]    Reason for call: Pt called in requesting a work note due to having too many bowel movements. Patient is requesting note sent to email address at [email or web address]. Please reach out to patient at the number above once email has been sent, thank you.    Scheduled procedure/appointment date if applicable: Apt/procedure 10/11/2024

## 2024-08-06 NOTE — LETTER
August 6, 2024         Patient: Arminda Gutierrez   YOB: 1995         To whom it may concern:      Please excuse Arminda Gutierrez from work on 08/05/2024 and 08/06/2024 due to gastrointestinal issues.  She can return to work on 08/09/2024 with no restrictions.    If you have any questions or concerns, please don't hesitate to call.        Sincerely,        Haritha Tatum PA-C

## 2024-08-09 NOTE — TELEPHONE ENCOUNTER
Pt calling back for have an extension for another day. Please have Yris review and provide updated letter. Please call pt w/update.

## 2024-08-12 ENCOUNTER — TELEPHONE (OUTPATIENT)
Dept: GASTROENTEROLOGY | Facility: CLINIC | Age: 29
End: 2024-08-12

## 2024-08-12 NOTE — LETTER
August 12, 2024         Patient: Arminda Gutierrez   YOB: 1995            To whom it may concern:        Please excuse Arminda Gutierrez from work on 08/05/2024 08/06/2024 and 08/09/2024 due to gastrointestinal issues. If you have any questions or concerns, please don't hesitate to call.           Sincerely,           Haritha Tatum PA-C

## 2024-08-13 ENCOUNTER — HOSPITAL ENCOUNTER (EMERGENCY)
Facility: HOSPITAL | Age: 29
Discharge: HOME | End: 2024-08-13
Attending: EMERGENCY MEDICINE
Payer: COMMERCIAL

## 2024-08-13 VITALS
WEIGHT: 126.98 LBS | RESPIRATION RATE: 20 BRPM | HEART RATE: 98 BPM | TEMPERATURE: 97.9 F | BODY MASS INDEX: 21.8 KG/M2 | SYSTOLIC BLOOD PRESSURE: 116 MMHG | OXYGEN SATURATION: 98 % | DIASTOLIC BLOOD PRESSURE: 77 MMHG

## 2024-08-13 DIAGNOSIS — J06.9 VIRAL URI: ICD-10-CM

## 2024-08-13 DIAGNOSIS — J02.9 VIRAL PHARYNGITIS: Primary | ICD-10-CM

## 2024-08-13 LAB
FLUAV RNA RESP QL NAA+PROBE: NEGATIVE
FLUBV RNA RESP QL NAA+PROBE: NEGATIVE
RSV RNA RESP QL NAA+PROBE: NEGATIVE
S PYO DNA THROAT QL NAA+PROBE: NOT DETECTED
SARS-COV-2 RNA RESP QL NAA+PROBE: NEGATIVE

## 2024-08-13 PROCEDURE — 0241U HB NFCT DS VIR RESP RNA 4 TRGT: CPT | Performed by: EMERGENCY MEDICINE

## 2024-08-13 PROCEDURE — 96372 THER/PROPH/DIAG INJ SC/IM: CPT

## 2024-08-13 PROCEDURE — 99284 EMERGENCY DEPT VISIT MOD MDM: CPT | Performed by: EMERGENCY MEDICINE

## 2024-08-13 PROCEDURE — 99283 EMERGENCY DEPT VISIT LOW MDM: CPT

## 2024-08-13 PROCEDURE — 87651 STREP A DNA AMP PROBE: CPT | Performed by: EMERGENCY MEDICINE

## 2024-08-13 RX ORDER — KETOROLAC TROMETHAMINE 30 MG/ML
15 INJECTION, SOLUTION INTRAMUSCULAR; INTRAVENOUS ONCE
Status: COMPLETED | OUTPATIENT
Start: 2024-08-13 | End: 2024-08-13

## 2024-08-13 RX ADMIN — KETOROLAC TROMETHAMINE 15 MG: 30 INJECTION, SOLUTION INTRAMUSCULAR; INTRAVENOUS at 10:15

## 2024-08-13 RX ADMIN — DEXAMETHASONE SODIUM PHOSPHATE 10 MG: 10 INJECTION, SOLUTION INTRAMUSCULAR; INTRAVENOUS at 10:15

## 2024-08-13 NOTE — Clinical Note
Arminda Gutierrez was seen and treated in our emergency department on 8/13/2024.    No restrictions            Diagnosis:     Arminda  may return to work on return date.    She may return on this date: 08/16/2024         If you have any questions or concerns, please don't hesitate to call.      Peyton Smith RN    ______________________________           _______________          _______________  Hospital Representative                              Date                                Time

## 2024-08-13 NOTE — ED PROVIDER NOTES
History  Chief Complaint   Patient presents with    Sore Throat    Flu Symptoms     Flu symptoms and sore throat for the past 2 days. Subjective fevers at home.      28 y/o female presents to the ED for flu like symptoms x 2 days. She states that she has had sore throat, congestion, subjective fever, chills, body aches, and headache.  No known sick contacts. She has tried nyquil last week without any relief. She denies any cp, sob, cough, abd pain, or n/v/d. No other complaints.       History provided by:  Patient  Sore Throat  Location:  Generalized  Quality:  Sore  Severity:  Moderate  Onset quality:  Sudden  Timing:  Constant  Progression:  Worsening  Chronicity:  New  Relieved by:  None tried  Worsened by:  Nothing  Ineffective treatments:  None tried  Associated symptoms: fever    Associated symptoms: no abdominal pain, no chest pain, no chills, no cough, no ear pain, no headaches, no neck stiffness, no rash and no shortness of breath    Risk factors: sick contacts    Flu Symptoms  Presenting symptoms: fever and sore throat    Presenting symptoms: no cough, no diarrhea, no headaches, no nausea, no shortness of breath and no vomiting    Severity:  Moderate  Onset quality:  Sudden  Progression:  Worsening  Chronicity:  New  Relieved by:  None tried  Worsened by:  Nothing  Ineffective treatments:  None tried  Associated symptoms: nasal congestion    Associated symptoms: no chills, no ear pain and no neck stiffness    Risk factors: no sick contacts        None       Past Medical History:   Diagnosis Date    Anxiety     COVID 08/08/2022    Crohn disease (HCC)     Depression     Inflammatory bowel disease 12/2015    Seizures (HCC)     as a child    Suicide attempt (HCC)        Past Surgical History:   Procedure Laterality Date    APPENDECTOMY LAPAROSCOPIC N/A 11/10/2023    Procedure: APPENDECTOMY LAPAROSCOPIC;  Surgeon: Mane Kent MD;  Location: [facility] OR;  Service: General    COLONOSCOPY      VULVA BIOPSY  Right 8/26/2020    Procedure: Excision of Right Labial Lesion;  Surgeon: Maegan Magana MD;  [facility];  Service: Gynecology    VULVA BIOPSY N/A 10/21/2022    Procedure: RESECTION OF RIGHT LABIAL CYST;  Surgeon: Maegan Magana MD;  [facility];  Service: Gynecology       Family History   Problem Relation Age of Onset    Vitiligo Paternal Grandfather     Stroke Paternal Grandfather     Lupus Paternal Aunt     Completed Suicide  Maternal Grandmother     Autoimmune disease Paternal Aunt      I have reviewed and agree with the history as documented.    E-Cigarette/Vaping    E-Cigarette Use Never User      E-Cigarette/Vaping Substances    Nicotine No     THC No     CBD No     Flavoring No     Other No     Unknown No      Social History     Tobacco Use    Smoking status: Never    Smokeless tobacco: Never   Vaping Use    Vaping status: Never Used   Substance Use Topics    Alcohol use: Yes     Comment: very occasional    Drug use: Yes     Frequency: 7.0 times per week     Types: Marijuana     Comment: Medical Marijuana       Review of Systems   Constitutional:  Positive for fever. Negative for chills.   HENT:  Positive for congestion and sore throat. Negative for ear pain.    Eyes:  Negative for pain and visual disturbance.   Respiratory:  Negative for cough, shortness of breath and wheezing.    Cardiovascular:  Negative for chest pain and leg swelling.   Gastrointestinal:  Negative for abdominal pain, diarrhea, nausea and vomiting.   Genitourinary:  Negative for dysuria, frequency, hematuria and urgency.   Musculoskeletal:  Negative for neck pain and neck stiffness.   Skin:  Negative for rash and wound.   Neurological:  Negative for weakness, numbness and headaches.   Psychiatric/Behavioral:  Negative for agitation and confusion.    All other systems reviewed and are negative.      Physical Exam  Physical Exam  Vitals and nursing note reviewed.   Constitutional:       Appearance: She  is well-developed.   HENT:      Head: Normocephalic and atraumatic.      Mouth/Throat:      Mouth: Mucous membranes are moist.      Pharynx: Uvula midline. Oropharyngeal exudate and posterior oropharyngeal erythema present.      Comments: No signs of peritonsillar abscess  Eyes:      Pupils: Pupils are equal, round, and reactive to light.   Cardiovascular:      Rate and Rhythm: Normal rate and regular rhythm.   Pulmonary:      Effort: Pulmonary effort is normal.      Breath sounds: Normal breath sounds.   Abdominal:      General: Bowel sounds are normal.      Palpations: Abdomen is soft.   Musculoskeletal:         General: Normal range of motion.      Cervical back: Normal range of motion and neck supple.   Skin:     General: Skin is warm and dry.   Neurological:      Mental Status: She is alert and oriented to person, place, and time.      Comments: No focal deficits         Vital Signs  ED Triage Vitals   Temperature Pulse Respirations Blood Pressure SpO2   08/13/24 0953 08/13/24 0953 08/13/24 0953 08/13/24 0953 08/13/24 0953   97.9 °F (36.6 °C) 98 20 116/77 98 %      Temp Source Heart Rate Source Patient Position - Orthostatic VS BP Location FiO2 (%)   08/13/24 0953 08/13/24 0953 08/13/24 0953 08/13/24 0953 --   Temporal Monitor Sitting Left arm       Pain Score       08/13/24 1015       6           Vitals:    08/13/24 0953   BP: 116/77   Pulse: 98   Patient Position - Orthostatic VS: Sitting         Visual Acuity      ED Medications  Medications   dexamethasone oral liquid 10 mg 1 mL (10 mg Oral Given 8/13/24 1015)   ketorolac (TORADOL) injection 15 mg (15 mg Intramuscular Given 8/13/24 1015)       Diagnostic Studies  Results Reviewed       Procedure Component Value Units Date/Time    Strep A PCR [[id number]]  (Normal) Collected: 08/13/24 0956    Lab Status: Final result Specimen: Throat Updated: 08/13/24 1113     STREP A PCR Not Detected    FLU/RSV/COVID - if FLU/RSV clinically relevant [[id number]]  (Normal)  Collected: 08/13/24 0956    Lab Status: Final result Specimen: Nares from Nose Updated: 08/13/24 1046     SARS-CoV-2 Negative     INFLUENZA A PCR Negative     INFLUENZA B PCR Negative     RSV PCR Negative    Narrative:      FOR PEDIATRIC PATIENTS - copy/paste COVID Guidelines URL to browser: [email or web address]    SARS-CoV-2 assay is a Nucleic Acid Amplification assay intended for the  qualitative detection of nucleic acid from SARS-CoV-2 in nasopharyngeal  swabs. Results are for the presumptive identification of SARS-CoV-2 RNA.    Positive results are indicative of infection with SARS-CoV-2, the virus  causing COVID-19, but do not rule out bacterial infection or co-infection  with other viruses. Laboratories within the [place] and its  territories are required to report all positive results to the appropriate  public health authorities. Negative results do not preclude SARS-CoV-2  infection and should not be used as the sole basis for treatment or other  patient management decisions. Negative results must be combined with  clinical observations, patient history, and epidemiological information.  This test has not been FDA cleared or approved.    This test has been authorized by FDA under an Emergency Use Authorization  (EUA). This test is only authorized for the duration of time the  declaration that circumstances exist justifying the authorization of the  emergency use of an in vitro diagnostic tests for detection of SARS-CoV-2  virus and/or diagnosis of COVID-19 infection under section 564(b)(1) of  the Act, 21 U.S.C. 360bbb-3(b)(1), unless the authorization is terminated  or revoked sooner. The test has been validated but independent review by FDA  and CLIA is pending.    Test performed using [employer]: This RT-PCR assay targets N2,  a region unique to SARS-CoV-2. A conserved region in the E-gene was chosen  for pan-Sarbecovirus detection which  includes SARS-CoV-2.    According to CMS-2020-01-R, this platform meets the definition of high-throughput technology.                   No orders to display              Procedures  Procedures         ED Course                                               Medical Decision Making  29-year-old female with flulike symptoms-will get strep, COVID/flu/RSV, and give Decadron, Toradol, and reassess.    Amount and/or Complexity of Data Reviewed  Labs: ordered.    Risk  Prescription drug management.                 Disposition  Final diagnoses:   Viral pharyngitis   Viral URI     Time reflects when diagnosis was documented in both MDM as applicable and the Disposition within this note       Time User Action Codes Description Comment    8/13/2024 11:23 AM Zoey Gomez [J02.9] Viral pharyngitis     8/13/2024 11:23 AM Zoey Gomez Add [J06.9] Viral URI           ED Disposition       ED Disposition   Discharge    Condition   Stable    Date/Time   Tue Aug 13, 2024 11:20 AM    Comment   Arminda Gutierrez discharge to home/self care.                   Follow-up Information       Follow up With Specialties Details Why Contact Info Additional Information    Radha Odell PA-C Internal Medicine, Physician Assistant Call in 1 day for follow up within 2-3 days [address]  [address]  [facility] [id number] [phone]       [facility] Emergency Department Emergency Medicine Go to  immediately for any new or worsening symptoms [address] [facility] [id number] [phone] [facility] Emergency Department, [address] [place], [place], [address]            There are no discharge medications for this patient.      No discharge procedures on file.    PDMP Review         Value Time User    PDMP Reviewed  Yes 10/18/2019  1:59 AM Josef Verma MD            ED Provider  Electronically Signed by             Zoey Gomez DO  08/13/24 7059

## 2024-08-15 ENCOUNTER — HOSPITAL ENCOUNTER (EMERGENCY)
Facility: HOSPITAL | Age: 29
Discharge: HOME | End: 2024-08-15
Attending: EMERGENCY MEDICINE
Payer: COMMERCIAL

## 2024-08-15 VITALS
TEMPERATURE: 97.7 F | OXYGEN SATURATION: 100 % | HEART RATE: 92 BPM | DIASTOLIC BLOOD PRESSURE: 55 MMHG | RESPIRATION RATE: 20 BRPM | SYSTOLIC BLOOD PRESSURE: 97 MMHG

## 2024-08-15 DIAGNOSIS — J02.0 STREP PHARYNGITIS: Primary | ICD-10-CM

## 2024-08-15 PROCEDURE — 99283 EMERGENCY DEPT VISIT LOW MDM: CPT

## 2024-08-15 PROCEDURE — 99284 EMERGENCY DEPT VISIT MOD MDM: CPT | Performed by: EMERGENCY MEDICINE

## 2024-08-15 RX ORDER — AZITHROMYCIN 500 MG/1
500 TABLET, FILM COATED ORAL EVERY 24 HOURS
Qty: 6 TABLET | Refills: 0 | Status: SHIPPED | OUTPATIENT
Start: 2024-08-15 | End: 2024-08-22

## 2024-08-15 RX ORDER — AZITHROMYCIN 500 MG/1
500 TABLET, FILM COATED ORAL ONCE
Status: COMPLETED | OUTPATIENT
Start: 2024-08-15 | End: 2024-08-15

## 2024-08-15 RX ADMIN — DEXAMETHASONE SODIUM PHOSPHATE 10 MG: 10 INJECTION, SOLUTION INTRAMUSCULAR; INTRAVENOUS at 11:38

## 2024-08-15 RX ADMIN — AZITHROMYCIN 500 MG: 500 TABLET, FILM COATED ORAL at 11:37

## 2024-08-15 NOTE — Clinical Note
Arminda Gutierrez was seen and treated in our emergency department on 8/15/2024.                Diagnosis:     Arminda  may return to work on return date.    She may return on this date: 08/17/2024         If you have any questions or concerns, please don't hesitate to call.      Gavin Calvo RN    ______________________________           _______________          _______________  Hospital Representative                              Date                                Time

## 2024-08-16 ENCOUNTER — APPOINTMENT (EMERGENCY)
Dept: CT IMAGING | Facility: HOSPITAL | Age: 29
End: 2024-08-16
Payer: COMMERCIAL

## 2024-08-16 ENCOUNTER — HOSPITAL ENCOUNTER (EMERGENCY)
Facility: HOSPITAL | Age: 29
Discharge: HOME | End: 2024-08-16
Attending: EMERGENCY MEDICINE
Payer: COMMERCIAL

## 2024-08-16 VITALS
RESPIRATION RATE: 18 BRPM | TEMPERATURE: 97.5 F | OXYGEN SATURATION: 99 % | SYSTOLIC BLOOD PRESSURE: 95 MMHG | HEART RATE: 96 BPM | DIASTOLIC BLOOD PRESSURE: 65 MMHG

## 2024-08-16 DIAGNOSIS — J03.90 TONSILLITIS: Primary | ICD-10-CM

## 2024-08-16 LAB
ALBUMIN SERPL BCG-MCNC: 4.3 G/DL (ref 3.5–5)
ALP SERPL-CCNC: 40 U/L (ref 34–104)
ALT SERPL W P-5'-P-CCNC: 11 U/L (ref 7–52)
ANION GAP SERPL CALCULATED.3IONS-SCNC: 10 MMOL/L (ref 4–13)
AST SERPL W P-5'-P-CCNC: 21 U/L (ref 13–39)
BASOPHILS # BLD MANUAL: 0 THOUSAND/UL (ref 0–0.1)
BASOPHILS NFR MAR MANUAL: 0 % (ref 0–1)
BILIRUB SERPL-MCNC: 0.5 MG/DL (ref 0.2–1)
BUN SERPL-MCNC: 13 MG/DL (ref 5–25)
CALCIUM SERPL-MCNC: 8.9 MG/DL (ref 8.4–10.2)
CHLORIDE SERPL-SCNC: 100 MMOL/L (ref 96–108)
CO2 SERPL-SCNC: 25 MMOL/L (ref 21–32)
CREAT SERPL-MCNC: 0.65 MG/DL (ref 0.6–1.3)
EOSINOPHIL # BLD MANUAL: 0 THOUSAND/UL (ref 0–0.4)
EOSINOPHIL NFR BLD MANUAL: 0 % (ref 0–6)
ERYTHROCYTE [DISTWIDTH] IN BLOOD BY AUTOMATED COUNT: 12 % (ref 11.6–15.1)
GFR SERPL CREATININE-BSD FRML MDRD: 122 ML/MIN/1.73SQ M
GLUCOSE SERPL-MCNC: 92 MG/DL (ref 65–140)
HCG SERPL QL: NEGATIVE
HCT VFR BLD AUTO: 39.1 % (ref 34.8–46.1)
HGB BLD-MCNC: 13.5 G/DL (ref 11.5–15.4)
LIPASE SERPL-CCNC: 14 U/L (ref 11–82)
LYMPHOCYTES # BLD AUTO: 2.56 THOUSAND/UL (ref 0.6–4.47)
LYMPHOCYTES # BLD AUTO: 40 % (ref 14–44)
MCH RBC QN AUTO: 30.1 PG (ref 26.8–34.3)
MCHC RBC AUTO-ENTMCNC: 34.5 G/DL (ref 31.4–37.4)
MCV RBC AUTO: 87 FL (ref 82–98)
MONOCYTES # BLD AUTO: 0.5 THOUSAND/UL (ref 0–1.22)
MONOCYTES NFR BLD: 9 % (ref 4–12)
NEUTROPHILS # BLD MANUAL: 2.5 THOUSAND/UL (ref 1.85–7.62)
NEUTS BAND NFR BLD MANUAL: 4 % (ref 0–8)
NEUTS SEG NFR BLD AUTO: 41 % (ref 43–75)
PLATELET # BLD AUTO: 184 THOUSANDS/UL (ref 149–390)
PLATELET BLD QL SMEAR: ADEQUATE
PMV BLD AUTO: 9.3 FL (ref 8.9–12.7)
POTASSIUM SERPL-SCNC: 3.4 MMOL/L (ref 3.5–5.3)
PROT SERPL-MCNC: 7.7 G/DL (ref 6.4–8.4)
RBC # BLD AUTO: 4.48 MILLION/UL (ref 3.81–5.12)
S PYO DNA THROAT QL NAA+PROBE: NOT DETECTED
SODIUM SERPL-SCNC: 135 MMOL/L (ref 135–147)
VARIANT LYMPHS # BLD AUTO: 6 %
WBC # BLD AUTO: 5.56 THOUSAND/UL (ref 4.31–10.16)

## 2024-08-16 PROCEDURE — 96365 THER/PROPH/DIAG IV INF INIT: CPT

## 2024-08-16 PROCEDURE — 85027 COMPLETE CBC AUTOMATED: CPT | Performed by: EMERGENCY MEDICINE

## 2024-08-16 PROCEDURE — 80053 COMPREHEN METABOLIC PANEL: CPT | Performed by: EMERGENCY MEDICINE

## 2024-08-16 PROCEDURE — 99285 EMERGENCY DEPT VISIT HI MDM: CPT | Performed by: EMERGENCY MEDICINE

## 2024-08-16 PROCEDURE — 74177 CT ABD & PELVIS W/CONTRAST: CPT

## 2024-08-16 PROCEDURE — 85007 BL SMEAR W/DIFF WBC COUNT: CPT | Performed by: EMERGENCY MEDICINE

## 2024-08-16 PROCEDURE — 83690 ASSAY OF LIPASE: CPT | Performed by: EMERGENCY MEDICINE

## 2024-08-16 PROCEDURE — 87651 STREP A DNA AMP PROBE: CPT | Performed by: EMERGENCY MEDICINE

## 2024-08-16 PROCEDURE — 99284 EMERGENCY DEPT VISIT MOD MDM: CPT

## 2024-08-16 PROCEDURE — 36415 COLL VENOUS BLD VENIPUNCTURE: CPT | Performed by: EMERGENCY MEDICINE

## 2024-08-16 PROCEDURE — 86308 HETEROPHILE ANTIBODY SCREEN: CPT | Performed by: EMERGENCY MEDICINE

## 2024-08-16 PROCEDURE — 84703 CHORIONIC GONADOTROPIN ASSAY: CPT | Performed by: EMERGENCY MEDICINE

## 2024-08-16 PROCEDURE — 70491 CT SOFT TISSUE NECK W/DYE: CPT

## 2024-08-16 PROCEDURE — 93005 ELECTROCARDIOGRAM TRACING: CPT

## 2024-08-16 PROCEDURE — 96375 TX/PRO/DX INJ NEW DRUG ADDON: CPT

## 2024-08-16 PROCEDURE — 96361 HYDRATE IV INFUSION ADD-ON: CPT

## 2024-08-16 RX ORDER — LIDOCAINE HYDROCHLORIDE 20 MG/ML
15 SOLUTION OROPHARYNGEAL ONCE
Status: COMPLETED | OUTPATIENT
Start: 2024-08-16 | End: 2024-08-16

## 2024-08-16 RX ORDER — METHYLPREDNISOLONE 4 MG
TABLET, DOSE PACK ORAL
Qty: 21 TABLET | Refills: 0 | Status: SHIPPED | OUTPATIENT
Start: 2024-08-16

## 2024-08-16 RX ORDER — CLINDAMYCIN HCL 150 MG
300 CAPSULE ORAL ONCE
Status: COMPLETED | OUTPATIENT
Start: 2024-08-16 | End: 2024-08-16

## 2024-08-16 RX ORDER — ACETAMINOPHEN 10 MG/ML
1000 INJECTION, SOLUTION INTRAVENOUS ONCE
Status: COMPLETED | OUTPATIENT
Start: 2024-08-16 | End: 2024-08-16

## 2024-08-16 RX ORDER — CLINDAMYCIN HCL 300 MG
300 CAPSULE ORAL 3 TIMES DAILY
Qty: 30 CAPSULE | Refills: 0 | Status: SHIPPED | OUTPATIENT
Start: 2024-08-16 | End: 2024-08-26

## 2024-08-16 RX ORDER — KETOROLAC TROMETHAMINE 30 MG/ML
15 INJECTION, SOLUTION INTRAMUSCULAR; INTRAVENOUS ONCE
Status: COMPLETED | OUTPATIENT
Start: 2024-08-16 | End: 2024-08-16

## 2024-08-16 RX ADMIN — CLINDAMYCIN HYDROCHLORIDE 300 MG: 150 CAPSULE ORAL at 21:52

## 2024-08-16 RX ADMIN — KETOROLAC TROMETHAMINE 15 MG: 30 INJECTION, SOLUTION INTRAMUSCULAR; INTRAVENOUS at 19:51

## 2024-08-16 RX ADMIN — LIDOCAINE HYDROCHLORIDE 15 ML: 20 SOLUTION ORAL; TOPICAL at 19:51

## 2024-08-16 RX ADMIN — IOHEXOL 100 ML: 350 INJECTION, SOLUTION INTRAVENOUS at 20:27

## 2024-08-16 RX ADMIN — SODIUM CHLORIDE 1000 ML: 0.9 INJECTION, SOLUTION INTRAVENOUS at 19:51

## 2024-08-16 RX ADMIN — ACETAMINOPHEN 1000 MG: 10 INJECTION INTRAVENOUS at 19:51

## 2024-08-16 NOTE — ED PROVIDER NOTES
History  Chief Complaint   Patient presents with   • Sore Throat     Patient arrives to the ER from home with complaints of sore throat, pt states she has a hard time swallowing x since Sunday. Pt also complains of pain to her gums and jaw (right side). Pt states she was started on antibiotics yesterday.      Patient is a 29-year-old female past medical history of Crohn's disease and substance abuse presenting with sore throat.  Patient states that she has had sore throat for the last 5 days, bilateral and states that this morning she began having right gum and jaw pain and swelling.  She has been seen here twice and was screened for strep and COVID which was negative, initially given Decadron and Toradol with no relief, returned 2 days later and given a Z-Fantasma which she started yesterday.  Strep and COVID testing were negative.  She notes lightheadedness, baseline diarrhea.  She denies any chest pain or shortness of breath, regurgitation, purulent drainage into her mouth, rashes, vision changes, dysuria.  Is unsure whether or not she has had any fevers and has not taken any pain medication today.        Prior to Admission Medications   Prescriptions Last Dose Informant Patient Reported? Taking?   azithromycin (ZITHROMAX) 500 MG tablet   No No   Sig: Take 1 tablet (500 mg total) by mouth every 24 hours for 7 days      Facility-Administered Medications: None       Past Medical History:   Diagnosis Date   • Anxiety    • COVID 08/08/2022   • Crohn disease (HCC)    • Depression    • Inflammatory bowel disease 12/2015   • Seizures (HCC)     as a child   • Suicide attempt (HCC)        Past Surgical History:   Procedure Laterality Date   • APPENDECTOMY LAPAROSCOPIC N/A 11/10/2023    Procedure: APPENDECTOMY LAPAROSCOPIC;  Surgeon: Mane Kent MD;  Location: [facility] OR;  Service: General   • COLONOSCOPY     • VULVA BIOPSY Right 8/26/2020    Procedure: Excision of Right Labial Lesion;  Surgeon: Maegan Magana MD;   [facility];  Service: Gynecology   • VULVA BIOPSY N/A 10/21/2022    Procedure: RESECTION OF RIGHT LABIAL CYST;  Surgeon: Maegan Magana MD;  [facility];  Service: Gynecology       Family History   Problem Relation Age of Onset   • Vitiligo Paternal Grandfather    • Stroke Paternal Grandfather    • Lupus Paternal Aunt    • Completed Suicide  Maternal Grandmother    • Autoimmune disease Paternal Aunt      I have reviewed and agree with the history as documented.    E-Cigarette/Vaping   • E-Cigarette Use Never User      E-Cigarette/Vaping Substances   • Nicotine No    • THC No    • CBD No    • Flavoring No    • Other No    • Unknown No      Social History     Tobacco Use   • Smoking status: Never   • Smokeless tobacco: Never   Vaping Use   • Vaping status: Never Used   Substance Use Topics   • Alcohol use: Yes     Comment: very occasional   • Drug use: Yes     Frequency: 7.0 times per week     Types: Marijuana     Comment: Medical Marijuana       Review of Systems   All other systems reviewed and are negative.      Physical Exam  Physical Exam  Vitals reviewed.   Constitutional:       General: She is not in acute distress.     Appearance: Normal appearance. She is not ill-appearing.   HENT:      Right Ear: Tympanic membrane normal.      Left Ear: Tympanic membrane normal.      Nose: No congestion.      Mouth/Throat:      Mouth: Mucous membranes are moist.      Pharynx: Pharyngeal swelling, oropharyngeal exudate and posterior oropharyngeal erythema present. No uvula swelling.      Tonsils: Tonsillar exudate present. No tonsillar abscesses. 3+ on the right. 3+ on the left.   Eyes:      Conjunctiva/sclera: Conjunctivae normal.   Cardiovascular:      Rate and Rhythm: Normal rate and regular rhythm.      Heart sounds: Normal heart sounds.   Pulmonary:      Effort: Pulmonary effort is normal.      Breath sounds: Normal breath sounds.   Abdominal:      General: Abdomen is flat.       Palpations: Abdomen is soft.      Tenderness: There is abdominal tenderness. There is no guarding.      Comments: Right upper quadrant abdominal tenderness without guarding   Musculoskeletal:         General: No swelling. Normal range of motion.      Cervical back: Normal range of motion and neck supple.   Skin:     General: Skin is warm and dry.   Neurological:      General: No focal deficit present.      Mental Status: She is alert.   Psychiatric:         Mood and Affect: Mood normal.         Vital Signs  ED Triage Vitals [08/16/24 1904]   Temperature Pulse Respirations Blood Pressure SpO2   97.5 °F (36.4 °C) 96 18 95/65 99 %      Temp Source Heart Rate Source Patient Position - Orthostatic VS BP Location FiO2 (%)   Tympanic Monitor Sitting Left arm --      Pain Score       7           Vitals:    08/16/24 1904   BP: 95/65   Pulse: 96   Patient Position - Orthostatic VS: Sitting         Visual Acuity      ED Medications  Medications   sodium chloride 0.9 % bolus 1,000 mL (has no administration in time range)   Lidocaine Viscous HCl (XYLOCAINE) 2 % mucosal solution 15 mL (has no administration in time range)   acetaminophen (Ofirmev) injection 1,000 mg (has no administration in time range)   ketorolac (TORADOL) injection 15 mg (has no administration in time range)       Diagnostic Studies  Results Reviewed       None                   No orders to display              Procedures  ECG 12 Lead Documentation Only    Date/Time: 8/16/2024 7:48 PM    Performed by: Christine Minor DO  Authorized by: Christine Minor DO    Patient location:  ED  Previous ECG:     Previous ECG:  Compared to current    Similarity:  No change  Interpretation:     Interpretation: non-specific    Rate:     ECG rate assessment: normal    Rhythm:     Rhythm: sinus rhythm    Ectopy:     Ectopy: none    QRS:     QRS axis:  Normal    QRS intervals:  Normal  Conduction:     Conduction: normal    ST segments:     ST segments:  Normal  T waves:      T waves: inverted      Inverted:  III and V2           ED Course  ED Course as of 08/17/24 0003   Fri Aug 16, 2024   2146 Patient has significant narrowing of the oropharynx, have discussed with ENT who recommends Medrol Dosepak and clindamycin, will change antibiotics, patient still pending CT abdomen pelvis read.   2223 CT abdomen negative, will discharge with outpatient ENT follow-up.                                 SBIRT 22yo+      Flowsheet Row Most Recent Value   Initial Alcohol Screen: US AUDIT-C     1. How often do you have a drink containing alcohol? 0 Filed at: 08/16/2024 1927   2. How many drinks containing alcohol do you have on a typical day you are drinking?  0 Filed at: 08/16/2024 1927   3a. Male UNDER 65: How often do you have five or more drinks on one occasion? 0 Filed at: 08/16/2024 1927   3b. FEMALE Any Age, or MALE 65+: How often do you have 4 or more drinks on one occassion? 0 Filed at: 08/16/2024 1927   Audit-C Score 0 Filed at: 08/16/2024 1927   TK: How many times in the past year have you...    Used an illegal drug or used a prescription medication for non-medical reasons? Never Filed at: 08/16/2024 1927                      Medical Decision Making  Patient is a 29-year-old female past medical history of Crohn's disease and substance abuse presenting with sore throat.  Patient is well-appearing at bedside with stable vitals and in no acute distress.  She has no stridor or wheezing, full range of motion of the neck, mild tenderness to multiple teeth and gingiva along the right lower jaw as well as bilateral tonsillar adenopathy with exudates with midline uvula no uvular edema.  Suspect mono and will test as well as repeat strep due to patient's exudates, will obtain CT soft tissue neck to rule out abscess, CT abdomen pelvis as patient did have mild abdominal tenderness, labs to assess for electrolyte abnormalities, anemia, STORM, give pain control and reassess.    Amount and/or Complexity  of Data Reviewed  Labs: ordered.  Radiology: ordered.    Risk  Prescription drug management.                 Disposition  Final diagnoses:   None     ED Disposition       None          Follow-up Information    None         Patient's Medications   Discharge Prescriptions    No medications on file       No discharge procedures on file.    PDMP Review         Value Time User    PDMP Reviewed  Yes 10/18/2019  1:59 AM Josef Verma MD            ED Provider  Electronically Signed by             Christine Minor DO  08/17/24 0003

## 2024-08-16 NOTE — Clinical Note
Arminda Gutierrez was seen and treated in our emergency department on 8/16/2024.                Diagnosis:     Arminda  may return to work on return date.    She may return on this date: 08/20/2024         If you have any questions or concerns, please don't hesitate to call.      Tati Carr, GREGORIA    ______________________________           _______________          _______________  Hospital Representative                              Date                                Time

## 2024-08-17 ENCOUNTER — NURSE TRIAGE (OUTPATIENT)
Dept: OTHER | Facility: OTHER | Age: 29
End: 2024-08-17

## 2024-08-17 LAB — HETEROPH AB SER QL: NEGATIVE

## 2024-08-17 NOTE — TELEPHONE ENCOUNTER
"Answer Assessment - Initial Assessment Questions  1. NAME of MEDICATION: \"What medicine are you calling about?\"      Clindamycin    2. QUESTION: \"What is your question?\" (e.g., medication refill, side effect)      Needs to know from her GI doctor is clindamycin is safe to take.    Protocols used: Medication Question Call-ADULT-    "

## 2024-08-17 NOTE — TELEPHONE ENCOUNTER
Reason for Disposition  • [1] Caller has URGENT medicine question about med that PCP or specialist prescribed AND [2] triager unable to answer question    Protocols used: Medication Question Call-ADULT-

## 2024-08-18 LAB
ATRIAL RATE: 71 BPM
P AXIS: 29 DEGREES
PR INTERVAL: 178 MS
QRS AXIS: 79 DEGREES
QRSD INTERVAL: 74 MS
QT INTERVAL: 386 MS
QTC INTERVAL: 419 MS
T WAVE AXIS: 16 DEGREES
VENTRICULAR RATE: 71 BPM

## 2024-08-18 PROCEDURE — 93010 ELECTROCARDIOGRAM REPORT: CPT | Performed by: STUDENT IN AN ORGANIZED HEALTH CARE EDUCATION/TRAINING PROGRAM

## 2024-08-18 NOTE — ED PROVIDER NOTES
History  Chief Complaint   Patient presents with    Sore Throat     Pt reports sore throat x5 days, seen here Tuesday for the same.      HPI    None       Past Medical History:   Diagnosis Date    Anxiety     COVID 08/08/2022    Crohn disease (HCC)     Depression     Inflammatory bowel disease 12/2015    Seizures (HCC)     as a child    Suicide attempt (HCC)        Past Surgical History:   Procedure Laterality Date    APPENDECTOMY LAPAROSCOPIC N/A 11/10/2023    Procedure: APPENDECTOMY LAPAROSCOPIC;  Surgeon: Mane Kent MD;  Location: [facility];  Service: General    COLONOSCOPY      VULVA BIOPSY Right 8/26/2020    Procedure: Excision of Right Labial Lesion;  Surgeon: Maegan Magana MD;  Location: [facility];  Service: Gynecology    VULVA BIOPSY N/A 10/21/2022    Procedure: RESECTION OF RIGHT LABIAL CYST;  Surgeon: Maegan Magana MD;  [facility];  Service: Gynecology       Family History   Problem Relation Age of Onset    Vitiligo Paternal Grandfather     Stroke Paternal Grandfather     Lupus Paternal Aunt     Completed Suicide  Maternal Grandmother     Autoimmune disease Paternal Aunt      I have reviewed and agree with the history as documented.    E-Cigarette/Vaping    E-Cigarette Use Never User      E-Cigarette/Vaping Substances    Nicotine No     THC No     CBD No     Flavoring No     Other No     Unknown No      Social History     Tobacco Use    Smoking status: Never    Smokeless tobacco: Never   Vaping Use    Vaping status: Never Used   Substance Use Topics    Alcohol use: Yes     Comment: very occasional    Drug use: Yes     Frequency: 7.0 times per week     Types: Marijuana     Comment: Medical Marijuana       Review of Systems    Physical Exam  Physical Exam    Vital Signs  ED Triage Vitals [08/15/24 1056]   Temperature Pulse Respirations Blood Pressure SpO2   97.7 °F (36.5 °C) 92 20 97/55 100 %      Temp Source Heart Rate Source Patient Position - Orthostatic VS BP  Location FiO2 (%)   Temporal Monitor Sitting Left arm --      Pain Score       --           Vitals:    08/15/24 1056   BP: 97/55   Pulse: 92   Patient Position - Orthostatic VS: Sitting         Visual Acuity      ED Medications  Medications   azithromycin (ZITHROMAX) tablet 500 mg (500 mg Oral Given 8/15/24 1137)   dexamethasone oral liquid 10 mg 1 mL (10 mg Oral Given 8/15/24 1138)       Diagnostic Studies  Results Reviewed       None                   No orders to display              Procedures  Procedures         ED Course                                               Medical Decision Making  Risk  Prescription drug management.                 Disposition  Final diagnoses:   Strep pharyngitis     Time reflects when diagnosis was documented in both MDM as applicable and the Disposition within this note       Time User Action Codes Description Comment    8/15/2024 11:39 AM Keri Tolbert [J02.0] Strep pharyngitis           ED Disposition       ED Disposition   Discharge    Condition   Stable    Date/Time   Thu Aug 15, 2024 11:39 AM    Comment   Arminda Gutierrez discharge to home/self care.                   Follow-up Information       Follow up With Specialties Details Why Contact Info Additional Information    [facility] Emergency Medicine  If symptoms worsen [address] [facility] [address]  [phone] [facility], [address], [place], [address]            Discharge Medication List as of 8/15/2024 11:52 AM        START taking these medications    Details   azithromycin (ZITHROMAX) 500 MG tablet Take 1 tablet (500 mg total) by mouth every 24 hours for 7 days, Starting Thu 8/15/2024, Until Thu 8/22/2024, Normal             No discharge procedures on file.    PDMP Review         Value Time User    PDMP Reviewed  Yes 10/18/2019  1:59 AM Josef Verma MD            ED  Provider  Electronically Signed by           Department Emergency Medicine  If symptoms worsen [address] [facility] [address]  [phone] [facility], [address], [place], [address]            Discharge Medication List as of 8/15/2024 11:52 AM        START taking these medications    Details   azithromycin (ZITHROMAX) 500 MG tablet Take 1 tablet (500 mg total) by mouth every 24 hours for 7 days, Starting Thu 8/15/2024, Until Thu 8/22/2024, Normal             No discharge procedures on file.    PDMP Review         Value Time User    PDMP Reviewed  Yes 10/18/2019  1:59 AM Josef Verma MD            ED Provider  Electronically Signed by             Keri Tolbret DO  08/21/24 4601

## 2024-08-19 NOTE — TELEPHONE ENCOUNTER
Routing to PA      Patient had called and was asking if clindamycin is safe to take ?       Please advise. Thank you!

## 2024-08-20 ENCOUNTER — OFFICE VISIT (OUTPATIENT)
Dept: INTERNAL MEDICINE CLINIC | Facility: CLINIC | Age: 29
End: 2024-08-20
Payer: COMMERCIAL

## 2024-08-20 VITALS
HEIGHT: 64 IN | SYSTOLIC BLOOD PRESSURE: 104 MMHG | DIASTOLIC BLOOD PRESSURE: 64 MMHG | OXYGEN SATURATION: 98 % | WEIGHT: 126 LBS | BODY MASS INDEX: 21.51 KG/M2 | HEART RATE: 74 BPM

## 2024-08-20 DIAGNOSIS — F41.9 ANXIETY: Primary | ICD-10-CM

## 2024-08-20 DIAGNOSIS — R00.2 PALPITATIONS: ICD-10-CM

## 2024-08-20 PROCEDURE — 99214 OFFICE O/P EST MOD 30 MIN: CPT | Performed by: PHYSICIAN ASSISTANT

## 2024-08-20 RX ORDER — ESCITALOPRAM OXALATE 10 MG/1
10 TABLET ORAL DAILY
Qty: 30 TABLET | Refills: 0 | Status: SHIPPED | OUTPATIENT
Start: 2024-08-20 | End: 2025-02-16

## 2024-08-20 RX ORDER — HYDROXYZINE HYDROCHLORIDE 10 MG/1
10 TABLET, FILM COATED ORAL EVERY 6 HOURS PRN
Qty: 30 TABLET | Refills: 0 | Status: SHIPPED | OUTPATIENT
Start: 2024-08-20

## 2024-08-20 NOTE — PROGRESS NOTES
"Ambulatory Visit  Name: Arminda Gutierrez      : 1995      MRN: [id number]  Encounter Provider: Radha Odell PA-C  Encounter Date: 2024   Encounter department: [facility] INTERNAL MEDICINE [place]    Assessment & Plan   1. Anxiety  Assessment & Plan:  Patient with significant anxiety that has been ongoing for several years.  She has multiple medical problems that are exacerbating her anxiety  Will restart Lexapro 10 mg daily and hydroxyzine 10 mg as needed  Discussed Lexapro black box warning  Patient will follow-up in the office in 2 weeks  Orders:  -     escitalopram (LEXAPRO) 10 mg tablet; Take 1 tablet (10 mg total) by mouth daily  -     Ambulatory referral to Psych Services; Future  -     hydrOXYzine HCL (ATARAX) 10 mg tablet; Take 1 tablet (10 mg total) by mouth every 6 (six) hours as needed for anxiety  2. Palpitations  Assessment & Plan:  Patient reports palpitations have improved  We discussed results of Holter monitor  Palpitations are likely secondary to anxiety       History of Present Illness     Patient is a pleasant 29-year-old female that presents in the office today for follow-up on palpitations.  She reports palpitations have improved.  She is still experiencing significant anxiety. She has been in the ER 3 times in the past week for a tonsil infection and now has a dental infection as well. She is follow with GI for infusions for Crohn's.         Review of Systems   HENT:  Positive for dental problem.    Respiratory:  Negative for shortness of breath.    Cardiovascular:  Negative for chest pain and palpitations.       Objective     /64 (BP Location: Left arm, Patient Position: Sitting, Cuff Size: Standard)   Pulse 74   Ht 5' 4\" (1.626 m)   Wt 57.2 kg (126 lb)   SpO2 98%   BMI 21.63 kg/m²     Physical Exam  Constitutional:       Appearance: Normal appearance.   HENT:      Nose: Nose normal.      Mouth/Throat:      Mouth: Mucous membranes are moist. "     Cardiovascular:      Rate and Rhythm: Normal rate and regular rhythm.   Pulmonary:      Effort: Pulmonary effort is normal.      Breath sounds: Normal breath sounds.   Skin:     General: Skin is warm and dry.   Neurological:      General: No focal deficit present.      Mental Status: She is alert and oriented to person, place, and time.   Psychiatric:         Mood and Affect: Mood normal.       Administrative Statements

## 2024-08-20 NOTE — ASSESSMENT & PLAN NOTE
Patient reports palpitations have improved  We discussed results of Holter monitor  Palpitations are likely secondary to anxiety

## 2024-08-20 NOTE — ASSESSMENT & PLAN NOTE
Patient with significant anxiety that has been ongoing for several years.  She has multiple medical problems that are exacerbating her anxiety  Will restart Lexapro 10 mg daily and hydroxyzine 10 mg as needed  Discussed Lexapro black box warning  Patient will follow-up in the office in 2 weeks

## 2024-08-21 ENCOUNTER — TELEPHONE (OUTPATIENT)
Age: 29
End: 2024-08-21

## 2024-08-26 NOTE — TELEPHONE ENCOUNTER
Contacted patient in regards to Routine Referral in attempts to verify patient's needs of services and add patient to proper wait list. LVM for patient to contact intake dept  in regards to referral. 2nd attempt.

## 2024-09-03 ENCOUNTER — TELEPHONE (OUTPATIENT)
Age: 29
End: 2024-09-03

## 2024-09-03 ENCOUNTER — TELEPHONE (OUTPATIENT)
Dept: OTHER | Facility: HOSPITAL | Age: 29
End: 2024-09-03

## 2024-09-03 NOTE — TELEPHONE ENCOUNTER
April from [facility] called about this rx. It does not require PA. She ask to just send it to the pharmacy.

## 2024-09-03 NOTE — TELEPHONE ENCOUNTER
PA for HYDROXYZINE HCL (ATARAX) 10 mg  SUBMITTED     via    []CMM-KEY:   []SurescriMedmonk-Case ID #   []Faxed to plan   [x]Other website [employer]uls[employer]K-Covbcxquk-TC: [id number]  []Phone call Case ID #     Office notes sent, clinical questions answered. Awaiting determination    Turnaround time for your insurance to make a decision on your Prior Authorization can take 7-21 business days.

## 2024-09-04 ENCOUNTER — NURSE TRIAGE (OUTPATIENT)
Age: 29
End: 2024-09-04

## 2024-09-04 NOTE — TELEPHONE ENCOUNTER
"  Last OV: 7/31/24 chron's  Last Colonoscopy: 11/8/23    Imaging: CT A/P 8/16/24  No acute intra-abdominal pathology.  Trace free fluid in the cul-de-sac, possibly physiologic.  Hepatomegaly, slightly progressed since prior study.    Next OV: 10/11/24      Pt. Reports hard abdomen, painful to touch, feels like intermittent contraction type pain, pain worse after eating, hx of bowel obstruction and having similar symptoms, LBM today, formed stool, passing gas, feel constipated, denies trouble urinating, denies fever, c/o nausea, denies vomiting, pt. Has not tried any otc laxatives or stool softeners, pt. Has been moving bowels every day for the past week , eating and drinking but taking small amounts,  if pt. Is unable to pass stool or gas and is vomiting with nausea and severe abdominal pain she needs to go to ER, pt. Verbalized understanding, advised to start using Miralax 1 capful daily and to also try a dulcolax laxative, stay hydrated, pt. Has f/u in office in October, please advise with further recommendation     Reason for Disposition   MILD constipation    Answer Assessment - Initial Assessment Questions  1. LOCATION: \"Where does it hurt?\"       Generalized   2. RADIATION: \"Does the pain shoot anywhere else?\" (e.g., chest, back)      Denies   3. ONSET: \"When did the pain begin?\" (e.g., minutes, hours or days ago)       1 week ago   4. SUDDEN: \"Gradual or sudden onset?\"      Gradual   5. PATTERN \"Does the pain come and go, or is it constant?\"     - If constant: \"Is it getting better, staying the same, or worsening?\"       (Note: Constant means the pain never goes away completely; most serious pain is constant and it progresses)      - If intermittent: \"How long does it last?\" \"Do you have pain now?\"      (Note: Intermittent means the pain goes away completely between bouts)      Intermittent cramping , contraction, tight   6. SEVERITY: \"How bad is the pain?\"  (e.g., Scale 1-10; mild, moderate, or severe)    - " "MILD (1-3): doesn't interfere with normal activities, abdomen soft and not tender to touch     - MODERATE (4-7): interferes with normal activities or awakens from sleep, tender to touch     - SEVERE (8-10): excruciating pain, doubled over, unable to do any normal activities       7/10  7. RECURRENT SYMPTOM: \"Have you ever had this type of stomach pain before?\" If Yes, ask: \"When was the last time?\" and \"What happened that time?\"       Yes, bowel obstruction   8. CAUSE: \"What do you think is causing the stomach pain?\"      Unsure   9. RELIEVING/AGGRAVATING FACTORS: \"What makes it better or worse?\" (e.g., movement, antacids, bowel movement)      Eating makes pain worse, moving bowels gives slight relief for a moment   10. OTHER SYMPTOMS: \"Has there been any vomiting, diarrhea, constipation, or urine problems?\"        Nausea   11. PREGNANCY: \"Is there any chance you are pregnant?\" \"When was your last menstrual period?\"        LMP 4 days ago    Answer Assessment - Initial Assessment Questions  1. STOOL PATTERN OR FREQUENCY: \"How often do you pass bowel movements (BMs)?\"  (Normal range: tid to q 3 days)  \"When was the last BM passed?\"        Today, moving bowels daily   2. STRAINING: \"Do you have to strain to have a BM?\"       Yes   3. RECTAL PAIN: \"Does your rectum hurt when the stool comes out?\" If Yes, ask: \"Do you have hemorrhoids? How bad is the pain?\"  (Scale 1-10; or mild, moderate, severe)      Denies   4. STOOL COMPOSITION: \"Are the stools hard?\"       Yes   5. BLOOD ON STOOLS: \"Has there been any blood on the toilet tissue or on the surface of the BM?\" If Yes, ask: \"When was the last time?\"       Denies   6. CHRONIC CONSTIPATION: \"Is this a new problem for you?\"  If no, ask: \"How long have you had this problem?\" (days, weeks, months)       Denies   7. CHANGES IN DIET OR HYDRATION: \"Have there been any recent changes in your diet?\" \"How much fluids are you drinking consuming on a daily basis?\"  \"How much have " "you had to drink today?\"      Denies   8. MEDICATIONS: \"Have you been taking any new medications?\" \"Are you taking any narcotic pain medications?\" (e.g., Vicoden, Percocet, morphine, dilaudid)      Denies   9. LAXATIVES: \"Have you been using any stool softeners, laxatives, or enemas?\"  If yes, ask \"What, how often, and when was the last time?\"  no  10.ACTIVITY:  \"How much walking do you do every day? on a daily basis?\"  \"Has your activity level decreased in the past week?\"         Active   11. CAUSE: \"What do you think is causing the constipation?\"         Unsure   12. OTHER SYMPTOMS: \"Do you have any other symptoms?\" (e.g., abdominal pain, bloating, fever, vomiting)        Abdominal pain, bloating, nausea   13. MEDICAL HISTORY: \"Do you have a history of hemorrhoids, rectal fissures, or rectal surgery or rectal abscess?\"          Denies   14. PREGNANCY: \"Is there any chance you are pregnant?\" \"When was your last menstrual period?\"        Denies    Protocols used: Abdominal Pain - Female-ADULT-[place], Constipation-ADULT-[place]    "

## 2024-09-04 NOTE — TELEPHONE ENCOUNTER
Agree with addition of the miralax 1 capful daily and if she is not able to pass stool/gas along with having nausea or vomiting, she would then need to go to the ER. I would recommend she get the CT enterography imaging that Haritha previously ordered that would better look at her small intestine to rule out any narrowing or inflammation going on that the regular CT scans she has had recently wouldn't  on. She would need to call central scheduling to schedule it.

## 2024-09-04 NOTE — TELEPHONE ENCOUNTER
Called patient and got . LMOM with message asper Eliza. Also left # for central scheduling and office # as well....

## 2024-09-25 ENCOUNTER — TELEPHONE (OUTPATIENT)
Age: 29
End: 2024-09-25

## 2024-09-25 NOTE — TELEPHONE ENCOUNTER
Called infusion Center and scheduled an appointment on Wednesday, Oct 9 at 10:00 am.  Called pt and LMOM with advised and to call us back if any questions.  Office # provided.

## 2024-09-25 NOTE — TELEPHONE ENCOUNTER
Patient calling stating she's scheduled for infusion today but not feeling well/ when she spoke with the infusion ctr she was told to contact our office and we need to cx and kimberly / Patient stated infusion can be kimberly for any day and time.

## 2024-09-26 ENCOUNTER — TELEPHONE (OUTPATIENT)
Dept: INFUSION CENTER | Facility: CLINIC | Age: 29
End: 2024-09-26

## 2024-09-26 NOTE — TELEPHONE ENCOUNTER
Patient has no showed 4 times within a 6 month period exceeding the infusion no show policy.  Patient no showed on 5/1/24, 5/15/24, 7/18/24, and 9/25/24.  Infusion techs please contact patient and review the no show policy as well as her next appt.  If she no shows once more within a 6 month period she will be discharged from infusion.  Gi providers please contact the patient and review the importance of appt compliance.

## 2024-09-27 NOTE — TELEPHONE ENCOUNTER
LVM for patient regarding her missed appt on 9/25 for Avsola.  Let her know that we have her scheduled for another infusion on 10/9 and if she has any concerns to let us know as she has no showed 4 times already within a 6 month period.  Left our phone number [phone]

## 2024-10-07 DIAGNOSIS — K50.012 CROHN'S DISEASE OF SMALL INTESTINE WITH INTESTINAL OBSTRUCTION (HCC): Primary | ICD-10-CM

## 2024-10-07 RX ORDER — ACETAMINOPHEN 325 MG/1
650 TABLET ORAL ONCE
OUTPATIENT
Start: 2024-10-09

## 2024-10-07 RX ORDER — SODIUM CHLORIDE 9 MG/ML
20 INJECTION, SOLUTION INTRAVENOUS ONCE
OUTPATIENT
Start: 2024-10-09

## 2024-10-07 RX ORDER — METHYLPREDNISOLONE SODIUM SUCCINATE 40 MG/ML
40 INJECTION, POWDER, LYOPHILIZED, FOR SOLUTION INTRAMUSCULAR; INTRAVENOUS ONCE
OUTPATIENT
Start: 2024-10-09

## 2024-10-07 RX ORDER — DIPHENHYDRAMINE HCL 25 MG
25 TABLET ORAL ONCE
OUTPATIENT
Start: 2024-10-09

## 2024-10-09 ENCOUNTER — TELEPHONE (OUTPATIENT)
Dept: INFUSION CENTER | Facility: CLINIC | Age: 29
End: 2024-10-09

## 2024-10-09 NOTE — TELEPHONE ENCOUNTER
Patient just called infusion asking to reschedule her appt that she missed.  Patient was instructed at this time she has been discharged from infusion for exceeding the no show policy and she will need to be seen by her provider as she has not received treatment since 7/31/24.  Patient was called by infusion to remind her of her appt today on 9/27 and there was a voicemail left.  At her next office visit please review the importance of her appt compliance.  If she continues to no show for her infusions violating the no show policy she will officially be discharged from infusion.  If she needs to cancel an appt she is to provide 24 hours notice, anything less than 24 hour notice will be marked as a no show.

## 2024-10-09 NOTE — TELEPHONE ENCOUNTER
Patient has no showed for a 5th times within a 6 month period exceeding the infusion no show policy. Patient no showed on 5/1/24, 5/15/24, 7/18/24, 9/25/24, and 10/9/24.  Patients last received infusion was 7/31/24.  She has exceeded the no show policy and is being discharged from infusion for her non-compliance.  Infusion techs please contact patient and make her aware.  All future appts have been canceled for this patient.

## 2024-10-15 ENCOUNTER — TELEPHONE (OUTPATIENT)
Dept: GASTROENTEROLOGY | Facility: CLINIC | Age: 29
End: 2024-10-15

## 2024-10-15 NOTE — TELEPHONE ENCOUNTER
Patients GI provider:  Yris TEJADA    Number to return call: [phone]    Reason for call: Pt called for a later apt tomorrow with Yris. Advised no other apts with Yris. Pt asked me to reach out to Yris to see if she can fit her in at the end of her day tomorrow. States Yris usually works with her. Please advise.     Scheduled procedure/appointment date if applicable: Apt 10/16

## 2024-10-15 NOTE — TELEPHONE ENCOUNTER
Called pt and asked.  Pt play coming no Friday at 3:30    Routing to Garrick to schedule the OV.  Thank You.

## 2024-10-15 NOTE — TELEPHONE ENCOUNTER
Fadi Celestin, Can you please reschedule this patient from tomorrow 10/16 to Friday October 18 @ 3:30 using the urgent appt slot. This is okay'd by Haritha.  No need to call the patient.  Thank you..

## 2024-10-18 ENCOUNTER — OFFICE VISIT (OUTPATIENT)
Dept: GASTROENTEROLOGY | Facility: CLINIC | Age: 29
End: 2024-10-18
Payer: COMMERCIAL

## 2024-10-18 VITALS
HEART RATE: 67 BPM | SYSTOLIC BLOOD PRESSURE: 100 MMHG | TEMPERATURE: 97.5 F | HEIGHT: 64 IN | OXYGEN SATURATION: 99 % | WEIGHT: 126 LBS | BODY MASS INDEX: 21.51 KG/M2 | DIASTOLIC BLOOD PRESSURE: 64 MMHG

## 2024-10-18 DIAGNOSIS — K50.012 CROHN'S DISEASE OF SMALL INTESTINE WITH INTESTINAL OBSTRUCTION (HCC): Primary | ICD-10-CM

## 2024-10-18 PROCEDURE — 99213 OFFICE O/P EST LOW 20 MIN: CPT | Performed by: PHYSICIAN ASSISTANT

## 2024-10-18 NOTE — PROGRESS NOTES
Ambulatory Visit  Name: Arminda Gutierrez      : 1995      MRN: [id number]  Encounter Provider: Haritha Tatum PA-C  Encounter Date: 10/18/2024   Encounter department: [facility] [place]    Assessment & Plan  Crohn's disease of small intestine with intestinal obstruction (HCC)  Her last infliximab infusion was in July  In August and September she was struggling with tonsillitis and gingivitis  Because of this she had to cancel several infusions  She has now be discharged from the infusion center  She is frustrated because she had been calling to cancel her infusions the morning of the appointment  After 3 cancels she was told our office would have to call to reschedule or it would be considered a no show  We called on  to reschedule to 10/9 and there was a message left for her with this information but she claims she was unaware    She has now been discharged from the [facility] infusion center    I told her I would speak with them to see if we could possibly reschedule her    If not she will need to go to [place] or we would need to look into home infusions    Otherwise she is doing ok  She notes constipation  She has not taken anything in particular on a consistent basis for this           History of Present Illness     Arminda Gutierrez is a 29 y.o. female with small large bowel Crohn's who presents for routine follow-up.  Patient was diagnosed with Crohn's disease in .  She was initially treated with Humira between the years of  and .  Unfortunately, she developed recurrent fungal infections and so stopped it on her own.  She was hospitalized several times in  with small bowel intussusception.  After hospitalization she was started on Stelara.  She maintained on Stelara for several years and it seemed to be working but she continued to report diarrhea and abdominal pain.  She also noted the injections were painful.  Because of this she was transition to  "Remicade in October 2023.  Overall she was doing well on the infusions.  Unfortunately, between August and September she was having recurrent issues with tonsillitis and gingivitis.  Because of that she had to cancel several infusions of a call the morning of to cancel.  This was considered a no-show.  Per the infusion center after 3 no-shows she was discharged from the Center altogether and can no longer schedule there.  She has not had her infliximab infusion since July.  She has had no severe abdominal pain.  She reports constipation.  She reports 2 to 3 days between her bowel movements.  She is not taking anything for this.  She has no rectal bleeding nausea or vomiting.  Her weight is stable.  She denies fevers or chills.    Review of Systems        Objective     /64 (BP Location: Right arm, Patient Position: Sitting, Cuff Size: Standard)   Pulse 67   Temp 97.5 °F (36.4 °C) (Temporal)   Ht 5' 4\" (1.626 m)   Wt 57.2 kg (126 lb)   SpO2 99%   BMI 21.63 kg/m²     Physical Exam    "

## 2024-10-18 NOTE — ASSESSMENT & PLAN NOTE
Her last infliximab infusion was in July  In August and September she was struggling with tonsillitis and gingivitis  Because of this she had to cancel several infusions  She has now be discharged from the infusion center  She is frustrated because she had been calling to cancel her infusions the morning of the appointment  After 3 cancels she was told our office would have to call to reschedule or it would be considered a no show  We called on 9/25 to reschedule to 10/9 and there was a message left for her with this information but she claims she was unaware    She has now been discharged from the [place] infusion center    I told her I would speak with them to see if we could possibly reschedule her    If not she will need to go to [place] or we would need to look into home infusions    Otherwise she is doing ok  She notes constipation  She has not taken anything in particular on a consistent basis for this

## 2024-10-23 ENCOUNTER — TELEPHONE (OUTPATIENT)
Dept: GASTROENTEROLOGY | Facility: CLINIC | Age: 29
End: 2024-10-23

## 2024-10-23 NOTE — TELEPHONE ENCOUNTER
CALLED & SPOKE TO [facility]. She stated that she will call patient to schedule a appointment and then once the patient shows up for that appointment they can R/S the next one. She stated she will call patient to give her appointment

## 2024-10-23 NOTE — TELEPHONE ENCOUNTER
----- Message from Haritha Tatum PA-C sent at 10/23/2024  1:47 PM EDT -----  Can we please call the infusion center and ask if they are willing to reschedule Arminda.  She reports that she had to cancel several times because she has been struggling with tonsillitis since the summer.  She reports that she did try to call to cancel her appointment so I will call in the morning of and it was marked as a no-show.  She reports the last time our office tried to call and cancel it and then reschedule it but is claiming that she never received the date of the reschedule.    If they refuse to reschedule her our options are to have her infused at [facility] or to see if she can be home infused.  ----- Message -----  From: Haritha Tatum PA-C  Sent: 10/18/2024   3:57 PM EDT  To: Haritha Tatum PA-C    Infusion center

## 2024-10-31 ENCOUNTER — VBI (OUTPATIENT)
Dept: ADMINISTRATIVE | Facility: OTHER | Age: 29
End: 2024-10-31

## 2024-10-31 NOTE — TELEPHONE ENCOUNTER
10/31/24 10:38 AM     Chart reviewed for Pap Smear (HPV) aka Cervical Cancer Screening was/were not submitted to the patient's insurance.     Ana Butterfield MA   [facility]

## 2024-11-08 ENCOUNTER — HOSPITAL ENCOUNTER (OUTPATIENT)
Dept: INFUSION CENTER | Facility: CLINIC | Age: 29
End: 2024-11-08
Payer: COMMERCIAL

## 2024-11-08 VITALS
RESPIRATION RATE: 18 BRPM | WEIGHT: 127.6 LBS | SYSTOLIC BLOOD PRESSURE: 104 MMHG | HEART RATE: 70 BPM | DIASTOLIC BLOOD PRESSURE: 66 MMHG | BODY MASS INDEX: 21.9 KG/M2 | TEMPERATURE: 97.2 F

## 2024-11-08 DIAGNOSIS — K50.012 CROHN'S DISEASE OF SMALL INTESTINE WITH INTESTINAL OBSTRUCTION (HCC): Primary | ICD-10-CM

## 2024-11-08 PROCEDURE — 96413 CHEMO IV INFUSION 1 HR: CPT

## 2024-11-08 PROCEDURE — 96415 CHEMO IV INFUSION ADDL HR: CPT

## 2024-11-08 PROCEDURE — 96375 TX/PRO/DX INJ NEW DRUG ADDON: CPT

## 2024-11-08 RX ORDER — METHYLPREDNISOLONE SODIUM SUCCINATE 40 MG/ML
40 INJECTION, POWDER, LYOPHILIZED, FOR SOLUTION INTRAMUSCULAR; INTRAVENOUS ONCE
Status: CANCELLED | OUTPATIENT
Start: 2024-11-22

## 2024-11-08 RX ORDER — ACETAMINOPHEN 325 MG/1
650 TABLET ORAL ONCE
Status: COMPLETED | OUTPATIENT
Start: 2024-11-08 | End: 2024-11-08

## 2024-11-08 RX ORDER — DIPHENHYDRAMINE HCL 25 MG
25 TABLET ORAL ONCE
Status: CANCELLED | OUTPATIENT
Start: 2024-11-22

## 2024-11-08 RX ORDER — METHYLPREDNISOLONE SODIUM SUCCINATE 40 MG/ML
40 INJECTION, POWDER, LYOPHILIZED, FOR SOLUTION INTRAMUSCULAR; INTRAVENOUS ONCE
Status: COMPLETED | OUTPATIENT
Start: 2024-11-08 | End: 2024-11-08

## 2024-11-08 RX ORDER — METHYLPREDNISOLONE SODIUM SUCCINATE 40 MG/ML
40 INJECTION, POWDER, LYOPHILIZED, FOR SOLUTION INTRAMUSCULAR; INTRAVENOUS ONCE
Status: CANCELLED | OUTPATIENT
Start: 2024-11-08

## 2024-11-08 RX ORDER — ACETAMINOPHEN 325 MG/1
650 TABLET ORAL ONCE
Status: CANCELLED | OUTPATIENT
Start: 2024-11-22

## 2024-11-08 RX ORDER — SODIUM CHLORIDE 9 MG/ML
20 INJECTION, SOLUTION INTRAVENOUS ONCE
Status: CANCELLED | OUTPATIENT
Start: 2024-11-08

## 2024-11-08 RX ORDER — SODIUM CHLORIDE 9 MG/ML
20 INJECTION, SOLUTION INTRAVENOUS ONCE
Status: CANCELLED | OUTPATIENT
Start: 2024-11-22

## 2024-11-08 RX ORDER — SODIUM CHLORIDE 9 MG/ML
20 INJECTION, SOLUTION INTRAVENOUS ONCE
Status: COMPLETED | OUTPATIENT
Start: 2024-11-08 | End: 2024-11-08

## 2024-11-08 RX ORDER — DIPHENHYDRAMINE HCL 25 MG
25 TABLET ORAL ONCE
Status: CANCELLED | OUTPATIENT
Start: 2024-11-08

## 2024-11-08 RX ORDER — ACETAMINOPHEN 325 MG/1
650 TABLET ORAL ONCE
Status: CANCELLED | OUTPATIENT
Start: 2024-11-08

## 2024-11-08 RX ORDER — DIPHENHYDRAMINE HCL 25 MG
25 TABLET ORAL ONCE
Status: COMPLETED | OUTPATIENT
Start: 2024-11-08 | End: 2024-11-08

## 2024-11-08 RX ADMIN — INFLIXIMAB-AXXQ 286 MG: 100 INJECTION, POWDER, LYOPHILIZED, FOR SOLUTION INTRAVENOUS at 13:09

## 2024-11-08 RX ADMIN — SODIUM CHLORIDE 20 ML/HR: 0.9 INJECTION, SOLUTION INTRAVENOUS at 12:23

## 2024-11-08 RX ADMIN — DIPHENHYDRAMINE HYDROCHLORIDE 25 MG: 25 TABLET ORAL at 12:24

## 2024-11-08 RX ADMIN — METHYLPREDNISOLONE SODIUM SUCCINATE 40 MG: 40 INJECTION, POWDER, FOR SOLUTION INTRAMUSCULAR; INTRAVENOUS at 12:24

## 2024-11-08 RX ADMIN — ACETAMINOPHEN 650 MG: 325 TABLET, FILM COATED ORAL at 12:24

## 2024-11-08 NOTE — PROGRESS NOTES
Patient to clinic for Avsola. Offers no complaints at this time. Denies recent illness and infection.  Tolerated infusion without complications. PIV removed.  Aware of next appointment on 11/22/24 at 12 pm . AVS declined.

## 2024-11-22 ENCOUNTER — HOSPITAL ENCOUNTER (OUTPATIENT)
Dept: INFUSION CENTER | Facility: CLINIC | Age: 29
End: 2024-11-22
Payer: COMMERCIAL

## 2024-11-22 VITALS — TEMPERATURE: 96.5 F | RESPIRATION RATE: 18 BRPM | SYSTOLIC BLOOD PRESSURE: 105 MMHG | DIASTOLIC BLOOD PRESSURE: 73 MMHG

## 2024-11-22 DIAGNOSIS — K50.012 CROHN'S DISEASE OF SMALL INTESTINE WITH INTESTINAL OBSTRUCTION (HCC): Primary | ICD-10-CM

## 2024-11-22 PROCEDURE — 96375 TX/PRO/DX INJ NEW DRUG ADDON: CPT

## 2024-11-22 PROCEDURE — 96415 CHEMO IV INFUSION ADDL HR: CPT

## 2024-11-22 PROCEDURE — 96413 CHEMO IV INFUSION 1 HR: CPT

## 2024-11-22 RX ORDER — ACETAMINOPHEN 325 MG/1
650 TABLET ORAL ONCE
OUTPATIENT
Start: 2024-12-20

## 2024-11-22 RX ORDER — METHYLPREDNISOLONE SODIUM SUCCINATE 40 MG/ML
40 INJECTION, POWDER, LYOPHILIZED, FOR SOLUTION INTRAMUSCULAR; INTRAVENOUS ONCE
Status: CANCELLED | OUTPATIENT
Start: 2024-12-20

## 2024-11-22 RX ORDER — ACETAMINOPHEN 325 MG/1
650 TABLET ORAL ONCE
Status: CANCELLED | OUTPATIENT
Start: 2024-12-20

## 2024-11-22 RX ORDER — DIPHENHYDRAMINE HCL 25 MG
25 TABLET ORAL ONCE
OUTPATIENT
Start: 2024-12-20

## 2024-11-22 RX ORDER — DIPHENHYDRAMINE HCL 25 MG
25 TABLET ORAL ONCE
Status: COMPLETED | OUTPATIENT
Start: 2024-11-22 | End: 2024-11-22

## 2024-11-22 RX ORDER — SODIUM CHLORIDE 9 MG/ML
20 INJECTION, SOLUTION INTRAVENOUS ONCE
Status: CANCELLED | OUTPATIENT
Start: 2024-12-20

## 2024-11-22 RX ORDER — ACETAMINOPHEN 325 MG/1
650 TABLET ORAL ONCE
Status: COMPLETED | OUTPATIENT
Start: 2024-11-22 | End: 2024-11-22

## 2024-11-22 RX ORDER — SODIUM CHLORIDE 9 MG/ML
20 INJECTION, SOLUTION INTRAVENOUS ONCE
Status: COMPLETED | OUTPATIENT
Start: 2024-11-22 | End: 2024-11-22

## 2024-11-22 RX ORDER — DIPHENHYDRAMINE HCL 25 MG
25 TABLET ORAL ONCE
Status: CANCELLED | OUTPATIENT
Start: 2024-12-20

## 2024-11-22 RX ORDER — METHYLPREDNISOLONE SODIUM SUCCINATE 40 MG/ML
40 INJECTION, POWDER, LYOPHILIZED, FOR SOLUTION INTRAMUSCULAR; INTRAVENOUS ONCE
OUTPATIENT
Start: 2024-12-20

## 2024-11-22 RX ORDER — SODIUM CHLORIDE 9 MG/ML
20 INJECTION, SOLUTION INTRAVENOUS ONCE
OUTPATIENT
Start: 2024-12-20

## 2024-11-22 RX ORDER — METHYLPREDNISOLONE SODIUM SUCCINATE 40 MG/ML
40 INJECTION, POWDER, LYOPHILIZED, FOR SOLUTION INTRAMUSCULAR; INTRAVENOUS ONCE
Status: COMPLETED | OUTPATIENT
Start: 2024-11-22 | End: 2024-11-22

## 2024-11-22 RX ADMIN — INFLIXIMAB-AXXQ 290 MG: 100 INJECTION, POWDER, LYOPHILIZED, FOR SOLUTION INTRAVENOUS at 12:48

## 2024-11-22 RX ADMIN — SODIUM CHLORIDE 20 ML/HR: 0.9 INJECTION, SOLUTION INTRAVENOUS at 12:48

## 2024-11-22 RX ADMIN — ACETAMINOPHEN 650 MG: 325 TABLET, FILM COATED ORAL at 12:15

## 2024-11-22 RX ADMIN — DIPHENHYDRAMINE HYDROCHLORIDE 25 MG: 25 TABLET ORAL at 12:15

## 2024-11-22 RX ADMIN — METHYLPREDNISOLONE SODIUM SUCCINATE 40 MG: 40 INJECTION, POWDER, FOR SOLUTION INTRAMUSCULAR; INTRAVENOUS at 12:15

## 2024-11-22 NOTE — PROGRESS NOTES
Pt here for avsola infusion, offering no complaints. Peripheral IV placed with positive blood return noted. Tolerated infusion without incident. Peripheral removed. AVS declined. Walked out in stable condition. Next appointment on 12/27/24 at 2pm.

## 2024-12-04 ENCOUNTER — TELEPHONE (OUTPATIENT)
Dept: GASTROENTEROLOGY | Facility: CLINIC | Age: 29
End: 2024-12-04

## 2024-12-04 ENCOUNTER — TELEPHONE (OUTPATIENT)
Age: 29
End: 2024-12-04

## 2024-12-04 NOTE — TELEPHONE ENCOUNTER
Patients GI provider:  Rc OWEN    Number to return call: [phone]    Reason for call: Pt calling requesting a dr note from Yris because she has stomach ache and missed her class on Monday 12/2,24. Stated if we can put this on her [employer] portal, any question please contact pt      Scheduled procedure/appointment date if applicable: Apt 12/19/24

## 2024-12-04 NOTE — LETTER
December 4, 2024     Arminda Gutierrez    Patient: Arminda Gutierrez   YOB: 1995           To Whom it May Concern:     Arminda Gutierrez is under my professional care. Arminda was seen in my office on 10/18/2024.  Arminda was  unabel to attend school on 12/02/24 due to a serious illness. Thank you.      If you have any questions or concerns, please don't hesitate to call.           Sincerely,            Haritha Tatum PA-C

## 2024-12-04 NOTE — LETTER
December 4, 2024         Patient: Arminda Gutierrez   YOB: 1995           To Whom it May Concern:     Arminda Gutierrez is under my professional care. Arminda was seen in my office on 10/18/2024.  Arminda was  unabel to attend school on 12/02/24 due to a serious illness. Thank you.     If you have any questions or concerns, please don't hesitate to call.           Sincerely,            Haritha Tatum PA-C

## 2024-12-17 ENCOUNTER — VBI (OUTPATIENT)
Dept: ADMINISTRATIVE | Facility: OTHER | Age: 29
End: 2024-12-17

## 2024-12-17 NOTE — TELEPHONE ENCOUNTER
12/17/24 9:19 AM     Chart reviewed for Cervical Cancer Screening was/were not submitted to the patient's insurance.     Ana Butterfield MA   [facility]

## 2024-12-23 ENCOUNTER — HOSPITAL ENCOUNTER (OUTPATIENT)
Dept: INFUSION CENTER | Facility: CLINIC | Age: 29
End: 2024-12-23

## 2024-12-27 ENCOUNTER — HOSPITAL ENCOUNTER (OUTPATIENT)
Dept: INFUSION CENTER | Facility: CLINIC | Age: 29
Discharge: HOME | End: 2024-12-27

## 2025-01-07 DIAGNOSIS — K50.012 CROHN'S DISEASE OF SMALL INTESTINE WITH INTESTINAL OBSTRUCTION (HCC): Primary | ICD-10-CM

## 2025-01-07 RX ORDER — DIPHENHYDRAMINE HCL 25 MG
25 TABLET ORAL ONCE
Status: CANCELLED | OUTPATIENT
Start: 2025-01-09

## 2025-01-07 RX ORDER — METHYLPREDNISOLONE SODIUM SUCCINATE 40 MG/ML
40 INJECTION, POWDER, LYOPHILIZED, FOR SOLUTION INTRAMUSCULAR; INTRAVENOUS ONCE
Status: CANCELLED | OUTPATIENT
Start: 2025-01-09

## 2025-01-07 RX ORDER — SODIUM CHLORIDE 9 MG/ML
20 INJECTION, SOLUTION INTRAVENOUS ONCE
Status: CANCELLED | OUTPATIENT
Start: 2025-01-09

## 2025-01-07 RX ORDER — ACETAMINOPHEN 325 MG/1
650 TABLET ORAL ONCE
Status: CANCELLED | OUTPATIENT
Start: 2025-01-09

## 2025-01-09 ENCOUNTER — HOSPITAL ENCOUNTER (OUTPATIENT)
Dept: INFUSION CENTER | Facility: CLINIC | Age: 30
Discharge: HOME | End: 2025-01-09

## 2025-01-09 ENCOUNTER — TELEPHONE (OUTPATIENT)
Age: 30
End: 2025-01-09

## 2025-01-09 ENCOUNTER — TELEPHONE (OUTPATIENT)
Dept: GASTROENTEROLOGY | Facility: CLINIC | Age: 30
End: 2025-01-09

## 2025-01-09 NOTE — TELEPHONE ENCOUNTER
Patients GI provider:  LEXIE Tatum    Number to return call: [phone]    Reason for call: Pt called to reschedule infusion due to not feeling well. Spoke to More who is going to assist with rescheduling infusion. Pt was scheduled for infusion 1/9/2025     Scheduled procedure/appointment date if applicable: 1/29/2025

## 2025-01-09 NOTE — TELEPHONE ENCOUNTER
Marlene from access center called. Patient had a 1:30 appointment for infusion. She had to cxl and R/S & patient was told we had to do that. Called infusion center @ [phone], spoke to Morenita Hill... she stated she would cxxl patient appointment ( & that it Is patients 2nd time cxling ) and she will call patient to R/S........

## 2025-01-10 ENCOUNTER — APPOINTMENT (EMERGENCY)
Dept: CT IMAGING | Facility: HOSPITAL | Age: 30
End: 2025-01-10
Payer: COMMERCIAL

## 2025-01-10 ENCOUNTER — HOSPITAL ENCOUNTER (EMERGENCY)
Facility: HOSPITAL | Age: 30
Discharge: HOME | End: 2025-01-10
Attending: EMERGENCY MEDICINE
Payer: COMMERCIAL

## 2025-01-10 ENCOUNTER — NURSE TRIAGE (OUTPATIENT)
Age: 30
End: 2025-01-10

## 2025-01-10 VITALS
OXYGEN SATURATION: 100 % | SYSTOLIC BLOOD PRESSURE: 97 MMHG | RESPIRATION RATE: 18 BRPM | HEART RATE: 74 BPM | DIASTOLIC BLOOD PRESSURE: 58 MMHG | TEMPERATURE: 97.6 F

## 2025-01-10 DIAGNOSIS — R10.9 ABDOMINAL PAIN: Primary | ICD-10-CM

## 2025-01-10 LAB
ALBUMIN SERPL BCG-MCNC: 4.9 G/DL (ref 3.5–5)
ALP SERPL-CCNC: 46 U/L (ref 34–104)
ALT SERPL W P-5'-P-CCNC: 14 U/L (ref 7–52)
ANION GAP SERPL CALCULATED.3IONS-SCNC: 7 MMOL/L (ref 4–13)
AST SERPL W P-5'-P-CCNC: 20 U/L (ref 13–39)
BACTERIA UR QL AUTO: ABNORMAL /HPF
BASOPHILS # BLD AUTO: 0.07 THOUSANDS/ΜL (ref 0–0.1)
BASOPHILS NFR BLD AUTO: 1 % (ref 0–1)
BILIRUB SERPL-MCNC: 0.38 MG/DL (ref 0.2–1)
BILIRUB UR QL STRIP: NEGATIVE
BUN SERPL-MCNC: 14 MG/DL (ref 5–25)
CALCIUM SERPL-MCNC: 9.5 MG/DL (ref 8.4–10.2)
CHLORIDE SERPL-SCNC: 104 MMOL/L (ref 96–108)
CLARITY UR: CLEAR
CO2 SERPL-SCNC: 25 MMOL/L (ref 21–32)
COLOR UR: ABNORMAL
CREAT SERPL-MCNC: 0.56 MG/DL (ref 0.6–1.3)
EOSINOPHIL # BLD AUTO: 0.19 THOUSAND/ΜL (ref 0–0.61)
EOSINOPHIL NFR BLD AUTO: 3 % (ref 0–6)
ERYTHROCYTE [DISTWIDTH] IN BLOOD BY AUTOMATED COUNT: 11.9 % (ref 11.6–15.1)
EXT PREGNANCY TEST URINE: NEGATIVE
EXT. CONTROL: NORMAL
GFR SERPL CREATININE-BSD FRML MDRD: 128 ML/MIN/1.73SQ M
GLUCOSE SERPL-MCNC: 91 MG/DL (ref 65–140)
GLUCOSE UR STRIP-MCNC: NEGATIVE MG/DL
HCT VFR BLD AUTO: 40.4 % (ref 34.8–46.1)
HGB BLD-MCNC: 13.9 G/DL (ref 11.5–15.4)
HGB UR QL STRIP.AUTO: ABNORMAL
IMM GRANULOCYTES # BLD AUTO: 0.01 THOUSAND/UL (ref 0–0.2)
IMM GRANULOCYTES NFR BLD AUTO: 0 % (ref 0–2)
KETONES UR STRIP-MCNC: NEGATIVE MG/DL
LEUKOCYTE ESTERASE UR QL STRIP: NEGATIVE
LIPASE SERPL-CCNC: 17 U/L (ref 11–82)
LYMPHOCYTES # BLD AUTO: 2.44 THOUSANDS/ΜL (ref 0.6–4.47)
LYMPHOCYTES NFR BLD AUTO: 36 % (ref 14–44)
MCH RBC QN AUTO: 30.3 PG (ref 26.8–34.3)
MCHC RBC AUTO-ENTMCNC: 34.4 G/DL (ref 31.4–37.4)
MCV RBC AUTO: 88 FL (ref 82–98)
MONOCYTES # BLD AUTO: 0.65 THOUSAND/ΜL (ref 0.17–1.22)
MONOCYTES NFR BLD AUTO: 10 % (ref 4–12)
MUCOUS THREADS UR QL AUTO: ABNORMAL
NEUTROPHILS # BLD AUTO: 3.5 THOUSANDS/ΜL (ref 1.85–7.62)
NEUTS SEG NFR BLD AUTO: 50 % (ref 43–75)
NITRITE UR QL STRIP: NEGATIVE
NON-SQ EPI CELLS URNS QL MICRO: ABNORMAL /HPF
NRBC BLD AUTO-RTO: 0 /100 WBCS
PH UR STRIP.AUTO: 6 [PH]
PLATELET # BLD AUTO: 268 THOUSANDS/UL (ref 149–390)
PMV BLD AUTO: 9.3 FL (ref 8.9–12.7)
POTASSIUM SERPL-SCNC: 3.6 MMOL/L (ref 3.5–5.3)
PROT SERPL-MCNC: 7.9 G/DL (ref 6.4–8.4)
PROT UR STRIP-MCNC: NEGATIVE MG/DL
RBC # BLD AUTO: 4.59 MILLION/UL (ref 3.81–5.12)
RBC #/AREA URNS AUTO: ABNORMAL /HPF
SODIUM SERPL-SCNC: 136 MMOL/L (ref 135–147)
SP GR UR STRIP.AUTO: 1.03 (ref 1–1.03)
UROBILINOGEN UR STRIP-ACNC: <2 MG/DL
WBC # BLD AUTO: 6.86 THOUSAND/UL (ref 4.31–10.16)
WBC #/AREA URNS AUTO: ABNORMAL /HPF

## 2025-01-10 PROCEDURE — 99285 EMERGENCY DEPT VISIT HI MDM: CPT | Performed by: EMERGENCY MEDICINE

## 2025-01-10 PROCEDURE — 85025 COMPLETE CBC W/AUTO DIFF WBC: CPT | Performed by: EMERGENCY MEDICINE

## 2025-01-10 PROCEDURE — 96361 HYDRATE IV INFUSION ADD-ON: CPT

## 2025-01-10 PROCEDURE — 36415 COLL VENOUS BLD VENIPUNCTURE: CPT | Performed by: EMERGENCY MEDICINE

## 2025-01-10 PROCEDURE — 74177 CT ABD & PELVIS W/CONTRAST: CPT

## 2025-01-10 PROCEDURE — 83690 ASSAY OF LIPASE: CPT | Performed by: EMERGENCY MEDICINE

## 2025-01-10 PROCEDURE — 96360 HYDRATION IV INFUSION INIT: CPT

## 2025-01-10 PROCEDURE — 81001 URINALYSIS AUTO W/SCOPE: CPT | Performed by: EMERGENCY MEDICINE

## 2025-01-10 PROCEDURE — 80053 COMPREHEN METABOLIC PANEL: CPT | Performed by: EMERGENCY MEDICINE

## 2025-01-10 PROCEDURE — 99284 EMERGENCY DEPT VISIT MOD MDM: CPT

## 2025-01-10 PROCEDURE — 81025 URINE PREGNANCY TEST: CPT | Performed by: EMERGENCY MEDICINE

## 2025-01-10 RX ORDER — BUDESONIDE 3 MG/1
9 CAPSULE, COATED PELLETS ORAL DAILY
Qty: 21 CAPSULE | Refills: 0 | Status: SHIPPED | OUTPATIENT
Start: 2025-01-10 | End: 2025-01-17

## 2025-01-10 RX ORDER — ACETAMINOPHEN 10 MG/ML
1000 INJECTION, SOLUTION INTRAVENOUS ONCE
Status: DISCONTINUED | OUTPATIENT
Start: 2025-01-10 | End: 2025-01-11 | Stop reason: HOSPADM

## 2025-01-10 RX ADMIN — IOHEXOL 100 ML: 350 INJECTION, SOLUTION INTRAVENOUS at 19:50

## 2025-01-10 RX ADMIN — SODIUM CHLORIDE 1000 ML: 0.9 INJECTION, SOLUTION INTRAVENOUS at 18:33

## 2025-01-10 NOTE — TELEPHONE ENCOUNTER
"LOV:10/18/24    HX:Crohn's disease small intestine w/ obstruction(HCC)    Pt transferred to nurse line.  Looking for recommendations for abd pain and nausea.  Pt states that for the past 2 weeks, she is experiencing generalized abd pain, abd distention- abd feels hard to touch and vomiting.  Pt rates abd pain a 6/10.  Pt states that laying on her abd helps alleviate pain a little bit.  She has not tried any medication.  Pt denies any fever, bloody stools or other symptoms.  Pt reports occasional constipation, but states she is going ever few days.     Pt vomited once today, about 10 min ago.  Pt concerned because she saw PCP yesterday for a sore on her face and was diagnosed with a \"staph infection.\"  Pt wanted provider to be aware.  She vomited about 30 min after taking antibiotics and is concerned she won't be able to take them due to nausea.      Please advise.  Pt has a follow up appt on 1/29/25.    Reason for Disposition   Abdominal pain is a chronic symptom (recurrent or ongoing AND lasting > 4 weeks)    Answer Assessment - Initial Assessment Questions  1. LOCATION: \"Where does it hurt?\"       Generalized abd pain- except left   2. RADIATION: \"Does the pain shoot anywhere else?\" (e.g., chest, back)      No   3. ONSET: \"When did the pain begin?\" (e.g., minutes, hours or days ago)       2 weeks ago  4. SUDDEN: \"Gradual or sudden onset?\"      Gradual   5. PATTERN \"Does the pain come and go, or is it constant?\"      Constant   6. SEVERITY: \"How bad is the pain?\"  (e.g., Scale 1-10; mild, moderate, or severe)      6  7. RECURRENT SYMPTOM: \"Have you ever had this type of stomach pain before?\" If Yes, ask: \"When was the last time?\" and \"What happened that time?\"       Yes  8. CAUSE: \"What do you think is causing the stomach pain?\"      Unsure   9. RELIEVING/AGGRAVATING FACTORS: \"What makes it better or worse?\" (e.g., antacids, bending or twisting motion, bowel movement)      Laying on stomach, putting pressure on abd " "  10. OTHER SYMPTOMS: \"Do you have any other symptoms?\" (e.g., back pain, diarrhea, fever, urination pain, vomiting)          11. PREGNANCY: \"Is there any chance you are pregnant?\" \"When was your last menstrual period?\"        No    Protocols used: Abdominal Pain - Female-Adult-[place]    "

## 2025-01-10 NOTE — TELEPHONE ENCOUNTER
I spoke with patient and reviewed provider recommendation for ED evaluation and patient agreeable. She states she will report to [facility]

## 2025-01-10 NOTE — TELEPHONE ENCOUNTER
Patients GI provider: LEXIE Tatum    Number to return call: [phone]    Reason for call: Pt calling to report abdominal pain feels like she ate 3meals even when eating nothing. Diagnosed w/staff infection. Transferred to [facility] in triage for further assistance.     Scheduled procedure/appointment date if applicable: Apt 01/29/2025

## 2025-01-11 NOTE — ED PROVIDER NOTES
Time reflects when diagnosis was documented in both MDM as applicable and the Disposition within this note       Time User Action Codes Description Comment    1/10/2025  9:43 PM Ramon Bearden Add [R10.9] Abdominal pain           ED Disposition       ED Disposition   Discharge    Condition   Stable    Date/Time   Fri Jorge Luis 10, 2025  9:43 PM    Comment   Arminda Gutierrez discharge to home/self care.                   Assessment & Plan       Medical Decision Making  29-year-old female with abdominal pain, will check CT to evaluate for perforation stricture fistulization or other inflammatory problems.    Reviewed results, discussed case with GI.  Will give oral budesonide per the recommendation discharged with follow-up.    Amount and/or Complexity of Data Reviewed  Labs: ordered.  Radiology: ordered.    Risk  Prescription drug management.             Medications   sodium chloride 0.9 % bolus 1,000 mL (0 mL Intravenous Stopped 1/10/25 2024)   iohexol (OMNIPAQUE) 350 MG/ML injection (MULTI-DOSE) 100 mL (100 mL Intravenous Given 1/10/25 1950)       ED Risk Strat Scores                          SBIRT 22yo+      Flowsheet Row Most Recent Value   Initial Alcohol Screen: US AUDIT-C     1. How often do you have a drink containing alcohol? 0 Filed at: 01/10/2025 1616   2. How many drinks containing alcohol do you have on a typical day you are drinking?  0 Filed at: 01/10/2025 1616   3a. Male UNDER 65: How often do you have five or more drinks on one occasion? 0 Filed at: 01/10/2025 1616   3b. FEMALE Any Age, or MALE 65+: How often do you have 4 or more drinks on one occassion? 0 Filed at: 01/10/2025 1616   Audit-C Score 0 Filed at: 01/10/2025 1616   TK: How many times in the past year have you...    Used an illegal drug or used a prescription medication for non-medical reasons? Never Filed at: 01/10/2025 1616                            History of Present Illness       Chief Complaint   Patient presents with    Abdominal Pain      Chrons acting up x 2 wks, wound on lip that urgent prescribed doxy for and now making abd worse, vomiting        Past Medical History:   Diagnosis Date    Anxiety     COVID 08/08/2022    Crohn disease (HCC)     Depression     Inflammatory bowel disease 12/2015    Seizures (HCC)     as a child    Suicide attempt (HCC)       Past Surgical History:   Procedure Laterality Date    APPENDECTOMY LAPAROSCOPIC N/A 11/10/2023    Procedure: APPENDECTOMY LAPAROSCOPIC;  Surgeon: Mane Kent MD;  Location: [facility];  Service: General    COLONOSCOPY      VULVA BIOPSY Right 8/26/2020    Procedure: Excision of Right Labial Lesion;  Surgeon: Maegan Magana MD;  Location: [facility];  Service: Gynecology    VULVA BIOPSY N/A 10/21/2022    Procedure: RESECTION OF RIGHT LABIAL CYST;  Surgeon: Maegan Magana MD;  [facility];  Service: Gynecology      Family History   Problem Relation Age of Onset    Vitiligo Paternal Grandfather     Stroke Paternal Grandfather     Lupus Paternal Aunt     Completed Suicide  Maternal Grandmother     Autoimmune disease Paternal Aunt       Social History     Tobacco Use    Smoking status: Never    Smokeless tobacco: Never   Vaping Use    Vaping status: Never Used   Substance Use Topics    Alcohol use: Yes     Comment: very occasional    Drug use: Yes     Frequency: 7.0 times per week     Types: Marijuana     Comment: Medical Marijuana      E-Cigarette/Vaping    E-Cigarette Use Never User       E-Cigarette/Vaping Substances    Nicotine No     THC No     CBD No     Flavoring No     Other No     Unknown No       I have reviewed and agree with the history as documented.     29-year-old female presenting to the emergency department for evaluation of abdominal pain.  Patient has history of Crohn's, has cramping lower abdominal pain stereotypical of previous flares.  No fevers or chills no blood in the stool.        Review of Systems   Constitutional:  Negative for appetite  change, chills, fatigue and fever.   HENT:  Negative for sneezing and sore throat.    Eyes:  Negative for visual disturbance.   Respiratory:  Negative for cough, choking, chest tightness, shortness of breath and wheezing.    Cardiovascular:  Negative for chest pain and palpitations.   Gastrointestinal:  Positive for abdominal pain and nausea. Negative for constipation, diarrhea and vomiting.   Genitourinary:  Negative for difficulty urinating and dysuria.   Neurological:  Negative for dizziness, weakness, light-headedness, numbness and headaches.   All other systems reviewed and are negative.          Objective       ED Triage Vitals [01/10/25 1613]   Temperature Pulse Blood Pressure Respirations SpO2 Patient Position - Orthostatic VS   97.6 °F (36.4 °C) 77 141/63 18 99 % Sitting      Temp Source Heart Rate Source BP Location FiO2 (%) Pain Score    Temporal Monitor Left arm -- --      Vitals      Date and Time Temp Pulse SpO2 Resp BP Pain Score FACES Pain Rating User   01/10/25 2130 -- 74 100 % 18 97/58 -- -- EN   01/10/25 2000 -- 71 100 % 18 96/51 -- -- EN   01/10/25 1845 -- 72 99 % 18 103/55 -- -- EN   01/10/25 1613 97.6 °F (36.4 °C) 77 99 % 18 141/63 -- -- KW            Physical Exam  Vitals and nursing note reviewed.   Constitutional:       General: She is not in acute distress.     Appearance: She is well-developed. She is not diaphoretic.   HENT:      Head: Normocephalic and atraumatic.   Eyes:      Pupils: Pupils are equal, round, and reactive to light.   Neck:      Vascular: No JVD.      Trachea: No tracheal deviation.   Cardiovascular:      Rate and Rhythm: Normal rate and regular rhythm.      Heart sounds: Normal heart sounds. No murmur heard.     No friction rub. No gallop.   Pulmonary:      Effort: Pulmonary effort is normal. No respiratory distress.      Breath sounds: Normal breath sounds. No wheezing or rales.   Abdominal:      General: Bowel sounds are normal. There is no distension.      Palpations:  Abdomen is soft.      Tenderness: There is no abdominal tenderness. There is no guarding or rebound.   Skin:     General: Skin is warm and dry.      Coloration: Skin is not pale.   Neurological:      Mental Status: She is alert and oriented to person, place, and time.      Cranial Nerves: No cranial nerve deficit.      Motor: No abnormal muscle tone.   Psychiatric:         Behavior: Behavior normal.         Results Reviewed       Procedure Component Value Units Date/Time    POCT pregnancy, urine [[id number]]  (Normal) Collected: 01/10/25 1910    Lab Status: Final result Updated: 01/10/25 1910     EXT Preg Test, Ur Negative     Control Valid    Urine Microscopic [[id number]]  (Abnormal) Collected: 01/10/25 1834    Lab Status: Final result Specimen: Urine, Other Updated: 01/10/25 1901     RBC, UA 1-2 /hpf      WBC, UA 1-2 /hpf      Epithelial Cells Occasional /hpf      Bacteria, UA Occasional /hpf      MUCUS THREADS Moderate    Comprehensive metabolic panel [[id number]]  (Abnormal) Collected: 01/10/25 1833    Lab Status: Final result Specimen: Blood from Arm, Right Updated: 01/10/25 1900     Sodium 136 mmol/L      Potassium 3.6 mmol/L      Chloride 104 mmol/L      CO2 25 mmol/L      ANION GAP 7 mmol/L      BUN 14 mg/dL      Creatinine 0.56 mg/dL      Glucose 91 mg/dL      Calcium 9.5 mg/dL      AST 20 U/L      ALT 14 U/L      Alkaline Phosphatase 46 U/L      Total Protein 7.9 g/dL      Albumin 4.9 g/dL      Total Bilirubin 0.38 mg/dL      eGFR 128 ml/min/1.73sq m     Narrative:      National Kidney Disease Foundation guidelines for Chronic Kidney Disease (CKD):     Stage 1 with normal or high GFR (GFR > 90 mL/min/1.73 square meters)    Stage 2 Mild CKD (GFR = 60-89 mL/min/1.73 square meters)    Stage 3A Moderate CKD (GFR = 45-59 mL/min/1.73 square meters)    Stage 3B Moderate CKD (GFR = 30-44 mL/min/1.73 square meters)    Stage 4 Severe CKD (GFR = 15-29 mL/min/1.73 square meters)    Stage 5 End Stage CKD (GFR <15  mL/min/1.73 square meters)  Note: GFR calculation is accurate only with a steady state creatinine    Lipase [[id number]]  (Normal) Collected: 01/10/25 1833    Lab Status: Final result Specimen: Blood from Arm, Right Updated: 01/10/25 1900     Lipase 17 u/L     UA w Reflex to Microscopic w Reflex to Culture [[id number]]  (Abnormal) Collected: 01/10/25 1834    Lab Status: Final result Specimen: Urine, Other Updated: 01/10/25 1855     Color, UA Light Yellow     Clarity, UA Clear     Specific Gravity, UA 1.029     pH, UA 6.0     Leukocytes, UA Negative     Nitrite, UA Negative     Protein, UA Negative mg/dl      Glucose, UA Negative mg/dl      Ketones, UA Negative mg/dl      Urobilinogen, UA <2.0 mg/dl      Bilirubin, UA Negative     Occult Blood, UA Trace    CBC and differential [[id number]] Collected: 01/10/25 1833    Lab Status: Final result Specimen: Blood from Arm, Right Updated: 01/10/25 1844     WBC 6.86 Thousand/uL      RBC 4.59 Million/uL      Hemoglobin 13.9 g/dL      Hematocrit 40.4 %      MCV 88 fL      MCH 30.3 pg      MCHC 34.4 g/dL      RDW 11.9 %      MPV 9.3 fL      Platelets 268 Thousands/uL      nRBC 0 /100 WBCs      Segmented % 50 %      Immature Grans % 0 %      Lymphocytes % 36 %      Monocytes % 10 %      Eosinophils Relative 3 %      Basophils Relative 1 %      Absolute Neutrophils 3.50 Thousands/µL      Absolute Immature Grans 0.01 Thousand/uL      Absolute Lymphocytes 2.44 Thousands/µL      Absolute Monocytes 0.65 Thousand/µL      Eosinophils Absolute 0.19 Thousand/µL      Basophils Absolute 0.07 Thousands/µL             CT abdomen pelvis with contrast   Final Interpretation by Alfie Robles MD (01/10 2057)      1. Submucosal enhancement involving a very short segment of the terminal ileum compatible with active Crohn's. This has been variably seen on prior CT exams.      Workstation performed: [id number]             Procedures    ED Medication and Procedure Management   Prior to Admission  Medications   Prescriptions Last Dose Informant Patient Reported? Taking?   hydrOXYzine HCL (ATARAX) 10 mg tablet  Self No No   Sig: Take 1 tablet (10 mg total) by mouth every 6 (six) hours as needed for anxiety   Patient taking differently: Take 10 mg by mouth every 6 (six) hours as needed for anxiety As needed      Facility-Administered Medications: None     Discharge Medication List as of 1/10/2025  9:56 PM        START taking these medications    Details   budesonide (ENTOCORT EC) 3 MG capsule Take 3 capsules (9 mg total) by mouth daily for 7 days, Starting Fri 1/10/2025, Until Fri 1/17/2025, Normal           CONTINUE these medications which have NOT CHANGED    Details   hydrOXYzine HCL (ATARAX) 10 mg tablet Take 1 tablet (10 mg total) by mouth every 6 (six) hours as needed for anxiety, Starting Tue 8/20/2024, Normal           No discharge procedures on file.  ED SEPSIS DOCUMENTATION   Time reflects when diagnosis was documented in both MDM as applicable and the Disposition within this note       Time User Action Codes Description Comment    1/10/2025  9:43 PM Ramon Bearden Add [R10.9] Abdominal pain                  Ramon Bearden MD  01/11/25 0431

## 2025-01-15 ENCOUNTER — HOSPITAL ENCOUNTER (OUTPATIENT)
Dept: INFUSION CENTER | Facility: CLINIC | Age: 30
Discharge: HOME | End: 2025-01-15
Payer: COMMERCIAL

## 2025-01-15 VITALS
BODY MASS INDEX: 22.97 KG/M2 | TEMPERATURE: 98 F | DIASTOLIC BLOOD PRESSURE: 74 MMHG | SYSTOLIC BLOOD PRESSURE: 109 MMHG | RESPIRATION RATE: 18 BRPM | HEART RATE: 55 BPM | WEIGHT: 133.8 LBS

## 2025-01-15 DIAGNOSIS — K50.012 CROHN'S DISEASE OF SMALL INTESTINE WITH INTESTINAL OBSTRUCTION (HCC): Primary | ICD-10-CM

## 2025-01-15 RX ORDER — DIPHENHYDRAMINE HCL 25 MG
25 TABLET ORAL ONCE
Status: COMPLETED | OUTPATIENT
Start: 2025-01-15 | End: 2025-01-15

## 2025-01-15 RX ORDER — DIPHENHYDRAMINE HCL 25 MG
25 TABLET ORAL ONCE
OUTPATIENT
Start: 2025-03-06

## 2025-01-15 RX ORDER — ACETAMINOPHEN 325 MG/1
650 TABLET ORAL ONCE
OUTPATIENT
Start: 2025-03-06

## 2025-01-15 RX ORDER — SODIUM CHLORIDE 9 MG/ML
20 INJECTION, SOLUTION INTRAVENOUS ONCE
OUTPATIENT
Start: 2025-03-06

## 2025-01-15 RX ORDER — SODIUM CHLORIDE 9 MG/ML
20 INJECTION, SOLUTION INTRAVENOUS ONCE
Status: COMPLETED | OUTPATIENT
Start: 2025-01-15 | End: 2025-01-15

## 2025-01-15 RX ORDER — ACETAMINOPHEN 325 MG/1
650 TABLET ORAL ONCE
Status: COMPLETED | OUTPATIENT
Start: 2025-01-15 | End: 2025-01-15

## 2025-01-15 RX ORDER — METHYLPREDNISOLONE SODIUM SUCCINATE 40 MG/ML
40 INJECTION, POWDER, LYOPHILIZED, FOR SOLUTION INTRAMUSCULAR; INTRAVENOUS ONCE
OUTPATIENT
Start: 2025-03-06

## 2025-01-15 RX ORDER — METHYLPREDNISOLONE SODIUM SUCCINATE 40 MG/ML
40 INJECTION, POWDER, LYOPHILIZED, FOR SOLUTION INTRAMUSCULAR; INTRAVENOUS ONCE
Status: COMPLETED | OUTPATIENT
Start: 2025-01-15 | End: 2025-01-15

## 2025-01-15 RX ADMIN — INFLIXIMAB-AXXQ 290 MG: 100 INJECTION, POWDER, LYOPHILIZED, FOR SOLUTION INTRAVENOUS at 09:19

## 2025-01-15 RX ADMIN — DIPHENHYDRAMINE HYDROCHLORIDE 25 MG: 25 TABLET ORAL at 08:36

## 2025-01-15 RX ADMIN — SODIUM CHLORIDE 20 ML/HR: 0.9 INJECTION, SOLUTION INTRAVENOUS at 08:30

## 2025-01-15 RX ADMIN — METHYLPREDNISOLONE SODIUM SUCCINATE 40 MG: 40 INJECTION, POWDER, FOR SOLUTION INTRAMUSCULAR; INTRAVENOUS at 08:36

## 2025-01-15 RX ADMIN — ACETAMINOPHEN 650 MG: 325 TABLET ORAL at 08:36

## 2025-01-15 NOTE — PROGRESS NOTES
Patient if here for avsola infusion, offering no complaints, but had mentioned she was in the ER and was prescribed budesonide (entocort) at that time and didn't take a dose today. Confirmed with pharmacy if there was any interaction with avsola and they confirmed no interaction.     PIV established in her LAC with positive blood return. Tolerated entire infusion. PIV deaccessed and band-aid placed.   AVS declined.   Reviewed next appointment for 3/12/2025 @0830.   Walked out of clinic with no incident.

## 2025-01-29 ENCOUNTER — TELEPHONE (OUTPATIENT)
Age: 30
End: 2025-01-29

## 2025-01-29 DIAGNOSIS — K50.012 CROHN'S DISEASE OF SMALL INTESTINE WITH INTESTINAL OBSTRUCTION (HCC): Primary | ICD-10-CM

## 2025-01-29 RX ORDER — BUDESONIDE 3 MG/1
CAPSULE, COATED PELLETS ORAL
Qty: 90 CAPSULE | Refills: 1 | Status: SHIPPED | OUTPATIENT
Start: 2025-01-29

## 2025-01-29 NOTE — TELEPHONE ENCOUNTER
Pt given 7 day course of budesonide from ER. Pt still experiencing same symptoms, 6/10 all over abdominal pain. Feels bloated, no appetite. When she does have bm it is diarrhea other wise constipated. BRB dots on tissue. Pt requesting provider recommendation if additional budesonide is appropriate or other treatment

## 2025-01-29 NOTE — TELEPHONE ENCOUNTER
Patients GI provider:  Rc    Number to return call: ( [phone]     Reason for call: Pt calling requesting medication that was given to her during her ER visit.  Doxycyline.  Please confirm if this can be sent to her pharmacy on file.     Scheduled procedure/appointment date if applicable: Appt 3/25/25

## 2025-03-11 ENCOUNTER — HOSPITAL ENCOUNTER (OUTPATIENT)
Dept: INFUSION CENTER | Facility: CLINIC | Age: 30
Discharge: HOME | End: 2025-03-11
Payer: COMMERCIAL

## 2025-03-11 VITALS
TEMPERATURE: 97.4 F | BODY MASS INDEX: 21.94 KG/M2 | SYSTOLIC BLOOD PRESSURE: 120 MMHG | DIASTOLIC BLOOD PRESSURE: 71 MMHG | HEART RATE: 65 BPM | WEIGHT: 127.8 LBS | RESPIRATION RATE: 18 BRPM

## 2025-03-11 DIAGNOSIS — K50.012 CROHN'S DISEASE OF SMALL INTESTINE WITH INTESTINAL OBSTRUCTION (HCC): Primary | ICD-10-CM

## 2025-03-11 PROCEDURE — 96415 CHEMO IV INFUSION ADDL HR: CPT

## 2025-03-11 PROCEDURE — 96413 CHEMO IV INFUSION 1 HR: CPT

## 2025-03-11 PROCEDURE — 96375 TX/PRO/DX INJ NEW DRUG ADDON: CPT

## 2025-03-11 RX ORDER — DIPHENHYDRAMINE HCL 25 MG
25 TABLET ORAL ONCE
OUTPATIENT
Start: 2025-03-12

## 2025-03-11 RX ORDER — ACETAMINOPHEN 325 MG/1
650 TABLET ORAL ONCE
Status: COMPLETED | OUTPATIENT
Start: 2025-03-11 | End: 2025-03-11

## 2025-03-11 RX ORDER — ACETAMINOPHEN 325 MG/1
650 TABLET ORAL ONCE
OUTPATIENT
Start: 2025-03-12

## 2025-03-11 RX ORDER — SODIUM CHLORIDE 9 MG/ML
20 INJECTION, SOLUTION INTRAVENOUS ONCE
OUTPATIENT
Start: 2025-03-12

## 2025-03-11 RX ORDER — SODIUM CHLORIDE 9 MG/ML
20 INJECTION, SOLUTION INTRAVENOUS ONCE
Status: COMPLETED | OUTPATIENT
Start: 2025-03-11 | End: 2025-03-11

## 2025-03-11 RX ORDER — METHYLPREDNISOLONE SODIUM SUCCINATE 40 MG/ML
40 INJECTION, POWDER, LYOPHILIZED, FOR SOLUTION INTRAMUSCULAR; INTRAVENOUS ONCE
OUTPATIENT
Start: 2025-03-12

## 2025-03-11 RX ORDER — DIPHENHYDRAMINE HCL 25 MG
25 TABLET ORAL ONCE
Status: COMPLETED | OUTPATIENT
Start: 2025-03-11 | End: 2025-03-11

## 2025-03-11 RX ORDER — METHYLPREDNISOLONE SODIUM SUCCINATE 40 MG/ML
40 INJECTION, POWDER, LYOPHILIZED, FOR SOLUTION INTRAMUSCULAR; INTRAVENOUS ONCE
Status: COMPLETED | OUTPATIENT
Start: 2025-03-11 | End: 2025-03-11

## 2025-03-11 RX ADMIN — INFLIXIMAB-AXXQ 290 MG: 100 INJECTION, POWDER, LYOPHILIZED, FOR SOLUTION INTRAVENOUS at 15:13

## 2025-03-11 RX ADMIN — METHYLPREDNISOLONE SODIUM SUCCINATE 40 MG: 40 INJECTION, POWDER, FOR SOLUTION INTRAMUSCULAR; INTRAVENOUS at 14:31

## 2025-03-11 RX ADMIN — SODIUM CHLORIDE 20 ML/HR: 0.9 INJECTION, SOLUTION INTRAVENOUS at 14:31

## 2025-03-11 RX ADMIN — ACETAMINOPHEN 650 MG: 325 TABLET, FILM COATED ORAL at 14:31

## 2025-03-11 RX ADMIN — DIPHENHYDRAMINE HYDROCHLORIDE 25 MG: 25 TABLET ORAL at 14:31

## 2025-03-11 NOTE — PROGRESS NOTES
Pt here for avsola infusion, offering no complaints.  Left ac IV placed with positive blood return noted.  Tolerated infusion without incident.  PIV removed.  AVS declined.  Next appt is 5/13 @ 1030 am.   Walked out in stable condition.

## 2025-03-12 ENCOUNTER — HOSPITAL ENCOUNTER (OUTPATIENT)
Dept: INFUSION CENTER | Facility: CLINIC | Age: 30
Discharge: HOME | End: 2025-03-12

## 2025-04-21 ENCOUNTER — VBI (OUTPATIENT)
Dept: ADMINISTRATIVE | Facility: OTHER | Age: 30
End: 2025-04-21

## 2025-04-21 NOTE — TELEPHONE ENCOUNTER
04/21/25 10:12 AM     Chart reviewed for Pap Smear (HPV) aka Cervical Cancer Screening was/were not submitted to the patient's insurance.     Ana Butterfield MA   [facility]

## 2025-04-28 DIAGNOSIS — K50.012 CROHN'S DISEASE OF SMALL INTESTINE WITH INTESTINAL OBSTRUCTION (HCC): Primary | ICD-10-CM

## 2025-05-12 DIAGNOSIS — K50.012 CROHN'S DISEASE OF SMALL INTESTINE WITH INTESTINAL OBSTRUCTION (HCC): Primary | ICD-10-CM

## 2025-05-12 RX ORDER — METHYLPREDNISOLONE SODIUM SUCCINATE 40 MG/ML
40 INJECTION, POWDER, LYOPHILIZED, FOR SOLUTION INTRAMUSCULAR; INTRAVENOUS ONCE
Status: CANCELLED | OUTPATIENT
Start: 2025-05-13

## 2025-05-12 RX ORDER — ACETAMINOPHEN 325 MG/1
650 TABLET ORAL ONCE
Status: CANCELLED | OUTPATIENT
Start: 2025-05-13

## 2025-05-12 RX ORDER — DIPHENHYDRAMINE HCL 25 MG
25 TABLET ORAL ONCE
Status: CANCELLED | OUTPATIENT
Start: 2025-05-13

## 2025-05-12 RX ORDER — SODIUM CHLORIDE 9 MG/ML
20 INJECTION, SOLUTION INTRAVENOUS ONCE
Status: CANCELLED | OUTPATIENT
Start: 2025-05-13

## 2025-05-13 ENCOUNTER — HOSPITAL ENCOUNTER (OUTPATIENT)
Dept: INFUSION CENTER | Facility: CLINIC | Age: 30
Discharge: HOME | End: 2025-05-13
Payer: COMMERCIAL

## 2025-05-13 VITALS
BODY MASS INDEX: 22.97 KG/M2 | DIASTOLIC BLOOD PRESSURE: 56 MMHG | HEART RATE: 65 BPM | SYSTOLIC BLOOD PRESSURE: 113 MMHG | TEMPERATURE: 97 F | RESPIRATION RATE: 18 BRPM | WEIGHT: 133.8 LBS

## 2025-05-13 DIAGNOSIS — K50.012 CROHN'S DISEASE OF SMALL INTESTINE WITH INTESTINAL OBSTRUCTION (HCC): Primary | ICD-10-CM

## 2025-05-13 PROCEDURE — 96413 CHEMO IV INFUSION 1 HR: CPT

## 2025-05-13 PROCEDURE — 96415 CHEMO IV INFUSION ADDL HR: CPT

## 2025-05-13 PROCEDURE — 96375 TX/PRO/DX INJ NEW DRUG ADDON: CPT

## 2025-05-13 RX ORDER — SODIUM CHLORIDE 9 MG/ML
20 INJECTION, SOLUTION INTRAVENOUS ONCE
OUTPATIENT
Start: 2025-07-07

## 2025-05-13 RX ORDER — ACETAMINOPHEN 325 MG/1
650 TABLET ORAL ONCE
Status: COMPLETED | OUTPATIENT
Start: 2025-05-13 | End: 2025-05-13

## 2025-05-13 RX ORDER — SODIUM CHLORIDE 9 MG/ML
20 INJECTION, SOLUTION INTRAVENOUS ONCE
Status: COMPLETED | OUTPATIENT
Start: 2025-05-13 | End: 2025-05-13

## 2025-05-13 RX ORDER — ACETAMINOPHEN 325 MG/1
650 TABLET ORAL ONCE
OUTPATIENT
Start: 2025-07-07

## 2025-05-13 RX ORDER — METHYLPREDNISOLONE SODIUM SUCCINATE 40 MG/ML
40 INJECTION, POWDER, LYOPHILIZED, FOR SOLUTION INTRAMUSCULAR; INTRAVENOUS ONCE
Status: COMPLETED | OUTPATIENT
Start: 2025-05-13 | End: 2025-05-13

## 2025-05-13 RX ORDER — DIPHENHYDRAMINE HCL 25 MG
25 TABLET ORAL ONCE
OUTPATIENT
Start: 2025-07-07

## 2025-05-13 RX ORDER — DIPHENHYDRAMINE HCL 25 MG
25 TABLET ORAL ONCE
Status: COMPLETED | OUTPATIENT
Start: 2025-05-13 | End: 2025-05-13

## 2025-05-13 RX ORDER — METHYLPREDNISOLONE SODIUM SUCCINATE 40 MG/ML
40 INJECTION, POWDER, LYOPHILIZED, FOR SOLUTION INTRAMUSCULAR; INTRAVENOUS ONCE
OUTPATIENT
Start: 2025-07-07

## 2025-05-13 RX ADMIN — ACETAMINOPHEN 650 MG: 325 TABLET, FILM COATED ORAL at 10:48

## 2025-05-13 RX ADMIN — INFLIXIMAB-AXXQ 290 MG: 100 INJECTION, POWDER, LYOPHILIZED, FOR SOLUTION INTRAVENOUS at 11:33

## 2025-05-13 RX ADMIN — DIPHENHYDRAMINE HYDROCHLORIDE 25 MG: 25 TABLET ORAL at 10:48

## 2025-05-13 RX ADMIN — SODIUM CHLORIDE 20 ML/HR: 0.9 INJECTION, SOLUTION INTRAVENOUS at 10:50

## 2025-05-13 RX ADMIN — METHYLPREDNISOLONE SODIUM SUCCINATE 40 MG: 40 INJECTION, POWDER, FOR SOLUTION INTRAMUSCULAR; INTRAVENOUS at 10:48

## 2025-05-13 NOTE — PROGRESS NOTES
Pt here for Avsola, offers no complaints today. Denies recent infection and ABX use. PIV established with positive blood return noted. Tolerated entire infusion without complications. PIV removed. AVS declined. Next appt 7/8 9am. Walked out in stable condition.

## 2025-05-14 ENCOUNTER — OFFICE VISIT (OUTPATIENT)
Dept: GASTROENTEROLOGY | Facility: CLINIC | Age: 30
End: 2025-05-14
Payer: COMMERCIAL

## 2025-05-14 VITALS
TEMPERATURE: 97.5 F | WEIGHT: 134 LBS | OXYGEN SATURATION: 99 % | DIASTOLIC BLOOD PRESSURE: 80 MMHG | SYSTOLIC BLOOD PRESSURE: 100 MMHG | HEART RATE: 61 BPM | HEIGHT: 63 IN | BODY MASS INDEX: 23.74 KG/M2

## 2025-05-14 DIAGNOSIS — K50.012 CROHN'S DISEASE OF SMALL INTESTINE WITH INTESTINAL OBSTRUCTION (HCC): Primary | ICD-10-CM

## 2025-05-14 PROCEDURE — 99213 OFFICE O/P EST LOW 20 MIN: CPT | Performed by: PHYSICIAN ASSISTANT

## 2025-05-14 NOTE — ASSESSMENT & PLAN NOTE
Dx 2015    Initially treated with Humira however stopped due to recurrent fungal infections  Colonoscopy 7/2021 with terminal ileitis  She started Stelara in the fall of 2021 with continued with symptoms and struggled with side effects of the injections    Transitioned to Remicade in the fall of 2023  She has done mostly well but had to stop her infusions in the fall of 2024 due to recurrent infections    She is back on Avsola q 8 weeks - she was reinducted    She continues to report multiple loose stools daily without blood  She reports occasional abdominal pain    She was in the ER in December with pain  CT AP noted submucosal enhancement involving a very short segment of the terminal ileum compatible with active Crohn's  She was given a 3-month budesonide course but only completed 2 months before stopping    She is back to feeling mostly well except for the frequent stools    - Will update colonoscopy  -Update labs  -Check CT enterography as previously planned    Her last Avsola infusion was yesterday -will need levels prior to next infusion

## 2025-05-14 NOTE — PATIENT INSTRUCTIONS
Scheduled date of colonoscopy (as of today):5/19/25  Physician performing colonoscopy:Dionicio  Location of colonoscopy:[facility]  Bowel prep reviewed with patient:Ina/Miralax  Instructions reviewed with patient by:Fawad fernandes  Clearances:  none

## 2025-05-14 NOTE — PROGRESS NOTES
Name: Arminda Gutierrez      : 1995      MRN: [id number]  Encounter Provider: Haritha Tatum PA-C  Encounter Date: 2025   Encounter department: [facility] [address] [place]  :  Assessment & Plan  Crohn's disease of small intestine with intestinal obstruction (HCC)  Dx 2015    Initially treated with Humira however stopped due to recurrent fungal infections  Colonoscopy 2021 with terminal ileitis  She started Stelara in the  with continued with symptoms and struggled with side effects of the injections    Transitioned to Remicade in the   She has done mostly well but had to stop her infusions in the  due to recurrent infections    She is back on Avsola q 8 weeks - she was reinducted    She continues to report multiple loose stools daily without blood  She reports occasional abdominal pain    She was in the ER in December with pain  CT AP noted submucosal enhancement involving a very short segment of the terminal ileum compatible with active Crohn's  She was given a 3-month budesonide course but only completed 2 months before stopping    She is back to feeling mostly well except for the frequent stools    - Will update colonoscopy  -Update labs  -Check CT enterography as previously planned    Her last Avsola infusion was yesterday -will need levels prior to next infusion           History of Present Illness   HPI  Arminda Gutierrez is a 30 y.o. female with small bowel Crohn's who presents for routine follow-up.  She was diagnosed in  and initially treated with Humira.  Unfortunately she developed recurrent fungal infections and so was stopped.  She was hospitalized several x  with small bowel obstruction secondary to intussusception.  After her hospitalization she was started on Stelara.  She remained on Stelara for several years and it did seem to be helping however she continuously reported diarrhea and abdominal pain.  She was also  reporting the injections were painful.  Because of this she was transition to Remicade in October 2023.  Again, she seemed to be doing well on the infusions however between August the September of last year she developed recurrent infections and had to cancel multiple infusions.  This led to issues with her scheduling infusions and ultimately she was unable to restart her Avsola until November of last year.  She was reinducted.  In December she had an episode of severe abdominal pain and went to the emergency room.  CT suggested active Crohn's in the terminal ileum.  She was given a budesonide course.  She completed 2 months of the budesonide course before stopping.  She is back to feeling at baseline however continues to report frequent loose stools.  She has no severe pain and she is tolerating a diet without vomiting.  She has no rectal bleeding.  Her last colonoscopy was in November 2023 with a normal-appearing terminal ileum, pseudopolyp in the cecum and erythema in the rectum.  She was advised to follow-up for repeat colonoscopy in 1 year.      Review of Systems  Medical History Reviewed by provider this encounter:  Problems     .  Past Medical History   Past Medical History:   Diagnosis Date    Anxiety     COVID 08/08/2022    Crohn disease (HCC)     Depression     Inflammatory bowel disease 12/2015    Seizures (HCC)     as a child    Suicide attempt (HCC)      Past Surgical History:   Procedure Laterality Date    APPENDECTOMY LAPAROSCOPIC N/A 11/10/2023    Procedure: APPENDECTOMY LAPAROSCOPIC;  Surgeon: Mane Kent MD;  Location: [facility];  Service: General    COLONOSCOPY      VULVA BIOPSY Right 8/26/2020    Procedure: Excision of Right Labial Lesion;  Surgeon: Maegan Magana MD;  Location: [facility];  Service: Gynecology    VULVA BIOPSY N/A 10/21/2022    Procedure: RESECTION OF RIGHT LABIAL CYST;  Surgeon: Maegan Magana MD;  [facility];  Service: Gynecology     Family  "History   Problem Relation Age of Onset    Vitiligo Paternal Grandfather     Stroke Paternal Grandfather     Lupus Paternal Aunt     Completed Suicide  Maternal Grandmother     Autoimmune disease Paternal Aunt       reports that she has never smoked. She has never used smokeless tobacco. She reports current alcohol use. She reports current drug use. Frequency: 7.00 times per week. Drug: Marijuana.  Current Outpatient Medications   Medication Instructions    budesonide (ENTOCORT EC) 3 MG capsule 3 PO daily x 6 weeks then 2 po daily x 2 weeks then 1 po daily x 2 weeks then stop   Allergies[1]   Medications Ordered Prior to Encounter[2]   Social History     Tobacco Use    Smoking status: Never    Smokeless tobacco: Never   Vaping Use    Vaping status: Never Used   Substance and Sexual Activity    Alcohol use: Yes     Comment: very occasional    Drug use: Yes     Frequency: 7.0 times per week     Types: Marijuana     Comment: Medical Marijuana    Sexual activity: Not Currently     Partners: Female     Birth control/protection: None        Objective   /80 (BP Location: Left arm, Patient Position: Sitting, Cuff Size: Standard)   Pulse 61   Temp 97.5 °F (36.4 °C) (Temporal)   Ht 5' 3\" (1.6 m)   Wt 60.8 kg (134 lb)   SpO2 99%   BMI 23.74 kg/m²      Physical Exam           [1]   Allergies  Allergen Reactions    Amoxicillin Anaphylaxis   [2]   Current Outpatient Medications on File Prior to Visit   Medication Sig Dispense Refill    budesonide (ENTOCORT EC) 3 MG capsule 3 PO daily x 6 weeks then 2 po daily x 2 weeks then 1 po daily x 2 weeks then stop (Patient not taking: Reported on 5/14/2025) 90 capsule 1     Current Facility-Administered Medications on File Prior to Visit   Medication Dose Route Frequency Provider Last Rate Last Admin    [COMPLETED] acetaminophen (TYLENOL) tablet 650 mg  650 mg Oral Once Ramon Nolan DO   650 mg at 05/13/25 1048    alteplase (CATHFLO) injection 2 mg  2 mg Intracatheter Q1MIN " PRN Ramon Nolan, DO        [COMPLETED] diphenhydrAMINE (BENADRYL) tablet 25 mg  25 mg Oral Once Ramon Nolan DO   25 mg at 05/13/25 1048    [COMPLETED] inFLIXimab-axxq (AVSOLA) 290 mg in sodium chloride 0.9 % 221 mL IVPB  5 mg/kg Intravenous Once Ramon Nolan, DO   Stopped at 05/13/25 1341    [COMPLETED] methylPREDNISolone sodium succinate (Solu-MEDROL) injection 40 mg  40 mg Intravenous Once Ramon Nolan DO   40 mg at 05/13/25 1048    [COMPLETED] sodium chloride 0.9 % infusion  20 mL/hr Intravenous Once Ramon Nolan, DO   Stopped at 05/13/25 1343

## 2025-05-19 ENCOUNTER — TELEPHONE (OUTPATIENT)
Age: 30
End: 2025-05-19

## 2025-05-19 NOTE — TELEPHONE ENCOUNTER
Scheduled date of colonoscopy (as of today): 06/03/25  Physician performing colonoscopy: Dionicio  Location of colonoscopy: [facility]  Bowel prep reviewed with patient: given at office  Instructions reviewed with patient by: SF  Clearances: N/A    : Mary Gutierrez [phone]

## 2025-07-08 ENCOUNTER — TELEPHONE (OUTPATIENT)
Age: 30
End: 2025-07-08

## 2025-07-08 NOTE — TELEPHONE ENCOUNTER
Patient has been added to the Medication Management wait list with a referral.    Insurance: Archie FARFAN  Insurance Type:    Commercial []   Medicaid [x]   [facility] (if applicable)   Medicare []  Location Preference: None  Provider Preference: None  Virtual: Yes [x] No [x]  Were outside resources sent: Yes [x] No []

## 2025-07-08 NOTE — TELEPHONE ENCOUNTER
Message was left   for pt to return call regarding online inquiry. Please verify need of service and place on proper wait list

## 2025-07-09 ENCOUNTER — HOSPITAL ENCOUNTER (OUTPATIENT)
Dept: INFUSION CENTER | Facility: CLINIC | Age: 30
Discharge: HOME | End: 2025-07-09
Attending: INTERNAL MEDICINE
Payer: COMMERCIAL

## 2025-07-09 VITALS
DIASTOLIC BLOOD PRESSURE: 57 MMHG | TEMPERATURE: 97.1 F | SYSTOLIC BLOOD PRESSURE: 115 MMHG | HEART RATE: 69 BPM | RESPIRATION RATE: 16 BRPM | WEIGHT: 133.4 LBS | BODY MASS INDEX: 23.63 KG/M2

## 2025-07-09 DIAGNOSIS — K50.012 CROHN'S DISEASE OF SMALL INTESTINE WITH INTESTINAL OBSTRUCTION (HCC): Primary | ICD-10-CM

## 2025-07-09 PROCEDURE — 96415 CHEMO IV INFUSION ADDL HR: CPT

## 2025-07-09 PROCEDURE — 96375 TX/PRO/DX INJ NEW DRUG ADDON: CPT

## 2025-07-09 PROCEDURE — 96413 CHEMO IV INFUSION 1 HR: CPT

## 2025-07-09 RX ORDER — ACETAMINOPHEN 325 MG/1
650 TABLET ORAL ONCE
Status: COMPLETED | OUTPATIENT
Start: 2025-07-09 | End: 2025-07-09

## 2025-07-09 RX ORDER — METHYLPREDNISOLONE SODIUM SUCCINATE 40 MG/ML
40 INJECTION, POWDER, LYOPHILIZED, FOR SOLUTION INTRAMUSCULAR; INTRAVENOUS ONCE
Status: COMPLETED | OUTPATIENT
Start: 2025-07-09 | End: 2025-07-09

## 2025-07-09 RX ORDER — SODIUM CHLORIDE 9 MG/ML
20 INJECTION, SOLUTION INTRAVENOUS ONCE
OUTPATIENT
Start: 2025-09-02

## 2025-07-09 RX ORDER — METHYLPREDNISOLONE SODIUM SUCCINATE 40 MG/ML
40 INJECTION, POWDER, LYOPHILIZED, FOR SOLUTION INTRAMUSCULAR; INTRAVENOUS ONCE
OUTPATIENT
Start: 2025-09-02

## 2025-07-09 RX ORDER — ACETAMINOPHEN 325 MG/1
650 TABLET ORAL ONCE
OUTPATIENT
Start: 2025-09-02

## 2025-07-09 RX ORDER — SODIUM CHLORIDE 9 MG/ML
20 INJECTION, SOLUTION INTRAVENOUS ONCE
Status: COMPLETED | OUTPATIENT
Start: 2025-07-09 | End: 2025-07-09

## 2025-07-09 RX ORDER — DIPHENHYDRAMINE HCL 25 MG
25 TABLET ORAL ONCE
Status: COMPLETED | OUTPATIENT
Start: 2025-07-09 | End: 2025-07-09

## 2025-07-09 RX ORDER — DIPHENHYDRAMINE HCL 25 MG
25 TABLET ORAL ONCE
OUTPATIENT
Start: 2025-09-02

## 2025-07-09 RX ADMIN — DIPHENHYDRAMINE HYDROCHLORIDE 25 MG: 25 TABLET ORAL at 12:47

## 2025-07-09 RX ADMIN — INFLIXIMAB-AXXQ 300 MG: 100 INJECTION, POWDER, LYOPHILIZED, FOR SOLUTION INTRAVENOUS at 13:27

## 2025-07-09 RX ADMIN — SODIUM CHLORIDE 20 ML/HR: 0.9 INJECTION, SOLUTION INTRAVENOUS at 12:49

## 2025-07-09 RX ADMIN — ACETAMINOPHEN 650 MG: 325 TABLET, FILM COATED ORAL at 12:47

## 2025-07-09 RX ADMIN — METHYLPREDNISOLONE SODIUM SUCCINATE 40 MG: 40 INJECTION, POWDER, FOR SOLUTION INTRAMUSCULAR; INTRAVENOUS at 12:48

## 2025-07-09 NOTE — PROGRESS NOTES
Arminda AAYNA Matt presents for Avla, offers no complaints. PIV placed with positive blood return. Tolerated treatment well with no complications.      Arminda Gutierrez is aware of future appt on 9/3/25 at 1:30 pm. PIV removed.    AVS declined.

## 2025-07-11 ENCOUNTER — TELEPHONE (OUTPATIENT)
Age: 30
End: 2025-07-11

## 2025-07-11 NOTE — TELEPHONE ENCOUNTER
Patients GI provider:  LEXIE CARRILLO    Number to return call: [phone]    Reason for call: Received call from [employer]. Avsola is Approved until 9/11/2025. Medication does not need review until 9/11/2025 even with change in pharmacy as they are par.   Ref # [id number]

## 2025-07-11 NOTE — TELEPHONE ENCOUNTER
Luana Bello MA  TB    7/11/25  2:17 PM  Note     Patients GI provider:  LEXIE CARRILLO     Number to return call: [phone]     Reason for call: Received call from [employer]. Avsola is Approved until 9/11/2025. Medication does not need review until 9/11/2025 even with change in pharmacy as they are par.   Ref # [id number]

## 2025-07-11 NOTE — TELEPHONE ENCOUNTER
Current authorization for Avsola is on file. [facility] center [facility] has changed. Will submit new auth

## 2025-07-17 ENCOUNTER — OFFICE VISIT (OUTPATIENT)
Dept: INTERNAL MEDICINE CLINIC | Facility: CLINIC | Age: 30
End: 2025-07-17
Payer: COMMERCIAL

## 2025-07-17 VITALS
DIASTOLIC BLOOD PRESSURE: 78 MMHG | BODY MASS INDEX: 23.25 KG/M2 | HEART RATE: 78 BPM | RESPIRATION RATE: 16 BRPM | OXYGEN SATURATION: 98 % | HEIGHT: 63 IN | WEIGHT: 131.2 LBS | SYSTOLIC BLOOD PRESSURE: 102 MMHG

## 2025-07-17 DIAGNOSIS — K50.019 CROHN'S DISEASE OF SMALL INTESTINE WITH COMPLICATION (HCC): Chronic | ICD-10-CM

## 2025-07-17 DIAGNOSIS — F41.9 ANXIETY: Primary | ICD-10-CM

## 2025-07-17 PROBLEM — E44.1 MILD PROTEIN-CALORIE MALNUTRITION (HCC): Status: RESOLVED | Noted: 2023-01-12 | Resolved: 2025-07-17

## 2025-07-17 PROBLEM — F43.25 ADJUSTMENT DISORDER WITH MIXED DISTURBANCE OF EMOTIONS AND CONDUCT: Status: RESOLVED | Noted: 2019-10-18 | Resolved: 2025-07-17

## 2025-07-17 PROBLEM — M25.552 ACUTE HIP PAIN, LEFT: Status: RESOLVED | Noted: 2020-06-22 | Resolved: 2025-07-17

## 2025-07-17 PROBLEM — U07.1 COVID-19: Status: RESOLVED | Noted: 2022-08-23 | Resolved: 2025-07-17

## 2025-07-17 PROBLEM — N90.89 LABIAL LESION: Status: RESOLVED | Noted: 2020-08-26 | Resolved: 2025-07-17

## 2025-07-17 PROBLEM — F10.920: Status: RESOLVED | Noted: 2022-08-23 | Resolved: 2025-07-17

## 2025-07-17 PROBLEM — E87.29 METABOLIC ACIDOSIS, INCREASED ANION GAP: Status: RESOLVED | Noted: 2022-08-23 | Resolved: 2025-07-17

## 2025-07-17 PROBLEM — Z00.8 MEDICAL CLEARANCE FOR PSYCHIATRIC ADMISSION: Status: RESOLVED | Noted: 2019-10-18 | Resolved: 2025-07-17

## 2025-07-17 PROBLEM — R11.2 INTRACTABLE NAUSEA AND VOMITING: Status: RESOLVED | Noted: 2021-07-25 | Resolved: 2025-07-17

## 2025-07-17 PROBLEM — F12.29: Status: RESOLVED | Noted: 2023-11-09 | Resolved: 2025-07-17

## 2025-07-17 PROBLEM — F12.90 MARIJUANA USE: Chronic | Status: RESOLVED | Noted: 2020-08-27 | Resolved: 2025-07-17

## 2025-07-17 PROBLEM — F13.10 BENZODIAZEPINE ABUSE (HCC): Status: RESOLVED | Noted: 2019-10-18 | Resolved: 2025-07-17

## 2025-07-17 PROBLEM — K56.1 INTUSSUSCEPTION (HCC): Status: RESOLVED | Noted: 2021-07-25 | Resolved: 2025-07-17

## 2025-07-17 PROBLEM — Z98.890 S/P GENITAL SURGERY: Status: RESOLVED | Noted: 2020-08-26 | Resolved: 2025-07-17

## 2025-07-17 PROBLEM — T50.902A OVERDOSE, INTENTIONAL SELF-HARM, INITIAL ENCOUNTER (HCC): Status: RESOLVED | Noted: 2023-11-09 | Resolved: 2025-07-17

## 2025-07-17 PROBLEM — M25.521 RIGHT ELBOW PAIN: Status: RESOLVED | Noted: 2020-06-22 | Resolved: 2025-07-17

## 2025-07-17 PROBLEM — R10.9 ABDOMINAL PAIN: Status: RESOLVED | Noted: 2021-07-25 | Resolved: 2025-07-17

## 2025-07-17 PROCEDURE — 99214 OFFICE O/P EST MOD 30 MIN: CPT

## 2025-07-17 RX ORDER — ALPRAZOLAM 0.5 MG
0.5 TABLET ORAL DAILY PRN
Qty: 5 TABLET | Refills: 0 | Status: SHIPPED | OUTPATIENT
Start: 2025-07-17 | End: 2025-07-30 | Stop reason: SDUPTHER

## 2025-07-17 RX ORDER — ALPRAZOLAM 0.5 MG
0.5 TABLET ORAL
Qty: 5 TABLET | Refills: 0 | Status: SHIPPED | OUTPATIENT
Start: 2025-07-17 | End: 2025-07-17

## 2025-07-17 RX ORDER — BUSPIRONE HYDROCHLORIDE 10 MG/1
10 TABLET ORAL
Qty: 180 TABLET | Refills: 0 | Status: SHIPPED | OUTPATIENT
Start: 2025-07-17 | End: 2025-07-17

## 2025-07-17 RX ORDER — BUSPIRONE HYDROCHLORIDE 10 MG/1
10 TABLET ORAL 2 TIMES DAILY
Qty: 120 TABLET | Refills: 0 | Status: SHIPPED | OUTPATIENT
Start: 2025-07-17

## 2025-07-17 NOTE — ASSESSMENT & PLAN NOTE
[place] on Saturday and coming back next Thursday.  In the past she has had to cut trips short due to her anxiety with flying and in anticipation of flying.  Five 0.5 mg Xanax provided for patient to use for each flight.  Encouraged patient to break tablets in half if needed, especially for use while she is in [place] so she does not experience too many sedative side effects.  Patient does have a history of suicidal ideation in the past, none currently.  Discussed with patient the dangers of use of benzodiazepines.  Patient reports that her mother will be holding the medication.  Patient verbalizes understanding.    For long-term maintenance, will initiate patient on BuSpar 10 mg twice daily.  Discussed depression as a component, patient reports that her depressive symptoms are as a result to her anxiety.  She feels like she is confined to her home due to her GI issues and her anxiety surrounding this.  After discussion with the patient, we will attempt to control her anxiety and reassess her symptoms once her anxiety is controlled. Patient will follow-up in 1 month for reassessment to see if there is improvement in the patient's anxiety as well as if there is a need to increase dosing.  Patient is agreeable with this plan.  GEORGINA-7 Flowsheet Screening      Flowsheet Row Most Recent Value   Over the last two weeks, how often have you been bothered by the following problems?     Feeling nervous, anxious, or on edge 3   Not being able to stop or control worrying 3   Worrying too much about different things 2   Trouble relaxing  2   Being so restless that it's hard to sit still 0   Becoming easily annoyed or irritable  2   Feeling afraid as if something awful might happen 2   How difficult have these problems made it for you to do your work, take care of things at home, or get along with other people?  Extremely difficult   GEORGINA Score  14          PHQ-2/9 Depression Screening    Little interest or pleasure in doing  things: 2 - more than half the days  Feeling down, depressed, or hopeless: 2 - more than half the days  Trouble falling or staying asleep, or sleeping too much: 3 - nearly every day  Feeling tired or having little energy: 2 - more than half the days  Poor appetite or overeatin - several days  Feeling bad about yourself - or that you are a failure or have let yourself or your family down: 1 - several days  Trouble concentrating on things, such as reading the newspaper or watching television: 2 - more than half the days  Moving or speaking so slowly that other people could have noticed. Or the opposite - being so fidgety or restless that you have been moving around a lot more than usual: 0 - not at all  Thoughts that you would be better off dead, or of hurting yourself in some way: 0 - not at all  PHQ-2 Score: 4  PHQ-2 Interpretation: POSITIVE depression screen  PHQ-9 Score: 13  PHQ-9 Interpretation: Moderate depression         Orders:    ALPRAZolam (XANAX) 0.5 mg tablet; Take 1 tablet (0.5 mg total) by mouth daily as needed for anxiety (anxiety with travel)    busPIRone (BUSPAR) 10 mg tablet; Take 1 tablet (10 mg total) by mouth in the morning and 1 tablet (10 mg total) in the evening.

## 2025-07-17 NOTE — ASSESSMENT & PLAN NOTE
Remicade infusions for about 2 years, currently Avsola infusions. She has a colonoscopy scheduled on 7/28. She feels her symptoms are pretty well controlled, minimal blood and multiple loose stools daily.  Next appointment with GI after the colonoscopy is on 8/12.

## 2025-07-17 NOTE — PROGRESS NOTES
Name: Arminda Gutierrez      : 1995      MRN: [id number]  Encounter Provider: WARD Washington  Encounter Date: 2025   Encounter department: [facility] INTERNAL MEDICINE [place]  :  Assessment & Plan  Anxiety  [place] on Saturday and coming back next Thursday.  In the past she has had to cut trips short due to her anxiety with flying and in anticipation of flying.  Five 0.5 mg Xanax provided for patient to use for each flight.  Encouraged patient to break tablets in half if needed, especially for use while she is in [place] so she does not experience too many sedative side effects.  Patient does have a history of suicidal ideation in the past, none currently.  Discussed with patient the dangers of use of benzodiazepines.  Patient reports that her mother will be holding the medication.  Patient verbalizes understanding.    For long-term maintenance, will initiate patient on BuSpar 10 mg twice daily.  Discussed depression as a component, patient reports that her depressive symptoms are as a result to her anxiety.  She feels like she is confined to her home due to her GI issues and her anxiety surrounding this.  After discussion with the patient, we will attempt to control her anxiety and reassess her symptoms once her anxiety is controlled. Patient will follow-up in 1 month for reassessment to see if there is improvement in the patient's anxiety as well as if there is a need to increase dosing.  Patient is agreeable with this plan.  GEORGINA-7 Flowsheet Screening      Flowsheet Row Most Recent Value   Over the last two weeks, how often have you been bothered by the following problems?     Feeling nervous, anxious, or on edge 3   Not being able to stop or control worrying 3   Worrying too much about different things 2   Trouble relaxing  2   Being so restless that it's hard to sit still 0   Becoming easily annoyed or irritable  2   Feeling afraid as if something awful might happen 2   How difficult  have these problems made it for you to do your work, take care of things at home, or get along with other people?  Extremely difficult   GEORGINA Score  14          PHQ-2/9 Depression Screening    Little interest or pleasure in doing things: 2 - more than half the days  Feeling down, depressed, or hopeless: 2 - more than half the days  Trouble falling or staying asleep, or sleeping too much: 3 - nearly every day  Feeling tired or having little energy: 2 - more than half the days  Poor appetite or overeatin - several days  Feeling bad about yourself - or that you are a failure or have let yourself or your family down: 1 - several days  Trouble concentrating on things, such as reading the newspaper or watching television: 2 - more than half the days  Moving or speaking so slowly that other people could have noticed. Or the opposite - being so fidgety or restless that you have been moving around a lot more than usual: 0 - not at all  Thoughts that you would be better off dead, or of hurting yourself in some way: 0 - not at all  PHQ-2 Score: 4  PHQ-2 Interpretation: POSITIVE depression screen  PHQ-9 Score: 13  PHQ-9 Interpretation: Moderate depression         Orders:    ALPRAZolam (XANAX) 0.5 mg tablet; Take 1 tablet (0.5 mg total) by mouth daily as needed for anxiety (anxiety with travel)    busPIRone (BUSPAR) 10 mg tablet; Take 1 tablet (10 mg total) by mouth in the morning and 1 tablet (10 mg total) in the evening.    Crohn's disease of small intestine with complication (HCC)  Remicade infusions for about 2 years, currently Avsola infusions. She has a colonoscopy scheduled on . She feels her symptoms are pretty well controlled, minimal blood and multiple loose stools daily.  Next appointment with GI after the colonoscopy is on .             Depression Screening and Follow-up Plan: Patient's depression screening was positive with a PHQ-2 score of 4. Their PHQ-9 score was 13.   Patient assessed for underlying  major depression. Brief counseling provided and recommend additional follow-up/re-evaluation next office visit.       History of Present Illness   Arminda is here today to discuss options of helping control her anxiety. She is currently being treated for Crohn's for quite a few years now.  She feels like she is confined to her house and has anxiety with leaving which alters her mood.  She is unable to hold jobs due to this as well.  She has tried hydroxyzine in the past and Lexapro, the hydroxyzine just made her too drowsy to the point where she could not function and she did not feel that the Lexapro was helping her.  She also has a difficult time with travel, her family travels frequently.  She has had to cut her trips short in the past due to her anxiety.  She has taken a family member's Xanax in the past which does help.  She currently has no SI or HI.  She believes that her anxiety feeds into her depression and that depression is not her issue.  She has a good support system.        Anxiety  Symptoms include nervous/anxious behavior and palpitations (with anxiety). Patient reports no chest pain, dizziness, nausea, shortness of breath or suicidal ideas.         Review of Systems   Constitutional:  Negative for chills and fever.   HENT:  Negative for congestion, ear pain, rhinorrhea and sore throat.    Eyes:  Negative for pain and visual disturbance.   Respiratory:  Positive for chest tightness (with anxiety). Negative for cough and shortness of breath.    Cardiovascular:  Positive for palpitations (with anxiety). Negative for chest pain and leg swelling.   Gastrointestinal:  Positive for diarrhea. Negative for abdominal pain, constipation, nausea and vomiting.   Musculoskeletal:  Negative for arthralgias and back pain.   Skin:  Negative for color change and rash.   Neurological:  Negative for dizziness, seizures, syncope and headaches.   Psychiatric/Behavioral:  Positive for dysphoric mood. Negative for suicidal  "ideas. The patient is nervous/anxious.    All other systems reviewed and are negative.      Objective   /78 (BP Location: Left arm, Patient Position: Sitting, Cuff Size: Adult)   Pulse 78   Resp 16   Ht 5' 3\" (1.6 m)   Wt 59.5 kg (131 lb 3.2 oz)   SpO2 98%   BMI 23.24 kg/m²      Physical Exam  Vitals and nursing note reviewed.   Constitutional:       General: She is not in acute distress.     Appearance: She is well-developed.     Cardiovascular:      Rate and Rhythm: Normal rate and regular rhythm.      Pulses: Normal pulses.      Heart sounds: Normal heart sounds. No murmur heard.  Pulmonary:      Effort: Pulmonary effort is normal. No respiratory distress.      Breath sounds: Normal breath sounds.   Abdominal:      General: Bowel sounds are normal.      Palpations: Abdomen is soft.      Tenderness: There is no abdominal tenderness.     Musculoskeletal:         General: No swelling. Normal range of motion.      Right lower leg: No edema.      Left lower leg: No edema.     Skin:     General: Skin is warm and dry.      Capillary Refill: Capillary refill takes less than 2 seconds.     Neurological:      Mental Status: She is alert and oriented to person, place, and time.     Psychiatric:         Mood and Affect: Mood normal.         "

## 2025-07-30 DIAGNOSIS — F41.9 ANXIETY: ICD-10-CM

## 2025-07-30 RX ORDER — ALPRAZOLAM 0.5 MG
0.5 TABLET ORAL DAILY PRN
Qty: 1 TABLET | Refills: 0 | Status: SHIPPED | OUTPATIENT
Start: 2025-07-30

## 2025-08-01 ENCOUNTER — HOSPITAL ENCOUNTER (OUTPATIENT)
Dept: GASTROENTEROLOGY | Facility: HOSPITAL | Age: 30
Setting detail: OUTPATIENT SURGERY
Discharge: HOME | End: 2025-08-01
Attending: PHYSICIAN ASSISTANT
Payer: COMMERCIAL

## 2025-08-01 ENCOUNTER — ANESTHESIA (OUTPATIENT)
Dept: GASTROENTEROLOGY | Facility: HOSPITAL | Age: 30
End: 2025-08-01
Payer: COMMERCIAL

## 2025-08-01 ENCOUNTER — ANESTHESIA EVENT (OUTPATIENT)
Dept: GASTROENTEROLOGY | Facility: HOSPITAL | Age: 30
End: 2025-08-01
Payer: COMMERCIAL

## 2025-08-01 VITALS
DIASTOLIC BLOOD PRESSURE: 70 MMHG | RESPIRATION RATE: 16 BRPM | HEART RATE: 51 BPM | SYSTOLIC BLOOD PRESSURE: 99 MMHG | OXYGEN SATURATION: 100 % | TEMPERATURE: 97.9 F | BODY MASS INDEX: 22.81 KG/M2 | WEIGHT: 128.75 LBS | HEIGHT: 63 IN

## 2025-08-01 DIAGNOSIS — K50.012 CROHN'S DISEASE OF SMALL INTESTINE WITH INTESTINAL OBSTRUCTION (HCC): ICD-10-CM

## 2025-08-01 PROCEDURE — 88305 TISSUE EXAM BY PATHOLOGIST: CPT | Performed by: PATHOLOGY

## 2025-08-01 PROCEDURE — 45380 COLONOSCOPY AND BIOPSY: CPT | Performed by: INTERNAL MEDICINE

## 2025-08-01 RX ORDER — LIDOCAINE HYDROCHLORIDE 20 MG/ML
INJECTION, SOLUTION EPIDURAL; INFILTRATION; INTRACAUDAL; PERINEURAL AS NEEDED
Status: DISCONTINUED | OUTPATIENT
Start: 2025-08-01 | End: 2025-08-01

## 2025-08-01 RX ORDER — SODIUM CHLORIDE, SODIUM LACTATE, POTASSIUM CHLORIDE, CALCIUM CHLORIDE 600; 310; 30; 20 MG/100ML; MG/100ML; MG/100ML; MG/100ML
INJECTION, SOLUTION INTRAVENOUS CONTINUOUS PRN
Status: DISCONTINUED | OUTPATIENT
Start: 2025-08-01 | End: 2025-08-01

## 2025-08-01 RX ORDER — PROPOFOL 10 MG/ML
INJECTION, EMULSION INTRAVENOUS AS NEEDED
Status: DISCONTINUED | OUTPATIENT
Start: 2025-08-01 | End: 2025-08-01

## 2025-08-01 RX ADMIN — PROPOFOL 100 MG: 10 INJECTION, EMULSION INTRAVENOUS at 11:53

## 2025-08-01 RX ADMIN — SODIUM CHLORIDE, SODIUM LACTATE, POTASSIUM CHLORIDE, AND CALCIUM CHLORIDE: .6; .31; .03; .02 INJECTION, SOLUTION INTRAVENOUS at 11:45

## 2025-08-01 RX ADMIN — PROPOFOL 100 MG: 10 INJECTION, EMULSION INTRAVENOUS at 11:56

## 2025-08-01 RX ADMIN — LIDOCAINE HYDROCHLORIDE 100 MG: 20 INJECTION, SOLUTION EPIDURAL; INFILTRATION; INTRACAUDAL; PERINEURAL at 11:51

## 2025-08-01 RX ADMIN — PROPOFOL 100 MG: 10 INJECTION, EMULSION INTRAVENOUS at 11:51

## 2025-08-05 PROCEDURE — 88305 TISSUE EXAM BY PATHOLOGIST: CPT | Performed by: PATHOLOGY

## (undated) DEVICE — TROCARS: Brand: KII® BALLOON BLUNT TIP SYSTEM

## (undated) DEVICE — ALLENTOWN LAP CHOLE APP PACK: Brand: CARDINAL HEALTH

## (undated) DEVICE — [HIGH FLOW INSUFFLATOR,  DO NOT USE IF PACKAGE IS DAMAGED,  KEEP DRY,  KEEP AWAY FROM SUNLIGHT,  PROTECT FROM HEAT AND RADIOACTIVE SOURCES.]: Brand: PNEUMOSURE

## (undated) DEVICE — DRAPE EQUIPMENT RF WAND

## (undated) DEVICE — SCD SEQUENTIAL COMPRESSION COMFORT SLEEVE MEDIUM KNEE LENGTH: Brand: KENDALL SCD

## (undated) DEVICE — INTENDED FOR TISSUE SEPARATION, AND OTHER PROCEDURES THAT REQUIRE A SHARP SURGICAL BLADE TO PUNCTURE OR CUT.: Brand: BARD-PARKER SAFETY BLADES SIZE 15, STERILE

## (undated) DEVICE — NEPTUNE E-SEP SMOKE EVACUATION PENCIL, COATED, 70MM BLADE, PUSH BUTTON SWITCH: Brand: NEPTUNE E-SEP

## (undated) DEVICE — SUT VICRYL 0 UR-6 27 IN J603H

## (undated) DEVICE — TROCAR: Brand: KII® SLEEVE

## (undated) DEVICE — CHLORAPREP HI-LITE 26ML ORANGE

## (undated) DEVICE — SUT MONOCRYL 4-0 PS-2 18 IN Y496G

## (undated) DEVICE — BETHLEHEM UNIVERSAL MINOR VAG: Brand: CARDINAL HEALTH

## (undated) DEVICE — GLOVE PI ULTRA TOUCH SZ.7.5

## (undated) DEVICE — GLOVE SRG BIOGEL 7

## (undated) DEVICE — IODOFORM PACKING STRIP: Brand: CURITY

## (undated) DEVICE — UNDYED BRAIDED (POLYGLACTIN 910), SYNTHETIC ABSORBABLE SUTURE: Brand: COATED VICRYL

## (undated) DEVICE — 4-PORT MANIFOLD: Brand: NEPTUNE 2

## (undated) DEVICE — PAD GROUNDING ADULT

## (undated) DEVICE — TROCAR: Brand: KII FIOS FIRST ENTRY

## (undated) DEVICE — PENCIL ELECTROSURG E-Z CLEAN -0035H

## (undated) DEVICE — MEDI-VAC YANKAUER SUCTION HANDLE W/STRAIGHT TIP & CONTROL VENT: Brand: CARDINAL HEALTH

## (undated) DEVICE — ENDOPATH ETS45 2.5MM RELOADS (VASCULAR/THIN): Brand: ENDOPATH

## (undated) DEVICE — MAXI PAD5.51 X 13.78 IN. (14.0 X 35.0 CM)HEAVYCONTOUREDUNSCENTED: Brand: CURITY

## (undated) DEVICE — 3M™ TEGADERM™ TRANSPARENT FILM DRESSING FRAME STYLE, 1624W, 2-3/8 IN X 2-3/4 IN (6 CM X 7 CM), 100/CT 4CT/CASE: Brand: 3M™ TEGADERM™

## (undated) DEVICE — LIGHT HANDLE COVER SLEEVE DISP BLUE STELLAR

## (undated) DEVICE — GAUZE SPONGES,8 PLY: Brand: CURITY

## (undated) DEVICE — STANDARD SURGICAL GOWN, L: Brand: CONVERTORS

## (undated) DEVICE — INTENDED FOR TISSUE SEPARATION, AND OTHER PROCEDURES THAT REQUIRE A SHARP SURGICAL BLADE TO PUNCTURE OR CUT.: Brand: BARD-PARKER SAFETY BLADES SIZE 11, STERILE

## (undated) DEVICE — PACK PBDS MAJOR GYN VAGINAL SLB

## (undated) DEVICE — TISSUE RETRIEVAL SYSTEM: Brand: INZII RETRIEVAL SYSTEM

## (undated) DEVICE — SUT VICRYL 3-0 SH 27 IN J416H

## (undated) DEVICE — SUT CHROMIC 2-0 CT-2 27 IN 883H

## (undated) DEVICE — 3M™ STERI-STRIP™ REINFORCED ADHESIVE SKIN CLOSURES, R1546, 1/4 IN X 4 IN (6 MM X 100 MM), 10 STRIPS/ENVELOPE: Brand: 3M™ STERI-STRIP™

## (undated) DEVICE — TUBING SUCTION 5MM X 12 FT

## (undated) DEVICE — TUBING SMOKE EVAC W/FILTRATION DEVICE PLUMEPORT ACTIV

## (undated) DEVICE — ENDOPATH ETS-FLEX45 ARTICULATING ENDOSCOPIC LINEAR CUTTER, NO RELOAD: Brand: ENDOPATH

## (undated) DEVICE — 3M™ STERI-STRIP™ REINFORCED ADHESIVE SKIN CLOSURES, R1542, 1/4 IN X 1-1/2 IN (6 MM X 38 MM), 6 STRIPS/ENVELOPE: Brand: 3M™ STERI-STRIP™

## (undated) DEVICE — ELECTRODE LAP L WIRE E-Z CLEAN 33CM -0100

## (undated) DEVICE — TOWEL SURG XR DETECT GREEN STRL RFD

## (undated) DEVICE — PLUMEPEN PRO 10FT

## (undated) DEVICE — ETS45 RELOAD STANDARD 45MM: Brand: ENDOPATH